# Patient Record
Sex: FEMALE | Race: WHITE | NOT HISPANIC OR LATINO | Employment: STUDENT | URBAN - METROPOLITAN AREA
[De-identification: names, ages, dates, MRNs, and addresses within clinical notes are randomized per-mention and may not be internally consistent; named-entity substitution may affect disease eponyms.]

---

## 2017-03-02 ENCOUNTER — OFFICE VISIT (OUTPATIENT)
Dept: OPHTHALMOLOGY | Facility: CLINIC | Age: 27
End: 2017-03-02

## 2017-03-02 DIAGNOSIS — H52.13 MYOPIA, BILATERAL: Primary | ICD-10-CM

## 2017-03-02 RX ORDER — SPIRONOLACTONE 50 MG/1
TABLET, FILM COATED ORAL
COMMUNITY
Start: 2017-02-26 | End: 2018-12-28

## 2017-03-02 ASSESSMENT — SLIT LAMP EXAM - LIDS
COMMENTS: NORMAL
COMMENTS: NORMAL

## 2017-03-02 ASSESSMENT — REFRACTION_MANIFEST
OD_CYLINDER: SPHERE
OS_SPHERE: -0.25
OD_SPHERE: -0.50
OS_CYLINDER: SPHERE

## 2017-03-02 ASSESSMENT — REFRACTION_WEARINGRX
SPECS_TYPE: SVL
OS_CYLINDER: SPHERE
OD_AXIS: 007
OD_SPHERE: -1.25
OD_CYLINDER: +0.25
OS_SPHERE: -0.75

## 2017-03-02 ASSESSMENT — VISUAL ACUITY
CORRECTION_TYPE: GLASSES
OD_CC: 20/20
OD_CC: J1+
OS_CC: J1+
METHOD: SNELLEN - LINEAR
OS_CC: 20/20

## 2017-03-02 ASSESSMENT — CONF VISUAL FIELD
OD_NORMAL: 1
OS_NORMAL: 1
METHOD: COUNTING FINGERS

## 2017-03-02 ASSESSMENT — REFRACTION
OS_SPHERE: -0.50
OD_SPHERE: -0.75

## 2017-03-02 ASSESSMENT — EXTERNAL EXAM - LEFT EYE: OS_EXAM: NORMAL

## 2017-03-02 ASSESSMENT — TONOMETRY
IOP_METHOD: ICARE
OD_IOP_MMHG: 17
OS_IOP_MMHG: 16

## 2017-03-02 ASSESSMENT — CUP TO DISC RATIO
OS_RATIO: 0.25
OD_RATIO: 0.25

## 2017-03-02 ASSESSMENT — EXTERNAL EXAM - RIGHT EYE: OD_EXAM: NORMAL

## 2017-03-02 NOTE — NURSING NOTE
Chief Complaints and History of Present Illnesses   Patient presents with     Annual Eye Exam     HPI    Affected eye(s):  Both   Symptoms:     No blurred vision      Frequency:  Constant       Do you have eye pain now?:  No      Comments:  Pt states not having any problems with vision. Only wears gls for driving. Pt last eye exam 3 years ago. No pain. No drops.    Jose Denton COT 11:18 AM March 2, 2017

## 2017-03-02 NOTE — MR AVS SNAPSHOT
After Visit Summary   3/2/2017    Debbie Bah    MRN: [id number]           Patient Information     Date Of Birth          1990        Visit Information        Provider Department      3/2/2017 11:00 AM Yvonne Renee OD [facility]        Today's Diagnoses     Myopia, bilateral    -  1       Follow-ups after your visit        Your next 10 appointments already scheduled     Mar 24, 2017 10:45 AM CDT   [facility] with HARJINDER Conn CNP   [facility] ([facility]    [address] [place] [id number] [phone]              Who to contact     Please call your clinic at [phone] to:    Ask questions about your health    Make or cancel appointments    Discuss your medicines    Learn about your test results    Speak to your doctor   If you have compliments or concerns about an experience at your clinic, or if you wish to file a complaint, please contact [facility] Patient Relations at [phone] or email us at [email or web address]         Additional Information About Your Visit        MyChart Information     [employer] gives you secure access to your electronic health record. If you see a primary care provider, you can also send messages to your care team and make appointments. If you have questions, please call your primary care clinic.  If you do not have a primary care provider, please call [phone] and they will assist you.      [employer] is an electronic gateway that provides easy, online access to your medical records. With [employer], you can request a clinic appointment, read your test results, renew a prescription or communicate with your care team.     To access your existing account, please contact your [facility] or call [phone] for assistance.        Care EveryWhere ID     This is your Care EveryWhere ID. This  could be used by other organizations to access your [facility] medical records  [id number]         Blood Pressure from Last 3 Encounters:   08/03/16 122/74   03/08/16 126/78    Weight from Last 3 Encounters:   08/03/16 100.6 kg (221 lb 12.8 oz)   03/08/16 101.2 kg (223 lb)              We Performed the Following     Refraction        Primary Care Provider    None Specified       No primary provider on file.        Thank you!     Thank you for choosing [facility]  for your care. Our goal is always to provide you with excellent care. Hearing back from our patients is one way we can continue to improve our services. Please take a few minutes to complete the written survey that you may receive in the mail after your visit with us. Thank you!             Your Updated Medication List - Protect others around you: Learn how to safely use, store and throw away your medicines at www.disposemymeds.org.          This list is accurate as of: 3/2/17 12:00 PM.  Always use your most recent med list.                   Brand Name Dispense Instructions for use    buPROPion 150 MG 24 hr tablet    WELLBUTRIN XL    180 tablet    Take 2 tablets (300 mg) by mouth every morning Please make an appointment to be seen prior to next script.       LORazepam 0.5 MG tablet    ATIVAN    8 tablet    Take 0.5-1 tablets (0.25-0.5 mg) by mouth every 8 hours as needed for anxiety Take 30 minutes prior to departure.  Do not operate a vehicle after taking this medication       spironolactone 50 MG tablet    ALDACTONE

## 2017-03-23 NOTE — PROGRESS NOTES
SUBJECTIVE:     CC: Debbie Bah is an 26 year old woman who presents for preventive health visit.   Answers for HPI/ROS submitted by the patient on 3/21/2017   Annual Exam:  Getting at least 3 servings of Calcium per day:: Yes  Bi-annual eye exam:: Yes  Dental care twice a year:: Yes  Sleep apnea or symptoms of sleep apnea:: Daytime drowsiness  Diet:: Vegetarian/vegan  Frequency of exercise:: 2-3 days/week  Taking medications regularly:: Yes  Medication side effects:: None  Additional concerns today:: YES  Q1: Little interest or pleasure in doing things: 0=Not at all  Q2: Feeling down, depressed or hopeless: 0=Not at all  PHQ-2 Score: 0  Duration of exercise:: 30-45 minutes      Depression Follow-Up (situational anxiety with flying)    Status since last visit: Improved     Increased Wellbutrin from 150-300 mg for depression symptoms in August - had not noticed difference at 150 mg.  Would like to taper    Feels other approac.  Going to [facility] - therapy, chiropractor, massage.  Exercising - currently 3 days of the week.  Ranges from 3-7 days and does bike for transportation . Improved diet - increased protein, more plant based, less dairy and less gluten.  No sugar. Chemistry of Renea recommendations, aromatherapy.  Seeing health .  Lost weight. Seeing dermtology    Other associated symptoms:None    Complicating factors:     Significant life event: No     Current substance abuse: None    PHQ-9 SCORE 6/30/2016 8/3/2016 3/24/2017   Total Score MyChart 6 (Mild depression) - -   Total Score - 9 3     TRAVIS-7 SCORE 3/8/2016   Total Score 1        PHQ-9  English      PHQ-9   Any Language     GAD7       Today's PHQ-2 Score:   PHQ-2 ( 1999 [employer]) 3/24/2017 3/21/2017   Q1: Little interest or pleasure in doing things 0 -   Q2: Feeling down, depressed or hopeless 1 -   PHQ-2 Score 1 -   Little interest or pleasure in doing things - Not at all   Feeling down, depressed or hopeless - Not at  all   PHQ-2 Score - 0       Abuse: Current or Past(Physical, Sexual or Emotional)- No  Do you feel safe in your environment - Yes    Social History   Substance Use Topics     Smoking status: Never Smoker     Smokeless tobacco: Not on file     Alcohol use 0.0 oz/week     0 Standard drinks or equivalent per week      Comment: 0-1 per week     The patient does not drink >3 drinks per day nor >7 drinks per week.    No results for input(s): CHOL, HDL, LDL, TRIG, CHOLHDLRATIO, NHDL in the last 00165 hours.    Reviewed orders with patient.  Reviewed health maintenance and updated orders accordingly - Yes    Mammo Decision Support:  Mammogram not appropriate for this patient based on age.    Pertinent mammograms are reviewed under the imaging tab.  History of abnormal Pap smear: NO - age 21-29 PAP every 3 years recommended    Reviewed and updated as needed this visit by clinical staff  Tobacco  Allergies  Meds  Med Hx  Surg Hx  Fam Hx  Soc Hx        Reviewed and updated as needed this visit by Provider        Past Medical History:   Diagnosis Date     GERD (gastroesophageal reflux disease)       Past Surgical History:   Procedure Laterality Date     NO HISTORY OF SURGERY       Wt Readings from Last 5 Encounters:   03/24/17 189 lb 4.8 oz (85.9 kg)   08/03/16 221 lb 12.8 oz (100.6 kg)   03/08/16 223 lb (101.2 kg)       ROS:  C: NEGATIVE for fever, chills, change in weight  I: NEGATIVE for worrisome rashes, moles or lesions  E: NEGATIVE for vision changes or irritation  ENT: NEGATIVE for ear, mouth and throat problems  R: NEGATIVE for significant cough or SOB  B: NEGATIVE for masses, tenderness or discharge  CV: NEGATIVE for chest pain, palpitations or peripheral edema  GI: NEGATIVE for nausea, abdominal pain, heartburn, or change in bowel habits.  Heartburn well controlled - laying down is the worst.  Would like to taper.  On for two years  : NEGATIVE for unusual urinary or vaginal symptoms. Periods are regular.  M:  "NEGATIVE for significant arthralgias or myalgia  N: NEGATIVE for weakness, dizziness or paresthesias  E: NEGATIVE for temperature intolerance, skin/hair changes  H: NEGATIVE for bleeding problems  P: NEGATIVE for changes in mood or affect    Problem list, Medication list, Allergies, and Medical/Social/Surgical histories reviewed in [facility] and updated as appropriate.  OBJECTIVE:     /80 (BP Location: Left arm, Patient Position: Chair, Cuff Size: Adult Regular)  Pulse 80  Temp 97.2  F (36.2  C) (Oral)  Ht 5' 4.76\" (1.645 m)  Wt 189 lb 4.8 oz (85.9 kg)  LMP 03/16/2017 (Exact Date)  SpO2 97%  BMI 31.73 kg/m2  EXAM:  GENERAL: healthy, alert and no distress  EYES: Eyes grossly normal to inspection, PERRL and conjunctivae and sclerae normal  HENT: ear canals and TM's normal, nose and mouth without ulcers or lesions  NECK: no adenopathy, no asymmetry, masses, or scars and thyroid normal to palpation  RESP: lungs clear to auscultation - no rales, rhonchi or wheezes  BREAST: normal without masses, tenderness or nipple discharge and no palpable axillary masses or adenopathy  CV: regular rate and rhythm, normal S1 S2, no S3 or S4, no murmur, click or rub, no peripheral edema and peripheral pulses strong  ABDOMEN: soft, nontender, no hepatosplenomegaly, no masses and bowel sounds normal  MS: no gross musculoskeletal defects noted, no edema  SKIN: no suspicious lesions or rashes  NEURO: Normal strength and tone, mentation intact and speech normal  PSYCH: mentation appears normal, affect normal/bright  LYMPH: no cervical, supraclavicular, axillary adenopathy    ASSESSMENT/PLAN:     (Z00.01) Encounter for routine adult medical exam with abnormal findings  (primary encounter diagnosis)  Comment:   Plan: Declining pap smear to lifetime no intimate contact.  Contracted for age 32 if not sexually active before this.    (F33.0) Major depressive disorder, recurrent episode, mild (H)  Comment:   Plan: buPROPion (WELLBUTRIN XL) " "150 MG 24 hr tablet        Extremely diligent effort toward non-pharmacological depression treatment. Very much improved and ready to taper.  Discussed reasonable taper strategy and bolstering non-medication approaches as she tapers.    (F41.8) Situational anxiety  Comment:   Plan:     (K21.9) Gastroesophageal reflux disease without esophagitis  Comment:   Plan: Improved.  Would like to discontinue.  Decrease omeprazole to every other day and can add on zantac if needed    COUNSELING:   Reviewed preventive health counseling, as reflected in patient instructions     reports that she has never smoked. She does not have any smokeless tobacco history on file.    Estimated body mass index is 31.73 kg/(m^2) as calculated from the following:    Height as of this encounter: 5' 4.76\" (1.645 m).    Weight as of this encounter: 189 lb 4.8 oz (85.9 kg).   Weight management plan: Discussed healthy diet and exercise guidelines and patient will follow up in 12 months in clinic to re-evaluate.    Counseling Resources:  ATP IV Guidelines  Pooled Cohorts Equation Calculator  Breast Cancer Risk Calculator  FRAX Risk Assessment  ICSI Preventive Guidelines  Dietary Guidelines for Americans, 2010  USDA's MyPlate  ASA Prophylaxis  Lung CA Screening    HARJINDER Vasquez [facility]  "

## 2017-03-23 NOTE — PATIENT INSTRUCTIONS
Reduce Wellbutrin to 150 mg daily for 2 weeks and then let me know how it's going and we will think about decreasing to 75 mg at that point.    Decrease omeprazole to every other day  If this is successful, then stop altogether after two weeks  If increase in symptoms, start zantac 150 mg daily or twice a day  If we are unable to get you off the omeprazole, I'll have you work with pharmacist to develop plan    Preventive Health Recommendations  Female Ages 18 to 25     Yearly exam:     See your health care provider every year in order to  o Review health changes.   o Discuss preventive care.    o Review your medicines if your doctor has prescribed any.      You should be tested each year for STDs (sexually transmitted diseases).       After age 20, talk to your provider about how often you should have cholesterol testing.      Starting at age 21, get a Pap test every three years. If you have an abnormal result, your doctor may have you test more often.      If you are at risk for diabetes, you should have a diabetes test (fasting glucose).     Shots:     Get a flu shot each year.     Get a tetanus shot every 10 years.     Consider getting the shot (vaccine) that prevents cervical cancer (Gardasil).    Nutrition:     Eat at least 5 servings of fruits and vegetables each day.    Eat whole-grain bread, whole-wheat pasta and brown rice instead of white grains and rice.    Talk to your provider about Calcium and Vitamin D.     Lifestyle    Exercise at least 150 minutes a week each week (30 minutes a day, 5 days a week). This will help you control your weight and prevent disease.    Limit alcohol to one drink per day.    No smoking.     Wear sunscreen to prevent skin cancer.    See your dentist every six months for an exam and cleaning.

## 2017-03-24 ENCOUNTER — OFFICE VISIT (OUTPATIENT)
Dept: FAMILY MEDICINE | Facility: CLINIC | Age: 27
End: 2017-03-24
Payer: COMMERCIAL

## 2017-03-24 VITALS
BODY MASS INDEX: 31.54 KG/M2 | HEART RATE: 80 BPM | DIASTOLIC BLOOD PRESSURE: 80 MMHG | TEMPERATURE: 97.2 F | OXYGEN SATURATION: 97 % | SYSTOLIC BLOOD PRESSURE: 123 MMHG | HEIGHT: 65 IN | WEIGHT: 189.3 LBS

## 2017-03-24 DIAGNOSIS — Z00.01 ENCOUNTER FOR ROUTINE ADULT MEDICAL EXAM WITH ABNORMAL FINDINGS: Primary | ICD-10-CM

## 2017-03-24 DIAGNOSIS — F33.0 MAJOR DEPRESSIVE DISORDER, RECURRENT EPISODE, MILD (H): ICD-10-CM

## 2017-03-24 DIAGNOSIS — K21.9 GASTROESOPHAGEAL REFLUX DISEASE WITHOUT ESOPHAGITIS: ICD-10-CM

## 2017-03-24 DIAGNOSIS — F41.8 SITUATIONAL ANXIETY: ICD-10-CM

## 2017-03-24 PROCEDURE — 99395 PREV VISIT EST AGE 18-39: CPT | Performed by: NURSE PRACTITIONER

## 2017-03-24 PROCEDURE — 99213 OFFICE O/P EST LOW 20 MIN: CPT | Mod: 25 | Performed by: NURSE PRACTITIONER

## 2017-03-24 RX ORDER — BUPROPION HYDROCHLORIDE 150 MG/1
150 TABLET ORAL EVERY MORNING
Qty: 30 TABLET | Refills: 0 | Status: SHIPPED | OUTPATIENT
Start: 2017-03-24 | End: 2017-04-12

## 2017-03-24 NOTE — MR AVS SNAPSHOT
After Visit Summary   3/24/2017    Debbie Bah    MRN: [id number]           Patient Information     Date Of Birth          1990        Visit Information        Provider Department      3/24/2017 10:45 AM Sarahi Santos APRN [facility]        Today's Diagnoses     Encounter for routine adult medical exam with abnormal findings    -  1    Major depressive disorder, recurrent episode, mild (H)        Situational anxiety        Gastroesophageal reflux disease without esophagitis          Care Instructions    Reduce Wellbutrin to 150 mg daily for 2 weeks and then let me know how it's going and we will think about decreasing to 75 mg at that point.    Decrease omeprazole to every other day  If this is successful, then stop altogether after two weeks  If increase in symptoms, start zantac 150 mg daily or twice a day  If we are unable to get you off the omeprazole, I'll have you work with pharmacist to develop plan    Preventive Health Recommendations  Female Ages 18 to 25     Yearly exam:     See your health care provider every year in order to  o Review health changes.   o Discuss preventive care.    o Review your medicines if your doctor has prescribed any.      You should be tested each year for STDs (sexually transmitted diseases).       After age 20, talk to your provider about how often you should have cholesterol testing.      Starting at age 21, get a Pap test every three years. If you have an abnormal result, your doctor may have you test more often.      If you are at risk for diabetes, you should have a diabetes test (fasting glucose).     Shots:     Get a flu shot each year.     Get a tetanus shot every 10 years.     Consider getting the shot (vaccine) that prevents cervical cancer (Gardasil).    Nutrition:     Eat at least 5 servings of fruits and vegetables each day.    Eat whole-grain bread, whole-wheat pasta and brown rice instead of white grains and  "rice.    Talk to your provider about Calcium and Vitamin D.     Lifestyle    Exercise at least 150 minutes a week each week (30 minutes a day, 5 days a week). This will help you control your weight and prevent disease.    Limit alcohol to one drink per day.    No smoking.     Wear sunscreen to prevent skin cancer.    See your dentist every six months for an exam and cleaning.        Follow-ups after your visit        Who to contact     If you have questions or need follow up information about today's clinic visit or your schedule please contact [facility] directly at [phone].  Normal or non-critical lab and imaging results will be communicated to you by [employer], letter or phone within 4 business days after the clinic has received the results. If you do not hear from us within 7 days, please contact the clinic through "[employer]t or phone. If you have a critical or abnormal lab result, we will notify you by phone as soon as possible.  Submit refill requests through [employer] or call your pharmacy and they will forward the refill request to us. Please allow 3 business days for your refill to be completed.          Additional Information About Your Visit        MyChart Information     [employer] gives you secure access to your electronic health record. If you see a primary care provider, you can also send messages to your care team and make appointments. If you have questions, please call your primary care clinic.  If you do not have a primary care provider, please call [phone] and they will assist you.        Care EveryWhere ID     This is your Care EveryWhere ID. This could be used by other organizations to access your [facility] medical records  [id number]        Your Vitals Were     Pulse Temperature Height Last Period Pulse Oximetry BMI (Body Mass Index)    80 97.2  F (36.2  C) (Oral) 5' 4.76\" (1.645 m) 03/16/2017 (Exact Date) 97% 31.73 kg/m2       Blood Pressure from Last 3 Encounters:   03/24/17 " 123/80   08/03/16 122/74   03/08/16 126/78    Weight from Last 3 Encounters:   03/24/17 189 lb 4.8 oz (85.9 kg)   08/03/16 221 lb 12.8 oz (100.6 kg)   03/08/16 223 lb (101.2 kg)              Today, you had the following     No orders found for display         Today's Medication Changes          These changes are accurate as of: 3/24/17 11:48 AM.  If you have any questions, ask your nurse or doctor.               These medicines have changed or have updated prescriptions.        Dose/Directions    buPROPion 150 MG 24 hr tablet   Commonly known as:  WELLBUTRIN XL   This may have changed:    - how much to take  - additional instructions   Used for:  Major depressive disorder, recurrent episode, mild (H)   Changed by:  Sarahi Santos APRN CNP        Dose:  150 mg   Take 1 tablet (150 mg) by mouth every morning   Quantity:  30 tablet   Refills:  0            Where to get your medicines      These medications were sent to [facility] [id number] [place], [place] - [address] [place] [address]  [address] [place] [id number]     Phone:  [phone]     buPROPion 150 MG 24 hr tablet                Primary Care Provider    None Specified       No primary provider on file.        Thank you!     Thank you for choosing [facility]  for your care. Our goal is always to provide you with excellent care. Hearing back from our patients is one way we can continue to improve our services. Please take a few minutes to complete the written survey that you may receive in the mail after your visit with us. Thank you!             Your Updated Medication List - Protect others around you: Learn how to safely use, store and throw away your medicines at www.disposemymeds.org.          This list is accurate as of: 3/24/17 11:48 AM.  Always use your most recent med list.                   Brand Name Dispense Instructions for use    buPROPion 150 MG 24 hr tablet    WELLBUTRIN XL    30 tablet    Take 1 tablet (150 mg) by  mouth every morning       LORazepam 0.5 MG tablet    ATIVAN    8 tablet    Take 0.5-1 tablets (0.25-0.5 mg) by mouth every 8 hours as needed for anxiety Take 30 minutes prior to departure.  Do not operate a vehicle after taking this medication       spironolactone 50 MG tablet    ALDACTONE

## 2017-03-24 NOTE — NURSING NOTE
"Chief Complaint   Patient presents with     Physical       Initial /80 (BP Location: Left arm, Patient Position: Chair, Cuff Size: Adult Regular)  Pulse 80  Temp 97.2  F (36.2  C) (Oral)  Ht 5' 4.76\" (1.645 m)  Wt 189 lb 4.8 oz (85.9 kg)  LMP 03/16/2017 (Exact Date)  SpO2 97%  BMI 31.73 kg/m2 Estimated body mass index is 31.73 kg/(m^2) as calculated from the following:    Height as of this encounter: 5' 4.76\" (1.645 m).    Weight as of this encounter: 189 lb 4.8 oz (85.9 kg).  Medication Reconciliation: complete     Macho Dotson MA      "

## 2017-03-25 ASSESSMENT — PATIENT HEALTH QUESTIONNAIRE - PHQ9: SUM OF ALL RESPONSES TO PHQ QUESTIONS 1-9: 3

## 2017-03-30 ENCOUNTER — MYC MEDICAL ADVICE (OUTPATIENT)
Dept: FAMILY MEDICINE | Facility: CLINIC | Age: 27
End: 2017-03-30

## 2017-03-30 DIAGNOSIS — F33.0 MAJOR DEPRESSIVE DISORDER, RECURRENT EPISODE, MILD (H): ICD-10-CM

## 2017-03-30 DIAGNOSIS — F41.8 SITUATIONAL ANXIETY: Primary | ICD-10-CM

## 2017-03-31 RX ORDER — PROPRANOLOL HYDROCHLORIDE 10 MG/1
10-20 TABLET ORAL 2 TIMES DAILY PRN
Qty: 30 TABLET | Refills: 1 | Status: SHIPPED | OUTPATIENT
Start: 2017-03-31 | End: 2019-02-13

## 2017-03-31 NOTE — TELEPHONE ENCOUNTER
Erika,   Please see [employer] message below.    Verenice Nails RN  [facility]

## 2017-04-01 ENCOUNTER — MYC MEDICAL ADVICE (OUTPATIENT)
Dept: FAMILY MEDICINE | Facility: CLINIC | Age: 27
End: 2017-04-01

## 2017-04-03 NOTE — TELEPHONE ENCOUNTER
Erika,   Please see [employer]GT message below in response to your message to pt. See encounter dated 3/30/17.    Verenice Nails RN  [facility]

## 2017-04-05 NOTE — TELEPHONE ENCOUNTER
Erika,  Please see [employer] message below.    Verenice Nails RN  [facility]

## 2017-04-12 ENCOUNTER — OFFICE VISIT (OUTPATIENT)
Dept: PHARMACY | Facility: CLINIC | Age: 27
End: 2017-04-12
Payer: COMMERCIAL

## 2017-04-12 DIAGNOSIS — E63.9 NUTRITIONAL DEFICIENCY: ICD-10-CM

## 2017-04-12 DIAGNOSIS — R21 RASH AND NONSPECIFIC SKIN ERUPTION: ICD-10-CM

## 2017-04-12 DIAGNOSIS — F33.0 MAJOR DEPRESSIVE DISORDER, RECURRENT EPISODE, MILD (H): Primary | ICD-10-CM

## 2017-04-12 DIAGNOSIS — F41.8 SITUATIONAL ANXIETY: ICD-10-CM

## 2017-04-12 DIAGNOSIS — K21.9 GASTROESOPHAGEAL REFLUX DISEASE WITHOUT ESOPHAGITIS: ICD-10-CM

## 2017-04-12 PROCEDURE — 99605 MTMS BY PHARM NP 15 MIN: CPT | Performed by: PHARMACIST

## 2017-04-12 PROCEDURE — 99607 MTMS BY PHARM ADDL 15 MIN: CPT | Performed by: PHARMACIST

## 2017-04-12 RX ORDER — BUPROPION HYDROCHLORIDE 75 MG/1
TABLET ORAL
Qty: 90 TABLET | Refills: 1 | Status: SHIPPED | OUTPATIENT
Start: 2017-04-12 | End: 2017-05-10

## 2017-04-12 RX ORDER — MULTIPLE VITAMINS W/ MINERALS TAB 9MG-400MCG
1 TAB ORAL DAILY
COMMUNITY
End: 2021-09-20

## 2017-04-12 RX ORDER — ADAPALENE 0.1 G/100G
CREAM TOPICAL AT BEDTIME
COMMUNITY
End: 2020-02-12

## 2017-04-12 RX ORDER — MULTIVIT-MIN/IRON/FOLIC ACID/K 18-600-40
2 CAPSULE ORAL
COMMUNITY
End: 2021-06-18

## 2017-04-12 RX ORDER — IVERMECTIN 10 MG/G
1 CREAM TOPICAL DAILY
COMMUNITY
End: 2018-04-18

## 2017-04-12 RX ORDER — OMEGA-3-ACID ETHYL ESTERS 1 G/1
2 CAPSULE, LIQUID FILLED ORAL 2 TIMES DAILY
COMMUNITY
End: 2021-06-18

## 2017-04-12 NOTE — MR AVS SNAPSHOT
After Visit Summary   4/12/2017    Debbie Bah    MRN: [id number]           Patient Information     Date Of Birth          1990        Visit Information        Provider Department      4/12/2017 11:00 AM Anny Blood [facility] Primary Care MTM        Today's Diagnoses     Major depressive disorder, recurrent episode, mild (H)    -  1      Care Instructions    Recommendations from today's MTM visit:                                                    MTM (medication therapy management) is a service provided by a clinical pharmacist designed to help you get the most of out of your medicines.     1. Stop the bupropion XL 150mg. Start bupropion 150mg in the morning and 75mg at night.     Next MTM visit: I'll MyChart you next week.     To schedule another MTM appointment, please call the clinic directly or you may call the MTM scheduling line at [phone] or toll-free at [phone].     My Clinical Pharmacist's contact information:                                                      It was a pleasure seeing you today!  Please feel free to contact me with any questions or concerns you have.      Anny Blood, PharmD  Medication Therapy Management Pharmacist  Pager: [phone]    You may receive a survey about the MTM services you received.  I would appreciate your feedback to help me serve you better in the future. Please fill it out and return it when you can. Your comments will be anonymous.                  Follow-ups after your visit        Who to contact     If you have questions or need follow up information about today's clinic visit or your schedule please contact [facility] PRIMARY CARE MTM directly at [phone].  Normal or non-critical lab and imaging results will be communicated to you by MyChart, letter or phone within 4 business days after the clinic has received the results. If you do not hear from us within 7 days,  please contact the clinic through [employer] or phone. If you have a critical or abnormal lab result, we will notify you by phone as soon as possible.  Submit refill requests through [employer] or call your pharmacy and they will forward the refill request to us. Please allow 3 business days for your refill to be completed.          Additional Information About Your Visit        [employer] Information     [employer] gives you secure access to your electronic health record. If you see a primary care provider, you can also send messages to your care team and make appointments. If you have questions, please call your primary care clinic.  If you do not have a primary care provider, please call [phone] and they will assist you.        Care EveryWhere ID     This is your Care EveryWhere ID. This could be used by other organizations to access your [place] medical records  [id number]        Your Vitals Were     Last Period                   03/16/2017 (Exact Date)            Blood Pressure from Last 3 Encounters:   03/24/17 123/80   08/03/16 122/74   03/08/16 126/78    Weight from Last 3 Encounters:   03/24/17 189 lb 4.8 oz (85.9 kg)   08/03/16 221 lb 12.8 oz (100.6 kg)   03/08/16 223 lb (101.2 kg)              Today, you had the following     No orders found for display         Today's Medication Changes          These changes are accurate as of: 4/12/17 11:37 AM.  If you have any questions, ask your nurse or doctor.               Start taking these medicines.        Dose/Directions    buPROPion 75 MG tablet   Commonly known as:  WELLBUTRIN   Used for:  Major depressive disorder, recurrent episode, mild (H)   Replaces:  buPROPion 150 MG 24 hr tablet        Take 150mg in the morning and 75mg at night   Quantity:  90 tablet   Refills:  1         Stop taking these medicines if you haven't already. Please contact your care team if you have questions.     buPROPion 150 MG 24 hr tablet   Commonly known as:  WELLBUTRIN XL   Replaced  by:  buPROPion 75 MG tablet                Where to get your medicines      These medications were sent to [facility] [id number] [facility] - [place], [place] - [address]  [address] [facility] [address] [address]     Phone:  [phone]     buPROPion 75 MG tablet                Primary Care Provider    None Specified       No primary provider on file.        Thank you!     Thank you for choosing [facility] PRIMARY CARE [facility]  for your care. Our goal is always to provide you with excellent care. Hearing back from our patients is one way we can continue to improve our services. Please take a few minutes to complete the written survey that you may receive in the mail after your visit with us. Thank you!             Your Updated Medication List - Protect others around you: Learn how to safely use, store and throw away your medicines at www.disposemymeds.org.          This list is accurate as of: 4/12/17 11:37 AM.  Always use your most recent med list.                   Brand Name Dispense Instructions for use    adapalene 0.1 % cream    DIFFERIN     Apply topically At Bedtime       B COMPLEX-FOLIC ACID PO      Take 1 tablet by mouth       buPROPion 75 MG tablet    WELLBUTRIN    90 tablet    Take 150mg in the morning and 75mg at night       Multi-vitamin Tabs tablet      Take 1 tablet by mouth daily       omega-3 acid ethyl esters 1 G capsule    Lovaza     Take 2 g by mouth 2 times daily       PROBIOTIC ACIDOPHILUS Tabs      Take 1 tablet by mouth       propranolol 10 MG tablet    INDERAL    30 tablet    Take 1-2 tablets (10-20 mg) by mouth 2 times daily as needed for situational anxiety       SOOLANTRA 1 % cream   Generic drug:  ivermectin      Apply 1 g topically daily Apply to the affected areas of the face once daily. Use a pea-size amount for each area of the face (forehead, chin, nose, each cheek) that is affected. Spread as a thin layer, avoiding the eyes and lips.       spironolactone 50 MG  tablet    ALDACTONE         vitamin D 2000 UNITS Caps      Take 200 capsules by mouth

## 2017-04-12 NOTE — PATIENT INSTRUCTIONS
Recommendations from today's MTM visit:                                                    MTM (medication therapy management) is a service provided by a clinical pharmacist designed to help you get the most of out of your medicines.     1. Stop the bupropion XL 150mg. Start bupropion 150mg in the morning and 75mg at night.     Next MTM visit: I'll MyChart you next week.     To schedule another MTM appointment, please call the clinic directly or you may call the MTM scheduling line at [phone] or toll-free at [phone].     My Clinical Pharmacist's contact information:                                                      It was a pleasure seeing you today!  Please feel free to contact me with any questions or concerns you have.      Anny Blood, PharmD  Medication Therapy Management Pharmacist  Pager: [phone]    You may receive a survey about the MTM services you received.  I would appreciate your feedback to help me serve you better in the future. Please fill it out and return it when you can. Your comments will be anonymous.

## 2017-04-12 NOTE — PROGRESS NOTES
SUBJECTIVE/OBJECTIVE:                                                    Debbie Bah is a 26 year old female coming in for an initial visit for Medication Therapy Management.  She was referred to me from Erika Santos.     Chief Complaint: Weaning off the Wellbutrin if possible.    Allergies/ADRs: Reviewed in Epic  Tobacco: No tobacco use   Alcohol: 1-3 beverages / week  PMH: Reviewed in Epic    Medication Adherence: no issues reported    Depression/Anxiety:  Current medications include: Bupropion XL 300mg alternating with 150mg once daily. Cut back from 300mg to 150mg once daily and felt very depressed and unmotivated. Then increased to the alternating 300mg and 150mg. Feels that her gas tank has run out when she is about to take the 2 tablets. Feels that her mood is better on the days where she is taking the 2 tablets.  3/25 went to 150XL, did that for 6 days before she realized that she was having issues with both what she thinks was withdrawal from the Wellbutrin and increased depression. Felt little motivation.   Has propranolol on-hand for flights for anxiety (hasn't needed yet).  PHQ-9 SCORE 6/30/2016 8/3/2016 3/24/2017   Total Score MyChart 6 (Mild depression) - -   Total Score - 9 3     GERD: Current medications include: Prilosec (omeprazole) 20mg every third day. Pt c/o no current symptoms.  Patient feels that current regimen is effective. She is self weaning off the Prilosec on her own with no problems.     Skin issues:  Working with derm for acne and rosacea. Has been prescribed Differin cream, Soolantra cream. Also taking spironolactone 50mg daily. She thinks these are working well, though she doesn't believe that she has rosacea. She doesn't have any issues with side effects.     Supplements: Currently taking vitamin D 4,000IU daily, B complex with folic acid daily, omega 3 daily, daily MVI. She would like to keep taking these if possible. Wondering if there's anything else she should be taking.  Asking today about Josiah-E.     Current labs include:  BP Readings from Last 3 Encounters:   03/24/17 123/80   08/03/16 122/74   03/08/16 126/78     TSH   Date Value Ref Range Status   03/08/2016 1.39 0.40 - 4.00 mU/L Final     Most Recent Immunizations   Administered Date(s) Administered     Human Papilloma Virus 08/03/2016     ASSESSMENT:                                                       Current medications were reviewed today.     Medication Adherence: no issues identified    Depression/Anxiety: Needs improvement. Would benefit pt to start on a regimen that is more stable while titrating downward. Should change to Wellbutrin 225mg daily - should separate out so titration is easier.     GERD: Stable. Pt self titrating on her own.     Skin issues:  Stable. Working with derm.    Supplements: Stable. Efficacy of Josiah-E is low - could consider this in the future if being off of Wellbutrin is tolerable however not optimal.     PLAN:                                                      1. Change Wellbutrin to 150mg in the AM and 75mg in the evening daily.    I spent 45 minutes with this patient today.  All changes were made via collaborative practice agreement with No primary care provider on file.. A copy of the visit note was provided to the patient's primary care provider.    Will follow up in 1 week via [employer].    The patient was sent via [employer] a summary of these recommendations as an after visit summary.     Anny Blood, PharmD  Medication Therapy Management Pharmacist  Pager: [phone]

## 2017-04-17 ENCOUNTER — TELEPHONE (OUTPATIENT)
Dept: FAMILY MEDICINE | Facility: CLINIC | Age: 27
End: 2017-04-17

## 2017-04-17 NOTE — TELEPHONE ENCOUNTER
Panel Management Review      Patient has the following on her problem list: None      Composite cancer screening  Chart review shows that this patient is due/due soon for the following Pap Smear  Summary:    Patient is due/failing the following:   PAP    Action needed:   Patient needs office visit for pap smear.    Type of outreach:    Sent [employer] message. and Sent letter.    Questions for provider review:    None                                                                                                                                    Macho Dotson MA     Chart routed to none.

## 2017-04-17 NOTE — LETTER
[facility]  [address] [place] [address]  [facility] [address]  [phone]        April 17, 2017      Debbie Bah  [address] [facility] [address]          Dear Debbie,    In order to ensure we are providing the best quality care, we have reviewed your chart and see that you are due for:    1. Pap smear    Please call the clinic at your earliest convenience to schedule an appointment.  If you have completed these please contact our office via phone or [employer] to update our records.  We would like to know the date (approximately month and year), the result, and ideally where the procedure was performed.    Thank you for trusting us with your health care.      Sincerely,    Care Team for MARCOS Stark

## 2017-06-30 ENCOUNTER — MYC MEDICAL ADVICE (OUTPATIENT)
Dept: FAMILY MEDICINE | Facility: CLINIC | Age: 27
End: 2017-06-30

## 2017-07-05 ENCOUNTER — MYC MEDICAL ADVICE (OUTPATIENT)
Dept: FAMILY MEDICINE | Facility: CLINIC | Age: 27
End: 2017-07-05

## 2017-07-05 DIAGNOSIS — F33.41 RECURRENT MAJOR DEPRESSIVE DISORDER, IN PARTIAL REMISSION (H): ICD-10-CM

## 2017-07-05 RX ORDER — BUPROPION HYDROCHLORIDE 75 MG/1
75 TABLET ORAL DAILY
Qty: 30 TABLET | Refills: 1 | Status: SHIPPED | OUTPATIENT
Start: 2017-07-05 | End: 2017-09-08

## 2017-07-05 NOTE — TELEPHONE ENCOUNTER
Bupropion 75MG       Last Written Prescription Date: 6/7/17  Last Fill Quantity: 30; # refills: 0  Last Office Visit with [facility], [facility] or [facility] prescribing provider:  3/24/17   Next 5 appointments (look out 90 days)     Jul 20, 2017 11:45 AM CDT   Griffin Boucher with HARJINDER Conn CNP   [facility] ([facility]    [address] [place] [id number] [phone]                   Last PHQ-9 score on record=   PHQ-9 SCORE 3/24/2017   Total Score MyChart -   Total Score 3       No results found for: AST  No results found for: ALT

## 2017-07-05 NOTE — TELEPHONE ENCOUNTER
Prescription approved per [facility] Refill Protocol.    Lia Chadwick RN

## 2017-07-19 NOTE — PROGRESS NOTES
SUBJECTIVE:                                                    Debbie Bah is a 26 year old female who presents to clinic today for the following health issues:    Hip Pain    Onset: Around December when restarted running    Description:   Location: right hip  Character: Nerve pain, sharp pain when turning. Only hurt when moving, working out, or in motion.     Intensity: moderate, severe - not able to run, starting to hurt with walking    Progression of Symptoms: same    Accompanying Signs & Symptoms:  Other symptoms: radiation of pain to the back to the thigh    History:   Previous similar pain: no       Precipitating factors:   Trauma or overuse: no     Alleviating factors:  Improved by: rest/inactivity    Therapies Tried and outcome: Seeing chiropractor and didn't seem to help either.        Problem list and histories reviewed & adjusted, as indicated.  Additional history: as documented    Reviewed and updated as needed this visit by clinical staff     Reviewed and updated as needed this visit by Provider         ROS:  C: NEGATIVE for fever, chills, change in weight  E/M: NEGATIVE for ear, mouth and throat problems  R: NEGATIVE for significant cough or SOB  CV: NEGATIVE for chest pain, palpitations or peripheral edema    OBJECTIVE:     /78  Pulse 75  Temp 98.1  F (36.7  C) (Oral)  Wt 180 lb (81.6 kg)  LMP 06/30/2017 (Exact Date)  SpO2 97%  BMI 30.17 kg/m2  Body mass index is 30.17 kg/(m^2).  GENERAL: healthy, alert and no distress  NECK: no adenopathy, no asymmetry, masses, or scars and thyroid normal to palpation  RESP: lungs clear to auscultation - no rales, rhonchi or wheezes  CV: regular rate and rhythm, normal S1 S2, no S3 or S4, no murmur, click or rub, no peripheral edema and peripheral pulses strong  MS: RLE exam shows normal strength and muscle mass, no deformities, no erythema, induration, or nodules, ROM of all joints is normal and no evidence of joint instability    Diagnostic  Test Results:  none     ASSESSMENT/PLAN:       (M25.551) Hip pain, right  (primary encounter diagnosis)  Comment:   Plan: [facility] REFERR[facility]            (M53.3) Sacroiliac joint dysfunction  Comment:   Plan: [facility] REFERRAL            HARJINDER Vasquez [facility]

## 2017-07-20 ENCOUNTER — OFFICE VISIT (OUTPATIENT)
Dept: FAMILY MEDICINE | Facility: CLINIC | Age: 27
End: 2017-07-20
Payer: COMMERCIAL

## 2017-07-20 VITALS
HEART RATE: 75 BPM | SYSTOLIC BLOOD PRESSURE: 120 MMHG | TEMPERATURE: 98.1 F | DIASTOLIC BLOOD PRESSURE: 78 MMHG | OXYGEN SATURATION: 97 % | BODY MASS INDEX: 30.17 KG/M2 | WEIGHT: 180 LBS

## 2017-07-20 DIAGNOSIS — M53.3 SACROILIAC JOINT DYSFUNCTION: ICD-10-CM

## 2017-07-20 DIAGNOSIS — M25.551 HIP PAIN, RIGHT: Primary | ICD-10-CM

## 2017-07-20 PROCEDURE — 99213 OFFICE O/P EST LOW 20 MIN: CPT | Performed by: NURSE PRACTITIONER

## 2017-07-20 NOTE — MR AVS SNAPSHOT
After Visit Summary   7/20/2017    Debbie Bah    MRN: [id number]           Patient Information     Date Of Birth          1990        Visit Information        Provider Department      7/20/2017 11:45 AM Sarahi Santos APRN CNP [facility]        Today's Diagnoses     Hip pain, right    -  1    Sacroiliac joint dysfunction          Care Instructions    I have heard good things about [facility]           Follow-ups after your visit        Additional Services     [facility] REFERRAL       **This order will print in the [facility] Scheduling Office**    Physical Therapy, Hand Therapy and Chiropractic Care are available through:    *[facility]  *[facility]  *[facility]    Call one number to schedule at any of the above locations: [phone].    Your provider has referred you to: Physical Therapy at [facility] or [facility]    Indication/Reason for Referral: Hip Pain  Onset of Illness: Dec 2016  Therapy Orders: Evaluate and Treat  Special Programs: None  Special Request: None    Emre Andrea      Additional Comments for the Therapist or Chiropractor:     Please be aware that coverage of these services is subject to the terms and limitations of your health insurance plan.  Call member services at your health plan with any benefit or coverage questions.      Please bring the following to your appointment:    *Your personal calendar for scheduling future appointments  *Comfortable clothing                  Who to contact     If you have questions or need follow up information about today's clinic visit or your schedule please contact [facility] directly at [phone].  Normal or non-critical lab and imaging results will be communicated to you by MyChart, letter or phone within 4 business days after the clinic has received the results. If you do not hear from us within 7 days,  please contact the clinic through [employer] or phone. If you have a critical or abnormal lab result, we will notify you by phone as soon as possible.  Submit refill requests through [employer] or call your pharmacy and they will forward the refill request to us. Please allow 3 business days for your refill to be completed.          Additional Information About Your Visit        [employer] Information     [employer] gives you secure access to your electronic health record. If you see a primary care provider, you can also send messages to your care team and make appointments. If you have questions, please call your primary care clinic.  If you do not have a primary care provider, please call [phone] and they will assist you.        Care EveryWhere ID     This is your Care EveryWhere ID. This could be used by other organizations to access your [place] medical records  [id number]        Your Vitals Were     Pulse Temperature Last Period Pulse Oximetry BMI (Body Mass Index)       75 98.1  F (36.7  C) (Oral) 06/30/2017 (Exact Date) 97% 30.17 kg/m2        Blood Pressure from Last 3 Encounters:   07/20/17 120/78   03/24/17 123/80   08/03/16 122/74    Weight from Last 3 Encounters:   07/20/17 180 lb (81.6 kg)   03/24/17 189 lb 4.8 oz (85.9 kg)   08/03/16 221 lb 12.8 oz (100.6 kg)              We Performed the Following     [facility] REFERRAL        Primary Care Provider    None Specified       No primary provider on file.        Equal Access to Services     GRISELDA PICKETT : Hademani Christie, wabrittnida coleman, qaybta kaalmadamien ramirez . So [facility] [phone].    ATENCIÓN: Si habla español, tiene a rojas disposición servicios gratuitos de asistencia lingüística. Llame al [phone].    We comply with applicable federal civil rights laws and [place] laws. We do not discriminate on the basis of race, color, national origin, age, disability sex, sexual  orientation or gender identity.            Thank you!     Thank you for choosing [facility]  for your care. Our goal is always to provide you with excellent care. Hearing back from our patients is one way we can continue to improve our services. Please take a few minutes to complete the written survey that you may receive in the mail after your visit with us. Thank you!             Your Updated Medication List - Protect others around you: Learn how to safely use, store and throw away your medicines at www.disposemymeds.org.          This list is accurate as of: 7/20/17 12:27 PM.  Always use your most recent med list.                   Brand Name Dispense Instructions for use Diagnosis    adapalene 0.1 % cream    DIFFERIN     Apply topically At Bedtime        B COMPLEX-FOLIC ACID PO      Take 1 tablet by mouth        buPROPion 75 MG tablet    WELLBUTRIN    30 tablet    Take 1 tablet (75 mg) by mouth daily    Recurrent major depressive disorder, in partial remission (H)       Multi-vitamin Tabs tablet      Take 1 tablet by mouth daily        omega-3 acid ethyl esters 1 G capsule    Lovaza     Take 2 g by mouth 2 times daily        PROBIOTIC ACIDOPHILUS Tabs      Take 1 tablet by mouth        propranolol 10 MG tablet    INDERAL    30 tablet    Take 1-2 tablets (10-20 mg) by mouth 2 times daily as needed for situational anxiety    Situational anxiety       SOOLANTRA 1 % cream   Generic drug:  ivermectin      Apply 1 g topically daily Apply to the affected areas of the face once daily. Use a pea-size amount for each area of the face (forehead, chin, nose, each cheek) that is affected. Spread as a thin layer, avoiding the eyes and lips.        spironolactone 50 MG tablet    ALDACTONE          vitamin D 2000 UNITS Caps      Take 200 capsules by mouth

## 2017-07-31 ENCOUNTER — MYC MEDICAL ADVICE (OUTPATIENT)
Dept: FAMILY MEDICINE | Facility: CLINIC | Age: 27
End: 2017-07-31

## 2017-07-31 DIAGNOSIS — G47.19 EXCESSIVE DAYTIME SLEEPINESS: Primary | ICD-10-CM

## 2017-08-01 ENCOUNTER — THERAPY VISIT (OUTPATIENT)
Dept: PHYSICAL THERAPY | Facility: CLINIC | Age: 27
End: 2017-08-01
Payer: COMMERCIAL

## 2017-08-01 DIAGNOSIS — M25.551 HIP PAIN, RIGHT: Primary | ICD-10-CM

## 2017-08-01 PROCEDURE — 97530 THERAPEUTIC ACTIVITIES: CPT | Mod: GP | Performed by: PHYSICAL THERAPIST

## 2017-08-01 PROCEDURE — 97161 PT EVAL LOW COMPLEX 20 MIN: CPT | Mod: GP | Performed by: PHYSICAL THERAPIST

## 2017-08-01 PROCEDURE — 97110 THERAPEUTIC EXERCISES: CPT | Mod: GP | Performed by: PHYSICAL THERAPIST

## 2017-08-01 ASSESSMENT — ACTIVITIES OF DAILY LIVING (ADL)
LIGHT_TO_MODERATE_WORK: NO DIFFICULTY AT ALL
DEEP_SQUATTING: NO DIFFICULTY AT ALL
SITTING_FOR_15_MINUTES: SLIGHT DIFFICULTY
PUTTING_ON_SOCKS_AND_SHOES: NO DIFFICULTY AT ALL
STEPPING_UP_AND_DOWN_CURBS: NO DIFFICULTY AT ALL
RECREATIONAL_ACTIVITIES: MODERATE DIFFICULTY
ROLLING_OVER_IN_BED: NO DIFFICULTY AT ALL
GOING_UP_1_FLIGHT_OF_STAIRS: NO DIFFICULTY AT ALL
GOING_DOWN_1_FLIGHT_OF_STAIRS: NO DIFFICULTY AT ALL
WALKING_UP_STEEP_HILLS: SLIGHT DIFFICULTY
GETTING_INTO_AND_OUT_OF_AN_AVERAGE_CAR: MODERATE DIFFICULTY
HOS_ADL_ITEM_SCORE_TOTAL: 56
HOW_WOULD_YOU_RATE_YOUR_CURRENT_LEVEL_OF_FUNCTION_DURING_YOUR_USUAL_ACTIVITIES_OF_DAILY_LIVING_FROM_0_TO_100_WITH_100_BEING_YOUR_LEVEL_OF_FUNCTION_PRIOR_TO_YOUR_HIP_PROBLEM_AND_0_BEING_THE_INABILITY_TO_PERFORM_ANY_OF_YOUR_USUAL_DAILY_ACTIVITIES?: 75
HOS_ADL_COUNT: 17
WALKING_15_MINUTES_OR_GREATER: SLIGHT DIFFICULTY
STANDING_FOR_15_MINUTES: NO DIFFICULTY AT ALL
WALKING_APPROXIMATELY_10_MINUTES: NO DIFFICULTY AT ALL
WALKING_DOWN_STEEP_HILLS: SLIGHT DIFFICULTY
TWISTING/PIVOTING_ON_INVOLVED_LEG: MODERATE DIFFICULTY
GETTING_INTO_AND_OUT_OF_A_BATHTUB: SLIGHT DIFFICULTY
HOS_ADL_HIGHEST_POTENTIAL_SCORE: 68
HOS_ADL_SCORE(%): 82.35
WALKING_INITIALLY: NO DIFFICULTY AT ALL
HEAVY_WORK: MODERATE DIFFICULTY

## 2017-08-01 NOTE — MR AVS SNAPSHOT
After Visit Summary   8/1/2017    Debbie Bah    MRN: [id number]           Patient Information     Date Of Birth          1990        Visit Information        Provider Department      8/1/2017 12:40 PM German Biggs, PT [facility]        Today's Diagnoses     Hip pain, right    -  1       Follow-ups after your visit        Your next 10 appointments already scheduled     Aug 09, 2017 11:30 AM CDT   ODELL Extremity with Greta Davis PT   [facility]    [address] [place] [id number] [phone]            Aug 15, 2017 12:40 PM CDT   ODELL Extremity with German Biggs PT   [facility]    [address] [place] [id number] [phone]              Who to contact     If you have questions or need follow up information about today's clinic visit or your schedule please contact [facility] directly at [phone].  Normal or non-critical lab and imaging results will be communicated to you by [facility], letter or phone within 4 business days after the clinic has received the results. If you do not hear from us within 7 days, please contact the clinic through Timeh[employer] or phone. If you have a critical or abnormal lab result, we will notify you by phone as soon as possible.  Submit refill requests through [employer] or call your pharmacy and they will forward the refill request to us. Please allow 3 business days for your refill to be completed.          Additional Information About Your Visit        [facility] Information     [employer] gives you secure access to your electronic health record. If you see a primary care provider, you can also send messages to your care team and make appointments. If you have questions, please call your primary care  clinic.  If you do not have a primary care provider, please call [phone] and they will assist you.        Care EveryWhere ID     This is your Care EveryWhere ID. This could be used by other organizations to access your [place] medical records  [id number]        Your Vitals Were     Last Period                   06/30/2017 (Exact Date)            Blood Pressure from Last 3 Encounters:   07/20/17 120/78   03/24/17 123/80   08/03/16 122/74    Weight from Last 3 Encounters:   07/20/17 81.6 kg (180 lb)   03/24/17 85.9 kg (189 lb 4.8 oz)   08/03/16 100.6 kg (221 lb 12.8 oz)              We Performed the Following     PT Eval, Low Complexity (03050)     Therapeutic Activities     Therapeutic Exercises        Primary Care Provider    None Specified       No primary provider on file.        Equal Access to Services     GRISELDA PICKETT : Leni Christie, lesley cee, karma otto, damien vaughan . So [facility] [phone].    ATENCIÓN: Si habla español, tiene a rojas disposición servicios gratuitos de asistencia lingüística. Krissy al [phone].    We comply with applicable federal civil rights laws and [place] laws. We do not discriminate on the basis of race, color, national origin, age, disability sex, sexual orientation or gender identity.            Thank you!     Thank you for choosing [facility]  for your care. Our goal is always to provide you with excellent care. Hearing back from our patients is one way we can continue to improve our services. Please take a few minutes to complete the written survey that you may receive in the mail after your visit with us. Thank you!             Your Updated Medication List - Protect others around you: Learn how to safely use, store and throw away your medicines at www.disposemymeds.org.          This list is accurate as of: 8/1/17  1:24 PM.  Always use your most recent med list.                    Brand Name Dispense Instructions for use Diagnosis    adapalene 0.1 % cream    DIFFERIN     Apply topically At Bedtime        B COMPLEX-FOLIC ACID PO      Take 1 tablet by mouth        buPROPion 75 MG tablet    WELLBUTRIN    30 tablet    Take 1 tablet (75 mg) by mouth daily    Recurrent major depressive disorder, in partial remission (H)       Multi-vitamin Tabs tablet      Take 1 tablet by mouth daily        omega-3 acid ethyl esters 1 G capsule    Lovaza     Take 2 g by mouth 2 times daily        PROBIOTIC ACIDOPHILUS Tabs      Take 1 tablet by mouth        propranolol 10 MG tablet    INDERAL    30 tablet    Take 1-2 tablets (10-20 mg) by mouth 2 times daily as needed for situational anxiety    Situational anxiety       SOOLANTRA 1 % cream   Generic drug:  ivermectin      Apply 1 g topically daily Apply to the affected areas of the face once daily. Use a pea-size amount for each area of the face (forehead, chin, nose, each cheek) that is affected. Spread as a thin layer, avoiding the eyes and lips.        spironolactone 50 MG tablet    ALDACTONE          vitamin D 2000 UNITS Caps      Take 200 capsules by mouth

## 2017-08-01 NOTE — PROGRESS NOTES
Subjective:    Patient is a 26 year old female presenting with rehab right hip hpi.   Debbie Bah is a 26 year old female with a right hip condition.  Condition occurred with:  Insidious onset.  Condition occurred: at home.  This is a new condition  Pt presents to PT with c/o R hip pain with insidious in Dec 2016 while running.   Pt reports biking is okay.    Pt reports she was running 3 days a week for 10-15 minutes.  The other days she would do walking.      Pt reports she had chiropractic which worked on adjustments of the spine.    Occupation: RN    PMH: R LBP and sciatica    Pt would like to return to running and start kick boxing and martial arts.    .    Site of Pain: lateral.  Radiates to: pain down lateral thigh.  Pain is described as sharp and is intermittent and reported as 3/10.   Pain is the same all the time.  Exacerbated by: Exercise, getting in and out of a car, transfers of patients. and relieved by rest.  Since onset symptoms are unchanged.  Special tests:  X-ray (Lumbar WNL).  Previous treatment includes chiropractic.  There was mild improvement following previous treatment.  General health as reported by patient is excellent.                                              Objective:    System         Lumbar/SI Evaluation  ROM:  AROM Lumbar: not assessed                                                          Hip Evaluation  Hip PROM:  Hip PROM:  Left Hip:    Normal  Right Hip:  Normal (Mild pain with hip flex OP on R)                          Hip Strength:      Extension:  Left: 4-/5  Pain:Right: 3+/5    Pain:    Abduction:  Left: 4-/5     Pain:Right: 3+/5    Pain:  Adduction:  Left: 3+/5    Pain:Right: 3/5   Pain:                Hip Special Testing:      Left hip negative for the following special tests:  Fadir/Labrum or SLR  Right hip negative for the following special tests:  Fadir/Labrum or SLR    Hip Palpation:      Left hip tenderness not present at:  Greater Trachanter or Gluteus  Medius  Right hip tenderness present at:  Greater Trachanter and Gluteus Medius                 General Evaluation:                          Functional Assessment:  Functional assessment wnl: Squat anterior pelvic tilt, SLS WFL, SL Squat valgus B.          Gait:  Gait wnl general: WNL.                                         ROS    Assessment/Plan:      Patient is a 26 year old female with right side hip complaints.    Patient has the following significant findings with corresponding treatment plan.                Diagnosis 1:  R hip pain  Pain -  hot/cold therapy  Decreased ROM/flexibility - manual therapy and therapeutic exercise  Decreased joint mobility - manual therapy and therapeutic exercise  Decreased strength - therapeutic exercise and therapeutic activities  Impaired balance - neuro re-education and therapeutic activities  Decreased proprioception - neuro re-education and therapeutic activities  Impaired gait - gait training  Impaired muscle performance - neuro re-education  Decreased function - therapeutic activities  Impaired posture - neuro re-education    Therapy Evaluation Codes:   1) History comprised of:   Personal factors that impact the plan of care:      None.    Comorbidity factors that impact the plan of care are:      None.     Medications impacting care: None.  2) Examination of Body Systems comprised of:   Body structures and functions that impact the plan of care:      Hip.   Activity limitations that impact the plan of care are:      Running, Squatting/kneeling, Standing, Walking and Working.  3) Clinical presentation characteristics are:   Stable/Uncomplicated.  4) Decision-Making    Low complexity using standardized patient assessment instrument and/or measureable assessment of functional outcome.  Cumulative Therapy Evaluation is: Low complexity.    Previous and current functional limitations:  (See Goal Flow Sheet for this information)    Short term and Long term goals: (See Goal Flow  Sheet for this information)     Communication ability:  Patient appears to be able to clearly communicate and understand verbal and written communication and follow directions correctly.  Treatment Explanation - The following has been discussed with the patient:   RX ordered/plan of care  Anticipated outcomes  Possible risks and side effects  This patient would benefit from PT intervention to resume normal activities.   Rehab potential is excellent.    Frequency:  1 X week, once daily  Duration:  for 6 weeks  Discharge Plan:  Achieve all LTG.  Independent in home treatment program.  Return to work with or without restrictions.  Return to previous functional level by discharge.  Reach maximal therapeutic benefit.    Please refer to the daily flowsheet for treatment today, total treatment time and time spent performing 1:1 timed codes.

## 2017-08-04 NOTE — TELEPHONE ENCOUNTER
Erika -- please see patient reply to your message below.    Lashay Hernandez, LYDIAN, RN  [facility]

## 2017-08-09 ENCOUNTER — THERAPY VISIT (OUTPATIENT)
Dept: PHYSICAL THERAPY | Facility: CLINIC | Age: 27
End: 2017-08-09
Payer: COMMERCIAL

## 2017-08-09 DIAGNOSIS — M25.551 HIP PAIN, RIGHT: Primary | ICD-10-CM

## 2017-08-09 PROCEDURE — 97110 THERAPEUTIC EXERCISES: CPT | Mod: GP | Performed by: PHYSICAL THERAPIST

## 2017-08-09 PROCEDURE — 97112 NEUROMUSCULAR REEDUCATION: CPT | Mod: GP | Performed by: PHYSICAL THERAPIST

## 2017-08-15 ENCOUNTER — THERAPY VISIT (OUTPATIENT)
Dept: PHYSICAL THERAPY | Facility: CLINIC | Age: 27
End: 2017-08-15
Payer: COMMERCIAL

## 2017-08-15 DIAGNOSIS — M25.551 HIP PAIN, RIGHT: ICD-10-CM

## 2017-08-15 PROCEDURE — 97530 THERAPEUTIC ACTIVITIES: CPT | Mod: GP | Performed by: PHYSICAL THERAPIST

## 2017-08-15 PROCEDURE — 97112 NEUROMUSCULAR REEDUCATION: CPT | Mod: GP | Performed by: PHYSICAL THERAPIST

## 2017-08-15 PROCEDURE — 97110 THERAPEUTIC EXERCISES: CPT | Mod: GP | Performed by: PHYSICAL THERAPIST

## 2017-08-17 NOTE — TELEPHONE ENCOUNTER
Erika -- please see pt message below.    Lashay Hernandez, LYDIAN, RN  [facility]

## 2017-08-22 ENCOUNTER — THERAPY VISIT (OUTPATIENT)
Dept: PHYSICAL THERAPY | Facility: CLINIC | Age: 27
End: 2017-08-22
Payer: COMMERCIAL

## 2017-08-22 DIAGNOSIS — M25.551 HIP PAIN, RIGHT: ICD-10-CM

## 2017-08-22 PROCEDURE — 97110 THERAPEUTIC EXERCISES: CPT | Mod: GP | Performed by: PHYSICAL THERAPIST

## 2017-08-22 PROCEDURE — 97140 MANUAL THERAPY 1/> REGIONS: CPT | Mod: GP | Performed by: PHYSICAL THERAPIST

## 2017-08-22 PROCEDURE — 97530 THERAPEUTIC ACTIVITIES: CPT | Mod: GP | Performed by: PHYSICAL THERAPIST

## 2017-08-28 ENCOUNTER — THERAPY VISIT (OUTPATIENT)
Dept: PHYSICAL THERAPY | Facility: CLINIC | Age: 27
End: 2017-08-28
Payer: COMMERCIAL

## 2017-08-28 DIAGNOSIS — M25.551 HIP PAIN, RIGHT: ICD-10-CM

## 2017-08-28 PROCEDURE — 97140 MANUAL THERAPY 1/> REGIONS: CPT | Mod: GP | Performed by: PHYSICAL THERAPIST

## 2017-08-28 PROCEDURE — 97530 THERAPEUTIC ACTIVITIES: CPT | Mod: GP | Performed by: PHYSICAL THERAPIST

## 2017-08-28 PROCEDURE — 97110 THERAPEUTIC EXERCISES: CPT | Mod: GP | Performed by: PHYSICAL THERAPIST

## 2017-09-07 ENCOUNTER — THERAPY VISIT (OUTPATIENT)
Dept: PHYSICAL THERAPY | Facility: CLINIC | Age: 27
End: 2017-09-07
Payer: COMMERCIAL

## 2017-09-07 DIAGNOSIS — M25.551 HIP PAIN, RIGHT: ICD-10-CM

## 2017-09-07 PROCEDURE — 97530 THERAPEUTIC ACTIVITIES: CPT | Mod: GP | Performed by: PHYSICAL THERAPIST

## 2017-09-07 PROCEDURE — 97110 THERAPEUTIC EXERCISES: CPT | Mod: GP | Performed by: PHYSICAL THERAPIST

## 2017-09-07 ASSESSMENT — ACTIVITIES OF DAILY LIVING (ADL)
GETTING_INTO_AND_OUT_OF_AN_AVERAGE_CAR: NO DIFFICULTY AT ALL
GETTING_INTO_AND_OUT_OF_A_BATHTUB: NO DIFFICULTY AT ALL
WALKING_INITIALLY: NO DIFFICULTY AT ALL
HOS_ADL_COUNT: 17
GOING_DOWN_1_FLIGHT_OF_STAIRS: NO DIFFICULTY AT ALL
PUTTING_ON_SOCKS_AND_SHOES: NO DIFFICULTY AT ALL
WALKING_UP_STEEP_HILLS: NO DIFFICULTY AT ALL
HOS_ADL_ITEM_SCORE_TOTAL: 63
WALKING_APPROXIMATELY_10_MINUTES: NO DIFFICULTY AT ALL
WALKING_15_MINUTES_OR_GREATER: SLIGHT DIFFICULTY
STEPPING_UP_AND_DOWN_CURBS: NO DIFFICULTY AT ALL
STANDING_FOR_15_MINUTES: NO DIFFICULTY AT ALL
WALKING_DOWN_STEEP_HILLS: SLIGHT DIFFICULTY
LIGHT_TO_MODERATE_WORK: NO DIFFICULTY AT ALL
ROLLING_OVER_IN_BED: NO DIFFICULTY AT ALL
HOS_ADL_HIGHEST_POTENTIAL_SCORE: 68
RECREATIONAL_ACTIVITIES: SLIGHT DIFFICULTY
HOS_ADL_SCORE(%): 92.65
GOING_UP_1_FLIGHT_OF_STAIRS: NO DIFFICULTY AT ALL
TWISTING/PIVOTING_ON_INVOLVED_LEG: SLIGHT DIFFICULTY
HEAVY_WORK: SLIGHT DIFFICULTY
SITTING_FOR_15_MINUTES: SLIGHT DIFFICULTY
DEEP_SQUATTING: NO DIFFICULTY AT ALL

## 2017-09-08 ENCOUNTER — OFFICE VISIT (OUTPATIENT)
Dept: SLEEP MEDICINE | Facility: CLINIC | Age: 27
End: 2017-09-08
Attending: NURSE PRACTITIONER
Payer: COMMERCIAL

## 2017-09-08 VITALS
HEART RATE: 74 BPM | WEIGHT: 189 LBS | SYSTOLIC BLOOD PRESSURE: 106 MMHG | DIASTOLIC BLOOD PRESSURE: 66 MMHG | BODY MASS INDEX: 31.49 KG/M2 | RESPIRATION RATE: 16 BRPM | OXYGEN SATURATION: 98 % | HEIGHT: 65 IN

## 2017-09-08 DIAGNOSIS — G47.21 CIRCADIAN RHYTHM SLEEP DISORDER, DELAYED SLEEP PHASE TYPE: Primary | ICD-10-CM

## 2017-09-08 DIAGNOSIS — G47.8 UNHEALTHY SLEEP HABIT: ICD-10-CM

## 2017-09-08 PROCEDURE — 99211 OFF/OP EST MAY X REQ PHY/QHP: CPT | Mod: ZF

## 2017-09-08 NOTE — PROGRESS NOTES
"Allergies:    Allergies   Allergen Reactions     Doxycycline      Mushroom Cramps, Diarrhea and GI Disturbance       Medications:    Current Outpatient Prescriptions   Medication Sig Dispense Refill     adapalene (DIFFERIN) 0.1 % cream Apply topically At Bedtime       Cholecalciferol (VITAMIN D) 2000 UNITS CAPS Take 200 capsules by mouth       Folic Acid-Vit B6-Vit B12 (B COMPLEX-FOLIC ACID PO) Take 1 tablet by mouth       omega-3 acid ethyl esters (LOVAZA) 1 G capsule Take 2 g by mouth 2 times daily       multivitamin, therapeutic with minerals (MULTI-VITAMIN) TABS tablet Take 1 tablet by mouth daily       ivermectin (SOOLANTRA) 1 % cream Apply 1 g topically daily Apply to the affected areas of the face once daily. Use a pea-size amount for each area of the face (forehead, chin, nose, each cheek) that is affected. Spread as a thin layer, avoiding the eyes and lips.       Lactobacillus (PROBIOTIC ACIDOPHILUS) TABS Take 1 tablet by mouth       propranolol (INDERAL) 10 MG tablet Take 1-2 tablets (10-20 mg) by mouth 2 times daily as needed for situational anxiety 30 tablet 1     spironolactone (ALDACTONE) 50 MG tablet          Problem List:  Patient Active Problem List    Diagnosis Date Noted     Hip pain, right 08/09/2017     Priority: Medium     Situational anxiety 08/10/2016     Priority: Medium     Gastroesophageal reflux disease without esophagitis 03/08/2016     Priority: Medium     Major depressive disorder, recurrent episode, mild (H) 03/08/2016     Priority: Medium        Past Medical/Surgical History:  Past Medical History:   Diagnosis Date     GERD (gastroesophageal reflux disease)      Past Surgical History:   Procedure Laterality Date     NO HISTORY OF SURGERY             Physical Examination:  Vitals: /66  Pulse 74  Resp 16  Ht 1.638 m (5' 4.5\")  Wt 85.7 kg (189 lb)  SpO2 98%  BMI 31.94 kg/m2  BMI= Body mass index is 31.94 kg/(m^2).    Neck Cir (cm): 35 cm    No flowsheet data " found.    GENERAL APPEARANCE: healthy, alert and no distress

## 2017-09-08 NOTE — PATIENT INSTRUCTIONS
Normal adults 7-9 hrs    Sleep environment: darker, no TV (voices)    Window for alcohol, not within 3-4 hrs of bedtime    Nap: power nap    Consistency is the key    Melatonin: avoid    Use bed for sleep or sex    If you wish to change to an AM person:    Insomnia - Delayed Sleep Phase  Each of us has a built in tendency to find it easier to stay up late at night or get up early in the morning.  Being a  night owl  or  early bird  is a function of our internal body clock.  When you are forced to keep a sleep schedule that goes against your typical habits (because a job or other responsibilities) insomnia can be the result.  Try the following changes to see if you can improve your sleep patterns:    Pick a sleep and wake schedule with about 7-8 hours in bed that is consistent.  That means keep the same bedtime and rise time every day of the week including the weekends, for the next 4-6 weeks    Try to get outside for about 30 minutes after you get up in the morning if the sun is out.  Sunlight is the best way to  set  your internal body clock  . SAD lamp 10,000 lux 1/2 hr     Please keep a sleep log or diary during this time to track your sleep patterns

## 2017-09-08 NOTE — PROGRESS NOTES
LOCATION:  [facility]      CHIEF COMPLAINT:  I have been asked to see Ms. Debbie Bah in consultation by Dr. Nadeen Santos for problems with daytime lethargy.      HISTORY OF PRESENT ILLNESS:  Reports a lifelong problem with tiredness and fatigue.  She does report that during middle school and high school, she was obviously unable to wake up adequately in the morning and slept away most of the hours of her morning classes.  What provoked her to seek consultation was a report that she snored; however, family members do not report loud snoring or witnessed apneas.  She does have a dry throat in the morning and has had problems with heartburn, but that has gotten much better with the weight loss.  Sleeps primarily on her stomach and sides.  No signs of orexin deficiency.  Has injured her hip about 9 months ago, but does not feel that hip pain is playing a problem with her sleep.  On occasion; however, she will have a jerking sensation as she enters sleep, occasional complaints of nightmares about 2 to 3 per month and some talking in her sleep, which she has always done.  On occasion, a nightmare may make re-falling asleep, difficult 1 or 2 times a month.      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  She gets into bed between 11 or 12 throughout the week and will spend time reading, eating, watching TV, working in bed or using a phone or computer.  She, on occasion will leave the TV on or use Spotify with sleep sounds that do not involve the human voice.  She will consider bedtime somewhere around 1:00 or 1:30, on rare occasions may fall asleep on weekends as late as 3:00 in the morning, that might be 1 day out of the month.  Exits bed on workdays, usually around 8:00 a.m.; however, 2 out of 30 days it will be 7:00 a.m.  On weekends, usually 9:00 a.m., it is  rare for her to awaken later than that.  She awakens naturally without an alarm clock.  Sleep latency:  Once she decides it is bedtime 20 to 40 minutes.  Wakes up 0 to1 time per night, it only takes 5 minutes to reinitiate sleep.  Total sleep with recalculations can be as little as 6-3/4 hours to as long as 8-8-1/2 hours.  Feels her best if she gets 9.  Does nap 5 times a week, usually a brief nap prior to work can be 45 minutes or less.  On weekends may nap in the afternoon for about an hour and a half.      SOCIAL HISTORY, PERSONAL, AND FAMILY HISTORY:  She is single, has a roommate, who has a daytime job.  Currently works as an RN.  Sleep environment.  She does not have a blackout shades in the morning and noise can awaken her, especially when her roommate wakes up.      FAMILY HISTORY:  Positive for snoring and restless legs.  She does have alcohol, maybe 1 or 2 times a week, that can occur in the later evening hours and may be within that 3 to 4 hour window.  Melatonin is taken, does drink caffeine, and does exercise.      Her Silex Sleepiness score today is 4 over 24.  No problems with driving, but does have problems with sleepiness affecting her at work.  If she does not get a nap, she is usually tired for the first few hours; however, as her shift goes on, she tends to wake up more.  Two out of 7 days, she will not have a nap prior to going to work.  Thirty out of 30 days, she is tired and fatigued and 5 out of 30 days, some mild depression, but that is pretty good right now.  One of her concerns is that with correcting her depression, she has not felt that the fatigue has really gone away.   14 point comprehensive ROS completed and negative with exception of the following:     CONSTITUTIONAL:  Has had a weight loss of I believe about 40 pounds.        ALLERGIES:  Allergy to doxycycline.      MUSCLES AND BONES:  Has hip pain.   Depression: good control  Skin: acne       Allergies:    Allergies   Allergen  "Reactions     Doxycycline      Mushroom Cramps, Diarrhea and GI Disturbance       Medications:    Current Outpatient Prescriptions   Medication Sig Dispense Refill     adapalene (DIFFERIN) 0.1 % cream Apply topically At Bedtime       Cholecalciferol (VITAMIN D) 2000 UNITS CAPS Take 200 capsules by mouth       Folic Acid-Vit B6-Vit B12 (B COMPLEX-FOLIC ACID PO) Take 1 tablet by mouth       omega-3 acid ethyl esters (LOVAZA) 1 G capsule Take 2 g by mouth 2 times daily       multivitamin, therapeutic with minerals (MULTI-VITAMIN) TABS tablet Take 1 tablet by mouth daily       ivermectin (SOOLANTRA) 1 % cream Apply 1 g topically daily Apply to the affected areas of the face once daily. Use a pea-size amount for each area of the face (forehead, chin, nose, each cheek) that is affected. Spread as a thin layer, avoiding the eyes and lips.       Lactobacillus (PROBIOTIC ACIDOPHILUS) TABS Take 1 tablet by mouth       propranolol (INDERAL) 10 MG tablet Take 1-2 tablets (10-20 mg) by mouth 2 times daily as needed for situational anxiety 30 tablet 1     spironolactone (ALDACTONE) 50 MG tablet          Problem List:  Patient Active Problem List    Diagnosis Date Noted     Hip pain, right 08/09/2017     Priority: Medium     Situational anxiety 08/10/2016     Priority: Medium     Gastroesophageal reflux disease without esophagitis 03/08/2016     Priority: Medium     Major depressive disorder, recurrent episode, mild (H) 03/08/2016     Priority: Medium        Past Medical/Surgical History:  Past Medical History:   Diagnosis Date     GERD (gastroesophageal reflux disease)      Past Surgical History:   Procedure Laterality Date     NO HISTORY OF SURGERY             Physical Examination:  Vitals: /66  Pulse 74  Resp 16  Ht 1.638 m (5' 4.5\")  Wt 85.7 kg (189 lb)  SpO2 98%  BMI 31.94 kg/m2  BMI= Body mass index is 31.94 kg/(m^2).    Neck Cir (cm): 35 cm    No flowsheet data found.    GENERAL APPEARANCE: healthy, alert and no " distress       ASSESSMENT AND PLAN:  It is my impression that Ms. Wyman continues to struggle with delayed sleep phase disorder and the following plan of care has been developed.   1.  Delayed sleep phase disorder.  I have discussed sleep norms for Ms. Wyman and environmental changes that could improve her quality of sleep, as well as some sleep habits.  In particular, making the sleep environment darker would be a significant help to her in getting improved quality of sleep towards the later part of the morning.  I recommended as well, that she protect her morning hours by not making appointments at that period of time and urged her to continue to use at least 30 minutes or less in a brief nap prior to going to work since she finds this exceptionally restorative.  She will look at the work of Brandon Del Real, and I have laid out a plan to phase advance should she wish to change to a daytime position down the road.  She will contact me if she has further concerns.   2.  Sleep habits.  With her problems with nightmares, as well as poor quality of sleep toward the end of her night's rest, I have recommended that she avoid alcohol within the 3 to 4-hour window of bedtime, to not use TV at night for white noise, and to reduce time in bed doing things other than sleep.  For best results enter bed at the time that she is ready to go to sleep.  We also discussed consistency and her wakeup time is the key to improving her sleep at this point and may consider stopping melatonin since her endogenous melatonin is likely adequate and she does tend to keep a regular shift, but on occasion has sleep debt.      Thank you for allowing me to participate in this kind woman's care.      Time spent with patient 60 minutes, of which greater than 50% was spent in counseling, education, and coordination of care.         HARJINDER ORTIZ, CNP             D: 09/08/2017 13:05   T: 09/08/2017 15:34   MT: CC      Name:     ZOFIA  DEREJE   MRN:      [id number]        Account:      [id number]   :      1990           Visit Date:   2017      Document: [id number]

## 2017-09-08 NOTE — MR AVS SNAPSHOT
After Visit Summary   9/8/2017    Debbie Bah    MRN: [id number]           Patient Information     Date Of Birth          1990        Visit Information        Provider Department      9/8/2017 10:00 AM Tracy Portillo APRN CNP [facility], [place], Sleep Study        Today's Diagnoses     Circadian rhythm sleep disorder, delayed sleep phase type    -  1    Unhealthy sleep habit          Care Instructions    Normal adults 7-9 hrs    Sleep environment: darker, no TV (voices)    Window for alcohol, not within 3-4 hrs of bedtime    Nap: power nap    Consistency is the key    Melatonin: avoid    Use bed for sleep or sex    If you wish to change to an AM person:    Insomnia - Delayed Sleep Phase  Each of us has a built in tendency to find it easier to stay up late at night or get up early in the morning.  Being a  night owl  or  early bird  is a function of our internal body clock.  When you are forced to keep a sleep schedule that goes against your typical habits (because a job or other responsibilities) insomnia can be the result.  Try the following changes to see if you can improve your sleep patterns:    Pick a sleep and wake schedule with about 7-8 hours in bed that is consistent.  That means keep the same bedtime and rise time every day of the week including the weekends, for the next 4-6 weeks    Try to get outside for about 30 minutes after you get up in the morning if the sun is out.  Sunlight is the best way to  set  your internal body clock  . SAD lamp 10,000 lux 1/2 hr     Please keep a sleep log or diary during this time to track your sleep patterns                Follow-ups after your visit        Follow-up notes from your care team     Return if symptoms worsen or fail to improve.      Your next 10 appointments already scheduled     Sep 15, 2017 11:30 AM CDT   ODELL Extremity with German Biggs PT   [facility] ([place]  "Gloria  Stephanie    [address] [facility] [address] [phone]              Who to contact     If you have questions or need follow up information about today's clinic visit or your schedule please [facility] directly at [phone].  Normal or non-critical lab and imaging results will be communicated to you by MyChart, letter or phone within 4 business days after the clinic has received the results. If you do not hear from us within 7 days, please contact the clinic through Daixehart or phone. If you have a critical or abnormal lab result, we will notify you by phone as soon as possible.  Submit refill requests through Moerae Matrix or call your pharmacy and they will forward the refill request to us. Please allow 3 business days for your refill to be completed.          Additional Information About Your Visit        MyChart Information     Mo[employer] Matrix gives you secure access to your electronic health record. If you see a primary care provider, you can also send messages to your care team and make appointments. If you have questions, please call your primary care clinic.  If you do not have a primary care provider, please call [phone] and they will assist you.        Care EveryWhere ID     This is your Care EveryWhere ID. This could be used by other organizations to access your [place] medical records  [id number]        Your Vitals Were     Pulse Respirations Height Pulse Oximetry BMI (Body Mass Index)       74 16 1.638 m (5' 4.5\") 98% 31.94 kg/m2        Blood Pressure from Last 3 Encounters:   09/08/17 106/66   07/20/17 120/78   03/24/17 123/80    Weight from Last 3 Encounters:   09/08/17 85.7 kg (189 lb)   07/20/17 81.6 kg (180 lb)   03/24/17 85.9 kg (189 lb 4.8 oz)              We Performed the Following     SLEEP EVALUATION & MANAGEMENT REFERRAL - ADULT        Primary Care Provider    None Specified       No primary provider on file.        Equal Access to Services     GRISELDA PICKETT AH: " Hadii aad ku hadflorentinoo Sobebetoali, waaxda luqadaha, qaybta kaalmeghana otto, damien anatjesus james. So [facility] [phone].    ATENCIÓN: Si pravin wells, tiene a rojas disposición servicios gratuitos de asistencia lingüística. Llame al [phone].    We comply with applicable federal civil rights laws and [place] laws. We do not discriminate on the basis of race, color, national origin, age, disability sex, sexual orientation or gender identity.            Thank you!     Thank you for choosing [facility] [place], SLEEP STUDY  for your care. Our goal is always to provide you with excellent care. Hearing back from our patients is one way we can continue to improve our services. Please take a few minutes to complete the written survey that you may receive in the mail after your visit with us. Thank you!             Your Updated Medication List - Protect others around you: Learn how to safely use, store and throw away your medicines at www.disposemymeds.org.          This list is accurate as of: 9/8/17  1:06 PM.  Always use your most recent med list.                   Brand Name Dispense Instructions for use Diagnosis    adapalene 0.1 % cream    DIFFERIN     Apply topically At Bedtime        B COMPLEX-FOLIC ACID PO      Take 1 tablet by mouth        Multi-vitamin Tabs tablet      Take 1 tablet by mouth daily        omega-3 acid ethyl esters 1 G capsule    Lovaza     Take 2 g by mouth 2 times daily        PROBIOTIC ACIDOPHILUS Tabs      Take 1 tablet by mouth        propranolol 10 MG tablet    INDERAL    30 tablet    Take 1-2 tablets (10-20 mg) by mouth 2 times daily as needed for situational anxiety    Situational anxiety       SOOLANTRA 1 % cream   Generic drug:  ivermectin      Apply 1 g topically daily Apply to the affected areas of the face once daily. Use a pea-size amount for each area of the face (forehead, chin, nose, each cheek) that is affected. Spread as a thin layer, avoiding the eyes and lips.         spironolactone 50 MG tablet    ALDACTONE          vitamin D 2000 UNITS Caps      Take 200 capsules by mouth

## 2017-09-15 ENCOUNTER — THERAPY VISIT (OUTPATIENT)
Dept: PHYSICAL THERAPY | Facility: CLINIC | Age: 27
End: 2017-09-15
Payer: COMMERCIAL

## 2017-09-15 DIAGNOSIS — M25.551 HIP PAIN, RIGHT: ICD-10-CM

## 2017-09-15 PROCEDURE — 97530 THERAPEUTIC ACTIVITIES: CPT | Mod: GP | Performed by: PHYSICAL THERAPIST

## 2017-09-15 PROCEDURE — 97110 THERAPEUTIC EXERCISES: CPT | Mod: GP | Performed by: PHYSICAL THERAPIST

## 2017-09-15 NOTE — PROGRESS NOTES
Subjective:    HPI                    Objective:    System    Physical Exam    General     ROS    Assessment/Plan:      PROGRESS  REPORT    Overall impression:  Pt has demonstrated minimal progress with PT focusing on hip/core strength, motor control, and mobilizations to improve hip IR.  Pt also has c/o of pain in multiple areas of the body including B hips, L shoulder, R wrist, and low back.  I recommend f/u with primary care to rule out systemic causes of pain.  I also recommend a trail of 1 session of dry needling with Sven Jackman to assess response.      SUBJECTIVE   Pt reports her R hip feels slightly better, but the L hip is also really hurting now.    She also feels shooting pain down the outside of both of her hips down into the knees.    She is not sure what is causing the pain.  She does not think it is the exercises.  Pt thinks it could maybe be her lifestyle because she is on her feet most of the day.    Pt also reports she has low back, L shoulder and R wrist pain.          OBJECTIVE  Hip ROM: Lacking a 5 deg R hip IR in 90 degrees flexion  Hip Strength: glute med and max weakness  Functional: Mild valgus with SL squat    ASSESSMENT/PLAN  Updated problem list and treatment plan: Diagnosis 1:  R hip pain  Pain -  hot/cold therapy  Decreased ROM/flexibility - manual therapy and therapeutic exercise  Decreased joint mobility - manual therapy and therapeutic exercise  Decreased strength - therapeutic exercise and therapeutic activities  Impaired balance - neuro re-education and therapeutic activities  Inflammation - cold therapy  Impaired gait - gait training  Impaired muscle performance - neuro re-education  Decreased function - therapeutic activities  Impaired posture - neuro re-education  STG/LTGs have been met or progress has been made towards goals:  Yes (See Goal flow sheet completed today.)  Assessment of Progress: The patient's condition is improving.  Self Management Plans:  Patient has been  instructed in a home treatment program.  I have re-evaluated this patient and find that the nature, scope, duration and intensity of the therapy is appropriate for the medical condition of the patient.  Debbie continues to require the following intervention to meet STG and LTG's:  PT    Recommendations:  This patient would benefit from continued therapy.     Frequency:  1 X week, once daily  Duration:  for 6 weeks          Please refer to the daily flowsheet for treatment today, total treatment time and time spent performing 1:1 timed codes.

## 2017-09-15 NOTE — MR AVS SNAPSHOT
After Visit Summary   9/15/2017    Debbie Bah    MRN: [id number]           Patient Information     Date Of Birth          1990        Visit Information        Provider Department      9/15/2017 11:30 AM German Biggs PT [facility] Physical Therapy        Today's Diagnoses     Hip pain, right           Follow-ups after your visit        Your next 10 appointments already scheduled     Sep 26, 2017  4:10 PM CDT   (Arrive by 3:55 PM)   ODELL Blanco with Sven Jackman PT   [facility] ( [facility]    [address] [place] [address]  [address] [address] [phone]              Who to contact     If you have questions or need follow up information about today's clinic visit or your schedule please contact [place] [facility] directly at [phone].  Normal or non-critical lab and imaging results will be communicated to you by [employer] Corporationhart, letter or phone within 4 business days after the clinic has received the results. If you do not hear from us within 7 days, please contact the clinic through [employer]hart or phone. If you have a critical or abnormal lab result, we will notify you by phone as soon as possible.  Submit refill requests through ClipClock or call your pharmacy and they will forward the refill request to us. Please allow 3 business days for your refill to be completed.          Additional Information About Your Visit        MyChart Information     ClipClock gives you secure access to your electronic health record. If you see a primary care provider, you can also send messages to your care team and make appointments. If you have questions, please call your primary care clinic.  If you do not have a primary care provider, please call [phone] and they will assist you.        Care EveryWhere ID     This is your Care EveryWhere ID. This could be  used by other organizations to access your [place] medical records  [id number]         Blood Pressure from Last 3 Encounters:   09/08/17 106/66   07/20/17 120/78   03/24/17 123/80    Weight from Last 3 Encounters:   09/08/17 85.7 kg (189 lb)   07/20/17 81.6 kg (180 lb)   03/24/17 85.9 kg (189 lb 4.8 oz)              We Performed the Following     Therapeutic Activities     Therapeutic Exercises        Primary Care Provider    None Specified       No primary provider on file.        Equal Access to Services     GRISELDA PICKETT : Hadii aad ku hadasho Soomaali, waaxda luqadaha, qaybta kaalmada adenakulyaoscar, damien vaughan . So [facility] [phone].    ATENCIÓN: Si habla español, tiene a rojas disposición servicios gratuitos de asistencia lingüística. Llame al [phone].    We comply with applicable federal civil rights laws and [place] laws. We do not discriminate on the basis of race, color, national origin, age, disability sex, sexual orientation or gender identity.            Thank you!     Thank you for choosing [facility]  for your care. Our goal is always to provide you with excellent care. Hearing back from our patients is one way we can continue to improve our services. Please take a few minutes to complete the written survey that you may receive in the mail after your visit with us. Thank you!             Your Updated Medication List - Protect others around you: Learn how to safely use, store and throw away your medicines at www.disposemymeds.org.          This list is accurate as of: 9/15/17 11:59 PM.  Always use your most recent med list.                   Brand Name Dispense Instructions for use Diagnosis    adapalene 0.1 % cream    DIFFERIN     Apply topically At Bedtime        B COMPLEX-FOLIC ACID PO      Take 1 tablet by mouth        Multi-vitamin Tabs tablet      Take 1 tablet by mouth daily        omega-3 acid ethyl esters 1 G  capsule    Lovaza     Take 2 g by mouth 2 times daily        PROBIOTIC ACIDOPHILUS Tabs      Take 1 tablet by mouth        propranolol 10 MG tablet    INDERAL    30 tablet    Take 1-2 tablets (10-20 mg) by mouth 2 times daily as needed for situational anxiety    Situational anxiety       SOOLANTRA 1 % cream   Generic drug:  ivermectin      Apply 1 g topically daily Apply to the affected areas of the face once daily. Use a pea-size amount for each area of the face (forehead, chin, nose, each cheek) that is affected. Spread as a thin layer, avoiding the eyes and lips.        spironolactone 50 MG tablet    ALDACTONE          vitamin D 2000 UNITS Caps      Take 200 capsules by mouth

## 2017-09-26 ENCOUNTER — THERAPY VISIT (OUTPATIENT)
Dept: PHYSICAL THERAPY | Facility: CLINIC | Age: 27
End: 2017-09-26
Payer: COMMERCIAL

## 2017-09-26 DIAGNOSIS — M25.551 HIP PAIN, RIGHT: ICD-10-CM

## 2017-09-26 PROCEDURE — 97110 THERAPEUTIC EXERCISES: CPT | Mod: GP | Performed by: PHYSICAL THERAPIST

## 2017-09-26 PROCEDURE — 97140 MANUAL THERAPY 1/> REGIONS: CPT | Mod: GP | Performed by: PHYSICAL THERAPIST

## 2017-10-04 ENCOUNTER — THERAPY VISIT (OUTPATIENT)
Dept: PHYSICAL THERAPY | Facility: CLINIC | Age: 27
End: 2017-10-04
Payer: COMMERCIAL

## 2017-10-04 DIAGNOSIS — M25.551 HIP PAIN, RIGHT: ICD-10-CM

## 2017-10-04 PROCEDURE — 97110 THERAPEUTIC EXERCISES: CPT | Mod: GP | Performed by: PHYSICAL THERAPIST

## 2017-10-04 PROCEDURE — 97140 MANUAL THERAPY 1/> REGIONS: CPT | Mod: GP | Performed by: PHYSICAL THERAPIST

## 2017-10-04 NOTE — MR AVS SNAPSHOT
After Visit Summary   10/4/2017    Debbie Bah    MRN: [id number]           Patient Information     Date Of Birth          1990        Visit Information        Provider Department      10/4/2017 10:50 AM Sven Jackman PT [facility]        Today's Diagnoses     Hip pain, right           Follow-ups after your visit        Your next 10 appointments already scheduled     Oct 10, 2017  2:10 PM CDT   ODELL Extremity with Sven Jackman PT   [facility] ( [facility])    [address] [place] [address] [phone]              Who to contact     If you have questions or need follow up information about today's clinic visit or your schedule please contact [facility] directly at [phone].  Normal or non-critical lab and imaging results will be communicated to you by DocLandinghart, letter or phone within 4 business days after the clinic has received the results. If you do not hear from us within 7 days, please contact the clinic through DocLandinghart or phone. If you have a critical or abnormal lab result, we will notify you by phone as soon as possible.  Submit refill requests through Trending Taste or call your pharmacy and they will forward the refill request to us. Please allow 3 business days for your refill to be completed.          Additional Information About Your Visit        MyChart Information     Trending Taste gives you secure access to your electronic health record. If you see a primary care provider, you can also send messages to your care team and make appointments. If you have questions, please call your primary care clinic.  If you do not have a primary care provider, please call [phone] and they will assist you.        Care EveryWhere ID     This is your Care EveryWhere ID. This could be used by other organizations to access your [facility]  records  [id number]         Blood Pressure from Last 3 Encounters:   09/08/17 106/66   07/20/17 120/78   03/24/17 123/80    Weight from Last 3 Encounters:   09/08/17 85.7 kg (189 lb)   07/20/17 81.6 kg (180 lb)   03/24/17 85.9 kg (189 lb 4.8 oz)              We Performed the Following     Manual Ther Tech, 1+Regions, EA 15 min     Therapeutic Exercises        Primary Care Provider    None Specified       No primary provider on file.        Equal Access to Services     GRISELDA PICKETT : Hadii aad ku hadasho Soomaali, waaxda luqadaha, qaybta kaalmada adenakulyaoscar, damien vaughan . So [facility] [phone].    ATENCIÓN: Si habla español, tiene a rojas disposición servicios gratuitos de asistencia lingüística. Llame al [phone].    We comply with applicable federal civil rights laws and [place] laws. We do not discriminate on the basis of race, color, national origin, age, disability, sex, sexual orientation, or gender identity.            Thank you!     Thank you for choosing [facility] PHYSICAL THERAPY [place]  for your care. Our goal is always to provide you with excellent care. Hearing back from our patients is one way we can continue to improve our services. Please take a few minutes to complete the written survey that you may receive in the mail after your visit with us. Thank you!             Your Updated Medication List - Protect others around you: Learn how to safely use, store and throw away your medicines at www.disposemymeds.org.          This list is accurate as of: 10/4/17 11:49 AM.  Always use your most recent med list.                   Brand Name Dispense Instructions for use Diagnosis    adapalene 0.1 % cream    DIFFERIN     Apply topically At Bedtime        B COMPLEX-FOLIC ACID PO      Take 1 tablet by mouth        Multi-vitamin Tabs tablet      Take 1 tablet by mouth daily        omega-3 acid ethyl esters 1 G capsule    Lovaza     Take 2 g by mouth 2 times daily         PROBIOTIC ACIDOPHILUS Tabs      Take 1 tablet by mouth        propranolol 10 MG tablet    INDERAL    30 tablet    Take 1-2 tablets (10-20 mg) by mouth 2 times daily as needed for situational anxiety    Situational anxiety       SOOLANTRA 1 % cream   Generic drug:  ivermectin      Apply 1 g topically daily Apply to the affected areas of the face once daily. Use a pea-size amount for each area of the face (forehead, chin, nose, each cheek) that is affected. Spread as a thin layer, avoiding the eyes and lips.        spironolactone 50 MG tablet    ALDACTONE          vitamin D 2000 UNITS Caps      Take 200 capsules by mouth

## 2017-10-04 NOTE — PROGRESS NOTES
S:  Pt reports left shoulder>right shoulder pain began after leaning forward reaching in bed six weeks ago-it has subdoed but is a dull ache and annoying.  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  Chiro has been working on it because of a rib out of place.  Pt believes have increased rhomboid tone.  HEP for this region is the bow and arrow stretch otherwise AROM and awareness with movement at work.  O:  Obs: ROHAN round shoulders  TTP: increase tone to left rhomboid/low trap, increase tone left greater than right UT  Shoulder AROM is full and painfree ROHAN  Special tests RTC and labral tear  A:  Pt has sxs consistent with postural strain.  P:  Follow up for hip and shoulder in one week.

## 2017-10-10 ENCOUNTER — THERAPY VISIT (OUTPATIENT)
Dept: PHYSICAL THERAPY | Facility: CLINIC | Age: 27
End: 2017-10-10
Payer: COMMERCIAL

## 2017-10-10 DIAGNOSIS — M25.551 HIP PAIN, RIGHT: ICD-10-CM

## 2017-10-10 PROCEDURE — 97140 MANUAL THERAPY 1/> REGIONS: CPT | Mod: GP | Performed by: PHYSICAL THERAPIST

## 2017-10-10 PROCEDURE — 97110 THERAPEUTIC EXERCISES: CPT | Mod: GP | Performed by: PHYSICAL THERAPIST

## 2017-10-27 ENCOUNTER — THERAPY VISIT (OUTPATIENT)
Dept: PHYSICAL THERAPY | Facility: CLINIC | Age: 27
End: 2017-10-27
Payer: COMMERCIAL

## 2017-10-27 DIAGNOSIS — M25.551 HIP PAIN, RIGHT: ICD-10-CM

## 2017-10-27 PROCEDURE — 97140 MANUAL THERAPY 1/> REGIONS: CPT | Mod: GP | Performed by: PHYSICAL THERAPIST

## 2017-10-27 PROCEDURE — 97110 THERAPEUTIC EXERCISES: CPT | Mod: GP | Performed by: PHYSICAL THERAPIST

## 2017-11-10 ENCOUNTER — THERAPY VISIT (OUTPATIENT)
Dept: PHYSICAL THERAPY | Facility: CLINIC | Age: 27
End: 2017-11-10
Payer: COMMERCIAL

## 2017-11-10 DIAGNOSIS — M25.551 HIP PAIN, RIGHT: ICD-10-CM

## 2017-11-10 PROCEDURE — 97140 MANUAL THERAPY 1/> REGIONS: CPT | Mod: GP | Performed by: PHYSICAL THERAPIST

## 2017-11-10 PROCEDURE — 97112 NEUROMUSCULAR REEDUCATION: CPT | Mod: GP | Performed by: PHYSICAL THERAPIST

## 2017-11-10 PROCEDURE — 97110 THERAPEUTIC EXERCISES: CPT | Mod: GP | Performed by: PHYSICAL THERAPIST

## 2017-11-24 ENCOUNTER — MYC MEDICAL ADVICE (OUTPATIENT)
Dept: FAMILY MEDICINE | Facility: CLINIC | Age: 27
End: 2017-11-24

## 2017-11-24 DIAGNOSIS — E55.9 HYPOVITAMINOSIS D: Primary | ICD-10-CM

## 2017-11-27 RX ORDER — EPINEPHRINE 0.3 MG/.3ML
0.3 INJECTION SUBCUTANEOUS PRN
Qty: 0.6 ML | Refills: 0 | Status: CANCELLED | OUTPATIENT
Start: 2017-11-27

## 2017-11-27 NOTE — TELEPHONE ENCOUNTER
Erika    See pt MashMango message    Med/lab cued if you agree    Advise     Lia Chadwick RN   [facility]

## 2017-12-05 DIAGNOSIS — E55.9 HYPOVITAMINOSIS D: ICD-10-CM

## 2017-12-05 PROCEDURE — 82306 VITAMIN D 25 HYDROXY: CPT | Performed by: NURSE PRACTITIONER

## 2017-12-05 PROCEDURE — 36415 COLL VENOUS BLD VENIPUNCTURE: CPT | Performed by: NURSE PRACTITIONER

## 2017-12-06 ENCOUNTER — E-VISIT (OUTPATIENT)
Dept: FAMILY MEDICINE | Facility: CLINIC | Age: 27
End: 2017-12-06
Payer: COMMERCIAL

## 2017-12-06 DIAGNOSIS — F33.0 MAJOR DEPRESSIVE DISORDER, RECURRENT EPISODE, MILD (H): Primary | ICD-10-CM

## 2017-12-06 DIAGNOSIS — M25.551 HIP PAIN, RIGHT: ICD-10-CM

## 2017-12-06 LAB — DEPRECATED CALCIDIOL+CALCIFEROL SERPL-MC: 54 UG/L (ref 20–75)

## 2017-12-06 PROCEDURE — 99444 ZZC PHYSICIAN ONLINE EVALUATION & MANAGEMENT SERVICE: CPT | Performed by: NURSE PRACTITIONER

## 2017-12-06 ASSESSMENT — PATIENT HEALTH QUESTIONNAIRE - PHQ9
SUM OF ALL RESPONSES TO PHQ QUESTIONS 1-9: 9
10. IF YOU CHECKED OFF ANY PROBLEMS, HOW DIFFICULT HAVE THESE PROBLEMS MADE IT FOR YOU TO DO YOUR WORK, TAKE CARE OF THINGS AT HOME, OR GET ALONG WITH OTHER PEOPLE: SOMEWHAT DIFFICULT
SUM OF ALL RESPONSES TO PHQ QUESTIONS 1-9: 9

## 2017-12-06 NOTE — TELEPHONE ENCOUNTER
Huddle with Erika who advised E-visit is OK. Message to patient.    LYDIA VelardeN, RN  [facility]

## 2017-12-06 NOTE — PROGRESS NOTES
Debbie,    Vitamin D looks great.  Let's continue course.  I don't have a great answer about the epi-pen.  I think without an associated diagnoses you'd have to pay out of pocket.  These things have tightened up a lot in the last couple years.  If you have any questions, please feel free to contact the clinic.    MARCOS Stark

## 2017-12-07 ASSESSMENT — PATIENT HEALTH QUESTIONNAIRE - PHQ9: SUM OF ALL RESPONSES TO PHQ QUESTIONS 1-9: 9

## 2017-12-12 ENCOUNTER — THERAPY VISIT (OUTPATIENT)
Dept: PHYSICAL THERAPY | Facility: CLINIC | Age: 27
End: 2017-12-12
Payer: COMMERCIAL

## 2017-12-12 ENCOUNTER — MYC MEDICAL ADVICE (OUTPATIENT)
Dept: FAMILY MEDICINE | Facility: CLINIC | Age: 27
End: 2017-12-12

## 2017-12-12 DIAGNOSIS — M25.551 HIP PAIN, RIGHT: ICD-10-CM

## 2017-12-12 PROCEDURE — 97530 THERAPEUTIC ACTIVITIES: CPT | Mod: GP | Performed by: PHYSICAL THERAPIST

## 2017-12-12 PROCEDURE — 97140 MANUAL THERAPY 1/> REGIONS: CPT | Mod: GP | Performed by: PHYSICAL THERAPIST

## 2017-12-12 NOTE — PROGRESS NOTES
S:  Pt reports pain is similar as last visit.  Pt reports things have never changed and are frustrated.  Current right hip pain is posterior lateral sharp hip pain.  Pt reports left hip has hurt over the past few months.  Pt reports groin pain with walking more than 2 miles.  Pt reports constantly feeling tight at the low back region.  Pt wonders with all the back, knees, hip shoulder pains is there something systemic occurring?  Pt reports HEP including clamshells, hip abd, squats, lunges, rhomboid PREs.  O:   TTP: none at hip region, SI  Negative SLR  FADIR + right hip  - scour  Good hamstring flexibility ROHAN  Good quad flexibility  HIP AROM  IR at 90: left 50, right 40  ER at 90: left 80, right 80  abd 50 ROHAN  MMT:  Glut med left 4+/5, right 4+/5  Glut max left 4+/5, right 4+/5  A:  Pt has plateaued in PT, continues to have chronic right hip pain.  Pt has gone through a course of conservative care and is being recommended back to MD for additional follow up possible labral/PRASHANT invovlement.  P: hold PT

## 2017-12-12 NOTE — MR AVS SNAPSHOT
After Visit Summary   12/12/2017    Debbie Bah    MRN: [id number]           Patient Information     Date Of Birth          1990        Visit Information        Provider Department      12/12/2017 10:20 AM Sven Jackman PT [facility]        Today's Diagnoses     Hip pain, right           Follow-ups after your visit        Who to contact     If you have questions or need follow up information about today's clinic visit or your schedule please contact [facility] directly at [phone].  Normal or non-critical lab and imaging results will be communicated to you by [employer], letter or phone within 4 business days after the clinic has received the results. If you do not hear from us within 7 days, please contact the clinic through [employer] or phone. If you have a critical or abnormal lab result, we will notify you by phone as soon as possible.  Submit refill requests through [employer] or call your pharmacy and they will forward the refill request to us. Please allow 3 business days for your refill to be completed.          Additional Information About Your Visit        MyChart Information     [employer] gives you secure access to your electronic health record. If you see a primary care provider, you can also send messages to your care team and make appointments. If you have questions, please call your primary care clinic.  If you do not have a primary care provider, please call [phone] and they will assist you.        Care EveryWhere ID     This is your Care EveryWhere ID. This could be used by other organizations to access your [place] medical records  [id number]         Blood Pressure from Last 3 Encounters:   09/08/17 106/66   07/20/17 120/78   03/24/17 123/80    Weight from Last 3 Encounters:   09/08/17 85.7 kg (189 lb)   07/20/17 81.6 kg (180 lb)   03/24/17 85.9 kg (189 lb 4.8 oz)              We Performed the  Following     ODELL Progress Notes Report     Manual Ther Tech, 1+Regions, EA 15 min     Therapeutic Activities        Primary Care Provider Office Phone # Fax #    HARJINDER [facility] [phone]       [facility] [address] [facility] [address]  [facility] [address]        Equal Access to Services     GRISELDA PICKETT : Hadii aad ku hadasho Soomaali, waaxda luqadaha, qaybta kaalmada adeegyada, waxay idiin hayaan adeeg kharash la'aan . So [facility] [phone].    ATENCIÓN: Si habla español, tiene a rojas disposición servicios gratuitos de asistencia lingüística. LlFire[facility] [phone].    We comply with applicable federal civil rights laws and [place] laws. We do not discriminate on the basis of race, color, national origin, age, disability, sex, sexual orientation, or gender identity.            Thank you!     Thank you for choosing [facility] PHYSICAL THER[facility]Y [place]  for your care. Our goal is always to provide you with excellent care. Hearing back from our patients is one way we can continue to improve our services. Please take a few minutes to complete the written survey that you may receive in the mail after your visit with us. Thank you!             Your Updated Medication List - Protect others around you: Learn how to safely use, store and throw away your medicines at www.disposemymeds.org.          This list is accurate as of: 12/12/17 11:10 AM.  Always use your most recent med list.                   Brand Name Dispense Instructions for use Diagnosis    adapalene 0.1 % cream    DIFFERIN     Apply topically At Bedtime        B COMPLEX-FOLIC ACID PO      Take 1 tablet by mouth        Multi-vitamin Tabs tablet      Take 1 tablet by mouth daily        omega-3 acid ethyl esters 1 G capsule    Lovaza     Take 2 g by mouth 2 times daily        PROBIOTIC ACIDOPHILUS Tabs      Take 1 tablet by mouth        propranolol 10 MG tablet    INDERAL    30 tablet    Take 1-2 tablets (10-20 mg) by mouth 2  times daily as needed for situational anxiety    Situational anxiety       SOOLANTRA 1 % cream   Generic drug:  ivermectin      Apply 1 g topically daily Apply to the affected areas of the face once daily. Use a pea-size amount for each area of the face (forehead, chin, nose, each cheek) that is affected. Spread as a thin layer, avoiding the eyes and lips.        spironolactone 50 MG tablet    ALDACTONE          vitamin D 2000 UNITS Caps      Take 200 capsules by mouth

## 2017-12-12 NOTE — TELEPHONE ENCOUNTER
Erika--    Please see patient mychart message below. Patient's last office visit with you was July 2017 for hip pain. Would you like patient to schedule a follow up with you or with ortho at this time?      Thank you,   Corine Fletcher RN  [facility]

## 2017-12-13 ENCOUNTER — MYC MEDICAL ADVICE (OUTPATIENT)
Dept: FAMILY MEDICINE | Facility: CLINIC | Age: 27
End: 2017-12-13

## 2017-12-21 ENCOUNTER — OFFICE VISIT (OUTPATIENT)
Dept: ORTHOPEDICS | Facility: CLINIC | Age: 27
End: 2017-12-21
Payer: COMMERCIAL

## 2017-12-21 ENCOUNTER — RADIANT APPOINTMENT (OUTPATIENT)
Dept: GENERAL RADIOLOGY | Facility: CLINIC | Age: 27
End: 2017-12-21
Attending: FAMILY MEDICINE
Payer: COMMERCIAL

## 2017-12-21 VITALS — WEIGHT: 190 LBS | BODY MASS INDEX: 31.65 KG/M2 | HEIGHT: 65 IN | RESPIRATION RATE: 16 BRPM

## 2017-12-21 DIAGNOSIS — M25.551 RIGHT HIP PAIN: Primary | ICD-10-CM

## 2017-12-21 DIAGNOSIS — M25.551 RIGHT HIP PAIN: ICD-10-CM

## 2017-12-21 NOTE — PROGRESS NOTES
Subjective:   Debbie Bah is a 27 year old female who is here complaining of right hip pain. 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 She restarted running last Winter when she started noticing the pain.  Throughout that, her right hip started becoming more painful. She tried pushing through it over the winter, but backed off in Spring. Since then, minimal exercise (walking, hiking), which can even aggravate her symptoms. Tried short distance running (1/2 mile), felt fine running, but very sore the next day. Localizes pain to anterior hip as well as buttocks on right side. Saw NP twice for this, and PT started in August. PT suggested PRASHANT vs labral pathology, saying muscles are strong and not spastic.     Athletic history: Horseback riding for about 10 years, otherwise rec soccer.    Background:   Date of injury: NA, insidious in onset  Duration of symptoms: 1 year  Mechanism of Injury: Insidious Onset; Activity Related Running  Aggravating factors: Running, hiking  Relieving Factors: rest  Prior Evaluation: Physical Therapy  7/20/17    PAST MEDICAL, SOCIAL, SURGICAL AND FAMILY HISTORY: She  has a past medical history of GERD (gastroesophageal reflux disease).  She  has a past surgical history that includes no history of surgery.  Her family history includes Arthritis in her paternal grandmother; Asthma in her mother; CEREBROVASCULAR DISEASE (age of onset: 75) in her paternal grandmother; CEREBROVASCULAR DISEASE (age of onset: 82) in her paternal grandfather; Coronary Artery Disease (age of onset: 65) in her maternal grandfather; Hypertension in her father and mother. There is no history of DIABETES or Breast Cancer.  She reports that she has never smoked. She has never used smokeless tobacco. She reports that she drinks alcohol. She reports that she does not use illicit drugs.      ALLERGIES: She is allergic to doxycycline and mushroom.    CURRENT MEDICATIONS: She has a current medication list  "which includes the following prescription(s): fluoxetine, vitamin d, omega-3 acid ethyl esters, multivitamin, therapeutic with minerals, ivermectin, probiotic acidophilus, propranolol, spironolactone, adapalene, and folic acid-vit b6-vit b12.     REVIEW OF SYSTEMS: 9 point review of systems is negative except as noted above.     Exam:   Resp 16  Ht 5' 4.5\" (1.638 m)  Wt 190 lb (86.2 kg)  BMI 32.11 kg/m2        CONSTITUTIONAL: healthy, alert and no distress  HEAD: Normocephalic. No masses, lesions, tenderness or abnormalities  SKIN: no suspicious lesions or rashes  GAIT: normal  NEUROLOGIC: Non-focal  PSYCHIATRIC: affect normal/bright and mentation appears normal.    MUSCULOSKELETAL:     - Right hip: Full range of motion. NTTP over hip flexors. TTP over lateral hip.  No discomfort with MAYNOR, FADIR, logroll. Scour test produced lateral hip pain. Strong resisted abduction. No pain with resisted hip flexion.  No pain with SI provoking tests.  - Contralateral hip: FROM. 5/5 strength         Assessment/Plan:   Right Hip Pain  - X rays show mild cam lesion, but exam is not quite consistent with PRASHANT at this time  - Concern for either labral pathology or stress reaction  - Obtaining MRI with arthrogram, follow up after imaging  - Okay for any activity that does not reproduce pain  - Tylenol for pain management    X-RAY INTERPRETATION:   X-Ray of the Hip: 2-view, ap pelvis, bilateral Rodrigues ordered and interpreted in the office today was positive for mild bilateral cam lesions with anteversion of pelvis    Kenneth Dick MD  Family Medicine Resident, PGY-3    Patient seen and staffed with Dr. Alfredo and Dr. Sofía Hernandez    "

## 2017-12-21 NOTE — LETTER
12/21/2017      RE: Debbie Bah  [address]  [facility] [address]        Subjective:   Debbie Bah is a 27 year old female who is here complaining of right hip pain. 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  Throughout that, her right hip started becoming more painful. She tried pushing through it over the winter, but backed off in Spring. Since then, minimal exercise (walking, hiking), which can even aggravate her symptoms. Tried short distance running (1/2 mile), felt fine running, but very sore the next day. Localizes pain to anterior hip as well as buttocks on right side. Saw NP twice for this, and PT started in August. PT suggested PRASHANT vs labral pathology, saying muscles are strong and not spastic.     Athletic history: Horseback riding for about 10 years, otherwise rec soccer.    Background:   Date of injury: NA, insidious in onset  Duration of symptoms: 1 year  Mechanism of Injury: Insidious Onset; Activity Related Running  Aggravating factors: Running, hiking  Relieving Factors: rest  Prior Evaluation: Physical Therapy  7/20/17    PAST MEDICAL, SOCIAL, SURGICAL AND FAMILY HISTORY: She  has a past medical history of GERD (gastroesophageal reflux disease).  She  has a past surgical history that includes no history of surgery.  Her family history includes Arthritis in her paternal grandmother; Asthma in her mother; CEREBROVASCULAR DISEASE (age of onset: 75) in her paternal grandmother; CEREBROVASCULAR DISEASE (age of onset: 82) in her paternal grandfather; Coronary Artery Disease (age of onset: 65) in her maternal grandfather; Hypertension in her father and mother. There is no history of DIABETES or Breast Cancer.  She reports that she has never smoked. She has never used smokeless tobacco. She reports that she drinks alcohol. She reports that she does not use illicit drugs.      ALLERGIES: She is allergic to  "doxycycline and mushroom.    CURRENT MEDICATIONS: She has a current medication list which includes the following prescription(s): fluoxetine, vitamin d, omega-3 acid ethyl esters, multivitamin, therapeutic with minerals, ivermectin, probiotic acidophilus, propranolol, spironolactone, adapalene, and folic acid-vit b6-vit b12.     REVIEW OF SYSTEMS: 9 point review of systems is negative except as noted above.     Exam:   Resp 16  Ht 5' 4.5\" (1.638 m)  Wt 190 lb (86.2 kg)  BMI 32.11 kg/m2        CONSTITUTIONAL: healthy, alert and no distress  HEAD: Normocephalic. No masses, lesions, tenderness or abnormalities  SKIN: no suspicious lesions or rashes  GAIT: normal  NEUROLOGIC: Non-focal  PSYCHIATRIC: affect normal/bright and mentation appears normal.    MUSCULOSKELETAL:     - Right hip: Full range of motion. NTTP over hip flexors. TTP over lateral hip.  No discomfort with MAYNOR, FADIR, logroll. Scour test produced lateral hip pain. Strong resisted abduction. No pain with resisted hip flexion.  No pain with SI provoking tests.  - Contralateral hip: FROM. 5/5 strength         Assessment/Plan:   Right Hip Pain  - X rays show mild cam lesion, but exam is not quite consistent with PRASHANT at this time  - Concern for either labral pathology or stress reaction  - Obtaining MRI with arthrogram, follow up after imaging  - Okay for any activity that does not reproduce pain  - Tylenol for pain management    X-RAY INTERPRETATION:   X-Ray of the Hip: 2-view, ap pelvis, bilateral Rodrigues ordered and interpreted in the office today was positive for mild bilateral cam lesions with anteversion of pelvis    Kenneth Dick MD  Family Medicine Resident, PGY-3    Patient seen and staffed with Dr. Alfredo and Dr. Sofía Hernandez      Attending Note:   I have personally examined this patient and have reviewed the clinical presentation and progress note with the resident. I agree with the treatment plan as outlined. The plan was formulated with " the resident on the day of the patient's visit. I have reviewed all imaging with the resident and agree with the findings in the documentation.     Sofía Hernandez MD, CAQ, CCD  [facility]  Sports Medicine and Bone Health    German Alfredo MD

## 2017-12-21 NOTE — MR AVS SNAPSHOT
After Visit Summary   12/21/2017    Debbie Bah    MRN: [id number]           Patient Information     Date Of Birth          1990        Visit Information        Provider Department      12/21/2017 3:00 PM German Alfredo MD M [facility] Sports Medicine        Today's Diagnoses     Right hip pain    -  1       Follow-ups after your visit        Your next 10 appointments already scheduled     Dec 28, 2017  1:00 PM CST   (Arrive by 12:45 PM)   XR HIP ARTHROGRAM RIGHT with SHXR4, SH MSK RAD   [facility] ([facility])    [address] [facility] [id number] [phone]           Stop drinking 1 hour before the exam.  You may take your medicines as usual, except for blood thinners (Coumadin, Plavix, Ticlid, Persantine, Aggrenox, Pletal, Effient, Brilliant). Talk to your doctor if you take these.  Tell your doctor if:   You have ever had an allergic reaction to X-ray dye (contrast fluid).   There is a chance you may be pregnant.  Please bring a list of your current medicines to your exam. Include vitamins, minerals and over-the-counter medicines.  Please call the Imaging Department at your exam site with any questions.            Dec 28, 2017  1:45 PM CST   (Arrive by 1:30 PM)   MR HIP RIGHT W CONTRAST with SHMRP2   [facility] MRI ([facility])    [address] [facility] [id number] [phone]           Take your medicines as usual, unless your doctor tells you not to. Bring a list of your current medicines to your exam (including vitamins, minerals and over-the-counter drugs).  You will be given intravenous contrast for this exam. To prepare:   The day before your exam, drink extra fluids at least six 8-ounce glasses (unless your doctor tells you to restrict your fluids).   Have a blood test (creatinine test) within 30 days of your exam. Go to your clinic or Diagnostic Imaging Department for this test.  The MRI  machine uses a strong magnet. Please wear clothes without metal (snaps, zippers). A sweatsuit works well, or we may give you a hospital gown.  Please remove any body piercings and hair extensions before you arrive. You will also remove watches, jewelry, hairpins, wallets, dentures, partial dental plates and hearing aids. You may wear contact lenses, and you may be able to wear your rings. We have a safe place to keep your personal items, but it is safer to leave them at home.   **IMPORTANT** THE INSTRUCTIONS BELOW ARE ONLY FOR THOSE PATIENTS WHO HAVE BEEN TOLD THEY WILL RECEIVE SEDATION OR GENERAL ANESTHESIA DURING THEIR MRI PROCEDURE:  IF YOU WILL RECEIVE SEDATION (take medicine to help you relax during your exam):   You must get the medicine from your doctor before you arrive. Bring the medicine to the exam. Do not take it at home.   Arrive one hour early. Bring someone who can take you home after the test. Your medicine will make you sleepy. After the exam, you may not drive, take a bus or take a taxi by yourself.   No eating 8 hours before your exam. You may have clear liquids up until 4 hours before your exam. (Clear liquids include water, clear tea, black coffee and fruit juice without pulp.)  IF YOU WILL RECEIVE ANESTHESIA (be asleep for your exam):   Arrive 1 1/2 hours early. Bring someone who can take you home after the test. You may not drive, take a bus or take a taxi by yourself.   No eating 8 hours before your exam. You may have clear liquids up until 4 hours before your exam. (Clear liquids include water, clear tea, black coffee and fruit juice without pulp.)  Please call the Imaging Department at your exam site with any questions.            Jan 04, 2018  1:00 PM CST   (Arrive by 12:45 PM)   New Patient Visit with German Alfredo MD   [facility] ([facility] [place])    [address] [address] [phone]              Future tests  "that were ordered for you today     Open Future Orders        Priority Expected Expires Ordered    MR Hip Right w Contrast Routine  12/21/2018 12/21/2017    XR Hip Arthrogram Right Routine 12/21/2017 12/21/2018 12/21/2017            Who to contact     Please call your clinic at [phone] to:    Ask questions about your health    Make or cancel appointments    Discuss your medicines    Learn about your test results    Speak to your doctor   If you have compliments or concerns about an experience at your clinic, or if you wish to file a complaint, please contact [facility] Patient Relations at [phone] or email us at [email or web address]         Additional Information About Your Visit        [employer] Information     [employer] gives you secure access to your electronic health record. If you see a primary care provider, you can also send messages to your care team and make appointments. If you have questions, please call your primary care clinic.  If you do not have a primary care provider, please call [phone] and they will assist you.      [employer] is an electronic gateway that provides easy, online access to your medical records. With [employer], you can request a clinic appointment, read your test results, renew a prescription or communicate with your care team.     To access your existing account, please contact your [facility] Clinic or call [phone] for assistance.        Care EveryWhere ID     This is your Care EveryWhere ID. This could be used by other organizations to access your [place] medical records  [id number]        Your Vitals Were     Respirations Height BMI (Body Mass Index)             16 5' 4.5\" (1.638 m) 32.11 kg/m2          Blood Pressure from Last 3 Encounters:   09/08/17 106/66   07/20/17 120/78   03/24/17 123/80    Weight from Last 3 Encounters:   12/21/17 190 lb (86.2 kg)   09/08/17 189 lb (85.7 kg)   07/20/17 180 lb " (81.6 kg)                 Today's Medication Changes          These changes are accurate as of: 12/21/17  4:51 PM.  If you have any questions, ask your nurse or doctor.               These medicines have changed or have updated prescriptions.        Dose/Directions    FLUoxetine 20 MG capsule   Commonly known as:  PROzac   This may have changed:  how much to take   Used for:  Major depressive disorder, recurrent episode, mild (H)        Dose:  20 mg   Take 1 capsule (20 mg) by mouth daily   Quantity:  90 capsule   Refills:  0                Primary Care Provider Office Phone # Fax #    Sarahi NAVA Danielle, HARJINDER [facility] [phone]       [facility] [address] [facility] [address]  [facility] [address]        Equal Access to Services     GRISELDA PICKETT : Leni Christie, waaxda markqadaha, qaybta kaalmada adenakulyada, damien vaughan . So [facility] [phone].    ATENCIÓN: Si habla español, tiene a rojas disposición servicios gratuitos de asistencia lingüística. Llame al [phone].    We comply with applicable federal civil rights laws and [place] laws. We do not discriminate on the basis of race, color, national origin, age, disability, sex, sexual orientation, or gender identity.            Thank you!     Thank you for choosing [facility]  for your care. Our goal is always to provide you with excellent care. Hearing back from our patients is one way we can continue to improve our services. Please take a few minutes to complete the written survey that you may receive in the mail after your visit with us. Thank you!             Your Updated Medication List - Protect others around you: Learn how to safely use, store and throw away your medicines at www.disposemymeds.org.          This list is accurate as of: 12/21/17  4:51 PM.  Always use your most recent med list.                   Brand Name Dispense Instructions for use Diagnosis    adapalene 0.1 % cream    DIFFERIN      Apply topically At Bedtime        B COMPLEX-FOLIC ACID PO      Take 1 tablet by mouth        FLUoxetine 20 MG capsule    PROzac    90 capsule    Take 1 capsule (20 mg) by mouth daily    Major depressive disorder, recurrent episode, mild (H)       Multi-vitamin Tabs tablet      Take 1 tablet by mouth daily        omega-3 acid ethyl esters 1 G capsule    Lovaza     Take 2 g by mouth 2 times daily        PROBIOTIC ACIDOPHILUS Tabs      Take 1 tablet by mouth        propranolol 10 MG tablet    INDERAL    30 tablet    Take 1-2 tablets (10-20 mg) by mouth 2 times daily as needed for situational anxiety    Situational anxiety       SOOLANTRA 1 % cream   Generic drug:  ivermectin      Apply 1 g topically daily Apply to the affected areas of the face once daily. Use a pea-size amount for each area of the face (forehead, chin, nose, each cheek) that is affected. Spread as a thin layer, avoiding the eyes and lips.        spironolactone 50 MG tablet    ALDACTONE          vitamin D 2000 UNITS Caps      Take 2 capsules by mouth

## 2017-12-28 ENCOUNTER — HOSPITAL ENCOUNTER (OUTPATIENT)
Dept: MRI IMAGING | Facility: CLINIC | Age: 27
End: 2017-12-28
Attending: COLON & RECTAL SURGERY
Payer: COMMERCIAL

## 2017-12-28 ENCOUNTER — HOSPITAL ENCOUNTER (OUTPATIENT)
Dept: GENERAL RADIOLOGY | Facility: CLINIC | Age: 27
Discharge: HOME | End: 2017-12-28
Attending: COLON & RECTAL SURGERY | Admitting: COLON & RECTAL SURGERY
Payer: COMMERCIAL

## 2017-12-28 ENCOUNTER — TELEPHONE (OUTPATIENT)
Dept: FAMILY MEDICINE | Facility: CLINIC | Age: 27
End: 2017-12-28

## 2017-12-28 DIAGNOSIS — M25.551 RIGHT HIP PAIN: ICD-10-CM

## 2017-12-28 PROCEDURE — A9585 GADOBUTROL INJECTION: HCPCS | Performed by: PHYSICIAN ASSISTANT

## 2017-12-28 PROCEDURE — 25500064 ZZH RX 255 OP 636: Performed by: PHYSICIAN ASSISTANT

## 2017-12-28 PROCEDURE — 25000125 ZZHC RX 250: Performed by: PHYSICIAN ASSISTANT

## 2017-12-28 PROCEDURE — 27211111 XR HIP ARTHROGRAM RIGHT

## 2017-12-28 PROCEDURE — 25000128 H RX IP 250 OP 636: Performed by: PHYSICIAN ASSISTANT

## 2017-12-28 PROCEDURE — 73722 MRI JOINT OF LWR EXTR W/DYE: CPT | Mod: RT

## 2017-12-28 RX ORDER — GADOBUTROL 604.72 MG/ML
0.1 INJECTION INTRAVENOUS ONCE
Status: COMPLETED | OUTPATIENT
Start: 2017-12-28 | End: 2017-12-28

## 2017-12-28 RX ORDER — EPINEPHRINE 1 MG/ML
1 INJECTION, SOLUTION, CONCENTRATE INTRAVENOUS ONCE
Status: COMPLETED | OUTPATIENT
Start: 2017-12-28 | End: 2017-12-28

## 2017-12-28 RX ORDER — IOPAMIDOL 408 MG/ML
10 INJECTION, SOLUTION INTRATHECAL ONCE
Status: COMPLETED | OUTPATIENT
Start: 2017-12-28 | End: 2017-12-28

## 2017-12-28 RX ADMIN — LIDOCAINE HYDROCHLORIDE 3 ML: 10 INJECTION, SOLUTION EPIDURAL; INFILTRATION; INTRACAUDAL; PERINEURAL at 13:42

## 2017-12-28 RX ADMIN — IOPAMIDOL 5 ML: 408 INJECTION, SOLUTION INTRATHECAL at 13:44

## 2017-12-28 RX ADMIN — EPINEPHRINE 0.1 MG: 1 INJECTION, SOLUTION, CONCENTRATE INTRAVENOUS at 14:07

## 2017-12-28 RX ADMIN — GADOBUTROL 0.1 ML: 604.72 INJECTION INTRAVENOUS at 14:08

## 2017-12-28 NOTE — PROGRESS NOTES
RADIOLOGY PROCEDURE NOTE  Patient name: Debbie Bah  MRN: [id number]  : 1990    Pre-procedure diagnosis:  Right hip pain  Post-procedure diagnosis: Same    Procedure Date/Time: 2017  1:54 PM  Procedure: Right hip gadolinium injection for MR arthrogram  Estimated blood loss: None  Specimen(s) collected with description: none  The patient tolerated the procedure well with no immediate complications.  Significant findings:none    See imaging dictation for procedural details.    Provider name: Neftaly Senior  Assistant(s):None

## 2017-12-28 NOTE — IP AVS SNAPSHOT
[facility] Radiology    [address] [facility] [address]    Phone:  [phone]                                       After Visit Summary   12/28/2017    Debbie Bah    MRN: [id number]           After Visit Summary Signature Page     I have received my discharge instructions, and my questions have been answered. I have discussed any challenges I see with this plan with the nurse or doctor.    ..........................................................................................................................................  Patient/Patient Representative Signature      ..........................................................................................................................................  Patient Representative Print Name and Relationship to Patient    ..................................................               ................................................  Date                                            Time    ..........................................................................................................................................  Reviewed by Signature/Title    ...................................................              ..............................................  Date                                                            Time

## 2017-12-28 NOTE — LETTER
December 28, 2017      Debbie Bah  [address] [facility] [address]        Dear Debbie,    In order to ensure we are providing the best quality care, we have reviewed your chart and see that you are due for:    1. Pap smear    Please call the clinic at your earliest convenience to schedule an appointment.  If you have completed these please contact our office via phone or Closehart to update our records.  We would like to know the date (approximately month and year), the result, and ideally where the procedure was performed.    Thank you for trusting us with your health care.      Sincerely,    Care Team for MARCOS Stark

## 2017-12-28 NOTE — DISCHARGE INSTRUCTIONS
Orthopedic Discharge Instructions:   After Your Injection or Aspiration  ________________________________________    Patient Name:  Debbie Bah  Today's Date:  December 28, 2017  The doctor who performed your  INJECTION PROCEDURE was Neftaly Senior at [facility] in the  RADIOLOGY Department  Care of needle site    If you have new numbness down your leg, this may last up to 6 hours, but it should go away. You may need help with walking until your leg feels normal.     Over the next 24 to 48 hours, pain at the needle site may increase before it gets better.     For the next 48 hours, use ice packs for 15 minutes, three to four times a day for pain.    If you have a bandage, you may remove it the next morning.     No tub baths, hot tubs or swimming for 48 hours. You may shower the next day.   Activity    Do not drive until morning.     Limit your activity based on your pain level. Follow your doctor s orders for activity.     You may eat a normal diet.     If you had sedation,   - You may feel sleepy, forgetful or unsteady.   - Do not drink alcohol for 24 hours.  Medicines    If you take aspirin or platelet inhibitors, you can restart them tomorrow.     Restart all other medicines today at your regular dose, including Coumadin (warfarin).    If you are restarting Coumadin, talk to your doctor about having your INR checked.   If you had a steroid shot     The medicine should help reduce swelling and pain. This may take from 7 to 10 days.     Side effects from the shot will be mild and go away in 2 to 3 days. Common side effects may include:  -  Insomnia (trouble sleeping).  -  Heartburn.  -  Flushed face.  -  Water retention (bloating or fluid build-up).  -  Increased appetite (feeling more hungry than usual).  -  Increased blood sugar.  If you have diabetes, watch your blood sugar closely. If needed, call your doctor to help you control your blood sugar.  Some patients will get lasting relief  from a single shot. Others may require up to three shots to get results. If you have more than one steroid shot, they should be given two weeks apart.  Some patients do not have relief of symptoms.    Follow-up:  No FOLLOW-UP NEEDED     Call your doctor at or go to the Emergency Room if you have severe pain, fever or problems with bowel or bladder control.

## 2017-12-28 NOTE — IP AVS SNAPSHOT
MRN:[id number]                      After Visit Summary   12/28/2017    Debbie Bah    MRN: [id number]           Visit Information        Provider Department      12/28/2017  1:00 PM [facility] RAD; [facility]           Review of your medicines      UNREVIEWED medicines. Ask your doctor about these medicines        Dose / Directions    adapalene 0.1 % cream   Commonly known as:  DIFFERIN        Apply topically At Bedtime   Refills:  0       B COMPLEX-FOLIC ACID PO        Dose:  1 tablet   Take 1 tablet by mouth   Refills:  0       FLUoxetine 20 MG capsule   Commonly known as:  PROzac   Used for:  Major depressive disorder, recurrent episode, mild (H)        Dose:  20 mg   Take 1 capsule (20 mg) by mouth daily   Quantity:  90 capsule   Refills:  0       Multi-vitamin Tabs tablet        Dose:  1 tablet   Take 1 tablet by mouth daily   Refills:  0       omega-3 acid ethyl esters 1 G capsule   Commonly known as:  Lovaza        Dose:  2 g   Take 2 g by mouth 2 times daily   Refills:  0       PROBIOTIC ACIDOPHILUS Tabs        Dose:  1 tablet   Take 1 tablet by mouth   Refills:  0       propranolol 10 MG tablet   Commonly known as:  INDERAL   Used for:  Situational anxiety        Dose:  10-20 mg   Take 1-2 tablets (10-20 mg) by mouth 2 times daily as needed for situational anxiety   Quantity:  30 tablet   Refills:  1       SOOLANTRA 1 % cream   Generic drug:  ivermectin        Dose:  1 g   Apply 1 g topically daily Apply to the affected areas of the face once daily. Use a pea-size amount for each area of the face (forehead, chin, nose, each cheek) that is affected. Spread as a thin layer, avoiding the eyes and lips.   Refills:  0       spironolactone 50 MG tablet   Commonly known as:  ALDACTONE        Refills:  0       vitamin D 2000 UNITS Caps        Dose:  2 capsule   Take 2 capsules by mouth   Refills:  0                Protect others around you: Learn how to safely use,  store and throw away your medicines at www.disposemymeds.org.         Follow-ups after your visit        Your next 10 appointments already scheduled     Jan 04, 2018  1:00 PM CST   (Arrive by 12:45 PM)   New Patient Visit with German Alfredo MD   [facility] ([facility])    [address] [facility]  [address] [facility] [address] [phone]               Care Instructions        Further instructions from your care team         Orthopedic Discharge Instructions:   After Your Injection or Aspiration  ________________________________________    Patient Name:  Debbie Bah  Today's Date:  December 28, 2017  The doctor who performed your  INJECTION PROCEDURE was Neftaly Senior at [facility] in the  RADIOLOGY Department  Care of needle site    If you have new numbness down your leg, this may last up to 6 hours, but it should go away. You may need help with walking until your leg feels normal.     Over the next 24 to 48 hours, pain at the needle site may increase before it gets better.     For the next 48 hours, use ice packs for 15 minutes, three to four times a day for pain.    If you have a bandage, you may remove it the next morning.     No tub baths, hot tubs or swimming for 48 hours. You may shower the next day.   Activity    Do not drive until morning.     Limit your activity based on your pain level. Follow your doctor s orders for activity.     You may eat a normal diet.     If you had sedation,   - You may feel sleepy, forgetful or unsteady.   - Do not drink alcohol for 24 hours.  Medicines    If you take aspirin or platelet inhibitors, you can restart them tomorrow.     Restart all other medicines today at your regular dose, including Coumadin (warfarin).    If you are restarting Coumadin, talk to your doctor about having your INR checked.   If you had a steroid shot     The medicine should help reduce swelling and pain. This may take from 7  to 10 days.     Side effects from the shot will be mild and go away in 2 to 3 days. Common side effects may include:  -  Insomnia (trouble sleeping).  -  Heartburn.  -  Flushed face.  -  Water retention (bloating or fluid build-up).  -  Increased appetite (feeling more hungry than usual).  -  Increased blood sugar.  If you have diabetes, watch your blood sugar closely. If needed, call your doctor to help you control your blood sugar.  Some patients will get lasting relief from a single shot. Others may require up to three shots to get results. If you have more than one steroid shot, they should be given two weeks apart.  Some patients do not have relief of symptoms.    Follow-up:  No FOLLOW-UP NEEDED     Call your doctor at or go to the Emergency Room if you have severe pain, fever or problems with bowel or bladder control.      Additional Information About Your Visit        [employer] Information     [email or web address] gives you secure access to your electronic health record. If you see a primary care provider, you can also send messages to your care team and make appointments. If you have questions, please call your primary care clinic.  If you do not have a primary care provider, please call [phone] and they will assist you.        Care EveryWhere ID     This is your Care EveryWhere ID. This could be used by other organizations to access your [facility] medical records  [id number]         Primary Care Provider Office Phone # Fax #    Sarahi HARJINDER Andrade [facility] [phone]      Equal Access to Services     GRISELDA PICKETT : Hadii sherlyn vanegaso Soliane, waaxda luqadaha, qaybta kaalmada adeegyada, damien vaughan . So [facility] [phone].    ATENCIÓN: Si habla español, tiene a rojas disposición servicios gratuitos de asistencia lingüística. Llhansa al [phone].    We comply with applicable federal civil rights laws and [place] laws. We do not discriminate on the basis of race, color,  national origin, age, disability, sex, sexual orientation, or gender identity.            Thank you!     Thank you for choosing [facility] for your care. Our goal is always to provide you with excellent care. Hearing back from our patients is one way we can continue to improve our services. Please take a few minutes to complete the written survey that you may receive in the mail after you visit with us. Thank you!             Medication List: This is a list of all your medications and when to take them. Check marks below indicate your daily home schedule. Keep this list as a reference.      Medications           Morning Afternoon Evening Bedtime As Needed    adapalene 0.1 % cream   Commonly known as:  DIFFERIN   Apply topically At Bedtime                                B COMPLEX-FOLIC ACID PO   Take 1 tablet by mouth                                FLUoxetine 20 MG capsule   Commonly known as:  PROzac   Take 1 capsule (20 mg) by mouth daily                                Multi-vitamin Tabs tablet   Take 1 tablet by mouth daily                                omega-3 acid ethyl esters 1 G capsule   Commonly known as:  Lovaza   Take 2 g by mouth 2 times daily                                PROBIOTIC ACIDOPHILUS Tabs   Take 1 tablet by mouth                                propranolol 10 MG tablet   Commonly known as:  INDERAL   Take 1-2 tablets (10-20 mg) by mouth 2 times daily as needed for situational anxiety                                SOOLANTRA 1 % cream   Apply 1 g topically daily Apply to the affected areas of the face once daily. Use a pea-size amount for each area of the face (forehead, chin, nose, each cheek) that is affected. Spread as a thin layer, avoiding the eyes and lips.   Generic drug:  ivermectin                                spironolactone 50 MG tablet   Commonly known as:  ALDACTONE                                vitamin D 2000 UNITS Caps   Take 2 capsules by mouth

## 2017-12-31 ENCOUNTER — HEALTH MAINTENANCE LETTER (OUTPATIENT)
Age: 27
End: 2017-12-31

## 2018-01-04 ENCOUNTER — MYC MEDICAL ADVICE (OUTPATIENT)
Dept: FAMILY MEDICINE | Facility: CLINIC | Age: 28
End: 2018-01-04

## 2018-01-04 ENCOUNTER — RADIANT APPOINTMENT (OUTPATIENT)
Dept: GENERAL RADIOLOGY | Facility: CLINIC | Age: 28
End: 2018-01-04
Payer: COMMERCIAL

## 2018-01-04 ENCOUNTER — OFFICE VISIT (OUTPATIENT)
Dept: ORTHOPEDICS | Facility: CLINIC | Age: 28
End: 2018-01-04
Payer: COMMERCIAL

## 2018-01-04 DIAGNOSIS — M54.5 LOW BACK PAIN, UNSPECIFIED BACK PAIN LATERALITY, UNSPECIFIED CHRONICITY, WITH SCIATICA PRESENCE UNSPECIFIED: ICD-10-CM

## 2018-01-04 DIAGNOSIS — M54.5 LOW BACK PAIN, UNSPECIFIED BACK PAIN LATERALITY, UNSPECIFIED CHRONICITY, WITH SCIATICA PRESENCE UNSPECIFIED: Primary | ICD-10-CM

## 2018-01-04 NOTE — MR AVS SNAPSHOT
After Visit Summary   1/4/2018    Debbie Bah    MRN: [id number]           Patient Information     Date Of Birth          1990        Visit Information        Provider Department      1/4/2018 1:00 PM German Alfredo MD [facility] Medicine        Today's Diagnoses     Low back pain, unspecified back pain laterality, unspecified chronicity, with sciatica presence unspecified    -  1       Follow-ups after your visit        Your next 10 appointments already scheduled     Jan 09, 2018  4:45 PM CST   (Arrive by 4:30 PM)   MR THORACIC SPINE W/O CONTRAST with [id number]   [facility] MRI ([facility])    [address]  [facility] [address] [phone]           Take your medicines as usual, unless your doctor tells you not to. Bring a list of your current medicines to your exam (including vitamins, minerals and over-the-counter drugs). Also bring the results of similar scans you may have had.  Please remove any body piercings and hair extensions before you arrive.  Follow your doctor s orders. If you do not, we may have to postpone your exam.  You will not have contrast for this exam. You do not need to do anything special to prepare.  The MRI machine uses a strong magnet. Please wear clothes without metal (snaps, zippers). A sweatsuit works well, or we may give you a hospital gown.   **IMPORTANT** THE INSTRUCTIONS BELOW ARE ONLY FOR THOSE PATIENTS WHO HAVE BEEN TOLD THEY WILL RECEIVE SEDATION OR GENERAL ANESTHESIA DURING THEIR MRI PROCEDURE:  IF YOU WILL RECEIVE SEDATION (take medicine to help you relax during your exam):   You must get the medicine from your doctor before you arrive. Bring the medicine to the exam. Do not take it at home.   Arrive one hour early. Bring someone who can take you home after the test. Your medicine will make you sleepy. After the exam, you may not drive, take a bus or take a taxi by yourself.   No  eating 8 hours before your exam. You may have clear liquids up until 4 hours before your exam. (Clear liquids include water, clear tea, black coffee and fruit juice without pulp.)  IF YOU WILL RECEIVE ANESTHESIA (be asleep for your exam):   Arrive 1 1/2 hours early. Bring someone who can take you home after the test. You may not drive, take a bus or take a taxi by yourself.   No eating 8 hours before your exam. You may have clear liquids up until 4 hours before your exam. (Clear liquids include water, clear tea, black coffee and fruit juice without pulp.)   You will spend four to five hours in the recovery room.  Please call the Imaging Department at your exam site with any questions.            Jan 09, 2018  5:30 PM CST   (Arrive by 5:15 PM)   MR LUMBAR SPINE W/O CONTRAST with [id number]   [facility] MRI ([facility])    [address] [address] [phone]           Take your medicines as usual, unless your doctor tells you not to. Bring a list of your current medicines to your exam (including vitamins, minerals and over-the-counter drugs). Also bring the results of similar scans you may have had.  Please remove any body piercings and hair extensions before you arrive.  Follow your doctor s orders. If you do not, we may have to postpone your exam.  You will not have contrast for this exam. You do not need to do anything special to prepare.  The MRI machine uses a strong magnet. Please wear clothes without metal (snaps, zippers). A sweatsuit works well, or we may give you a hospital gown.   **IMPORTANT** THE INSTRUCTIONS BELOW ARE ONLY FOR THOSE PATIENTS WHO HAVE BEEN TOLD THEY WILL RECEIVE SEDATION OR GENERAL ANESTHESIA DURING THEIR MRI PROCEDURE:  IF YOU WILL RECEIVE SEDATION (take medicine to help you relax during your exam):   You must get the medicine from your doctor before you arrive. Bring the medicine to the exam. Do not take it at  home.   Arrive one hour early. Bring someone who can take you home after the test. Your medicine will make you sleepy. After the exam, you may not drive, take a bus or take a taxi by yourself.   No eating 8 hours before your exam. You may have clear liquids up until 4 hours before your exam. (Clear liquids include water, clear tea, black coffee and fruit juice without pulp.)  IF YOU WILL RECEIVE ANESTHESIA (be asleep for your exam):   Arrive 1 1/2 hours early. Bring someone who can take you home after the test. You may not drive, take a bus or take a taxi by yourself.   No eating 8 hours before your exam. You may have clear liquids up until 4 hours before your exam. (Clear liquids include water, clear tea, black coffee and fruit juice without pulp.)   You will spend four to five hours in the recovery room.  Please call the Imaging Department at your exam site with any questions.            Jan 11, 2018  4:40 PM CST   (Arrive by 4:25 PM)   Return Visit with German Alfredo MD   [facility] ([facility])    [address] [place] [address] [phone]              Future tests that were ordered for you today     Open Future Orders        Priority Expected Expires Ordered    MRI Lumbar spine w/o contrast Routine  1/4/2019 1/4/2018    MR Thoracic Spine w/o Contrast Routine  1/4/2019 1/4/2018            Who to contact     Please call your clinic at [phone] to:    Ask questions about your health    Make or cancel appointments    Discuss your medicines    Learn about your test results    Speak to your doctor   If you have compliments or concerns about an experience at your clinic, or if you wish to file a complaint, please contact [facility] Patient Relations at [phone] or email us at [email or web address]         Additional Information About Your Visit        [employer] Information     [employer] gives you secure  access to your electronic health record. If you see a primary care provider, you can also send messages to your care team and make appointments. If you have questions, please call your primary care clinic.  If you do not have a primary care provider, please call [phone] and they will assist you.      TiVo is an electronic gateway that provides easy, online access to your medical records. With TiVo, you can request a clinic appointment, read your test results, renew a prescription or communicate with your care team.     To access your existing account, please contact your [facility] or call [phone] for assistance.        Care EveryWhere ID     This is your Care EveryWhere ID. This could be used by other organizations to access your [facility] medical records  [id number]         Blood Pressure from Last 3 Encounters:   09/08/17 106/66   07/20/17 120/78   03/24/17 123/80    Weight from Last 3 Encounters:   12/21/17 86.2 kg (190 lb)   09/08/17 85.7 kg (189 lb)   07/20/17 81.6 kg (180 lb)               Primary Care Provider Office Phone # Fax #    Sarahi HARJINDER Andrade [facility] [phone]       [facility] [address]  [facility] [address]        Equal Access to Services     GRISELDA PICKETT AH: Hadii aad ku hadasho Soomaali, waaxda luqadaha, qaybta kaalmada adeegyada, waxay idiin hayaan fariba vaughan . So [facility] [phone].    ATENCIÓN: Si habla español, tiene a rojas disposición servicios gratuitos de asistencia lingüística. Llame al [phone].    We comply with applicable federal civil rights laws and [place] laws. We do not discriminate on the basis of race, color, national origin, age, disability, sex, sexual orientation, or gender identity.            Thank you!     Thank you for choosing [facility]  for your care. Our goal is always to provide you with excellent care. Hearing back from our patients is one way we can continue  to improve our services. Please take a few minutes to complete the written survey that you may receive in the mail after your visit with us. Thank you!             Your Updated Medication List - Protect others around you: Learn how to safely use, store and throw away your medicines at www.disposemymeds.org.          This list is accurate as of: 1/4/18  2:11 PM.  Always use your most recent med list.                   Brand Name Dispense Instructions for use Diagnosis    adapalene 0.1 % cream    DIFFERIN     Apply topically At Bedtime        B COMPLEX-FOLIC ACID PO      Take 1 tablet by mouth        FLUoxetine 20 MG capsule    PROzac    60 capsule    Take 2 capsules (40 mg) by mouth daily    Major depressive disorder, recurrent episode, mild (H)       Multi-vitamin Tabs tablet      Take 1 tablet by mouth daily        omega-3 acid ethyl esters 1 G capsule    Lovaza     Take 2 g by mouth 2 times daily        PROBIOTIC ACIDOPHILUS Tabs      Take 1 tablet by mouth        propranolol 10 MG tablet    INDERAL    30 tablet    Take 1-2 tablets (10-20 mg) by mouth 2 times daily as needed for situational anxiety    Situational anxiety       SOOLANTRA 1 % cream   Generic drug:  ivermectin      Apply 1 g topically daily Apply to the affected areas of the face once daily. Use a pea-size amount for each area of the face (forehead, chin, nose, each cheek) that is affected. Spread as a thin layer, avoiding the eyes and lips.        spironolactone 50 MG tablet    ALDACTONE          vitamin D 2000 UNITS Caps      Take 2 capsules by mouth

## 2018-01-04 NOTE — LETTER
"  1/4/2018      RE: Debbie Bah  [address]  [facility] [address]        Subjective:   Debbie Bah is a 27 year old female presenting for MRI follow up for ongoing R gluteal pain, most notable while running. Reports ongoing gluteal pain. MRI shows the following:     \"MR RIGHT HIP WITH CONTRAST  12/28/2017 2:51 PM     HISTORY:  Right hip pain for one year. No specific injury.        TECHNIQUE:  Intra-articular injection of gadolinium procedure will be  reported separately. Multiplanar T1, T2 and STIR images.     COMPARISON:  None.     FINDINGS: No acute or chronic bony abnormality. Articular cartilages  appear normal. Acetabular labrum is well evaluated and shows no  evidence for degeneration, tear, detachment or paralabral cyst. No  evidence for synovitis or loose bodies. The ligamentum teres appears  normal. Soft tissues around the right hip are unremarkable. Incidental  note of a 1.9 cm right ovarian cyst.         IMPRESSION: Unremarkable MR arthrogram right hip.     ARTURO THOMPSON MD\"    Athletic history: Horseback riding for about 10 years, otherwise rec soccer.    Background:   Date of injury: NA, insidious in onset  Duration of symptoms: 1 year  Mechanism of Injury: Insidious Onset; Activity Related Running  Aggravating factors: Running, hiking  Relieving Factors: rest  Prior Evaluation: Physical Therapy  7/20/17    PAST MEDICAL, SOCIAL, SURGICAL AND FAMILY HISTORY: She  has a past medical history of GERD (gastroesophageal reflux disease).  She  has a past surgical history that includes no history of surgery.  Her family history includes Arthritis in her paternal grandmother; Asthma in her mother; CEREBROVASCULAR DISEASE (age of onset: 75) in her paternal grandmother; CEREBROVASCULAR DISEASE (age of onset: 82) in her paternal grandfather; Coronary Artery Disease (age of onset: 65) in her maternal grandfather; Hypertension in her father and mother. There is no history of DIABETES " or Breast Cancer.  She reports that she has never smoked. She has never used smokeless tobacco. She reports that she drinks alcohol. She reports that she does not use illicit drugs.      ALLERGIES: She is allergic to doxycycline and mushroom.    CURRENT MEDICATIONS: She has a current medication list which includes the following prescription(s): fluoxetine, adapalene, vitamin d, folic acid-vit b6-vit b12, omega-3 acid ethyl esters, multivitamin, therapeutic with minerals, ivermectin, probiotic acidophilus, propranolol, and spironolactone.     REVIEW OF SYSTEMS: 9 point review of systems is negative except as noted above.     Exam:   There were no vitals taken for this visit.        CONSTITUTIONAL: healthy, alert and no distress  HEAD: Normocephalic. No masses, lesions, tenderness or abnormalities  SKIN: no suspicious lesions or rashes  GAIT: normal  NEUROLOGIC: Non-focal  PSYCHIATRIC: affect normal/bright and mentation appears normal.    MUSCULOSKELETAL:      Right hip: Full range of motion. No discomfort with MAYNOR, FADIR, logroll. Scour test produced lateral hip pain. Strong resisted abduction. No pain with resisted hip flexion.  No pain with SI provoking tests.   Left hip: Full ROM. 5/5 strength   Back: TTP along L1-L2 spinous processes. SLR WNL      Assessment/Plan:   Right gluteal and leg pain: Her symptoms initially presented in a similar manner to hip pathology. However, her hip MRI showed no pathology. As prior, we discussed that referred back pain may be possible with gluteal and groin pain, especially in light of spinous process tenderness.     Will obtain MRI T and L spine given the location of her tenderness. For follow up thereafter.     German Alfredo MD  Primary Care Sports Medicine Fellow  January 11, 2018      Attending Note:   I have personally examined this patient and have reviewed the clinical presentation and progress note with the fellow. I agree with the treatment plan as outlined. The  plan was formulated with the fellow on the day of the patient's visit. I have reviewed all imaging with the fellow and agree with the findings in the documentation.     Sofía Hernandez MD, CAQ, CCD  [facility]  Sports Medicine and Bone Health

## 2018-01-04 NOTE — TELEPHONE ENCOUNTER
Erika,     Patient is wondering if she should increase her Prozac dose from 40mg/day to 60 mg/day.     Please see mychart message below.       Corine Fletcher RN  [facility]

## 2018-01-04 NOTE — PROGRESS NOTES
" Subjective:   Debbie Bah is a 27 year old female presenting for MRI follow up for ongoing R gluteal pain, most notable while running. Reports ongoing gluteal pain. MRI shows the following:     \"MR RIGHT HIP WITH CONTRAST  12/28/2017 2:51 PM     HISTORY:  Right hip pain for one year. No specific injury.        TECHNIQUE:  Intra-articular injection of gadolinium procedure will be  reported separately. Multiplanar T1, T2 and STIR images.     COMPARISON:  None.     FINDINGS: No acute or chronic bony abnormality. Articular cartilages  appear normal. Acetabular labrum is well evaluated and shows no  evidence for degeneration, tear, detachment or paralabral cyst. No  evidence for synovitis or loose bodies. The ligamentum teres appears  normal. Soft tissues around the right hip are unremarkable. Incidental  note of a 1.9 cm right ovarian cyst.         IMPRESSION: Unremarkable MR arthrogram right hip.     ARTURO THOMPSON MD\"    Athletic history: Horseback riding for about 10 years, otherwise rec soccer.    Background:   Date of injury: NA, insidious in onset  Duration of symptoms: 1 year  Mechanism of Injury: Insidious Onset; Activity Related Running  Aggravating factors: Running, hiking  Relieving Factors: rest  Prior Evaluation: Physical Therapy  7/20/17    PAST MEDICAL, SOCIAL, SURGICAL AND FAMILY HISTORY: She  has a past medical history of GERD (gastroesophageal reflux disease).  She  has a past surgical history that includes no history of surgery.  Her family history includes Arthritis in her paternal grandmother; Asthma in her mother; CEREBROVASCULAR DISEASE (age of onset: 75) in her paternal grandmother; CEREBROVASCULAR DISEASE (age of onset: 82) in her paternal grandfather; Coronary Artery Disease (age of onset: 65) in her maternal grandfather; Hypertension in her father and mother. There is no history of DIABETES or Breast Cancer.  She reports that she has never smoked. She has never used " smokeless tobacco. She reports that she drinks alcohol. She reports that she does not use illicit drugs.      ALLERGIES: She is allergic to doxycycline and mushroom.    CURRENT MEDICATIONS: She has a current medication list which includes the following prescription(s): fluoxetine, adapalene, vitamin d, folic acid-vit b6-vit b12, omega-3 acid ethyl esters, multivitamin, therapeutic with minerals, ivermectin, probiotic acidophilus, propranolol, and spironolactone.     REVIEW OF SYSTEMS: 9 point review of systems is negative except as noted above.     Exam:   There were no vitals taken for this visit.        CONSTITUTIONAL: healthy, alert and no distress  HEAD: Normocephalic. No masses, lesions, tenderness or abnormalities  SKIN: no suspicious lesions or rashes  GAIT: normal  NEUROLOGIC: Non-focal  PSYCHIATRIC: affect normal/bright and mentation appears normal.    MUSCULOSKELETAL:      Right hip: Full range of motion. No discomfort with MAYNOR, FADIR, logroll. Scour test produced lateral hip pain. Strong resisted abduction. No pain with resisted hip flexion.  No pain with SI provoking tests.   Left hip: Full ROM. 5/5 strength   Back: TTP along L1-L2 spinous processes. SLR WNL      Assessment/Plan:   Right gluteal and leg pain: Her symptoms initially presented in a similar manner to hip pathology. However, her hip MRI showed no pathology. As prior, we discussed that referred back pain may be possible with gluteal and groin pain, especially in light of spinous process tenderness.     Will obtain MRI T and L spine given the location of her tenderness. For follow up thereafter.     German Alfredo MD  Primary Care Sports Medicine Fellow  January 11, 2018

## 2018-01-04 NOTE — PROGRESS NOTES
Attending Note:   I have personally examined this patient and have reviewed the clinical presentation and progress note with the fellow. I agree with the treatment plan as outlined. The plan was formulated with the fellow on the day of the patient's visit. I have reviewed all imaging with the fellow and agree with the findings in the documentation.     Sofía Hernandez MD, CAQ, CCD  [facility]  Sports Medicine and Bone Health

## 2018-01-09 ENCOUNTER — RADIANT APPOINTMENT (OUTPATIENT)
Dept: MRI IMAGING | Facility: CLINIC | Age: 28
End: 2018-01-09
Attending: FAMILY MEDICINE
Payer: COMMERCIAL

## 2018-01-09 DIAGNOSIS — M54.5 LOW BACK PAIN, UNSPECIFIED BACK PAIN LATERALITY, UNSPECIFIED CHRONICITY, WITH SCIATICA PRESENCE UNSPECIFIED: ICD-10-CM

## 2018-01-12 NOTE — PROGRESS NOTES
Attending Note:   I have personally examined this patient and have reviewed the clinical presentation and progress note with the resident. I agree with the treatment plan as outlined. The plan was formulated with the resident on the day of the patient's visit. I have reviewed all imaging with the resident and agree with the findings in the documentation.     Sofía Hernandez MD, CAQ, CCD  [facility]  Sports Medicine and Bone Health

## 2018-01-18 ENCOUNTER — OFFICE VISIT (OUTPATIENT)
Dept: ORTHOPEDICS | Facility: CLINIC | Age: 28
End: 2018-01-18
Payer: COMMERCIAL

## 2018-01-18 DIAGNOSIS — M79.2 NEUROPATHIC PAIN DUE TO RADIATION: Primary | ICD-10-CM

## 2018-01-18 RX ORDER — MELOXICAM 15 MG/1
15 TABLET ORAL DAILY
Qty: 30 TABLET | Refills: 0 | Status: SHIPPED | OUTPATIENT
Start: 2018-01-18 | End: 2018-11-28

## 2018-01-18 NOTE — LETTER
1/18/2018      RE: Debbie Bah  [address]  [facility] [address]        Subjective:   Debbie Bah is a 27 year old female presenting for MRI follow up for ongoing R gluteal pain, most notable while running. Reports that her pain has been mostly improved on its own. MRI T and L spine obtained out concern for radicular pain, but these largely resulted as normal.     Athletic history: Horseback riding for about 10 years, otherwise rec soccer.    Has not tried running to date. States that she has backed off of more impact activities because of this.     Background:   Date of injury: NA, insidious in onset  Duration of symptoms: 1 year  Mechanism of Injury: Insidious Onset; Activity Related Running  Aggravating factors: Running, hiking  Relieving Factors: rest  Prior Evaluation: Physical Therapy  7/20/17    PAST MEDICAL, SOCIAL, SURGICAL AND FAMILY HISTORY: She  has a past medical history of GERD (gastroesophageal reflux disease).  She  has a past surgical history that includes no history of surgery.  Her family history includes Arthritis in her paternal grandmother; Asthma in her mother; CEREBROVASCULAR DISEASE (age of onset: 75) in her paternal grandmother; CEREBROVASCULAR DISEASE (age of onset: 82) in her paternal grandfather; Coronary Artery Disease (age of onset: 65) in her maternal grandfather; Hypertension in her father and mother. There is no history of DIABETES or Breast Cancer.  She reports that she has never smoked. She has never used smokeless tobacco. She reports that she drinks alcohol. She reports that she does not use illicit drugs.      ALLERGIES: She is allergic to doxycycline and mushroom.    CURRENT MEDICATIONS: She has a current medication list which includes the following prescription(s): fluoxetine, adapalene, vitamin d, folic acid-vit b6-vit b12, omega-3 acid ethyl esters, multivitamin, therapeutic with minerals, ivermectin, probiotic acidophilus, propranolol, and  spironolactone.     REVIEW OF SYSTEMS: 9 point review of systems is negative except as noted above.     Exam:   There were no vitals taken for this visit.        CONSTITUTIONAL: healthy, alert and no distress  HEAD: Normocephalic. No masses, lesions, tenderness or abnormalities  SKIN: no suspicious lesions or rashes  GAIT: normal  NEUROLOGIC: Non-focal  PSYCHIATRIC: affect normal/bright and mentation appears normal.    MUSCULOSKELETAL:   Back: TTP over upper lumbar spinous processes, including paraspinal musculature.        Assessment/Plan:   Right gluteal and leg pain: Her symptoms initially presented in a similar manner to hip pathology. Her back was also of concern given her concurrent radiating pain. However, MRI T and L spine (which was obtained due to point tenderness and due to h/o reported lumbar spine injury 2/2 to horseback riding) did not show any significant pathology.     She does have some findings of lumbar disc disease, more at L1-L2 and L2-L3, which may be causing her symptoms. As she is asymptomatic currently will opt for less invasive treatment options. Will refer to PT lumbar spine treatments and will given a course of NSAIDs for 2 weeks.     RTC PRN. Can consider referral for epidural if not improving.      German Alfredo MD  Primary Care Sports Medicine Fellow  January 18, 2018      Attending Note:   I have personally examined this patient and have reviewed the clinical presentation and progress note with the fellow. I agree with the treatment plan as outlined. The plan was formulated with the fellow on the day of the patient's visit. I have reviewed all imaging with the fellow and agree with the findings in the documentation.     Sofía Hernandez MD, CAQ, CCD  [facility]  Sports Medicine and Bone Health

## 2018-01-18 NOTE — PROGRESS NOTES
Subjective:   Debbie Bah is a 27 year old female presenting for MRI follow up for ongoing R gluteal pain, most notable while running. Reports that her pain has been mostly improved on its own. MRI T and L spine obtained out concern for radicular pain, but these largely resulted as normal.     Athletic history: Horseback riding for about 10 years, otherwise rec soccer.    Has not tried running to date. States that she has backed off of more impact activities because of this.     Background:   Date of injury: NA, insidious in onset  Duration of symptoms: 1 year  Mechanism of Injury: Insidious Onset; Activity Related Running  Aggravating factors: Running, hiking  Relieving Factors: rest  Prior Evaluation: Physical Therapy  7/20/17    PAST MEDICAL, SOCIAL, SURGICAL AND FAMILY HISTORY: She  has a past medical history of GERD (gastroesophageal reflux disease).  She  has a past surgical history that includes no history of surgery.  Her family history includes Arthritis in her paternal grandmother; Asthma in her mother; CEREBROVASCULAR DISEASE (age of onset: 75) in her paternal grandmother; CEREBROVASCULAR DISEASE (age of onset: 82) in her paternal grandfather; Coronary Artery Disease (age of onset: 65) in her maternal grandfather; Hypertension in her father and mother. There is no history of DIABETES or Breast Cancer.  She reports that she has never smoked. She has never used smokeless tobacco. She reports that she drinks alcohol. She reports that she does not use illicit drugs.      ALLERGIES: She is allergic to doxycycline and mushroom.    CURRENT MEDICATIONS: She has a current medication list which includes the following prescription(s): fluoxetine, adapalene, vitamin d, folic acid-vit b6-vit b12, omega-3 acid ethyl esters, multivitamin, therapeutic with minerals, ivermectin, probiotic acidophilus, propranolol, and spironolactone.     REVIEW OF SYSTEMS: 9 point review of systems is negative except as  noted above.     Exam:   There were no vitals taken for this visit.        CONSTITUTIONAL: healthy, alert and no distress  HEAD: Normocephalic. No masses, lesions, tenderness or abnormalities  SKIN: no suspicious lesions or rashes  GAIT: normal  NEUROLOGIC: Non-focal  PSYCHIATRIC: affect normal/bright and mentation appears normal.    MUSCULOSKELETAL:   Back: TTP over upper lumbar spinous processes, including paraspinal musculature.        Assessment/Plan:   Right gluteal and leg pain: Her symptoms initially presented in a similar manner to hip pathology. Her back was also of concern given her concurrent radiating pain. However, MRI T and L spine (which was obtained due to point tenderness and due to h/o reported lumbar spine injury 2/2 to horseback riding) did not show any significant pathology.     She does have some findings of lumbar disc disease, more at L1-L2 and L2-L3, which may be causing her symptoms. As she is asymptomatic currently will opt for less invasive treatment options. Will refer to PT lumbar spine treatments and will given a course of NSAIDs for 2 weeks.     RTC PRN. Can consider referral for epidural if not improving.      German Alfredo MD  Primary Care Sports Medicine Fellow  January 18, 2018

## 2018-01-18 NOTE — MR AVS SNAPSHOT
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    -  1       Follow-ups after your visit        Additional Services     PHYSICAL THERAPY REFERRAL (Internal)       Physical Therapy Referral                  Your next 10 appointments already scheduled     Feb 02, 2018  2:00 PM CST   (Arrive by 1:45 PM)   [facility] Running with Kristina Mccann PT   [facility] Physical Therapy [facility] ([facility])    [address] [place] [address] [address] [facility] [address] [phone]              Who to contact     Please call your clinic at [phone] to:    Ask questions about your health    Make or cancel appointments    Discuss your medicines    Learn about your test results    Speak to your doctor   If you have compliments or concerns about an experience at your clinic, or if you wish to file a complaint, please contact [facility] Patient Relations at [phone] or email us at [email or web address]         Additional Information About Your Visit        MyChart Information     MyNextRun gives you secure access to your electronic health record. If you see a primary care provider, you can also send messages to your care team and make appointments. If you have questions, please call your primary care clinic.  If you do not have a primary care provider, please call [phone] and they will assist you.      MyNextRun is an electronic gateway that provides easy, online access to your medical records. With MyNextRun, you can request a clinic appointment, read your test results, renew a prescription or communicate with your care team.     To access your existing account, please contact your  [facility] or call [phone] for assistance.        Care EveryWhere ID     This is your Care EveryWhere ID. This could be used by other organizations to access your [facility] medical records  [id number]         Blood Pressure from Last 3 Encounters:   09/08/17 106/66   07/20/17 120/78   03/24/17 123/80    Weight from Last 3 Encounters:   12/21/17 190 lb (86.2 kg)   09/08/17 189 lb (85.7 kg)   07/20/17 180 lb (81.6 kg)              We Performed the Following     PHYSICAL THERAPY REFERRAL (Internal)          Today's Medication Changes          These changes are accurate as of 1/18/18 11:59 PM.  If you have any questions, ask your nurse or doctor.               Start taking these medicines.        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   Started by:  German Alfredo MD        Dose:  15 mg   Take 1 tablet (15 mg) by mouth daily   Quantity:  30 tablet   Refills:  0            Where to get your medicines      These medications were sent to [place] [id number] [place], [place] - [address] [facility]  [address] [facility] [address]     Phone:  [phone]     meloxicam 15 MG tablet                Primary Care Provider Office Phone # Fax #    Sarahi HARJINDER Andrade [facility] [phone]       [facility] [address]  [facility] [address]        Equal Access to Services     GRISELDA PICKETT AH: Hadii sherlyn vanegaso Soliane, waaxda luqadaha, qaybta kaalmada adeegyada, waxeve steve james. So [facility] [phone].    ATENCIÓN: Si habla español, tiene a rojas disposición servicios gratuitos de asistencia lingüística. Llame al [phone].    We comply with applicable federal civil rights laws and [place] laws. We do not discriminate on the basis of race, color, national origin, age, disability, sex, sexual orientation, or gender identity.            Thank you!      Thank you for choosing [facility]  for your care. Our goal is always to provide you with excellent care. Hearing back from our patients is one way we can continue to improve our services. Please take a few minutes to complete the written survey that you may receive in the mail after your visit with us. Thank you!             Your Updated Medication List - Protect others around you: Learn how to safely use, store and throw away your medicines at www.disposemymeds.org.          This list is accurate as of 1/18/18 11:59 PM.  Always use your most recent med list.                   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 Use a pea-size amount for each area of the face (forehead, chin, nose, each cheek) that is affected. Spread as a thin layer, avoiding the eyes and lips.        spironolactone 50 MG tablet    ALDACTONE          vitamin D 2000 UNITS Caps      Take 2 capsules by mouth

## 2018-01-25 NOTE — PROGRESS NOTES
Attending Note:   I have personally examined this patient and have reviewed the clinical presentation and progress note with the fellow. I agree with the treatment plan as outlined. The plan was formulated with the fellow on the day of the patient's visit. I have reviewed all imaging with the fellow and agree with the findings in the documentation.     Sofía Hernandez MD, CAQ, CCD  [facility]  Sports Medicine and Bone Health

## 2018-01-27 ENCOUNTER — MYC MEDICAL ADVICE (OUTPATIENT)
Dept: FAMILY MEDICINE | Facility: CLINIC | Age: 28
End: 2018-01-27

## 2018-01-27 DIAGNOSIS — F33.0 MAJOR DEPRESSIVE DISORDER, RECURRENT EPISODE, MILD (H): Primary | ICD-10-CM

## 2018-01-27 NOTE — LETTER
[address] [address]  [phone]      2018        To Whom It May Concern:       Debbie Bah (: 1990) is under my medical care for depression and medication management. While she is currently taking medication, her depression has been stable both on and off medication.  She has no symptoms currently that would pose a risk to herself or others.    Sincerely,       MARCOS Stark

## 2018-01-29 NOTE — TELEPHONE ENCOUNTER
Erika--    Please see [employer] message below. A letter is cued for your review/revision, if okay.     Thank you,   Corine Fletcher RN

## 2018-02-02 ENCOUNTER — THERAPY VISIT (OUTPATIENT)
Dept: PHYSICAL THERAPY | Facility: CLINIC | Age: 28
End: 2018-02-02
Payer: COMMERCIAL

## 2018-02-02 DIAGNOSIS — M25.551 HIP PAIN, RIGHT: Primary | ICD-10-CM

## 2018-02-02 PROCEDURE — 97110 THERAPEUTIC EXERCISES: CPT | Mod: GP | Performed by: PHYSICAL THERAPIST

## 2018-02-02 PROCEDURE — 97162 PT EVAL MOD COMPLEX 30 MIN: CPT | Mod: GP | Performed by: PHYSICAL THERAPIST

## 2018-02-02 PROCEDURE — 97530 THERAPEUTIC ACTIVITIES: CPT | Mod: GP | Performed by: PHYSICAL THERAPIST

## 2018-02-02 NOTE — PROGRESS NOTES
KEY PT FINDINGS:  1) Impaired trunk control/abnormal movement patterns with SL stance and SL Squat  2) Decreased core strength - lacking transverse abdominis activation  3) Negative lumbar screen, negative SIJ screen - does present with hip findings but no limitations in motion    Physical Therapy Initial Evaluation: Subjective History     Injury/Condition Details:  Presenting Complaint Right Hip (left hip is becoming more involved)   Onset Timing/Date December 2016   Mechanism December/January 2016/2017 started running again and working out more to lose weight. Would do intervals. After the first month, her hip would be sore for the next couple of days. March/April stopped running and the pain did not change. PT in August to December. Saw Dr. Hernanedz in January.   All MRIs and X-rays have been unremarkable.   The pain has changed over the year - started with right groin pain (pain with lifting leg out of the car). Moved to having nerve pain with lateral hip and down the lateral thigh. The nerve pain can move down into the foot, the nerve pain stays on the right. Cannot recall a pattern to produce the pain.   Reports her pain is like a pulsating pain, general achiness in the hip.     Symptom Behavior Details    Primary Symptoms Sporadic symptoms; Activity/position dependent, pain (Location: Anterior groin (feels tight), deep in the buttock , Quality: Sharp and Aching/Throbbing), stiffness, weakness   Worst Pain 6/10 (with running)   Symptom Provocators Walking >2 miles, sitting for a long time   Best Pain 0/10    Symptom Relievers Unknown - nothing consistently that doesn't cause her pain   Time of day dependent? No   Recent symptom change? no change in symptoms     Prior Testing/Intervention for current condition:  Prior Tests  x-ray and MRI - no findings that would indicate the etiology of her pain - Lumbar MRI clear, Hip MRI showed a cam lesion but no labral pathology   Prior Treatment PT, chiropractic, massage      Lifestyle & General Medical History:  Employment Nurse at [employer]   Usual physical activities  (within past year) Running - goal. Currently not participating in activities due to pain.   Orthopaedic history See Epic Chart   Notable medical history See Epic Chart     Lower Extremity Physical Therapy Examination    Dynamic Movement Screen:  2 leg stance: Mild increased in lumbar lordosis, symmetrical loading  2 leg squat: moves into posterior pelvic tilt with full depth    1 leg stance: Right: Mild proprioceptive deficits, trunk lean over stance leg plus hip hike on non-stance limb ; Left: proprioceptive deficits, normal trunk positioning with level pelvis positioning  1 leg squat:   Right: Lacks trunk control - rotation noted throughout trunk with sidebending, loses femoral ADD/IR at end range  Left: lacks trunk control - rotation noted throughout the trunk with sidebending and excessive trunk flexion    Gait: No remarkable findings, running evaluation performed    Knee Joint ROM: symmetrical            Hip Joint ROM: symmetrical    Lower Extremity Muscle Strength (x/5)   Hip IR Hip ER Knee Ext Hip ABD Hip Ext Knee Flex Hip Flex   Right  5/5 5/5 5/5 5/5 5-/5 5/5 4+/5   Left 5/5 5/5 5/5 5/5 5-/5 5/5 5/5     Basic Muscle Activation:  Transversus Abdominus: Impaired activation in hooklying, decreased awareness of neutral spine positioning, lacks rotational pelvic control in DL bridge testing.       Lower Extremity Flexibility Screen:  Hamstrings (Supine SLR): Right: -; Left: -  Gastroc (Supine Active DF): Right: ++; Left: ++  Quadriceps (Prone KF): Right: -; Left: -  Hip Flexors (Kenneth Test): Right: NEXT; Left: NEXT  ITB (Kristopher Test): Right: NEXT; Left: NEXT    Hip Special Testing:  Test Right Left   FADDIR (+) at extreme motion (-)   MAYNOR (-) (-)   Log Roll (ER) (-) (-)   Resisted SLR (-) (-)     Resisted Movement Testing:  Test Right Left   Adduction Squeeze (-) (-)   Psoas (-) (-)   TFL (-) (-)   ABD  (-) (-)       Hip Palpation:  Tender to palpation at: gluteus medius muscle belly and TFL muscle belly    Lumbar exam: full motion, no hinging at specific level noted with range of motion. Negative SLR for neural tension    SI exam: negative    Assessment/Plan:  Felicia presents to physical therapy after not making progress with hip strengthening to manage her symptoms. She had a clear lumbar and sacroiliac joint evaluation. She does present with mild signs of PRASHANT but do not seem to be the source of her continued pain. In conjunction with clear imaging, she would benefit from a thorough movement analysis and running analysis to allow her to make progress. The most impressive findings today were his lack of core control and activation with weight bearing tasks and her difficulty maintaining neutral spine.     Patient is a 27 year old female with right side hip complaints.    Patient has the following significant findings with corresponding treatment plan.                Diagnosis 1:  Right hip pain - unclear etiology  Pain -  hot/cold therapy, manual therapy, STS, splint/taping/bracing/orthotics, self management and education  Decreased ROM/flexibility - manual therapy, therapeutic exercise and home program  Decreased strength - therapeutic exercise, therapeutic activities and home program  Impaired balance - neuro re-education, therapeutic activities and home program  Decreased proprioception - neuro re-education, therapeutic activities and home program  Impaired muscle performance - neuro re-education and home program  Decreased function - therapeutic activities and home program    Therapy Evaluation Codes:   1) History comprised of:   Personal factors that impact the plan of care:      Time since onset of symptoms and None.    Comorbidity factors that impact the plan of care are:      None.     Medications impacting care: None.  2) Examination of Body Systems comprised of:   Body structures and functions that  impact the plan of care:      Hip and Pelvis.   Activity limitations that impact the plan of care are:      Running, Sitting, Sports and Squatting/kneeling.  3) Clinical presentation characteristics are:   Evolving/Changing.  4) Decision-Making    Moderate complexity using standardized patient assessment instrument and/or measureable assessment of functional outcome.  Cumulative Therapy Evaluation is: Moderate complexity.    Previous and current functional limitations:  (See Goal Flow Sheet for this information)    Short term and Long term goals: (See Goal Flow Sheet for this information)     Communication ability:  Patient appears to be able to clearly communicate and understand verbal and written communication and follow directions correctly.  Treatment Explanation - The following has been discussed with the patient:   RX ordered/plan of care  Anticipated outcomes  Possible risks and side effects  This patient would benefit from PT intervention to resume normal activities.   Rehab potential is good.    Frequency:  1 X week, once daily  Duration:  for 6 weeks  Discharge Plan:  Achieve all LTG.  Independent in home treatment program.  Reach maximal therapeutic benefit.    Please refer to the daily flowsheet for treatment today, total treatment time and time spent performing 1:1 timed codes.

## 2018-02-02 NOTE — MR AVS SNAPSHOT
After Visit Summary   2/2/2018    Debbie Bah    MRN: [id number]           Patient Information     Date Of Birth          1990        Visit Information        Provider Department      2/2/2018 2:00 PM Kristina Mccann, NISH NAVA [facility] Physical Therapy [facility]        Today's Diagnoses     Hip pain, right    -  1       Follow-ups after your visit        Your next 10 appointments already scheduled     Feb 13, 2018  2:50 PM CST   [facility] Running with NISH Prince [facility] Physical Therapy [facility] ([facility])    [address] [place] [id number] [phone]              Who to contact     If you have questions or need follow up information about today's clinic visit or your schedule please contact [facility] PHYSICAL THERAPY [facility] directly at [phone].  Normal or non-critical lab and imaging results will be communicated to you by SIPXhart, letter or phone within 4 business days after the clinic has received the results. If you do not hear from us within 7 days, please contact the clinic through SIPXhart or phone. If you have a critical or abnormal lab result, we will notify you by phone as soon as possible.  Submit refill requests through Bluenog or call your pharmacy and they will forward the refill request to us. Please allow 3 business days for your refill to be completed.          Additional Information About Your Visit        MyChart Information     Bluenog gives you secure access to your electronic health record. If you see a primary care provider, you can also send messages to your care team and make appointments. If you have questions, please call your primary care clinic.  If you do not have a primary care provider, please call [phone] and they will assist you.        Care EveryWhere ID     This is your Care EveryWhere ID. This could be used by other organizations to access your [place] medical records  [id number]         Blood  Pressure from Last 3 Encounters:   09/08/17 106/66   07/20/17 120/78   03/24/17 123/80    Weight from Last 3 Encounters:   12/21/17 86.2 kg (190 lb)   09/08/17 85.7 kg (189 lb)   07/20/17 81.6 kg (180 lb)              We Performed the Following     HC PT EVAL, MODERATE COMPLEXITY     [facility] INITIAL EVAL REPORT     THERAPEUTIC ACTIVITIES     THERAPEUTIC EXERCISES        Primary Care Provider Office Phone # Fax #    Sarahi Santos, APRN [facility] [phone]       [facility] [address]  [facility] [address]        Equal Access to Services     [facility] NIEVES : Hadii aad ku hadasho Sobebetoali, waaxda luqadaha, qaybta kaalmada adeegyada, damien vaughan . So [facility] [phone].    ATENCIÓN: Si habla español, tiene a rojas disposición servicios gratuitos de asistencia lingüística. Llame al [phone].    We comply with applicable federal civil rights laws and [place] laws. We do not discriminate on the basis of race, color, national origin, age, disability, sex, sexual orientation, or gender identity.            Thank you!     Thank you for choosing [facility] PHYSICAL THERAPY [facility]  for your care. Our goal is always to provide you with excellent care. Hearing back from our patients is one way we can continue to improve our services. Please take a few minutes to complete the written survey that you may receive in the mail after your visit with us. Thank you!             Your Updated Medication List - Protect others around you: Learn how to safely use, store and throw away your medicines at www.disposemymeds.org.          This list is accurate as of 2/2/18  6:55 PM.  Always use your most recent med list.                   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 Use a pea-size amount for each area of the face (forehead, chin, nose, each cheek) that is affected. Spread as a thin layer, avoiding the eyes and lips.        spironolactone 50 MG tablet    ALDACTONE          vitamin D 2000 UNITS Caps      Take 2 capsules by mouth

## 2018-02-13 ENCOUNTER — THERAPY VISIT (OUTPATIENT)
Dept: PHYSICAL THERAPY | Facility: CLINIC | Age: 28
End: 2018-02-13
Payer: COMMERCIAL

## 2018-02-13 DIAGNOSIS — M25.551 HIP PAIN, RIGHT: ICD-10-CM

## 2018-02-13 PROCEDURE — 97110 THERAPEUTIC EXERCISES: CPT | Mod: GP | Performed by: PHYSICAL THERAPIST

## 2018-02-13 PROCEDURE — 97112 NEUROMUSCULAR REEDUCATION: CPT | Mod: GP | Performed by: PHYSICAL THERAPIST

## 2018-02-13 PROCEDURE — 97530 THERAPEUTIC ACTIVITIES: CPT | Mod: GP | Performed by: PHYSICAL THERAPIST

## 2018-02-13 NOTE — PROGRESS NOTES
2D Video Running Gait Analysis   Reproduction of  Sports [facility] 2D Video Analysis    Nagi Bishop PT, PhD 2015     SAGITTAL PLANE OBSERVATIONS  Variable Right Left   Foot Strike Pattern Heel strike Heel strike   IC Tibial Inclination Angle (within 5  of vertical) Mild inclination Mild inclination   IC KF Angle (~20 ) Mild decrease Mild decrease   MS KF Angle (~40 ) Decreased Decreased   MS Ankle DF Angle   (knee over toes) Appropriate Appropriate   Push-off Hip Ext Angle (0-5 ) Decreased Decreased   Anterior Pelvic Tilt (5-10 ) Increased Increased   Lumbar Lordosis Increased Increased   COM Excursion (6-8 cm) Appropriate Appropriate     Forward Trunk Lean (5-10  forward) Forward lean from hip/trunk flexion       FRONTAL PLANE OBSERVATIONS  Variable Right Left   Trunk Side Bend (vertical) Appropriate Appropriate   Lateral Pelvic Drop   (males 3-5 , females 4-7 ) Appropriate Appropriate   Knee Center Position (midline) Mild medial Mild medial   Knee Separation   (slight separation) Appropriate Appropriate   Foot-COM Position   (speed dependent) Appropriate Appropriate   Rearfoot Position   (neutral or mild pronation) Appropriate Appropriate   Forefoot Position   (neutral or mild abduction) Appropriate Appropriate     Other Observations  Trunk Rotation Appropriate   Arm Swing Left arm held wide   Heel Height (symmetrical) Appropriate     Kim: 160    Overall findings: patient demonstrates increased lumbar lordosis and anterior pelvic tilting. When asked to demonstrate neutral standing posture, she was unable to move into posterior pelvic tilt/neutral positioning.

## 2018-02-13 NOTE — MR AVS SNAPSHOT
After Visit Summary   2/13/2018    Debbie Bah    MRN: [id number]           Patient Information     Date Of Birth          1990        Visit Information        Provider Department      2/13/2018 2:50 PM Kristina Mccann PT M [facility] Physical Therapy [facility]        Today's Diagnoses     Hip pain, right           Follow-ups after your visit        Your next 10 appointments already scheduled     Feb 27, 2018 10:20 AM CST   [facility] Running with NISH Prince [facility] Physical Therapy [facility] ([facility] [place])    [address] [place] [address] [phone]              Who to contact     If you have questions or need follow up information about today's clinic visit or your schedule please contact [facility] PHYSICAL THERAPY [facility] directly at [phone].  Normal or non-critical lab and imaging results will be communicated to you by WALTOPhart, letter or phone within 4 business days after the clinic has received the results. If you do not hear from us within 7 days, please contact the clinic through WALTOPhart or phone. If you have a critical or abnormal lab result, we will notify you by phone as soon as possible.  Submit refill requests through [email or web address] or call your pharmacy and they will forward the refill request to us. Please allow 3 business days for your refill to be completed.          Additional Information About Your Visit        MyChart Information     [email or web address] gives you secure access to your electronic health record. If you see a primary care provider, you can also send messages to your care team and make appointments. If you have questions, please call your primary care clinic.  If you do not have a primary care provider, please call [phone] and they will assist you.        Care EveryWhere ID     This is your Care EveryWhere ID. This could be used by other organizations to access your [place] medical records  [id number]         Blood  Pressure from Last 3 Encounters:   09/08/17 106/66   07/20/17 120/78   03/24/17 123/80    Weight from Last 3 Encounters:   12/21/17 86.2 kg (190 lb)   09/08/17 85.7 kg (189 lb)   07/20/17 81.6 kg (180 lb)              We Performed the Following     NEUROMUSCULAR RE-EDUCATION     THERAPEUTIC ACTIVITIES     THERAPEUTIC EXERCISES        Primary Care Provider Office Phone # Fax #    Sarahi Santos, APRN [facility] [phone]       [facility] [address] [facility] [address]  [facility] [address]        Equal Access to Services     [facility]: Hadii aad ku hadasho Soomaali, waaxda luqadaha, qaybta kaalmada adeegyada, damien wilson haytito vaughan . So [facility] [phone].    ATENCIÓN: Si habla español, tiene a rojas disposición servicios gratuitos de asistencia lingüística. Llame al [phone].    We comply with applicable federal civil rights laws and [place] laws. We do not discriminate on the basis of race, color, national origin, age, disability, sex, sexual orientation, or gender identity.            Thank you!     Thank you for choosing [facility] PHYSICAL THERAPY [facility]  for your care. Our goal is always to provide you with excellent care. Hearing back from our patients is one way we can continue to improve our services. Please take a few minutes to complete the written survey that you may receive in the mail after your visit with us. Thank you!             Your Updated Medication List - Protect others around you: Learn how to safely use, store and throw away your medicines at www.disposemymeds.org.          This list is accurate as of 2/13/18  4:13 PM.  Always use your most recent med list.                   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 Use a pea-size amount for each area of the face (forehead, chin, nose, each cheek) that is affected. Spread as a thin layer, avoiding the eyes and lips.        spironolactone 50 MG tablet    ALDACTONE          vitamin D 2000 UNITS Caps      Take 2 capsules by mouth

## 2018-02-14 ENCOUNTER — MYC MEDICAL ADVICE (OUTPATIENT)
Dept: FAMILY MEDICINE | Facility: CLINIC | Age: 28
End: 2018-02-14

## 2018-02-14 NOTE — TELEPHONE ENCOUNTER
Erika    See pt mychart message    Letter printed for your signature    Lia Chadwick RN   [facility]

## 2018-02-27 ENCOUNTER — THERAPY VISIT (OUTPATIENT)
Dept: PHYSICAL THERAPY | Facility: CLINIC | Age: 28
End: 2018-02-27
Payer: COMMERCIAL

## 2018-02-27 DIAGNOSIS — M25.551 HIP PAIN, RIGHT: ICD-10-CM

## 2018-02-27 PROCEDURE — 97110 THERAPEUTIC EXERCISES: CPT | Mod: GP | Performed by: PHYSICAL THERAPIST

## 2018-02-27 PROCEDURE — 97112 NEUROMUSCULAR REEDUCATION: CPT | Mod: GP | Performed by: PHYSICAL THERAPIST

## 2018-03-07 ENCOUNTER — MYC MEDICAL ADVICE (OUTPATIENT)
Dept: FAMILY MEDICINE | Facility: CLINIC | Age: 28
End: 2018-03-07

## 2018-03-08 ENCOUNTER — E-VISIT (OUTPATIENT)
Dept: FAMILY MEDICINE | Facility: CLINIC | Age: 28
End: 2018-03-08
Payer: COMMERCIAL

## 2018-03-08 DIAGNOSIS — L70.0 ACNE VULGARIS: Primary | ICD-10-CM

## 2018-03-08 PROCEDURE — 99207 ZZC NO BILLABLE SERVICE THIS VISIT: CPT | Performed by: NURSE PRACTITIONER

## 2018-03-08 NOTE — MR AVS SNAPSHOT
After Visit Summary   3/8/2018    Debbie Bah    MRN: [id number]           Patient Information     Date Of Birth          1990        Visit Information        Provider Department      3/8/2018 12:28 PM Sarahi Santos APRN CNP [facility]        Today's Diagnoses     Acne vulgaris    -  1       Follow-ups after your visit        Your next 10 appointments already scheduled     Mar 27, 2018  4:10 PM CDT   [facility] Running with Kristina Mccann PT   [facility] ([facility])    [address] [place] [id number] [phone]              Who to contact     If you have questions or need follow up information about today's clinic visit or your schedule please contact [facility] directly at [phone].  Normal or non-critical lab and imaging results will be communicated to you by MyChart, letter or phone within 4 business days after the clinic has received the results. If you do not hear from us within 7 days, please contact the clinic through MyChart or phone. If you have a critical or abnormal lab result, we will notify you by phone as soon as possible.  Submit refill requests through [employer] or call your pharmacy and they will forward the refill request to us. Please allow 3 business days for your refill to be completed.          Additional Information About Your Visit        MyChart Information     [employer] gives you secure access to your electronic health record. If you see a primary care provider, you can also send messages to your care team and make appointments. If you have questions, please call your primary care clinic.  If you do not have a primary care provider, please call [phone] and they will assist you.        Care EveryWhere ID     This is your Care EveryWhere ID. This could be used by other organizations to access your [place] medical records  [id number]         Blood  Pressure from Last 3 Encounters:   09/08/17 106/66   07/20/17 120/78   03/24/17 123/80    Weight from Last 3 Encounters:   12/21/17 190 lb (86.2 kg)   09/08/17 189 lb (85.7 kg)   07/20/17 180 lb (81.6 kg)              Today, you had the following     No orders found for display         Today's Medication Changes          These changes are accurate as of 3/8/18 11:59 PM.  If you have any questions, ask your nurse or doctor.               Start taking these medicines.        Dose/Directions    norgestim-eth estrad triphasic 0.18/0.215/0.25 MG-25 MCG per tablet   Commonly known as:  ORTHO TRI-CYCLEN LO   Used for:  Acne vulgaris        Dose:  1 tablet   Take 1 tablet by mouth daily   Quantity:  84 tablet   Refills:  4            Where to get your medicines      These medications were sent to [place] [id number] [place] [facility] [place], [place] - [address]  [address] [address]     Phone:  [phone]     norgestim-eth estrad triphasic 0.18/0.215/0.25 MG-25 MCG per tablet                Primary Care Provider Office Phone # Fax #    HARJINDER [facility] [phone]       [facility] [address]  [facility] [address]        Equal Access to Services     GRISELDA PICKETT AH: Hademani vanegaso Soliane, waaxda luqadaha, qaybta kaalmada adedeysi, damien james. So [facility] [phone].    ATENCIÓN: Si habla español, tiene a rojas disposición servicios gratuitos de asistencia lingüística. Krissy al [phone].    We comply with applicable federal civil rights laws and [place] laws. We do not discriminate on the basis of race, color, national origin, age, disability, sex, sexual orientation, or gender identity.            Thank you!     Thank you for choosing [facility]  for your care. Our goal is always to provide you with excellent care. Hearing back from our patients is one way we can continue to improve our services. Please take a  few minutes to complete the written survey that you may receive in the mail after your visit with us. Thank you!             Your Updated Medication List - Protect others around you: Learn how to safely use, store and throw away your medicines at www.disposemymeds.org.          This list is accurate as of 3/8/18 11:59 PM.  Always use your most recent med list.                   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       Multi-vitamin Tabs tablet      Take 1 tablet by mouth daily        norgestim-eth estrad triphasic 0.18/0.215/0.25 MG-25 MCG per tablet    ORTHO TRI-CYCLEN LO    84 tablet    Take 1 tablet by mouth daily    Acne vulgaris       omega-3 acid ethyl esters 1 G capsule    Lovaza     Take 2 g by mouth 2 times daily        PROBIOTIC ACIDOPHILUS Tabs      Take 1 tablet by mouth        propranolol 10 MG tablet    INDERAL    30 tablet    Take 1-2 tablets (10-20 mg) by mouth 2 times daily as needed for situational anxiety    Situational anxiety       SOOLANTRA 1 % cream   Generic drug:  ivermectin      Apply 1 g topically daily Apply to the affected areas of the face once daily. Use a pea-size amount for each area of the face (forehead, chin, nose, each cheek) that is affected. Spread as a thin layer, avoiding the eyes and lips.        spironolactone 50 MG tablet    ALDACTONE          vitamin D 2000 UNITS Caps      Take 2 capsules by mouth

## 2018-03-09 PROBLEM — L70.0 ACNE VULGARIS: Status: ACTIVE | Noted: 2018-03-09

## 2018-03-09 RX ORDER — NORGESTIMATE AND ETHINYL ESTRADIOL 7DAYSX3 LO
1 KIT ORAL DAILY
Qty: 84 TABLET | Refills: 4 | Status: SHIPPED | OUTPATIENT
Start: 2018-03-09 | End: 2019-04-26

## 2018-03-09 NOTE — TELEPHONE ENCOUNTER
Routing to provider - [facility] - please review and advise as appropriate      Thank you,  Deep Peterson RN

## 2018-03-16 ENCOUNTER — THERAPY VISIT (OUTPATIENT)
Dept: PHYSICAL THERAPY | Facility: CLINIC | Age: 28
End: 2018-03-16
Payer: COMMERCIAL

## 2018-03-16 DIAGNOSIS — M25.551 HIP PAIN, RIGHT: ICD-10-CM

## 2018-03-16 PROCEDURE — 97110 THERAPEUTIC EXERCISES: CPT | Mod: GP | Performed by: PHYSICAL THERAPIST

## 2018-03-16 PROCEDURE — 97112 NEUROMUSCULAR REEDUCATION: CPT | Mod: GP | Performed by: PHYSICAL THERAPIST

## 2018-03-28 ENCOUNTER — MYC REFILL (OUTPATIENT)
Dept: FAMILY MEDICINE | Facility: CLINIC | Age: 28
End: 2018-03-28

## 2018-03-28 DIAGNOSIS — F33.0 MAJOR DEPRESSIVE DISORDER, RECURRENT EPISODE, MILD (H): ICD-10-CM

## 2018-03-29 NOTE — TELEPHONE ENCOUNTER
Medication is being filled for 1 time refill only due to:  Patient needs to be seen because due for clinic follow up.     LYDIA ConnellyN RN  [facility]

## 2018-04-12 ENCOUNTER — THERAPY VISIT (OUTPATIENT)
Dept: PHYSICAL THERAPY | Facility: CLINIC | Age: 28
End: 2018-04-12
Payer: COMMERCIAL

## 2018-04-12 DIAGNOSIS — M25.551 HIP PAIN, RIGHT: ICD-10-CM

## 2018-04-12 PROCEDURE — 97112 NEUROMUSCULAR REEDUCATION: CPT | Mod: GP | Performed by: PHYSICAL THERAPIST

## 2018-04-12 PROCEDURE — 97110 THERAPEUTIC EXERCISES: CPT | Mod: GP | Performed by: PHYSICAL THERAPIST

## 2018-04-17 NOTE — PROGRESS NOTES
SUBJECTIVE:   CC: Debbie Bah is an 27 year old woman who presents for preventive health visit.     Answers for HPI/ROS submitted by the patient on 4/16/2018   Annual Exam:  Getting at least 3 servings of Calcium per day:: Yes  Bi-annual eye exam:: Yes  Dental care twice a year:: Yes  Sleep apnea or symptoms of sleep apnea:: Daytime drowsiness  Diet:: Vegetarian/vegan  Frequency of exercise:: 2-3 days/week  Taking medications regularly:: Yes  PHQ-2 Score: 1  Duration of exercise:: 45-60 minutes    Depression Followup    Status since last visit: Stable . Feels that control could be a little better.    See PHQ-9 for current symptoms.  Other associated symptoms: None    Complicating factors:   Significant life event:  No   Current substance abuse:  None  Anxiety or Panic symptoms:  Yes-  Has some worry. Denies that worry interferes with self care, responsibilities.    PHQ-9 8/3/2016 3/24/2017 12/6/2017   Total Score 9 3 9   Q9: Suicide Ideation Not at all Not at all Not at all     In the past two weeks have you had thoughts of suicide or self-harm?  No.    Do you have concerns about your personal safety or the safety of others?   No  PHQ-9  English  PHQ-9   Any Language  Suicide Assessment Five-step Evaluation and Treatment (SAFE-T)    Today's PHQ-2 Score:   PHQ-2 ( 1999 [employer]) 4/18/2018 4/16/2018   Q1: Little interest or pleasure in doing things 0 1   Q2: Feeling down, depressed or hopeless 0 0   PHQ-2 Score 0 1   Q1: Little interest or pleasure in doing things - Several days   Q2: Feeling down, depressed or hopeless - Not at all   PHQ-2 Score - 1       Abuse: Current or Past(Physical, Sexual or Emotional)- No  Do you feel safe in your environment - Yes    Social History   Substance Use Topics     Smoking status: Never Smoker     Smokeless tobacco: Never Used     Alcohol use 0.0 oz/week     0 Standard drinks or equivalent per week      Comment: 0-1 per week     If you drink alcohol do you typically have >3  drinks per day or >7 drinks per week? No                     Reviewed orders with patient.  Reviewed health maintenance and updated orders accordingly - Yes  Patient Active Problem List   Diagnosis     Gastroesophageal reflux disease without esophagitis     Major depressive disorder, recurrent episode, mild (H)     Situational anxiety     Hip pain, right     Acne vulgaris     Past Surgical History:   Procedure Laterality Date     NO HISTORY OF SURGERY         Social History   Substance Use Topics     Smoking status: Never Smoker     Smokeless tobacco: Never Used     Alcohol use 0.0 oz/week     0 Standard drinks or equivalent per week      Comment: 0-1 per week     Family History   Problem Relation Age of Onset     Asthma Mother      Hypertension Mother      Hypertension Father      Arthritis Paternal Grandmother      CEREBROVASCULAR DISEASE Paternal Grandmother 75     CEREBROVASCULAR DISEASE Paternal Grandfather 82     Coronary Artery Disease Maternal Grandfather 65     DIABETES No family hx of      Breast Cancer No family hx of            Mammogram not appropriate for this patient based on age.    Pertinent mammograms are reviewed under the imaging tab.  History of abnormal Pap smear: NO - age 21-29 PAP every 3 years recommended    Reviewed and updated as needed this visit by clinical staff  Tobacco  Allergies  Meds  Med Hx  Surg Hx  Fam Hx  Soc Hx        Reviewed and updated as needed this visit by Provider        Starting [employer] at [employer] in May.  Will try to keep working 75%.  Trip to [employer] before school starts.  Anxiety related to health concerns seems to have accelerated, but is manageable    ROS:  C: NEGATIVE for fever, chills, change in weight  I: NEGATIVE for worrisome rashes, moles or lesions  E: NEGATIVE for vision changes or irritation  ENT: NEGATIVE for ear, mouth and throat problems  R: NEGATIVE for significant cough or SOB  B: NEGATIVE for masses, tenderness or  "discharge  CV: NEGATIVE for chest pain, palpitations or peripheral edema  GI: NEGATIVE for nausea, abdominal pain, heartburn, or change in bowel habits  : NEGATIVE for unusual urinary or vaginal symptoms. Periods are regular.  M: NEGATIVE for significant arthralgias or myalgia  N: NEGATIVE for weakness, dizziness or paresthesias  E: NEGATIVE for temperature intolerance, skin/hair changes  P: NEGATIVE for changes in mood or affect    OBJECTIVE:   /78  Pulse 66  Temp 98.1  F (36.7  C) (Oral)  Ht 5' 4.5\" (1.638 m)  Wt 189 lb 8 oz (86 kg)  LMP 04/04/2018 (Exact Date)  SpO2 98%  BMI 32.03 kg/m2  EXAM:  GENERAL: healthy, alert and no distress  EYES: Eyes grossly normal to inspection, PERRL and conjunctivae and sclerae normal  HENT: ear canals and TM's normal, nose and mouth without ulcers or lesions  NECK: no adenopathy, no asymmetry, masses, or scars and thyroid normal to palpation  RESP: lungs clear to auscultation - no rales, rhonchi or wheezes  BREAST: normal without masses, tenderness or nipple discharge and no palpable axillary masses or adenopathy  CV: regular rate and rhythm, normal S1 S2, no S3 or S4, no murmur, click or rub, no peripheral edema and peripheral pulses strong  ABDOMEN: soft, nontender, no hepatosplenomegaly, no masses and bowel sounds normal   (female): normal female external genitalia, normal urethral meatus, vaginal mucosa pink, moist, well rugated, and normal cervix/adnexa/uterus without masses or discharge  MS: no gross musculoskeletal defects noted, no edema  SKIN: no suspicious lesions or rashes  NEURO: Normal strength and tone, mentation intact and speech normal  PSYCH: mentation appears normal, affect normal/bright  LYMPH: no cervical, supraclavicular, axillary, or inguinal adenopathy    ASSESSMENT/PLAN:   (Z00.00) Routine general medical examination at a health care facility  (primary encounter diagnosis)  Comment: Well appearing 27 year old presenting for routine health " "maintenance  Plan:     (Z12.4) Screening for cervical cancer  Comment: Has not had a pap prior to today. Is not sexually active, has not engaged in sexual activity.  Plan: PAP imaged thin layer screen reflex to HPV if         ASCUS - recommended age 25 - 29 years            (F33.0) Major depressive disorder, recurrent episode, mild (H)  Comment: Prozac is helping, but feels that there could be additional control of symptoms. Would like to try to go up on dosage.  Plan: FLUoxetine (PROZAC) 40 MG capsule        Advised to monitor for worsening anxiety symptoms. If this occurs will consider backing off Prozac dose, or switching antidepressants to one that does not affect anxiety symptoms    (Z11.8) Special screening examination for chlamydial disease  Comment: No concern for chlamydia, never sexually active.  Plan: Chlamydia trachomatis PCR        Only per MCM guidelines      COUNSELING:   Reviewed preventive health counseling, as reflected in patient instructions       Regular exercise       Healthy diet/nutrition         reports that she has never smoked. She has never used smokeless tobacco.    Estimated body mass index is 32.03 kg/(m^2) as calculated from the following:    Height as of this encounter: 5' 4.5\" (1.638 m).    Weight as of this encounter: 189 lb 8 oz (86 kg).   Weight management plan: Discussed healthy diet and exercise guidelines and patient will follow up in 12 months in clinic to re-evaluate.    Counseling Resources:  ATP IV Guidelines  Pooled Cohorts Equation Calculator  Breast Cancer Risk Calculator  FRAX Risk Assessment  ICSI Preventive Guidelines  Dietary Guidelines for Americans, 2010  USDA's MyPlate  ASA Prophylaxis  Lung CA Screening    Courtney Zhu RN DNP/FNP student    Present and preformed physical exam with student nurse-family practice. The patient was evaluated and managed, by Courtney Zhu RN, SNP in collaboration with Sarahi Santos, APRN, CNP supervising clinical " preceptor.    Documentation written by HARJINDER Bauman CNP    [facility]

## 2018-04-17 NOTE — PATIENT INSTRUCTIONS
Aim for 5-7 servings of vegetables (raw vegetables - 1 serving = 1/2 cup; raw greens - 1 serving = 1 cup)    Preventive Health Recommendations  Female Ages 18 to 25     Yearly exam:     See your health care provider every year in order to  o Review health changes.   o Discuss preventive care.    o Review your medicines if your doctor has prescribed any.      You should be tested each year for STDs (sexually transmitted diseases).       After age 20, talk to your provider about how often you should have cholesterol testing.      Starting at age 21, get a Pap test every three years. If you have an abnormal result, your doctor may have you test more often.      If you are at risk for diabetes, you should have a diabetes test (fasting glucose).     Shots:     Get a flu shot each year.     Get a tetanus shot every 10 years.     Consider getting the shot (vaccine) that prevents cervical cancer (Gardasil).    Nutrition:     Eat at least 5 servings of fruits and vegetables each day.    Eat whole-grain bread, whole-wheat pasta and brown rice instead of white grains and rice.    Talk to your provider about Calcium and Vitamin D.     Lifestyle    Exercise at least 150 minutes a week each week (30 minutes a day, 5 days a week). This will help you control your weight and prevent disease.    Limit alcohol to one drink per day.    No smoking.     Wear sunscreen to prevent skin cancer.    See your dentist every six months for an exam and cleaning.

## 2018-04-18 ENCOUNTER — OFFICE VISIT (OUTPATIENT)
Dept: FAMILY MEDICINE | Facility: CLINIC | Age: 28
End: 2018-04-18
Payer: COMMERCIAL

## 2018-04-18 VITALS
DIASTOLIC BLOOD PRESSURE: 78 MMHG | BODY MASS INDEX: 31.57 KG/M2 | SYSTOLIC BLOOD PRESSURE: 116 MMHG | TEMPERATURE: 98.1 F | HEIGHT: 65 IN | HEART RATE: 66 BPM | WEIGHT: 189.5 LBS | OXYGEN SATURATION: 98 %

## 2018-04-18 DIAGNOSIS — Z00.00 ROUTINE GENERAL MEDICAL EXAMINATION AT A HEALTH CARE FACILITY: Primary | ICD-10-CM

## 2018-04-18 DIAGNOSIS — Z12.4 SCREENING FOR CERVICAL CANCER: ICD-10-CM

## 2018-04-18 DIAGNOSIS — Z11.8 SPECIAL SCREENING EXAMINATION FOR CHLAMYDIAL DISEASE: ICD-10-CM

## 2018-04-18 DIAGNOSIS — F33.0 MAJOR DEPRESSIVE DISORDER, RECURRENT EPISODE, MILD (H): ICD-10-CM

## 2018-04-18 PROCEDURE — G0145 SCR C/V CYTO,THINLAYER,RESCR: HCPCS | Performed by: NURSE PRACTITIONER

## 2018-04-18 PROCEDURE — 99395 PREV VISIT EST AGE 18-39: CPT | Performed by: NURSE PRACTITIONER

## 2018-04-18 PROCEDURE — 87491 CHLMYD TRACH DNA AMP PROBE: CPT | Performed by: NURSE PRACTITIONER

## 2018-04-18 RX ORDER — FLUOXETINE 40 MG/1
80 CAPSULE ORAL DAILY
Qty: 180 CAPSULE | Refills: 1 | Status: SHIPPED | OUTPATIENT
Start: 2018-04-18 | End: 2018-10-18

## 2018-04-18 NOTE — MR AVS SNAPSHOT
After Visit Summary   4/18/2018    Debbie Bah    MRN: [id number]           Patient Information     Date Of Birth          1990        Visit Information        Provider Department      4/18/2018 1:30 PM Sarahi Santos APRN [facility]        Today's Diagnoses     Routine general medical examination at a health care facility    -  1    Screening for cervical cancer        Major depressive disorder, recurrent episode, mild (H)        Special screening examination for chlamydial disease          Care Instructions    Aim for 5-7 servings of vegetables (raw vegetables - 1 serving = 1/2 cup; raw greens - 1 serving = 1 cup)    Preventive Health Recommendations  Female Ages 18 to 25     Yearly exam:     See your health care provider every year in order to  o Review health changes.   o Discuss preventive care.    o Review your medicines if your doctor has prescribed any.      You should be tested each year for STDs (sexually transmitted diseases).       After age 20, talk to your provider about how often you should have cholesterol testing.      Starting at age 21, get a Pap test every three years. If you have an abnormal result, your doctor may have you test more often.      If you are at risk for diabetes, you should have a diabetes test (fasting glucose).     Shots:     Get a flu shot each year.     Get a tetanus shot every 10 years.     Consider getting the shot (vaccine) that prevents cervical cancer (Gardasil).    Nutrition:     Eat at least 5 servings of fruits and vegetables each day.    Eat whole-grain bread, whole-wheat pasta and brown rice instead of white grains and rice.    Talk to your provider about Calcium and Vitamin D.     Lifestyle    Exercise at least 150 minutes a week each week (30 minutes a day, 5 days a week). This will help you control your weight and prevent disease.    Limit alcohol to one drink per day.    No smoking.     Wear sunscreen to prevent skin  "cancer.    See your dentist every six months for an exam and cleaning.          Follow-ups after your visit        Your next 10 appointments already scheduled     May 08, 2018  4:10 PM CDTHERESE BAIN Running with NISH Prince [facility] ([facility])    [address] [place] [address] [phone]              Who to contact     If you have questions or need follow up information about today's clinic visit or your schedule please contact [facility] directly at [phone].  Normal or non-critical lab and imaging results will be communicated to you by [employer], letter or phone within 4 business days after the clinic has received the results. If you do not hear from us within 7 days, please contact the clinic through [employer] or phone. If you have a critical or abnormal lab result, we will notify you by phone as soon as possible.  Submit refill requests through [employer] or call your pharmacy and they will forward the refill request to us. Please allow 3 business days for your refill to be completed.          Additional Information About Your Visit        MyChart Information     [employer] gives you secure access to your electronic health record. If you see a primary care provider, you can also send messages to your care team and make appointments. If you have questions, please call your primary care clinic.  If you do not have a primary care provider, please call [phone] and they will assist you.        Care EveryWhere ID     This is your Care EveryWhere ID. This could be used by other organizations to access your [place] medical records  [id number]        Your Vitals Were     Pulse Temperature Height Last Period Pulse Oximetry BMI (Body Mass Index)    66 98.1  F (36.7  C) (Oral) 5' 4.5\" (1.638 m) 04/04/2018 (Exact Date) 98% 32.03 kg/m2       Blood Pressure from Last 3 Encounters:   04/18/18 116/78   09/08/17 106/66 "   07/20/17 120/78    Weight from Last 3 Encounters:   04/18/18 189 lb 8 oz (86 kg)   12/21/17 190 lb (86.2 kg)   09/08/17 189 lb (85.7 kg)              We Performed the Following     Chlamydia trachomatis PCR     PAP imaged thin layer screen reflex to HPV if ASCUS - recommended age 25 - 29 years          Today's Medication Changes          These changes are accurate as of 4/18/18  2:33 PM.  If you have any questions, ask your nurse or doctor.               These medicines have changed or have updated prescriptions.        Dose/Directions    * FLUoxetine 20 MG capsule   Commonly known as:  PROzac   This may have changed:  Another medication with the same name was added. Make sure you understand how and when to take each.   Used for:  Major depressive disorder, recurrent episode, mild (H)   Changed by:  Sarahi Santos APRN CNP        Dose:  60 mg   Take 3 capsules (60 mg) by mouth daily   Quantity:  90 capsule   Refills:  1       * FLUoxetine 40 MG capsule   Commonly known as:  PROzac   This may have changed:  You were already taking a medication with the same name, and this prescription was added. Make sure you understand how and when to take each.   Used for:  Major depressive disorder, recurrent episode, mild (H)   Changed by:  Sarahi Santos APRN CNP        Dose:  80 mg   Take 2 capsules (80 mg) by mouth daily   Quantity:  180 capsule   Refills:  1       * Notice:  This list has 2 medication(s) that are the same as other medications prescribed for you. Read the directions carefully, and ask your doctor or other care provider to review them with you.         Where to get your medicines      These medications were sent to [place] [id number] [place] [facility] [place], [place] - [address]  [address] [address]     Phone:  [phone]     FLUoxetine 40 MG capsule                Primary Care Provider Office Phone # Fax #    HARJINDER Conn [phone]       [place]  [facility] [address] [facility] [address]  [facility] [address]        Equal Access to Services     PAERLJAQUELIN NIEVES : Hadii sherlyn ramachandran hadflorentinoo Sobebetoali, waaxda luqadaha, qaybta kaalmada clarita, damien wagnerbalacatalina james. So [facility] [phone].    ATENCIÓN: Si habla español, tiene a rojas disposición servicios gratuitos de asistencia lingüística. Llame al [phone].    We comply with applicable federal civil rights laws and [place] laws. We do not discriminate on the basis of race, color, national origin, age, disability, sex, sexual orientation, or gender identity.            Thank you!     Thank you for choosing [facility]  for your care. Our goal is always to provide you with excellent care. Hearing back from our patients is one way we can continue to improve our services. Please take a few minutes to complete the written survey that you may receive in the mail after your visit with us. Thank you!             Your Updated Medication List - Protect others around you: Learn how to safely use, store and throw away your medicines at www.disposemymeds.org.          This list is accurate as of 4/18/18  2:33 PM.  Always use your most recent med list.                   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       Multi-vitamin Tabs tablet      Take 1 tablet by mouth daily        norgestim-eth estrad triphasic 0.18/0.215/0.25 MG-25 MCG per tablet    ORTHO TRI-CYCLEN LO    84 tablet    Take 1  tablet by mouth daily    Acne vulgaris       omega-3 acid ethyl esters 1 g capsule    Lovaza     Take 2 g by mouth 2 times daily        PROBIOTIC ACIDOPHILUS Tabs      Take 1 tablet by mouth        propranolol 10 MG tablet    INDERAL    30 tablet    Take 1-2 tablets (10-20 mg) by mouth 2 times daily as needed for situational anxiety    Situational anxiety       spironolactone 50 MG tablet    ALDACTONE          vitamin D 2000 units Caps      Take 2 capsules by mouth        * Notice:  This list has 2 medication(s) that are the same as other medications prescribed for you. Read the directions carefully, and ask your doctor or other care provider to review them with you.

## 2018-04-19 LAB
C TRACH DNA SPEC QL NAA+PROBE: NEGATIVE
SPECIMEN SOURCE: NORMAL

## 2018-04-19 NOTE — PROGRESS NOTES
Debbie,    Not surprisingly, the chlamydia test was negative.  Good luck with the start of school!  If you have any questions, please feel free to contact the clinic.    MARCOS Stark

## 2018-04-23 LAB
COPATH REPORT: NORMAL
PAP: NORMAL

## 2018-05-08 ENCOUNTER — THERAPY VISIT (OUTPATIENT)
Dept: PHYSICAL THERAPY | Facility: CLINIC | Age: 28
End: 2018-05-08
Payer: COMMERCIAL

## 2018-05-08 DIAGNOSIS — M25.551 HIP PAIN, RIGHT: ICD-10-CM

## 2018-05-08 PROCEDURE — 97110 THERAPEUTIC EXERCISES: CPT | Mod: GP | Performed by: PHYSICAL THERAPIST

## 2018-05-08 PROCEDURE — 97530 THERAPEUTIC ACTIVITIES: CPT | Mod: GP | Performed by: PHYSICAL THERAPIST

## 2018-05-18 ENCOUNTER — HOSPITAL ENCOUNTER (EMERGENCY)
Facility: CLINIC | Age: 28
Discharge: HOME | End: 2018-05-19
Attending: EMERGENCY MEDICINE | Admitting: EMERGENCY MEDICINE
Payer: COMMERCIAL

## 2018-05-18 DIAGNOSIS — J06.9 VIRAL URI: ICD-10-CM

## 2018-05-18 PROCEDURE — 99282 EMERGENCY DEPT VISIT SF MDM: CPT | Performed by: EMERGENCY MEDICINE

## 2018-05-18 PROCEDURE — 99282 EMERGENCY DEPT VISIT SF MDM: CPT | Mod: Z6 | Performed by: EMERGENCY MEDICINE

## 2018-05-18 NOTE — ED AVS SNAPSHOT
[facility], Emergency Department    [address] [facility] [facility] [address]    Phone:  [phone]                                       Debbie Bah   MRN: [id number]    Department:  [facility], Emergency Department   Date of Visit:  5/18/2018           Patient Information     Date Of Birth          1990        Your diagnoses for this visit were:     Viral URI        You were seen by Surendra Arshad MD.        Discharge Instructions       Please make an appointment to follow up with Your Primary Care Provider in 7-10 days unless symptoms completely resolve.        Viral Upper Respiratory Illness (Adult)  You have a viral upper respiratory illness (URI), which is another term for the common cold. This illness is contagious during the first few days. It is spread through the air by coughing and sneezing. It may also be spread by direct contact (touching the sick person and then touching your own eyes, nose, or mouth). Frequent handwashing will decrease risk of spread. Most viral illnesses go away within 7 to 10 days with rest and simple home remedies. Sometimes the illness may last for several weeks. Antibiotics will not kill a virus, and they are generally not prescribed for this condition.    Home care    If symptoms are severe, rest at home for the first 2 to 3 days. When you resume activity, don't let yourself get too tired.    Avoid being exposed to cigarette smoke (yours or others ).    You may use acetaminophen or ibuprofen to control pain and fever, unless another medicine was prescribed. If you have chronic liver or kidney disease, have ever had a stomach ulcer or gastrointestinal bleeding, or are taking blood-thinning medicines, talk with your healthcare provider before using these medicines. Aspirin should never be given to anyone under 18 years of age who is ill with a viral infection or fever. It may cause severe liver or brain damage.    Your appetite may be poor, so a light diet is  fine. Avoid dehydration by drinking 6 to 8 glasses of fluids per day (water, soft drinks, juices, tea, or soup). Extra fluids will help loosen secretions in the nose and lungs.    Over-the-counter cold medicines will not shorten the length of time you re sick, but they may be helpful for the following symptoms: cough, sore throat, and nasal and sinus congestion. (Note: Do not use decongestants if you have high blood pressure.)  Follow-up care  Follow up with your healthcare provider, or as advised.  When to seek medical advice  Call your healthcare provider right away if any of these occur:    Cough with lots of colored sputum (mucus)    Severe headache; face, neck, or ear pain    Difficulty swallowing due to throat pain    Fever of 100.4 F (38 C) or higher, or as directed by your healthcare provider  Call 911  Call 911 if any of these occur:    Chest pain, shortness of breath, wheezing, or difficulty breathing    Coughing up blood    Inability to swallow due to throat pain  Date Last Reviewed: 9/13/2015 2000-2017 [employer]. [address] [place] [address]. All rights reserved. This information is not intended as a substitute for professional medical care. Always follow your healthcare professional's instructions.          24 Hour Appointment Hotline       To make an appointment at any [facility], call [phone] ([phone]). If you don't have a family doctor or clinic, we will help you find one. [place] clinics are conveniently located to serve the needs of you and your family.             Review of your medicines      Our records show that you are taking the medicines listed below. If these are incorrect, please call your family doctor or clinic.        Dose / Directions Last dose taken    adapalene 0.1 % cream   Commonly known as:  DIFFERIN        Apply topically At Bedtime   Refills:  0        FLUoxetine 40 MG capsule   Commonly known as:  PROzac   Dose:  80 mg    Quantity:  180 capsule        Take 2 capsules (80 mg) by mouth daily   Refills:  1        meloxicam 15 MG tablet   Commonly known as:  MOBIC   Dose:  15 mg   Quantity:  30 tablet        Take 1 tablet (15 mg) by mouth daily   Refills:  0        Multi-vitamin Tabs tablet   Dose:  1 tablet        Take 1 tablet by mouth daily   Refills:  0        norgestim-eth estrad triphasic 0.18/0.215/0.25 MG-25 MCG per tablet   Commonly known as:  ORTHO TRI-CYCLEN LO   Dose:  1 tablet   Quantity:  84 tablet        Take 1 tablet by mouth daily   Refills:  4        omega-3 acid ethyl esters 1 g capsule   Commonly known as:  Lovaza   Dose:  2 g        Take 2 g by mouth 2 times daily   Refills:  0        PROBIOTIC ACIDOPHILUS Tabs   Dose:  1 tablet        Take 1 tablet by mouth   Refills:  0        propranolol 10 MG tablet   Commonly known as:  INDERAL   Dose:  10-20 mg   Quantity:  30 tablet        Take 1-2 tablets (10-20 mg) by mouth 2 times daily as needed for situational anxiety   Refills:  1        spironolactone 50 MG tablet   Commonly known as:  ALDACTONE        Refills:  0        vitamin D 2000 units Caps   Dose:  2 capsule        Take 2 capsules by mouth   Refills:  0                Orders Needing Specimen Collection     None      Pending Results     No orders found for last 3 day(s).            Pending Culture Results     No orders found for last 3 day(s).            Pending Results Instructions     If you had any lab results that were not finalized at the time of your Discharge, you can call the ED Lab Result RN at [phone]. You will be contacted by this team for any positive Lab results or changes in treatment. The nurses are available 7 days a week from 10A to 6:30P.  You can leave a message 24 hours per day and they will return your call.        Thank you for choosing [facility]       Thank you for choosing [facility] for your care. Our goal is always to provide you with excellent care. Hearing back from our patients is  one way we can continue to improve our services. Please take a few minutes to complete the written survey that you may receive in the mail after you visit with us. Thank you!        [employer] Information     [employer] gives you secure access to your electronic health record. If you see a primary care provider, you can also send messages to your care team and make appointments. If you have questions, please call your primary care clinic.  If you do not have a primary care provider, please call [phone] and they will assist you.        Care EveryWhere ID     This is your Care EveryWhere ID. This could be used by other organizations to access your [place] medical records  [id number]        Equal Access to Services     GRISELDA PICKETT : Leni Christie, lesley cee, karma otto, damien james. So [facility] [phone].    ATENCIÓN: Si habla español, tiene a rojas disposición servicios gratuitos de asistencia lingüística. Llame al [phone].    We comply with applicable federal civil rights laws and [place] laws. We do not discriminate on the basis of race, color, national origin, age, disability, sex, sexual orientation, or gender identity.            After Visit Summary       This is your record. Keep this with you and show to your community pharmacist(s) and doctor(s) at your next visit.

## 2018-05-18 NOTE — ED AVS SNAPSHOT
[facility], [place], Emergency Department    [address] [place] [address]    Phone:  [phone]                                       Debbie Bah   MRN: [id number]    Department:  [facility], Emergency Department   Date of Visit:  5/18/2018           After Visit Summary Signature Page     I have received my discharge instructions, and my questions have been answered. I have discussed any challenges I see with this plan with the nurse or doctor.    ..........................................................................................................................................  Patient/Patient Representative Signature      ..........................................................................................................................................  Patient Representative Print Name and Relationship to Patient    ..................................................               ................................................  Date                                            Time    ..........................................................................................................................................  Reviewed by Signature/Title    ...................................................              ..............................................  Date                                                            Time

## 2018-05-19 VITALS
DIASTOLIC BLOOD PRESSURE: 93 MMHG | BODY MASS INDEX: 30.82 KG/M2 | WEIGHT: 185 LBS | SYSTOLIC BLOOD PRESSURE: 129 MMHG | RESPIRATION RATE: 16 BRPM | HEIGHT: 65 IN | OXYGEN SATURATION: 98 % | TEMPERATURE: 98.3 F

## 2018-05-19 NOTE — ED PROVIDER NOTES
History     Chief Complaint   Patient presents with     Flu Symptoms     Neck Pain     HPI  Debbie Bah is a 27 year old, otherwise healthy female who presents with multiple complaints.   Patient complains of nonproductive cough, sore throat, myalgias and neck soreness since Wednesday, 3 days ago.   Denies fever (temp 98.7 F at home), shortness of breath, rash, abdominal pain, chest pain or dysuria.     PAST MEDICAL HISTORY:   Past Medical History:   Diagnosis Date     GERD (gastroesophageal reflux disease)        PAST SURGICAL HISTORY:   Past Surgical History:   Procedure Laterality Date     NO HISTORY OF SURGERY         FAMILY HISTORY:   Family History   Problem Relation Age of Onset     Asthma Mother      Hypertension Mother      Hypertension Father      Arthritis Paternal Grandmother      CEREBROVASCULAR DISEASE Paternal Grandmother 75     CEREBROVASCULAR DISEASE Paternal Grandfather 82     Coronary Artery Disease Maternal Grandfather 65     DIABETES No family hx of      Breast Cancer No family hx of        SOCIAL HISTORY:   Social History   Substance Use Topics     Smoking status: Never Smoker     Smokeless tobacco: Never Used     Alcohol use 0.0 oz/week     0 Standard drinks or equivalent per week      Comment: 0-1 per week     No current facility-administered medications for this encounter.      Current Outpatient Prescriptions   Medication     adapalene (DIFFERIN) 0.1 % cream     Cholecalciferol (VITAMIN D) 2000 UNITS CAPS     FLUoxetine (PROZAC) 40 MG capsule     Lactobacillus (PROBIOTIC ACIDOPHILUS) TABS     meloxicam (MOBIC) 15 MG tablet     multivitamin, therapeutic with minerals (MULTI-VITAMIN) TABS tablet     norgestim-eth estrad triphasic (ORTHO TRI-CYCLEN LO) 0.18/0.215/0.25 MG-25 MCG per tablet     omega-3 acid ethyl esters (LOVAZA) 1 G capsule     propranolol (INDERAL) 10 MG tablet     spironolactone (ALDACTONE) 50 MG tablet        Allergies   Allergen Reactions     Doxycycline       "Mushroom Cramps, Diarrhea and GI Disturbance      I have reviewed the Medications, Allergies, Past Medical and Surgical History, and Social History in the Epic system.    Review of Systems   14-point review of symptoms was performed and is negative except as noted above.      Physical Exam   BP: 129/85  Heart Rate: 85  Temp: 98  F (36.7  C)  Resp: 16  Height: 165.1 cm (5' 5\")  Weight: 83.9 kg (185 lb)  SpO2: 98 %      Physical Exam  GEN: Well appearing, non toxic, cooperative and conversant.   HEENT: The head is normocephalic and atraumatic. Pupils are equal round and reactive to light. Extraocular motions are intact. There is no facial swelling. The neck is nontender and supple.  Op clear  CV: Regular rate and rhythm without murmurs rubs or gallops. 2+ radial pulses bilaterally.  PULM: Clear to auscultation bilaterally.  ABD: Soft, nontender, nondistended.   EXT: Full range of motion.  No edema.  NEURO: Cranial nerves II through XII are intact and symmetric. Bilateral upper and lower extremities grossly show full range of motion without any focal deficits.   SKIN: No rashes, ecchymosis, or lacerations  PSYCH: Calm and cooperative, interactive.    ED Course     ED Course     Procedures     12:24 AM  The patient was seen and examined by Dr. Arshad in Room 11.                  Labs Ordered and Resulted from Time of ED Arrival Up to the Time of Departure from the ED - No data to display         Assessments & Plan (with Medical Decision Making)   Viral URI with mild cervical spasm, tension.     She does have FROM of her head and looks very well, non toxic appearing.   Exam otherwise unrevealing. OP clear  Susp for emergent infectious meningitis is very low. As is other emergent process.     - OTC analgesics prn  - pcp f/u  - Strict ED return precautions     - Patient agrees to our plan and is ready and eager for discharge. Care plan, follow up plan, and reasons to return immediately to the ED were dicussed in detail and " summarized as noted in the discharge instructions.          I have reviewed the nursing notes.    I have reviewed the findings, diagnosis, plan and need for follow up with the patient.    Discharge Medication List as of 5/19/2018 12:47 AM          Final diagnoses:   Viral URI     INoé, am serving as a trained medical scribe to document services personally performed by Surendra Arshad MD, based on the provider's statements to me.   Surendra MELTON MD, was physically present and have reviewed and verified the accuracy of this note documented by Noé Jack.     5/18/2018   [facility], [place], EMERGENCY DEPARTMENT     Surendra Arshad MD  05/22/18 0122

## 2018-05-19 NOTE — ED TRIAGE NOTES
Pt. presents to ED with c/o fever, cough (non productive), sore throat, body aches, and neck pain that started Wednesday of this week. Pt. A & O x 4, AVSS on RA, afebrile currently. Denies SOB. Independently ambulatory.

## 2018-05-19 NOTE — DISCHARGE INSTRUCTIONS
Please make an appointment to follow up with Your Primary Care Provider in 7-10 days unless symptoms completely resolve.        Viral Upper Respiratory Illness (Adult)  You have a viral upper respiratory illness (URI), which is another term for the common cold. This illness is contagious during the first few days. It is spread through the air by coughing and sneezing. It may also be spread by direct contact (touching the sick person and then touching your own eyes, nose, or mouth). Frequent handwashing will decrease risk of spread. Most viral illnesses go away within 7 to 10 days with rest and simple home remedies. Sometimes the illness may last for several weeks. Antibiotics will not kill a virus, and they are generally not prescribed for this condition.    Home care    If symptoms are severe, rest at home for the first 2 to 3 days. When you resume activity, don't let yourself get too tired.    Avoid being exposed to cigarette smoke (yours or others ).    You may use acetaminophen or ibuprofen to control pain and fever, unless another medicine was prescribed. If you have chronic liver or kidney disease, have ever had a stomach ulcer or gastrointestinal bleeding, or are taking blood-thinning medicines, talk with your healthcare provider before using these medicines. Aspirin should never be given to anyone under 18 years of age who is ill with a viral infection or fever. It may cause severe liver or brain damage.    Your appetite may be poor, so a light diet is fine. Avoid dehydration by drinking 6 to 8 glasses of fluids per day (water, soft drinks, juices, tea, or soup). Extra fluids will help loosen secretions in the nose and lungs.    Over-the-counter cold medicines will not shorten the length of time you re sick, but they may be helpful for the following symptoms: cough, sore throat, and nasal and sinus congestion. (Note: Do not use decongestants if you have high blood pressure.)  Follow-up care  Follow up with  your healthcare provider, or as advised.  When to seek medical advice  Call your healthcare provider right away if any of these occur:    Cough with lots of colored sputum (mucus)    Severe headache; face, neck, or ear pain    Difficulty swallowing due to throat pain    Fever of 100.4 F (38 C) or higher, or as directed by your healthcare provider  Call 911  Call 911 if any of these occur:    Chest pain, shortness of breath, wheezing, or difficulty breathing    Coughing up blood    Inability to swallow due to throat pain  Date Last Reviewed: 9/13/2015 2000-2017 [employer]. [address] [place] [address]. All rights reserved. This information is not intended as a substitute for professional medical care. Always follow your healthcare professional's instructions.

## 2018-05-30 ENCOUNTER — MYC REFILL (OUTPATIENT)
Dept: FAMILY MEDICINE | Facility: CLINIC | Age: 28
End: 2018-05-30

## 2018-05-30 DIAGNOSIS — L70.0 ACNE VULGARIS: ICD-10-CM

## 2018-05-30 RX ORDER — NORGESTIMATE AND ETHINYL ESTRADIOL 7DAYSX3 LO
1 KIT ORAL DAILY
Qty: 84 TABLET | Refills: 4 | Status: CANCELLED | OUTPATIENT
Start: 2018-05-30

## 2018-05-30 NOTE — TELEPHONE ENCOUNTER
Benhauer message sent to patient. Should have refills available. Last prescription written on 03/09/18 for #84, 4 refills.    Grace Cantrell RN  [facility]

## 2018-05-30 NOTE — TELEPHONE ENCOUNTER
Message from MyChart:  Original authorizing provider: HARJINDER Vasquez CNP would like a refill of the following medications:  norgestim-eth estrad triphasic (ORTHO TRI-CYCLEN LO) 0.18/0.215/0.25 MG-25 MCG per tablet [HARJINDER Vasquez CNP]    Preferred pharmacy: [facility] [id number] [place], [place] - [facility]    Comment:

## 2018-09-04 ENCOUNTER — NURSE TRIAGE (OUTPATIENT)
Dept: NURSING | Facility: CLINIC | Age: 28
End: 2018-09-04
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
## 2018-10-18 DIAGNOSIS — F33.0 MAJOR DEPRESSIVE DISORDER, RECURRENT EPISODE, MILD (H): ICD-10-CM

## 2018-10-18 NOTE — TELEPHONE ENCOUNTER
"Requested Prescriptions   Pending Prescriptions Disp Refills     FLUoxetine (PROZAC) 40 MG capsule    Last Written Prescription Date:  4/18/18  Last Fill Quantity: 180,  # refills: 1  Last office visit: 4/18/2018 with prescribing provider:  4/18/18   Future Office Visit:     180 capsule 1     Sig: Take 2 capsules (80 mg) by mouth daily    SSRIs Protocol Failed    10/18/2018 11:17 AM       Failed - PHQ-9 score less than 5 in past 6 months    Please review last PHQ-9 score.          Failed - Recent (6 mo) or future (30 days) visit within the authorizing provider's specialty    Patient had office visit in the last 6 months or has a visit in the next 30 days with authorizing provider or within the authorizing provider's specialty.  See \"Patient Info\" tab in inbasket, or \"Choose Columns\" in Meds & Orders section of the refill encounter.           Passed - Patient is age 18 or older       Passed - No active pregnancy on record       Passed - No positive pregnancy test in last 12 months          "

## 2018-10-18 NOTE — TELEPHONE ENCOUNTER
Erika,     Please review/sign or advise for refill of fluoxetine 40 mg capsule.     Routing because PHQ-9 score today was 10. Called patient and completed over the phone.     Grace Cantrell RN  [facility]

## 2018-10-19 RX ORDER — FLUOXETINE 40 MG/1
80 CAPSULE ORAL DAILY
Qty: 180 CAPSULE | Refills: 1 | Status: SHIPPED | OUTPATIENT
Start: 2018-10-19 | End: 2018-12-20

## 2018-10-19 ASSESSMENT — PATIENT HEALTH QUESTIONNAIRE - PHQ9: SUM OF ALL RESPONSES TO PHQ QUESTIONS 1-9: 10

## 2018-10-19 NOTE — TELEPHONE ENCOUNTER
Spoke with pt, she is not in need of a visit at this time she feels she is doing pretty good  She will do a visit if needed    Lia Chadwick RN   [facility]

## 2018-10-19 NOTE — TELEPHONE ENCOUNTER
PHQ-9 SCORE 3/24/2017 12/6/2017 10/18/2018   Total Score MyChart - 9 (Mild depression) -   Total Score 3 9 10     Offer visit, but ok if doesn't schedule.  Very self-aware and proactive with mental health.  Refilled prozac.  Will be due in April 2019 at the latest.  MARCOS Stark

## 2018-11-20 ENCOUNTER — TELEPHONE (OUTPATIENT)
Dept: FAMILY MEDICINE | Facility: CLINIC | Age: 28
End: 2018-11-20

## 2018-11-20 ENCOUNTER — E-VISIT (OUTPATIENT)
Dept: FAMILY MEDICINE | Facility: CLINIC | Age: 28
End: 2018-11-20
Payer: COMMERCIAL

## 2018-11-20 DIAGNOSIS — F33.0 MAJOR DEPRESSIVE DISORDER, RECURRENT EPISODE, MILD (H): Primary | ICD-10-CM

## 2018-11-20 PROCEDURE — 99444 ZZC PHYSICIAN ONLINE EVALUATION & MANAGEMENT SERVICE: CPT | Performed by: NURSE PRACTITIONER

## 2018-11-20 ASSESSMENT — PATIENT HEALTH QUESTIONNAIRE - PHQ9
SUM OF ALL RESPONSES TO PHQ QUESTIONS 1-9: 16
10. IF YOU CHECKED OFF ANY PROBLEMS, HOW DIFFICULT HAVE THESE PROBLEMS MADE IT FOR YOU TO DO YOUR WORK, TAKE CARE OF THINGS AT HOME, OR GET ALONG WITH OTHER PEOPLE: SOMEWHAT DIFFICULT
SUM OF ALL RESPONSES TO PHQ QUESTIONS 1-9: 16

## 2018-11-20 NOTE — TELEPHONE ENCOUNTER
Please call.patient  If she is actively is-suicidal she needs to go to [facility] otherwise will have her talk to ( not just nicolette) Erika tomorrow

## 2018-11-20 NOTE — TELEPHONE ENCOUNTER
"Erika/Provider Pool,    [facility]  E-Visit was routed to the RN triage pool. Writer noticed a positive answer to quest 9 regarding suicidal ideation.     Called patient. Patient states that she has no plan in place and does not have active thoughts on hurting herself or committing suicide.     She states \"its a very passive suicidal ideation\". Patient states it has been going on for the last couple of weeks.     Patient states she figured she should follow up with her doctor and she is fine with coming in for a face to face visit    Patient was very light hearted and laughing throughout conversations, so writer did not feel patient was in immediate danger    Writer did inform patient that if she starts to feel like she is going to act on these thoughts or develops a plan to go to the emergency room, specifically at the [facility] ED. Patient verbalized understanding and is in agreement with plan    Haley Mcpherson, RN  Triage Nurse      "

## 2018-11-20 NOTE — MR AVS SNAPSHOT
After Visit Summary   11/20/2018    Debbie Bah    MRN: [id number]           Patient Information     Date Of Birth          1990        Visit Information        Provider Department      11/20/2018 4:50 PM Sarahi Santos APRN CNP [facility]        Today's Diagnoses     Major depressive disorder, recurrent episode, mild (H)    -  1       Follow-ups after your visit        Your next 10 appointments already scheduled     Nov 28, 2018 10:00 AM SANA Moya Short with HARJINDER Conn CNP   [facility] ([facility])    [address] [place] [id number] [phone]              Who to contact     If you have questions or need follow up information about today's clinic visit or your schedule please contact [facility] directly at [phone].  Normal or non-critical lab and imaging results will be communicated to you by MyChart, letter or phone within 4 business days after the clinic has received the results. If you do not hear from us within 7 days, please contact the clinic through LOOKKhart or phone. If you have a critical or abnormal lab result, we will notify you by phone as soon as possible.  Submit refill requests through Vadio or call your pharmacy and they will forward the refill request to us. Please allow 3 business days for your refill to be completed.          Additional Information About Your Visit        MyChart Information     Vadio gives you secure access to your electronic health record. If you see a primary care provider, you can also send messages to your care team and make appointments. If you have questions, please call your primary care clinic.  If you do not have a primary care provider, please call [phone] and they will assist you.        Care EveryWhere ID     This is your Care EveryWhere ID. This could be used by other organizations to access your [facility]  records  [id number]         Blood Pressure from Last 3 Encounters:   05/19/18 (!) 129/93   04/18/18 116/78   09/08/17 106/66    Weight from Last 3 Encounters:   05/18/18 185 lb (83.9 kg)   04/18/18 189 lb 8 oz (86 kg)   12/21/17 190 lb (86.2 kg)              Today, you had the following     No orders found for display         Today's Medication Changes          These changes are accurate as of 11/20/18 11:59 PM.  If you have any questions, ask your nurse or doctor.               These medicines have changed or have updated prescriptions.        Dose/Directions    * FLUoxetine 40 MG capsule   Commonly known as:  PROzac   This may have changed:  Another medication with the same name was added. Make sure you understand how and when to take each.   Used for:  Major depressive disorder, recurrent episode, mild (H)   Changed by:  Sarahi Santos APRN CNP        Dose:  80 mg   Take 2 capsules (80 mg) by mouth daily   Quantity:  180 capsule   Refills:  1       * FLUoxetine 20 MG capsule   Commonly known as:  PROzac   This may have changed:  You were already taking a medication with the same name, and this prescription was added. Make sure you understand how and when to take each.   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 Read the directions carefully, and ask your doctor or other care provider to review them with you.         Where to get your medicines      These [id number] [place], [place] - [address] [facility]  [address] [facility] [address]     Phone:  [phone]     FLUoxetine 20 MG capsule                Primary Care Provider Office Phone # Fax #    HARJINDER Conn [facility] [phone]       [address]  [facility] [address]        Equal Access to Services     GRISELDA PICKETT : Hadii aad ku hadasho Soomaali, waaxda luqadaha, qaybta kaalmada adeegyada, waxay idiin hayaan adeeg kharash la'tito . So [facility] [phone].    ATENCIÓN: Si habla español, tiene a rojas disposición servicios gratuitos de asistencia lingüística. Llame al [phone].    We comply with applicable federal civil rights laws and [place] laws. We do not discriminate on the basis of race, color, national origin, age, disability, sex, sexual orientation, or gender identity.            Thank you!     Thank you for choosing [facility]  for your care. Our goal is always to provide you with excellent care. Hearing back from our patients is one way we can continue to improve our services. Please take a few minutes to complete the written survey that you may receive in the mail after your visit with us. Thank you!             Your Updated Medication List - Protect others around you: Learn how to safely use, store and throw away your medicines at www.disposemymeds.org.          This list is accurate as of 11/20/18 11:59 PM.  Always use your most recent med list.                   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       Multi-vitamin Tabs tablet      Take 1 tablet by mouth daily        norgestim-eth estrad triphasic 0.18/0.215/0.25 MG-25 MCG per tablet    ORTHO TRI-CYCLEN LO    84 tablet    Take 1 tablet by mouth daily    Acne vulgaris       omega-3 acid ethyl esters 1 g capsule    Lovaza     Take 2 g by mouth 2 times daily        PROBIOTIC ACIDOPHILUS Tabs      Take 1 tablet by mouth        propranolol 10 MG tablet    INDERAL    30 tablet    Take 1-2 tablets (10-20 mg) by mouth 2 times daily as needed for situational anxiety    Situational anxiety       spironolactone 50 MG tablet    ALDACTONE          vitamin D 2000 units Caps      Take 2 capsules by mouth        * Notice:  This list has 2 medication(s) that are the same as other medications prescribed for you. Read the directions carefully, and ask your doctor or other care provider to review them with you.

## 2018-11-20 NOTE — TELEPHONE ENCOUNTER
Writer already spoke with patient. See TE dated 11/20/2018 for more information    Haley Mcpherson, RN  Triage Nurse

## 2018-11-21 ASSESSMENT — PATIENT HEALTH QUESTIONNAIRE - PHQ9: SUM OF ALL RESPONSES TO PHQ QUESTIONS 1-9: 16

## 2018-11-23 NOTE — TELEPHONE ENCOUNTER
"Dr. Turk response below: \"  To go from 80 mg to 40 is a big decrease, i'd rather she did 60 mg for a time but I enrique still leave this to Erika (Routing comment)            Will await Erika to advise--Writer priscilla'd up 20 mg caps in order for patient to take 60 mg--please advise  Thanks! Shari Loo RN    "

## 2018-11-23 NOTE — TELEPHONE ENCOUNTER
Appointment made for Thursday 11/29/at 245pm.     Patient has    Disp Refills Start End KANCHAN     FLUoxetine (PROZAC) 40 MG capsule           Patient asking if ok to decrease to 40 mg instead of 60 since has 40 mg caps.     Please advise.   Thanks! Shari Loo RN

## 2018-11-26 DIAGNOSIS — F33.0 MAJOR DEPRESSIVE DISORDER, RECURRENT EPISODE, MILD (H): ICD-10-CM

## 2018-11-26 NOTE — TELEPHONE ENCOUNTER
"Requested Prescriptions   Pending Prescriptions Disp Refills     FLUoxetine (PROZAC) 20 MG capsule 30 capsule 1    Last Written Prescription Date:  11/26/18  Last Fill Quantity: 30,  # refills: 1   Last office visit: 4/18/2018 with prescribing provider:  4/18/18   Future Office Visit:   Next 5 appointments (look out 90 days)     Nov 28, 2018 10:00 AM CST   Long Office Call with HARJINDER Conn CNP   [facility] ([facility])    [address] [place] [id number] [phone]                  Sig: Take 3 capsules (60 mg) by mouth daily    SSRIs Protocol Failed    11/26/2018  1:23 PM       Failed - PHQ-9 score less than 5 in past 6 months    Please review last PHQ-9 score.          Passed - Patient is age 18 or older       Passed - No active pregnancy on record       Passed - No positive pregnancy test in last 12 months       Passed - Recent (6 mo) or future (30 days) visit within the authorizing provider's specialty    Patient had office visit in the last 6 months or has a visit in the next 30 days with authorizing provider or within the authorizing provider's specialty.  See \"Patient Info\" tab in inbasket, or \"Choose Columns\" in Meds & Orders section of the refill encounter.              "

## 2018-11-26 NOTE — TELEPHONE ENCOUNTER
[facility]  Did you mean to write for 90, a 30 day supply? If so, please send a new script.

## 2018-11-27 NOTE — TELEPHONE ENCOUNTER
Per chart review, prescription sent by provider for 30 caps with 1 refill.    Grace Cantrell RN  [facility]

## 2018-11-27 NOTE — PROGRESS NOTES
"  SUBJECTIVE:   Debbie Bah is a 28 year old female who presents to clinic today for the following health issues:    Depression Followup    Status since last visit: Worsened - anxiety with schoolwork     Went down to prozac 40 mg about a week ago and not noticing any difference     See PHQ-9 for current symptoms.  Other associated symptoms: new onset of suicidal thoughts     Complicating factors:   Significant life event:  Yes-  Started grad school    Current substance abuse:  None  Anxiety or Panic symptoms: Anxiety     Started graduate school in May and \"shit hit the fan.\" Had not realized how much routine was important to her mental health.  Increased work hours, started school, and teaching clinicals.  Started seeing someone at [facility] for binge eating and doing therapy and intensive day program and is on leave of absence thru Dec 31st.  When she returns she will be at 0.5 or .6 FTE.  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  Started having thoughts like \"want to quit life\"  Taking vitamin D 4000 international unit(s) for 1-2 years, MVI, omega fatty acids,     Previous medication trials:    PHQ 12/6/2017 10/18/2018 11/20/2018   PHQ-9 Total Score 9 10 16   Q9: Suicide Ideation Not at all Not at all Several days   F/U: Thoughts of suicide or self-harm - - Yes   F/U: Safety concerns - - No     In the past two weeks have you had thoughts of suicide or self-harm?  Yes  In the past two weeks have you thought of a plan or intent to harm yourself? No.  Do you have concerns about your personal safety or the safety of others?   No  PHQ-9  English  PHQ-9   Any Language  Suicide Assessment Five-step Evaluation and Treatment (SAFE-T)    Amount of exercise or physical activity: None    Problems taking medications regularly: No    Medication side effects: none    Diet: regular (no restrictions)      Problem list and histories reviewed & adjusted, as " indicated.  Additional history: as documented    Reviewed and updated as needed this visit by clinical staff       Reviewed and updated as needed this visit by Provider         ROS:  Constitutional, HEENT, cardiovascular, pulmonary, gi and gu systems are negative, except as otherwise noted.    OBJECTIVE:     /82  Pulse 84  Temp 98  F (36.7  C) (Oral)  Wt 225 lb (102.1 kg)  LMP 11/14/2018 (Approximate)  SpO2 98%  BMI 37.44 kg/m2  Body mass index is 37.44 kg/(m^2).  GENERAL: healthy, alert and no distress  PSYCH: mentation appears normal, affect normal/bright, tearful at times  MENTAL STATUS EXAM  Appearance: appropriate  Attitude: cooperative  Behavior: normal  Eye Contact: normal  Speech: normal  Orientation: oreinted to person , place, time and situation  Mood:  admits sadness and anxiety most of time  Affect: Mood Congruent  Thought Process: clear  Suicidal Ideation: reports thoughts, no intention  Hallucination: none    Diagnostic Test Results:  none     ASSESSMENT/PLAN:     Depression; recurrent episode-- Moderate   Associated with the following complications:    none   Plan:  Medications:   Bupropion- add      (F33.0) Major depressive disorder, recurrent episode, mild (H)  (primary encounter diagnosis)  Comment:   Plan: buPROPion (WELLBUTRIN XL) 150 MG 24 hr tablet        Lowering dose of prozac has not helped significantly. Will start Wellbutrin and then wean prozac.    (E55.9) Hypovitaminosis D  Comment:   Plan: Vitamin D Deficiency              Patient Instructions   Start wellbutrin xl 150 mg  Continue prozac 40 mg for now  Follow-up in three weeks    Plan for depression and anxiety :    It is important to take care of yourself so you can heal and feel better.    - eat a balanced diet with lots of fruits, vegetables, protein, and whole grains   (organic best).  Try decreasing or avoiding sugars, trans fats, and white flour  -consider these supplements daily:    multiple vitamins or B complex  with at least 50 mg of B6,    fish oil 1000 mg twice daily or 2 TBS ground flax seed meal    Vit D3 1080-6487 IU     Probiotics (healthy bacteria) especially if stomach or bowel symptoms   - exercise regularly with at least 30 minutes of movement daily or 5 hours per week  - sleep at least 8 hours per night    If having difficulty getting to sleep, try Magnesium glycinate 400-600 mg or    melatonin 1-3 mg in the evening    If having difficulty staying asleep, try L-theanine 100-200 mg at bedtime or   Passionflower tincture, tea or capsule  -practice meditation or relaxation daily   Yoga, Berto Chi, Qi Gong, sitting or walking meditation or   whatever you do that is meditative--that which empties the mind of worry   Sewing, reading, cooking, gardening, fishing, praying, etc  -consider starting or continuing in counseling or therapy   Contact your health insurance for resources  -seek support from friends and family and [place] communities   -other alternative therapies may also be of help such as acupuncture, homeopathy,  traditional chinese medicine, massage, etc      In addition to the above important treatments, antidepressant meds may be prescribed.   Drugs of the SSRI class can have side effects such as weight gain, sexual dysfunction, insomnia, headache, nausea, and initially increases in anxiety.  Let me know if significant side effects do occur.    Follow up in clinic in 3 weeks for recheck; sooner if symptoms should worsen.        Resources/Books:  Unstuck by Valeriy Browning     The Chemistry of Renea  by Moreno De La Garza     The Chemistry of Calm  by Moreno De La Garza     Total Catastrophe Living by Francisco Javier Tran    Mindfulness-Based Stress Reduction Classes:    [facility]  [email or web address]  [phone]    [facility]  [email or web address]    [facility]  [email or web address]    [facility] [employer] [facility]  [employer]  [email or web address]                HARJINDER Vasquez [facility]

## 2018-11-28 ENCOUNTER — OFFICE VISIT (OUTPATIENT)
Dept: FAMILY MEDICINE | Facility: CLINIC | Age: 28
End: 2018-11-28
Payer: COMMERCIAL

## 2018-11-28 VITALS
OXYGEN SATURATION: 98 % | BODY MASS INDEX: 37.44 KG/M2 | TEMPERATURE: 98 F | HEART RATE: 84 BPM | WEIGHT: 225 LBS | SYSTOLIC BLOOD PRESSURE: 136 MMHG | DIASTOLIC BLOOD PRESSURE: 82 MMHG

## 2018-11-28 DIAGNOSIS — E55.9 HYPOVITAMINOSIS D: ICD-10-CM

## 2018-11-28 DIAGNOSIS — F33.0 MAJOR DEPRESSIVE DISORDER, RECURRENT EPISODE, MILD (H): Primary | ICD-10-CM

## 2018-11-28 PROBLEM — E66.01 MORBID OBESITY (H): Status: ACTIVE | Noted: 2018-11-28

## 2018-11-28 PROCEDURE — 99214 OFFICE O/P EST MOD 30 MIN: CPT | Performed by: NURSE PRACTITIONER

## 2018-11-28 PROCEDURE — 82306 VITAMIN D 25 HYDROXY: CPT | Performed by: NURSE PRACTITIONER

## 2018-11-28 PROCEDURE — 36415 COLL VENOUS BLD VENIPUNCTURE: CPT | Performed by: NURSE PRACTITIONER

## 2018-11-28 RX ORDER — BUPROPION HYDROCHLORIDE 150 MG/1
150 TABLET ORAL EVERY MORNING
Qty: 90 TABLET | Refills: 1 | Status: SHIPPED | OUTPATIENT
Start: 2018-11-28 | End: 2019-05-22

## 2018-11-28 NOTE — MR AVS SNAPSHOT
After Visit Summary   11/28/2018    Debbie Bah    MRN: [id number]           Patient Information     Date Of Birth          1990        Visit Information        Provider Department      11/28/2018 10:00 AM Sarahi Santos APRN [facility]        Today's Diagnoses     Major depressive disorder, recurrent episode, mild (H)    -  1    Hypovitaminosis D          Care Instructions    Start wellbutrin xl 150 mg  Continue prozac 40 mg for now  Follow-up in three weeks    Plan for depression and anxiety :    It is important to take care of yourself so you can heal and feel better.    - eat a balanced diet with lots of fruits, vegetables, protein, and whole grains   (organic best).  Try decreasing or avoiding sugars, trans fats, and white flour  -consider these supplements daily:    multiple vitamins or B complex with at least 50 mg of B6,    fish oil 1000 mg twice daily or 2 TBS ground flax seed meal    Vit D3 1849-2538 IU     Probiotics (healthy bacteria) especially if stomach or bowel symptoms   - exercise regularly with at least 30 minutes of movement daily or 5 hours per week  - sleep at least 8 hours per night    If having difficulty getting to sleep, try Magnesium glycinate 400-600 mg or    melatonin 1-3 mg in the evening    If having difficulty staying asleep, try L-theanine 100-200 mg at bedtime or   Passionflower tincture, tea or capsule  -practice meditation or relaxation daily   Yoga, Berto Chi, Qi Gong, sitting or walking meditation or   whatever you do that is meditative--that which empties the mind of worry   Sewing, reading, cooking, gardening, fishing, praying, etc  -consider starting or continuing in counseling or therapy   Contact your health insurance for resources  -seek support from friends and family and eloina communities   -other alternative therapies may also be of help such as acupuncture, homeopathy,  traditional chinese medicine, massage, etc      In  addition to the above important treatments, antidepressant meds may be prescribed.   Drugs of the SSRI class can have side effects such as weight gain, sexual dysfunction, insomnia, headache, nausea, and initially increases in anxiety.  Let me know if significant side effects do occur.    Follow up in clinic in 3 weeks for recheck; sooner if symptoms should worsen.        Resources/Books:  Unstuck by Valeriy Browning     The Chemistry of Renea  by Moreno De La Garza     The Chemistry of Calm  by Moreno De La Garza     Total Catastrophe Living by Francisco Javier Tran    Mindfulness-Based Stress Reduction Classes:    [facility]ionate [facility]  [email or web address]  [phone]    [facility] Healing  [email or web address]    [facility]  www.[email or web address] [email or web address]    [facility] [place]  [email or web address]                    Follow-ups after your visit        Follow-up notes from your care team     Return in about 3 weeks (around 12/19/2018) for mental health check.      Who to contact     If you have questions or need follow up information about today's clinic visit or your schedule please contact [facility] directly at [phone].  Normal or non-critical lab and imaging results will be communicated to you by ThromboVisionhart, letter or phone within 4 business days after the clinic has received the results. If you do not hear from us within 7 days, please contact the clinic through ThromboVisionhart or phone. If you have a critical or abnormal lab result, we will notify you by phone as soon as possible.  Submit refill requests through MatchMine or call your pharmacy and they will forward the refill request to us. Please allow 3 business days for your refill to be completed.          Additional Information About Your Visit        ThromboVisionhart Information     MatchMine gives you secure access to your electronic health record. If you see a primary care provider, you can also send messages to your  care team and make appointments. If you have questions, please call your primary care clinic.  If you do not have a primary care provider, please call [phone] and they will assist you.        Care EveryWhere ID     This is your Care EveryWhere ID. This could be used by other organizations to access your [place] medical records  [id number]        Your Vitals Were     Pulse Temperature Last Period Pulse Oximetry BMI (Body Mass Index)       84 98  F (36.7  C) (Oral) 11/14/2018 (Approximate) 98% 37.44 kg/m2        Blood Pressure from Last 3 Encounters:   11/28/18 136/82   05/19/18 (!) 129/93   04/18/18 116/78    Weight from Last 3 Encounters:   11/28/18 225 lb (102.1 kg)   05/18/18 185 lb (83.9 kg)   04/18/18 189 lb 8 oz (86 kg)              We Performed the Following     Vitamin D Deficiency          Today's Medication Changes          These changes are accurate as of 11/28/18 11:00 AM.  If you have any questions, ask your nurse or doctor.               Start taking these medicines.        Dose/Directions    buPROPion 150 MG 24 hr tablet   Commonly known as:  WELLBUTRIN XL   Used for:  Major depressive disorder, recurrent episode, mild (H)   Started by:  Sarahi Santos APRN CNP        Dose:  150 mg   Take 1 tablet (150 mg) by mouth every morning   Quantity:  90 tablet   Refills:  1            Where to get your medicines      These medications were sent to [place] [id number] [place] [facility] [place], [place] - [address]  [address] [address]     Phone:  [phone]     buPROPion 150 MG 24 hr tablet                Primary Care Provider Office Phone # Fax #    HARJINDER Conn [phone]       [address] [facility] [address]  [facility] [address]        Equal Access to Services     GRISELDA PICKETT : Leni Christie, wabrittnida luqadaha, qaybta kaalmada clarita, damien james. So [facility] [phone].    ATENCIÓN: Si ivon henriquez rojas  disposición servicios gratuitos de asistencia lingüística. Krissy mendoza [phone].    We comply with applicable federal civil rights laws and [place] laws. We do not discriminate on the basis of race, color, national origin, age, disability, sex, sexual orientation, or gender identity.            Thank you!     Thank you for choosing [facility]  for your care. Our goal is always to provide you with excellent care. Hearing back from our patients is one way we can continue to improve our services. Please take a few minutes to complete the written survey that you may receive in the mail after your visit with us. Thank you!             Your Updated Medication List - Protect others around you: Learn how to safely use, store and throw away your medicines at www.disposemymeds.org.          This list is accurate as of 11/28/18 11:00 AM.  Always use your most recent med list.                   Brand Name Dispense Instructions for use Diagnosis    adapalene 0.1 % external cream    DIFFERIN     Apply topically At Bedtime        buPROPion 150 MG 24 hr tablet    WELLBUTRIN XL    90 tablet    Take 1 tablet (150 mg) by mouth every morning    Major depressive disorder, recurrent episode, mild (H)       * FLUoxetine 40 MG capsule    PROzac    180 capsule    Take 2 capsules (80 mg) by mouth daily    Major depressive disorder, recurrent episode, mild (H)       * FLUoxetine 20 MG capsule    PROzac    30 capsule    Take 3 capsules (60 mg) by mouth daily    Major depressive disorder, recurrent episode, mild (H)       Multi-vitamin tablet      Take 1 tablet by mouth daily        norgestim-eth estrad triphasic 0.18/0.215/0.25 MG-25 MCG per tablet    ORTHO TRI-CYCLEN LO    84 tablet    Take 1 tablet by mouth daily    Acne vulgaris       omega-3 acid ethyl esters 1 g capsule    Lovaza     Take 2 g by mouth 2 times daily        PROBIOTIC ACIDOPHILUS Tabs      Take 1 tablet by mouth        propranolol 10 MG tablet    INDERAL     30 tablet    Take 1-2 tablets (10-20 mg) by mouth 2 times daily as needed for situational anxiety    Situational anxiety       spironolactone 50 MG tablet    ALDACTONE          vitamin D 2000 units Caps      Take 2 capsules by mouth        * Notice:  This list has 2 medication(s) that are the same as other medications prescribed for you. Read the directions carefully, and ask your doctor or other care provider to review them with you.

## 2018-11-28 NOTE — PATIENT INSTRUCTIONS
Start wellbutrin xl 150 mg  Continue prozac 40 mg for now  Follow-up in three weeks    Plan for depression and anxiety :    It is important to take care of yourself so you can heal and feel better.    - eat a balanced diet with lots of fruits, vegetables, protein, and whole grains   (organic best).  Try decreasing or avoiding sugars, trans fats, and white flour  -consider these supplements daily:    multiple vitamins or B complex with at least 50 mg of B6,    fish oil 1000 mg twice daily or 2 TBS ground flax seed meal    Vit D3 3636-8917 IU     Probiotics (healthy bacteria) especially if stomach or bowel symptoms   - exercise regularly with at least 30 minutes of movement daily or 5 hours per week  - sleep at least 8 hours per night    If having difficulty getting to sleep, try Magnesium glycinate 400-600 mg or    melatonin 1-3 mg in the evening    If having difficulty staying asleep, try L-theanine 100-200 mg at bedtime or   Passionflower tincture, tea or capsule  -practice meditation or relaxation daily   Yoga, Berto Chi, Qi Gong, sitting or walking meditation or   whatever you do that is meditative--that which empties the mind of worry   Sewing, reading, cooking, gardening, fishing, praying, etc  -consider starting or continuing in counseling or therapy   Contact your health insurance for resources  -seek support from friends and family and eloina communities   -other alternative therapies may also be of help such as acupuncture, homeopathy,  traditional chinese medicine, massage, etc      In addition to the above important treatments, antidepressant meds may be prescribed.   Drugs of the SSRI class can have side effects such as weight gain, sexual dysfunction, insomnia, headache, nausea, and initially increases in anxiety.  Let me know if significant side effects do occur.    Follow up in clinic in 3 weeks for recheck; sooner if symptoms should worsen.        Resources/Books:  Unstuck by Valeriy Browning     The  Chemistry of Renea  by Moreno De La Garza     The Chemistry of Calm  by Moreno De La Garza     Total Catastrophe Living by Francisco Javier Tran    Mindfulness-Based Stress Reduction Classes:    [facility]ionate [facility]  [email or web address]  [phone]    [facility]  [email or web address]    [facility]  [email or web address]    [facility] [place]  www.[facility]

## 2018-11-29 LAB — DEPRECATED CALCIDIOL+CALCIFEROL SERPL-MC: 53 UG/L (ref 20–75)

## 2018-11-29 NOTE — PROGRESS NOTES
Debbie,    Your vitamin D is perfect at 53. If you have any questions, please feel free to contact the clinic.    MARCOS Stark

## 2018-12-19 NOTE — PROGRESS NOTES
"  SUBJECTIVE:   Debbie Bah is a 28 year old female who presents to clinic today for the following health issues:    Depression Followup    Status since last visit: Improved     Added wellbutrin at the last visit.  Unsure how much improvement is wellbutrin vs end of the semester    Continues [employer], trying to do more \"putting herself out there\" - for example went on a blind date.  Also notes doing art therapy at [facility].    Taking vitamin D 5000 international unit(s) and prozac 40 mg (reduced at last visit from 60 mg)    SI has resolved    See PHQ-9 for current symptoms.  Other associated symptoms: None    Complicating factors:   Significant life event:  No - semester has ended   Current substance abuse:  None  Anxiety or Panic symptoms:  No    PHQ 12/6/2017 10/18/2018 11/20/2018   PHQ-9 Total Score 9 10 16   Q9: Suicide Ideation Not at all Not at all Several days   F/U: Thoughts of suicide or self-harm - - Yes   F/U: Safety concerns - - No     In the past two weeks have you had thoughts of suicide or self-harm?  No.    Do you have concerns about your personal safety or the safety of others?   No  PHQ-9  English  PHQ-9   Any Language  Suicide Assessment Five-step Evaluation and Treatment (SAFE-T)    Amount of exercise or physical activity: 2-3 days/week for an average of 15-30 minutes    Problems taking medications regularly: No    Medication side effects: none    Diet: regular (no restrictions)      Problem list and histories reviewed & adjusted, as indicated.  Additional history: as documented      Reviewed and updated as needed this visit by clinical staff  Tobacco  Allergies  Meds  Med Hx  Surg Hx  Fam Hx  Soc Hx      Reviewed and updated as needed this visit by Provider         ROS:  Constitutional, HEENT, cardiovascular, pulmonary, gi and gu systems are negative, except as otherwise noted.    OBJECTIVE:     /72   Pulse 75   Temp 97.7  F (36.5  C) (Oral)   Wt 101.6 kg (223 lb " 14.4 oz)   SpO2 95%   BMI 37.26 kg/m    Body mass index is 37.26 kg/m .  GENERAL: healthy, alert and no distress  MENTAL STATUS EXAM  Appearance: appropriate  Attitude: cooperative  Behavior: normal  Eye Contact: normal  Speech: normal  Orientation: oriented to person , place, time and situation  Mood:  Happy, less stressed  Affect: Mood Congruent  Thought Process: clear  Suicidal Ideation: reports no thoughts, no intention  Hallucination: no      Diagnostic Test Results:  none     ASSESSMENT/PLAN:     Depression; recurrent episode-- Severe but improved   Associated with the following complications:    None   Plan:  No changes in the patient's current treatment plan          (F33.0) Major depressive disorder, recurrent episode, mild (H)  (primary encounter diagnosis)  Comment:   Plan: FLUoxetine (PROZAC) 40 MG capsule        Significant improvement with the addition of wellbutrin.  At this point, continue the prozac at 40 mg unless anxiety or irritability appear.  Follow-up a couple weeks into the next semester    (F41.8) Situational anxiety  Comment:   Plan: no change    Patient Instructions         Resources/Books:  Unstuck by Valeriy Browning     The Chemistry of Renea  by Moreno De La Garza     The Chemistry of Calm  by Moreno De La Garza     Total Catastrophe Living by Francisco Javier Tran    Mindfulness-Based Stress Reduction Classes:    Compassionate [facility]  [email or web address]  [phone]    [facility]  [email or web address]    [facility]  [email or web address]    [facility] [place]  [email or web address]              HARJINDER Vasquez [facility]

## 2018-12-20 ENCOUNTER — OFFICE VISIT (OUTPATIENT)
Dept: FAMILY MEDICINE | Facility: CLINIC | Age: 28
End: 2018-12-20
Payer: COMMERCIAL

## 2018-12-20 VITALS
TEMPERATURE: 97.7 F | BODY MASS INDEX: 37.26 KG/M2 | DIASTOLIC BLOOD PRESSURE: 72 MMHG | WEIGHT: 223.9 LBS | HEART RATE: 75 BPM | OXYGEN SATURATION: 95 % | SYSTOLIC BLOOD PRESSURE: 105 MMHG

## 2018-12-20 DIAGNOSIS — F33.0 MAJOR DEPRESSIVE DISORDER, RECURRENT EPISODE, MILD (H): Primary | ICD-10-CM

## 2018-12-20 DIAGNOSIS — F41.8 SITUATIONAL ANXIETY: ICD-10-CM

## 2018-12-20 DIAGNOSIS — L83 ACANTHOSIS NIGRICANS: Primary | ICD-10-CM

## 2018-12-20 LAB
ALBUMIN SERPL-MCNC: 3.6 G/DL (ref 3.4–5)
ALP SERPL-CCNC: 83 U/L (ref 40–150)
ALT SERPL W P-5'-P-CCNC: 23 U/L (ref 0–50)
ANION GAP SERPL CALCULATED.3IONS-SCNC: 10 MMOL/L (ref 3–14)
AST SERPL W P-5'-P-CCNC: 12 U/L (ref 0–45)
BASOPHILS # BLD AUTO: 0 10E9/L (ref 0–0.2)
BASOPHILS NFR BLD AUTO: 0.4 %
BILIRUB DIRECT SERPL-MCNC: <0.1 MG/DL (ref 0–0.2)
BILIRUB SERPL-MCNC: 0.2 MG/DL (ref 0.2–1.3)
BUN SERPL-MCNC: 9 MG/DL (ref 7–30)
CALCIUM SERPL-MCNC: 8.7 MG/DL (ref 8.5–10.1)
CHLORIDE SERPL-SCNC: 106 MMOL/L (ref 94–109)
CO2 SERPL-SCNC: 23 MMOL/L (ref 20–32)
CREAT SERPL-MCNC: 0.67 MG/DL (ref 0.52–1.04)
DIFFERENTIAL METHOD BLD: NORMAL
EOSINOPHIL # BLD AUTO: 0.1 10E9/L (ref 0–0.7)
EOSINOPHIL NFR BLD AUTO: 1.8 %
ERYTHROCYTE [DISTWIDTH] IN BLOOD BY AUTOMATED COUNT: 13.2 % (ref 10–15)
GFR SERPL CREATININE-BSD FRML MDRD: >90 ML/MIN/{1.73_M2}
GLUCOSE SERPL-MCNC: 94 MG/DL (ref 70–99)
HCT VFR BLD AUTO: 39.9 % (ref 35–47)
HGB BLD-MCNC: 13.3 G/DL (ref 11.7–15.7)
LYMPHOCYTES # BLD AUTO: 1.2 10E9/L (ref 0.8–5.3)
LYMPHOCYTES NFR BLD AUTO: 17.9 %
MCH RBC QN AUTO: 30.4 PG (ref 26.5–33)
MCHC RBC AUTO-ENTMCNC: 33.3 G/DL (ref 31.5–36.5)
MCV RBC AUTO: 91 FL (ref 78–100)
MONOCYTES # BLD AUTO: 0.5 10E9/L (ref 0–1.3)
MONOCYTES NFR BLD AUTO: 8 %
NEUTROPHILS # BLD AUTO: 4.9 10E9/L (ref 1.6–8.3)
NEUTROPHILS NFR BLD AUTO: 71.9 %
PLATELET # BLD AUTO: 352 10E9/L (ref 150–450)
POTASSIUM SERPL-SCNC: 3.8 MMOL/L (ref 3.4–5.3)
PROT SERPL-MCNC: 7.6 G/DL (ref 6.8–8.8)
RBC # BLD AUTO: 4.38 10E12/L (ref 3.8–5.2)
SODIUM SERPL-SCNC: 139 MMOL/L (ref 133–144)
TSH SERPL DL<=0.005 MIU/L-ACNC: 1.05 MU/L (ref 0.4–4)
WBC # BLD AUTO: 6.8 10E9/L (ref 4–11)

## 2018-12-20 PROCEDURE — 85025 COMPLETE CBC W/AUTO DIFF WBC: CPT | Performed by: NURSE PRACTITIONER

## 2018-12-20 PROCEDURE — 80076 HEPATIC FUNCTION PANEL: CPT | Performed by: NURSE PRACTITIONER

## 2018-12-20 PROCEDURE — 84443 ASSAY THYROID STIM HORMONE: CPT | Performed by: NURSE PRACTITIONER

## 2018-12-20 PROCEDURE — 80048 BASIC METABOLIC PNL TOTAL CA: CPT | Performed by: NURSE PRACTITIONER

## 2018-12-20 PROCEDURE — 99213 OFFICE O/P EST LOW 20 MIN: CPT | Performed by: NURSE PRACTITIONER

## 2018-12-20 PROCEDURE — 36415 COLL VENOUS BLD VENIPUNCTURE: CPT | Performed by: NURSE PRACTITIONER

## 2018-12-20 RX ORDER — FLUOXETINE 40 MG/1
40 CAPSULE ORAL DAILY
Qty: 90 CAPSULE | Refills: 1 | Status: SHIPPED | OUTPATIENT
Start: 2018-12-20 | End: 2019-04-30

## 2018-12-20 NOTE — PATIENT INSTRUCTIONS
Resources/Books:  Unstuck by Valeriy Browning     The Chemistry of Renea  by Moreno De La Garza     The Chemistry of Calm  by Moreno De La Garza     Total Catastrophe Living by Francisco Javier Tran    Mindfulness-Based Stress Reduction Classes:    Compassionate [facility]  [email or web address]  [phone]    [facility] Healing  [email or web address]    [facility]  www.[email or web address].[email or web address]    [facility] [place]  www.[facility] Center[facility]

## 2018-12-28 ENCOUNTER — OFFICE VISIT (OUTPATIENT)
Dept: OPHTHALMOLOGY | Facility: CLINIC | Age: 28
End: 2018-12-28
Payer: COMMERCIAL

## 2018-12-28 DIAGNOSIS — H52.13 MYOPIA, BILATERAL: Primary | ICD-10-CM

## 2018-12-28 ASSESSMENT — REFRACTION_MANIFEST
OS_SPHERE: -1.00
OS_CYLINDER: SPHERE
OD_SPHERE: -1.00
OD_CYLINDER: +0.25
OD_AXIS: 105

## 2018-12-28 ASSESSMENT — CUP TO DISC RATIO
OS_RATIO: 0.25
OD_RATIO: 0.25

## 2018-12-28 ASSESSMENT — TONOMETRY
IOP_METHOD: ICARE
OS_IOP_MMHG: 19
OD_IOP_MMHG: 21

## 2018-12-28 ASSESSMENT — EXTERNAL EXAM - LEFT EYE: OS_EXAM: NORMAL

## 2018-12-28 ASSESSMENT — EXTERNAL EXAM - RIGHT EYE: OD_EXAM: NORMAL

## 2018-12-28 ASSESSMENT — REFRACTION_WEARINGRX
SPECS_TYPE: SVL
OD_SPHERE: -1.25
OS_SPHERE: -0.75
OD_AXIS: 007
OD_CYLINDER: +0.25
OS_CYLINDER: SPHERE

## 2018-12-28 ASSESSMENT — VISUAL ACUITY
OS_CC: 20/20
OD_CC: J1+
METHOD: SNELLEN - LINEAR
OS_CC: J1+
OD_CC: 20/20

## 2018-12-28 ASSESSMENT — SLIT LAMP EXAM - LIDS
COMMENTS: NORMAL
COMMENTS: NORMAL

## 2018-12-28 ASSESSMENT — CONF VISUAL FIELD
OS_NORMAL: 1
OD_NORMAL: 1
METHOD: COUNTING FINGERS

## 2018-12-28 NOTE — PROGRESS NOTES
Assessment/Plan  (H52.13) Myopia, bilateral  (primary encounter diagnosis)  Comment: Patient deferred DFE today as she has had difficulty in the past. 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  Plan: REFRACTION [36296]        SRx updated and released. Plan on monitoring regularly for changes. Patient encouraged to RTC with vision changes. Emphasized importance of regular DFE to examine peripheral retina.       Complete documentation of historical and exam elements from today's encounter can  be found in the full encounter summary report (not reduplicated in this progress  note). I personally obtained the chief complaint(s) and history of present illness. I  confirmed and edited as necessary the review of systems, past medical/surgical  history, family history, social history, and examination findings as documented by  others; and I examined the patient myself. I personally reviewed the relevant tests,  images, and reports as documented above. I formulated and edited as necessary the  assessment and plan and discussed the findings and management plan with the  patient and family.    Bret Hernandez, OD

## 2019-02-12 NOTE — PROGRESS NOTES
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  Clinicals do not start until next spring semester    Marathon training starting in April - [place] Half and [place] Full    Read Full Ctastrophe Living and looked into MBSR, but class was too expensive    Vitamin D last check was normal while patient taking 5000IU daily    See PHQ-9 for current symptoms.  Other associated symptoms: None    Complicating factors:   Significant life event:  No   Current substance abuse:  None  Anxiety or Panic symptoms:  Yes-  Very mild anxiety     PHQ 10/18/2018 11/20/2018 2/13/2019   PHQ-9 Total Score 10 16 7   Q9: Suicide Ideation Not at all Several days Not at all   F/U: Thoughts of suicide or self-harm - Yes -   F/U: Safety concerns - No -     In the past two weeks have you had thoughts of suicide or self-harm?  No.    Do you have concerns about your personal safety or the safety of others?   No  PHQ-9  English  PHQ-9   Any Language  Suicide Assessment Five-step Evaluation and Treatment (SAFE-T)    Amount of exercise or physical activity: 2-3 days/week for an average of 30-45 minutes    Problems taking medications regularly: No    Medication side effects: none    Diet: regular (no restrictions)      Problem list and histories reviewed & adjusted, as indicated.  Additional history: as documented    Reviewed and updated as needed this visit by clinical staff  Tobacco  Allergies  Meds  Med Hx  Surg Hx  Fam Hx  Soc Hx      Reviewed and updated as needed this visit by Provider         ROS:  Constitutional, HEENT, cardiovascular, pulmonary, gi and gu systems are negative, except as otherwise noted.    OBJECTIVE:     /78   Pulse 88   Temp 98.1  F (36.7  C) (Oral)   Ht 1.67 m (5' 5.75\")   Wt 105.3 kg (232 lb 1.6 oz)   LMP 02/05/2019 (Exact Date)   SpO2 96%  "  BMI 37.75 kg/m    Body mass index is 37.75 kg/m .  GENERAL: healthy, alert and no distress  PSYCH: mentation appears normal, affect normal/bright  MENTAL STATUS EXAM  Appearance: appropriate  Attitude: cooperative  Behavior: normal  Eye Contact: normal  Speech: normal  Orientation: oriented to person , place, time and situation  Mood:  Good, happy, little anxiety  Affect: Mood Congruent  Thought Process: clear  Suicidal Ideation: reports no thoughts, no intention  Hallucination: no    Diagnostic Test Results:  none     ASSESSMENT/PLAN:     Depression; recurrent episode-- Full remission   Associated with the following complications:    anxiety   Plan:  No changes in the patient's current treatment plan    (F33.0) Major depressive disorder, recurrent episode, mild (H)  (primary encounter diagnosis)  Comment:   Plan: Stable - good.  No changes.  Increasing exercise with marathon training.      (F41.8) Situational anxiety  Comment:   Plan: Hasn't flown since getting rx for propranolol so has not tried this yet    (Z23) Need for tetanus, diphtheria, and acellular pertussis (Tdap) vaccine  Comment:   Plan: TDAP VACCINE (ADACEL)              See Patient Instructions    HARJINDER Vasquez [facility]

## 2019-02-13 ENCOUNTER — OFFICE VISIT (OUTPATIENT)
Dept: FAMILY MEDICINE | Facility: CLINIC | Age: 29
End: 2019-02-13
Payer: COMMERCIAL

## 2019-02-13 VITALS
TEMPERATURE: 98.1 F | OXYGEN SATURATION: 96 % | HEIGHT: 66 IN | SYSTOLIC BLOOD PRESSURE: 128 MMHG | WEIGHT: 232.1 LBS | HEART RATE: 88 BPM | DIASTOLIC BLOOD PRESSURE: 78 MMHG | BODY MASS INDEX: 37.3 KG/M2

## 2019-02-13 DIAGNOSIS — Z23 NEED FOR TETANUS, DIPHTHERIA, AND ACELLULAR PERTUSSIS (TDAP) VACCINE: ICD-10-CM

## 2019-02-13 DIAGNOSIS — F33.0 MAJOR DEPRESSIVE DISORDER, RECURRENT EPISODE, MILD (H): Primary | ICD-10-CM

## 2019-02-13 DIAGNOSIS — F41.8 SITUATIONAL ANXIETY: ICD-10-CM

## 2019-02-13 PROCEDURE — 90471 IMMUNIZATION ADMIN: CPT | Performed by: NURSE PRACTITIONER

## 2019-02-13 PROCEDURE — 99213 OFFICE O/P EST LOW 20 MIN: CPT | Mod: 25 | Performed by: NURSE PRACTITIONER

## 2019-02-13 PROCEDURE — 90715 TDAP VACCINE 7 YRS/> IM: CPT | Performed by: NURSE PRACTITIONER

## 2019-02-13 ASSESSMENT — PATIENT HEALTH QUESTIONNAIRE - PHQ9: SUM OF ALL RESPONSES TO PHQ QUESTIONS 1-9: 7

## 2019-02-13 ASSESSMENT — MIFFLIN-ST. JEOR: SCORE: 1805.55

## 2019-02-13 NOTE — PATIENT INSTRUCTIONS
HERBERT Sherman at [employer] [facility] Daja RIDLEY'Brien  Or any of the other classes there    Fluevog    Refill ok when birth control comes due

## 2019-04-01 ENCOUNTER — MYC MEDICAL ADVICE (OUTPATIENT)
Dept: FAMILY MEDICINE | Facility: CLINIC | Age: 29
End: 2019-04-01

## 2019-04-10 DIAGNOSIS — L83 ACANTHOSIS NIGRICANS: ICD-10-CM

## 2019-04-10 LAB — HBA1C MFR BLD: 5.3 % (ref 0–5.6)

## 2019-04-10 PROCEDURE — 36415 COLL VENOUS BLD VENIPUNCTURE: CPT | Performed by: NURSE PRACTITIONER

## 2019-04-10 PROCEDURE — 83036 HEMOGLOBIN GLYCOSYLATED A1C: CPT | Performed by: NURSE PRACTITIONER

## 2019-04-26 DIAGNOSIS — L70.0 ACNE VULGARIS: ICD-10-CM

## 2019-04-26 RX ORDER — NORGESTIMATE AND ETHINYL ESTRADIOL 7DAYSX3 LO
1 KIT ORAL DAILY
Qty: 84 TABLET | Refills: 4 | Status: SHIPPED | OUTPATIENT
Start: 2019-04-26 | End: 2020-02-12

## 2019-04-26 NOTE — TELEPHONE ENCOUNTER
Prescription approved per [facility] Refill Protocol.Betsy Camargo RN April 26, 2019 9:54 AM

## 2019-04-26 NOTE — TELEPHONE ENCOUNTER
"Requested Prescriptions   Pending Prescriptions Disp Refills     norgestim-eth estrad triphasic (ORTHO TRI-CYCLEN LO) 0.18/0.215/0.25 MG-25 MCG tablet 84 tablet 4     Sig: Take 1 tablet by mouth daily  Last Written Prescription Date:  03/09/2018  Last Fill Quantity: 84,  # refills: 4   Last office visit: 2/13/2019 with prescribing provider:  02/13/2019   Future Office Visit:         Contraceptives Protocol Passed - 4/26/2019  8:39 AM        Passed - Patient is not a current smoker if age is 35 or older        Passed - Recent (12 mo) or future (30 days) visit within the authorizing provider's specialty     Patient had office visit in the last 12 months or has a visit in the next 30 days with authorizing provider or within the authorizing provider's specialty.  See \"Patient Info\" tab in inbasket, or \"Choose Columns\" in Meds & Orders section of the refill encounter.              Passed - Medication is active on med list        Passed - No active pregnancy on record        Passed - No positive pregnancy test in past 12 months        "

## 2019-04-30 ENCOUNTER — MYC REFILL (OUTPATIENT)
Dept: FAMILY MEDICINE | Facility: CLINIC | Age: 29
End: 2019-04-30

## 2019-04-30 DIAGNOSIS — F33.0 MAJOR DEPRESSIVE DISORDER, RECURRENT EPISODE, MILD (H): ICD-10-CM

## 2019-04-30 NOTE — TELEPHONE ENCOUNTER
"Requested Prescriptions   Pending Prescriptions Disp Refills     FLUoxetine (PROZAC) 40 MG capsule 90 capsule 1     Sig: Take 1 capsule (40 mg) by mouth daily       SSRIs Protocol Failed - 4/30/2019 12:49 PM        Failed - PHQ-9 score less than 5 in past 6 months     Please review last PHQ-9 score.           Passed - Medication is active on med list        Passed - Patient is age 18 or older        Passed - No active pregnancy on record        Passed - No positive pregnancy test in last 12 months        Passed - Recent (6 mo) or future (30 days) visit within the authorizing provider's specialty     Patient had office visit in the last 6 months or has a visit in the next 30 days with authorizing provider or within the authorizing provider's specialty.  See \"Patient Info\" tab in inbasket, or \"Choose Columns\" in Meds & Orders section of the refill encounter.            "

## 2019-05-01 RX ORDER — FLUOXETINE 40 MG/1
40 CAPSULE ORAL DAILY
Qty: 90 CAPSULE | Refills: 1 | Status: SHIPPED | OUTPATIENT
Start: 2019-05-01 | End: 2020-02-12

## 2019-05-20 DIAGNOSIS — F33.0 MAJOR DEPRESSIVE DISORDER, RECURRENT EPISODE, MILD (H): ICD-10-CM

## 2019-05-20 NOTE — TELEPHONE ENCOUNTER
"Requested Prescriptions   Pending Prescriptions Disp Refills     buPROPion (WELLBUTRIN XL) 150 MG 24 hr tablet 90 tablet 1     Sig: Take 1 tablet (150 mg) by mouth every morning  Last Written Prescription Date:  11/28/2018  Last Fill Quantity: 90,  # refills: 1   Last office visit: 2/13/2019 with prescribing provider:  02/13/2019   Future Office Visit:         SSRIs Protocol Failed - 5/20/2019 10:08 AM        Failed - PHQ-9 score less than 5 in past 6 months     Please review last PHQ-9 score.           Passed - Medication is Bupropion     If the medication is Bupropion (Wellbutrin), and the patient is taking for smoking cessation; OK to refill.          Passed - Medication is active on med list        Passed - Patient is age 18 or older        Passed - No active pregnancy on record        Passed - No positive pregnancy test in last 12 months        Passed - Recent (6 mo) or future (30 days) visit within the authorizing provider's specialty     Patient had office visit in the last 6 months or has a visit in the next 30 days with authorizing provider or within the authorizing provider's specialty.  See \"Patient Info\" tab in inbasket, or \"Choose Columns\" in Meds & Orders section of the refill encounter.            "

## 2019-05-22 RX ORDER — BUPROPION HYDROCHLORIDE 150 MG/1
150 TABLET ORAL EVERY MORNING
Qty: 90 TABLET | Refills: 1 | Status: SHIPPED | OUTPATIENT
Start: 2019-05-22 | End: 2020-05-20

## 2019-05-22 NOTE — TELEPHONE ENCOUNTER
Pt was seen by provider 02/13/19 and instructed to return in August.    Prescription approved per [facility] Refill Protocol.    Grace Cantrell RN  [facility] [place]

## 2019-06-11 ENCOUNTER — TELEPHONE (OUTPATIENT)
Dept: FAMILY MEDICINE | Facility: CLINIC | Age: 29
End: 2019-06-11

## 2019-06-11 NOTE — TELEPHONE ENCOUNTER
Panel Management Review      Patient has the following on her problem list:     Depression / Dysthymia review    Measure:  Needs PHQ-9 score of 4 or less during index window.  Administer PHQ-9 and if score is 5 or more, send encounter to provider for next steps.    5 - 7 month window range: Due    PHQ-9 SCORE 10/18/2018 11/20/2018 2/13/2019   PHQ-9 Total Score MyChart - 16 (Moderately severe depression) -   PHQ-9 Total Score 10 16 7       If PHQ-9 recheck is 5 or more, route to provider for next steps.    Patient is due for:  PHQ9      Composite cancer screening  Chart review shows that this patient is due/due soon for the following None  Summary:    Patient is due/failing the following:   PHQ9    Action needed:   Patient needs to do PHQ9.    Type of outreach:    Sent [employer] message.    Questions for provider review:    None                                                                                                                                    Kay Jorge MA

## 2019-12-02 ENCOUNTER — MYC MEDICAL ADVICE (OUTPATIENT)
Dept: FAMILY MEDICINE | Facility: CLINIC | Age: 29
End: 2019-12-02

## 2019-12-02 ENCOUNTER — TELEPHONE (OUTPATIENT)
Dept: FAMILY MEDICINE | Facility: CLINIC | Age: 29
End: 2019-12-02

## 2019-12-02 NOTE — TELEPHONE ENCOUNTER
Patient Quality Outreach      Patient has the following on her problem list:     Depression / Dysthymia review       PHQ-9 SCORE 10/18/2018 11/20/2018 2/13/2019   PHQ-9 Total Score MyChart - 16 (Moderately severe depression) -   PHQ-9 Total Score 10 16 7       If PHQ-9 recheck is 5 or more, route to provider for next steps.    Immunizations     Flu Vaccine(1)        Composite cancer screening  Chart review shows that this patient is due/due soon for the following None  Patient declined cancer screening. No  Summary:    Patient is due/failing the following:   Depression - PHQ-9 Needed and Immunizations    Type of outreach:    Sent [employer] message.    Questions for provider review:    None                                                                                                                                    Rosa Baker    Care Management Coordinator - [facility]  [facility]

## 2019-12-03 ASSESSMENT — PATIENT HEALTH QUESTIONNAIRE - PHQ9
10. IF YOU CHECKED OFF ANY PROBLEMS, HOW DIFFICULT HAVE THESE PROBLEMS MADE IT FOR YOU TO DO YOUR WORK, TAKE CARE OF THINGS AT HOME, OR GET ALONG WITH OTHER PEOPLE: SOMEWHAT DIFFICULT
SUM OF ALL RESPONSES TO PHQ QUESTIONS 1-9: 8
SUM OF ALL RESPONSES TO PHQ QUESTIONS 1-9: 8

## 2019-12-04 ASSESSMENT — PATIENT HEALTH QUESTIONNAIRE - PHQ9: SUM OF ALL RESPONSES TO PHQ QUESTIONS 1-9: 8

## 2019-12-16 ENCOUNTER — OFFICE VISIT (OUTPATIENT)
Dept: SLEEP MEDICINE | Facility: CLINIC | Age: 29
End: 2019-12-16
Payer: COMMERCIAL

## 2019-12-16 VITALS
BODY MASS INDEX: 42.51 KG/M2 | DIASTOLIC BLOOD PRESSURE: 79 MMHG | HEART RATE: 79 BPM | RESPIRATION RATE: 16 BRPM | HEIGHT: 64 IN | WEIGHT: 249 LBS | OXYGEN SATURATION: 97 % | SYSTOLIC BLOOD PRESSURE: 114 MMHG

## 2019-12-16 DIAGNOSIS — G25.81 RESTLESS LEGS SYNDROME (RLS): ICD-10-CM

## 2019-12-16 DIAGNOSIS — G47.21 CIRCADIAN RHYTHM SLEEP DISORDER, DELAYED SLEEP PHASE TYPE: ICD-10-CM

## 2019-12-16 DIAGNOSIS — G47.26 CIRCADIAN RHYTHM SLEEP DISORDER, SHIFT WORK TYPE: ICD-10-CM

## 2019-12-16 DIAGNOSIS — R06.83 SNORING: Primary | ICD-10-CM

## 2019-12-16 PROCEDURE — 99215 OFFICE O/P EST HI 40 MIN: CPT | Performed by: NURSE PRACTITIONER

## 2019-12-16 ASSESSMENT — MIFFLIN-ST. JEOR: SCORE: 1857.21

## 2019-12-16 NOTE — PATIENT INSTRUCTIONS
Snore lab: samantha  Overnight ox before return  RLS  The following is recommended:  Other treatments that can work well is developing a routine of stretching legs, warm bath, massage of legs with lotion, all before bedtime.  Avoiding excessive caffeine, alcohol can help as well.    Fasting ferritin and TIBC.   Hold off:  Ferrous sulfate 325mg and 500mg of vitamin C _____ daily. Most common side effects is constipation, which can be treated with increasing water and fiber or adding miralax if needed.   An iron infusion is also a possibility, but there are some side effects of this treatment with rare but possible anaphylaxis, so most of my patients elect ferrous sulfate and add gabapentin till the ferritin level rises.    Gabapentin 100mg pill, 1.5 hrs before bedtime. 1 pill for 3-5 days, if needed increase to 2 pills, then after 3-5 days can increase to 3 pills if needed.    No driving after taking gabapentin.   Let me know what pharmacy you would like this to go to. [facility] [address]      Your BMI is Body mass index is 42.1 kg/m .  Weight management is a personal decision.  If you are interested in exploring weight loss strategies, the following discussion covers the approaches that may be successful. Body mass index (BMI) is one way to tell whether you are at a healthy weight, overweight, or obese. It measures your weight in relation to your height.  A BMI of 18.5 to 24.9 is in the healthy range. A person with a BMI of 25 to 29.9 is considered overweight, and someone with a BMI of 30 or greater is considered obese. More than two-thirds of American adults are considered overweight or obese.  Being overweight or obese increases the risk for further weight gain. Excess weight may lead to heart disease and diabetes.  Creating and following plans for healthy eating and physical activity may help you improve your health.  Weight control is part of healthy lifestyle and includes exercise, emotional health, and  healthy eating habits. Careful eating habits lifelong are the mainstay of weight control. Though there are significant health benefits from weight loss, long-term weight loss with diet alone may be very difficult to achieve- studies show long-term success with dietary management in less than 10% of people. Attaining a healthy weight may be especially difficult to achieve in those with severe obesity. In some cases, medications, devices and surgical management might be considered.  What can you do?  If you are overweight or obese and are interested in methods for weight loss, you should discuss this with your provider.     Consider reducing daily calorie intake by 500 calories.     Keep a food journal.     Avoiding skipping meals, consider cutting portions instead.    Diet combined with exercise helps maintain muscle while optimizing fat loss. Strength training is particularly important for building and maintaining muscle mass. Exercise helps reduce stress, increase energy, and improves fitness. Increasing exercise without diet control, however, may not burn enough calories to loose weight.       Start walking three days a week 10-20 minutes at a time    Work towards walking thirty minutes five days a week     Eventually, increase the speed of your walking for 1-2 minutes at time    In addition, we recommend that you review healthy lifestyles and methods for weight loss available through the National Institutes of Health patient information sites:  http://win.niddk.nih.gov/publications/index.htm    And look into health and wellness programs that may be available through your health insurance provider, employer, local community center, or gila club.    Weight management plan: Patient was referred to their PCP to discuss a diet and exercise plan.    RLS:

## 2019-12-16 NOTE — Clinical Note
Please see recent return to sleep clinic.HARJINDER Sainz, CNP-[facility]

## 2019-12-16 NOTE — PROGRESS NOTES
[facility]   Outpatient Sleep Medicine Consultation  December 16, 2019      Name: Debbie Bah MRN# [id number]   Age: 29 year old YOB: 1990     Date of Consultation: December 16, 2019  Consultation is requested by: No referring provider defined for this encounter. No ref. provider found  Primary care provider: Sarahi Santos  Home clinic: {[facility]:[id number]}       Reason for Sleep Consult:     Debbie Bah is a 29 year old female for complaints of  *** for  *** months/years         Assessment and Plan:     Summary Sleep Diagnoses:    ***  ***      Comorbid Diagnoses:    ***  ***    Summary Recommendations:    ***  No orders of the defined types were placed in this encounter.      Summary Counseling:  See instructions    Counseling included a comprehensive review of diagnostic and therapeutic strategies as well as risks of inadequate therapy.  Educational materials provided in instructions.             History of Present Illness:     Debbie Bah is a 29 year old female ***  <dictation: nature, severity, frequency, duration of symptom/compliant, assoc impairments, exacerbating factors, observations by others, alleviating factors>    <Supporting PSG payer requirements for HST: failed HST or autoCPAP, cognitive impairment, moderate to severe pulmonary disease, stroke, neuromuscular disease, obesity hypoventilation [PaCO2/BMI/or nocturnal hypoxemia], heart failure [EF/congestion], daily opioids, central sleep apnea, parasomnias/seizures, severe insomnia [ex with ROBIN greater than 21, sleep diary] >    PREVIOUS IN- LAB or HOME SLEEP STUDIES:   Date:   TYPE:    AHI:   Intervention:   Sleep Architecture:   MSLT/MWT:       SLEEP-WAKE SCHEDULE:     Debbie Bah     -Describes themself as {MORNING/NIGHT:273820} person;  prefer to go to sleep at {TIMES OF DAY:193638} {AM/PM:794799} and wake up at {TIMES OF DAY:788790} {AM/PM:384811}.     -Naps  "{NUMBER RANGE:718203} times per {DAYS:599046} for {NAP RANGE:095995} minutes, feels {REFRESHED/UNREFRESHED:490973} after naps; takes {NAPS:751959} inadvertant naps.     Debbie Bah    Work shifts 7-3:30 or 3-11:30  School: on line  Days: wake up 5:55A , enter at 9:30, fall at 10:30  Lori schedule: 7-8A, nap before work 12-1:30 may feel groggy  Enter on lori 12, fall about 1A  Off 7-8A  Mid afternoon 1-2 pm  For 1 hr, 1.5    Enter 12   wakeups noc 5 mins- 20 mins avg,  sometime 1-2 hrs,     Wakeup alarm on day shift  wakeups  30 mins or longer:1 or 2x/night    Unwind out side 8 or 9P, bed 10-11 and no check, or day shift 7:30 or 8PM, even 10P : enter if rain sounds or show, scroll       -ON WEEKDAYS, goes to sleep at {TIMES OF DAY:752266} {AM/PM:517827} during the week; awakens  {TIMES OF DAY:233265} {AM/PM:199980} {WITH/WITHOUT:418832::\"with\"} an alarm; falls asleep in {SLEEP TIMES:247943} minutes; {HAS/DENIES/NA:570324} difficulty falling asleep.     -ON WEEKENDS, goes to sleep at {TIMES OF DAY:918797} {AM/PM:540029}.  He wakes up at {TIMES OF DAY:333240} {AM/PM:158383} {WITH/WITHOUT:170698::\"with\"} an alarm; falls asleep in {SLEEP TIMES:256415} minutes.       -Awakens {WAKES UP:946724} times a night for {SLEEP TIMES:395344} minutes before falling back to sleep; awakens to {WAKING ACTIVITIES:501884} with social jet lag of *** hours.    -Describes optimal sleeping time as {MORNING/NIGHT:091044} person.  She would prefer to go to sleep at {TIMES OF DAY:681443} {AM/PM:266868} and wake up at {TIMES OF DAY:789614} {AM/PM:511153}.       BEDTIME SCHEDULE S [RED] AND SHIFT WORK W [GREEN]:      Shift Schedule                            MONDAY        or M-F 8 9 10 11 12am 1 2 3 4 5 6 7 8 9 10 11 12pm 1 2 3 4 5 6 7 HRS       S S S S S S S S  W W W W W W W W W    0   TUESDAY 8 9 10 11 12am 1 2 3 4 5 6 7 8 9 10 11 12pm 1 2 3 4 5 6 7 HRS                            0   WEDNESDAY 8 9 10 11 12am 1 2 3 4 5 6 7 8 9 10 11 12pm 1 " 2 3 4 5 6 7 HRS                            0   THURSDAY 8 9 10 11 12am 1 2 3 4 5 6 7 8 9 10 11 12pm 1 2 3 4 5 6 7 HRS                            0   FRIDAY 8 9 10 11 12am 1 2 3 4 5 6 7 8 9 10 11 12pm 1 2 3 4 5 6 7 HRS                            0   SATURDAY 8 9 10 11 12am 1 2 3 4 5 6 7 8 9 10 11 12pm 1 2 3 4 5 6 7 HRS                            0   SUNDAY 8 9 10 11 12am 1 2 3 4 5 6 7 8 9 10 11 12pm 1 2 3 4 5 6 7 HRS                            0        SCALES       SLEEP APNEA: [place]ang score         INSOMNIA:  Insomnia severity score:        SLEEPINESS: [place] sleepiness scale: *** [normal < 11]   Drowsy driving/near accidents ***  Consequences: ***       PHQ9:     SLEEP COMPLAINTS:  Cardio-respiratory    Snoring- on occasion /week  Dyspnea- denies  Morning headaches or confusion-denies  Coexisting Lung disease: denies    Coexisting Heart disease: denies    Does patient have a bed partner: occasional denies  Has bed partner been sleeping separately because of snoring:  No denies            RLS Screen: When you try to relax in the evening or sleep at  night, do you ever have unpleasant, restless feelings in your  legs that can be relieved by walking or movement?     Periodic limb movement: denies    + hypnic jerk    Narcolepsy:  denies sudden urges of sleep attacks  Cataplexy:  ***denies   Sleep paralysis:  ***denies    Hallucinations:   ***denies       Sleep Behaviors:  Leg symptoms/movements: reportsdenies  Motor restlessness:reports denies  Night terrors: denies   Bruxism: denies  Automatic behaviors: none  Wakeups: nocturnal   conf arousal: crying or scream: 1x/mo    Other subjective complaints:  Anxiety or rumination: to do list,    Pain and discomfort at  night: denies  Waking up with heart pounding or racing:   GERD or aspiration:denies         Parasomnia:   NREM - denies recurrent persistent confusional arousal, night eating, sleep walking or sleep terrors , intentional eat to fall  REM  - denies dream  enactment; injuries   Safety:             Medications:     Current Outpatient Medications   Medication Sig     Cholecalciferol (VITAMIN D) 2000 UNITS CAPS Take 2 capsules by mouth      multivitamin, therapeutic with minerals (MULTI-VITAMIN) TABS tablet Take 1 tablet by mouth daily     norgestim-eth estrad triphasic (ORTHO TRI-CYCLEN LO) 0.18/0.215/0.25 MG-25 MCG tablet Take 1 tablet by mouth daily     adapalene (DIFFERIN) 0.1 % cream Apply topically At Bedtime     buPROPion (WELLBUTRIN XL) 150 MG 24 hr tablet Take 1 tablet (150 mg) by mouth every morning (Patient not taking: Reported on 12/16/2019)     FLUoxetine (PROZAC) 40 MG capsule Take 1 capsule (40 mg) by mouth daily (Patient not taking: Reported on 12/16/2019)     Lactobacillus (PROBIOTIC ACIDOPHILUS) TABS Take 1 tablet by mouth     omega-3 acid ethyl esters (LOVAZA) 1 G capsule Take 2 g by mouth 2 times daily     No current facility-administered medications for this visit.       Vit d and bcp  Allergies   Allergen Reactions     Doxycycline      Mushroom Cramps, Diarrhea and GI Disturbance            Past Medical History:     Does*** not need 02 supplement at night   Past Medical History:   Diagnosis Date     GERD (gastroesophageal reflux disease)              Past Surgical History:    Previous upper airway surgery ***   Past Surgical History:   Procedure Laterality Date     NO HISTORY OF SURGERY              Social History:     Social History     Tobacco Use     Smoking status: Never Smoker     Smokeless tobacco: Never Used   Substance Use Topics     Alcohol use: Yes     Alcohol/week: 0.0 standard drinks     Comment: 0-1 per week         Chemical History:     Tobacco: ***      Uses {CAFFEINE INTAKE:682511}. Last caffeine intake is usually before ***    Supplements for wakefulness: ***    EtOH: *** {CAGE SCREEN FOR ETOH ABUSE:040514}  Recreational Drugs: ***     Psych Hx:   ***  PHQ9 ***  Current dangers to self or others:none           Family History:  "    Family History   Problem Relation Age of Onset     Asthma Mother      Hypertension Mother      Hypertension Father      Arthritis Paternal Grandmother      Cerebrovascular Disease Paternal Grandmother 75     Cerebrovascular Disease Paternal Grandfather 82     Coronary Artery Disease Maternal Grandfather 65     Diabetes No family hx of      Breast Cancer No family hx of         Sleep Family Hx:        RLS- ***   JHONY - ***  Insomnia - ***  Parasomnia - ***         Review of Systems:     A complete 10 point review of systems was negative other than HPI or as commented below:   Debbie Bah has gained {NUMBERS 0-5,5-10,10-15:456584} pounds {TIMEFRAME:798959}.      {SLEEP ROS QUESTIONS:746050::\"CONSTITUTIONAL: NEGATIVE for weight gain/loss, fever, chills, sweats or night sweats, drug allergies.\",\"EYES: NEGATIVE for changes in vision, blind spots, double vision.\",\"ENT: NEGATIVE for ear pain, sore throat, sinus pain, post-nasal drip, runny nose, bloody nose\",\"CARDIAC: NEGATIVE for fast heartbeats or fluttering in chest, chest pain or pressure, breathlessness when lying flat, swollen legs or swollen feet.\",\"NEUROLOGIC: NEGATIVE headaches, weakness or numbness in the arms or legs.\",\"DERMATOLOGIC: NEGATIVE for rashes, new moles or change in mole(s)\",\"PULMONARY: NEGATIVE SOB at rest, SOB with activity, dry cough, productive cough, coughing up blood, wheezing or whistling when breathing.  \",\"GASTROINTESTINAL: NEGATIVE for nausea or vomitting, loose or watery stools, fat or grease in stools, constipation, abdominal pain, bowel movements black in color or blood noted.\",\"GENITOURINARY: NEGATIVE for pain during urination, blood in urine, urinating more frequently than usual, irregular menstrual periods.\",\"MUSCULOSKELETAL: NEGATIVE for muscle pain, bone or joint pain, swollen joints.\",\"ENDOCRINE: NEGATIVE for increased thirst or urination, diabetes.\",\"LYMPHATIC: NEGATIVE for swollen lymph nodes, lumps or bumps in the " "breasts or nipple discharge.\"}         Physical Examination:     [place] Total Score 12/16/2019   Total score - [place] 7     Neck Circumference: *** inches/cm   Constitutional: . ***Awake, alert, cooperative, dressed casually, good eye contact, comfortably sitting in a chair, in no apparent distress  Mood: ***euthymic; affect congruent with full range and intensity.  Attention/Concentration:  Normal   Eyes: No icterus.  ENT: Mallampati Class: {YASMINE NUMERAL I-IV:686110}.   Tonsillar Stage: {NUMBERS 1-4:013747}  *** Retrognathia, micrognathia, small and crowded oropharynx,  low-lying soft palate macroglossia, tonsillar hypertrophy, elongated uvula, turbinate hypertrophy, deviated nasal septum, poor dentition***  Cardiovascular: ***Regular S1 and S2, no gallops or murmurs. No carotid bruits  Neck: ***Supple, no thyroid enlargement.   Pulmonary:  ***Chest symmetric, lungs clear bilaterally and no crackles, wheezes or rales  Extremities:  ***No pedal edema.  Muscle/joint: ***Strength and tone normal   Skin:  ***No rash or significant lesions.   Gait ***Normal.  Neurologic: ***Alert, oriented x3, no focal neurological deficit, cranial nerves grossly normal  Allergies:    Allergies   Allergen Reactions     Doxycycline      Mushroom Cramps, Diarrhea and GI Disturbance       Medications:    Current Outpatient Medications   Medication Sig Dispense Refill     Cholecalciferol (VITAMIN D) 2000 UNITS CAPS Take 2 capsules by mouth        multivitamin, therapeutic with minerals (MULTI-VITAMIN) TABS tablet Take 1 tablet by mouth daily       norgestim-eth estrad triphasic (ORTHO TRI-CYCLEN LO) 0.18/0.215/0.25 MG-25 MCG tablet Take 1 tablet by mouth daily 84 tablet 4     adapalene (DIFFERIN) 0.1 % cream Apply topically At Bedtime       buPROPion (WELLBUTRIN XL) 150 MG 24 hr tablet Take 1 tablet (150 mg) by mouth every morning (Patient not taking: Reported on 12/16/2019) 90 tablet 1     FLUoxetine (PROZAC) 40 MG capsule Take 1 " "capsule (40 mg) by mouth daily (Patient not taking: Reported on 12/16/2019) 90 capsule 1     Lactobacillus (PROBIOTIC ACIDOPHILUS) TABS Take 1 tablet by mouth       omega-3 acid ethyl esters (LOVAZA) 1 G capsule Take 2 g by mouth 2 times daily         Problem List:  Patient Active Problem List    Diagnosis Date Noted     Obesity (BMI 35.0-39.9) with comorbidity (H) 11/28/2018     Priority: Medium     Acne vulgaris 03/09/2018     Priority: Medium     Situational anxiety 08/10/2016     Priority: Medium     Gastroesophageal reflux disease without esophagitis 03/08/2016     Priority: Medium     Major depressive disorder, recurrent episode, mild (H) 03/08/2016     Priority: Medium        Past Medical/Surgical History:  Past Medical History:   Diagnosis Date     GERD (gastroesophageal reflux disease)      Past Surgical History:   Procedure Laterality Date     NO HISTORY OF SURGERY             Physical Examination:  Vitals: /79   Pulse 79   Resp 16   Ht 1.638 m (5' 4.49\")   Wt 112.9 kg (249 lb)   SpO2 97%   BMI 42.10 kg/m    BMI= Body mass index is 42.1 kg/m .         [place] Total Score 12/16/2019   Total score - [place] 7       {SHORT EXAM:033160}  Musculoskeletal:  Mallampati Class: {YASMINE NUMERAL I-IV:399868}.  Tonsillar Stage: {NUMBERS 1-4:600117}.  Dental:         CC: No ref. provider found                  Data: All pertinent previous laboratory data reviewed     No results found for: PH, PHARTERIAL, PO2, [id number], SAT, PCO2, HCO3, BASEEXCESS, JOSE, BEB  Lab Results   Component Value Date    TSH 1.05 12/20/2018    TSH 1.39 03/08/2016     Lab Results   Component Value Date    GLC 94 12/20/2018     Lab Results   Component Value Date    HGB 13.3 12/20/2018    HGB 13.4 03/08/2016     Lab Results   Component Value Date    BUN 9 12/20/2018    CR 0.67 12/20/2018     Lab Results   Component Value Date    AST 12 12/20/2018    ALT 23 12/20/2018    ALKPHOS 83 12/20/2018    BILITOTAL 0.2 12/20/2018     No results " found for: UAMP, UBARB, BENZODIAZEUR, UCANN, UCOC, OPIT, UPCP    Echocardiology: ***     Chest x-ray: ***    PFT: ***    Copy to: Sarahi Santos, APRN CNP 12/16/2019     Total time spent with patient: *** min >50% counseling      .

## 2019-12-21 NOTE — PROGRESS NOTES
[facility]   Outpatient Sleep Medicine Consultation  December 16, 2019      Name: Debbie Bah MRN# [id number]   Age: 29 year old YOB: 1990     Date of Consultation: December 16, 2019  Consultation is requested by: No referring provider defined for this encounter. No ref. provider found  Primary care provider: Sarahi Santos  [place] [facility]:        Reason for Sleep Consult:     Debbie Bah is a 29 year old female for complaints of worsening tiredness and fatigue as she has gone back to school for her masters program and is currently working 2 different shifts having taken on day shifts in addition to her preferred 10:57 PM shift.  She has had a delayed sleep phase disorder since middle school.  She did improve with behavioral management after last being seen however at this point in time with increased fatigue is uncertain as to what might be the cause.  With this review it appears that she has developed RLS, (new to SSRI medication) she has gained approximately 60 pounds in weight since I have seen her last and is uncertain as to whether snoring has worsened, and she has added shiftwork to her regimen in the setting of delayed sleep phase disorder, and returned to school to get her masters.         Assessment and Plan:     Summary Sleep Diagnoses:      Snoring: Will download snore lab samantha, and we will do an overnight oximetry prior to returning to clinic    RLS: wating on fasting labs, will add gabapetin and discussed behavioral tx.    Circadian rhythm: shift work.  Has taken on working AM shifts with her previous and well tolerated PM shift.  Discussed the need to select a shift which meets her own biology if she can (delayed sleep phase disorder) she will update me on possibilities or if she needs a letter for management.    Comorbid Diagnoses:      Obesity BMI 42    Depression anxiety: Currently treated with SSRI (Prozac) and Wellbutrin    Summary  Counseling:  See instructions    Counseling included a comprehensive review of diagnostic and therapeutic strategies as well as risks of inadequate therapy.  Educational materials provided in instructions.             History of Present Illness:     Debbie Bah is a 29 year old female who was previously seen by me in the past for difficulties with snoring and tiredness and fatigue.  She also reported a prolonged difficulty with a.m. awakening since middle school and sleeping through most of her morning classes, occasional hypnic jerks and some sleep talking, with a rare nightmare and difficulty re-falling back asleep.  The plan of care included improving her delayed sleep phase disorder in the setting of shiftwork (worked an excellent shaft for her of 3-11 PM) with an eye towards protecting her total sleep time by not making early morning appointments, and discussed enhancing some sleep habits and environmental changes to improve quality of sleep such as avoiding bright light towards the end of the night, alcohol within 3 to 4 hours of her bedtime, and white noise to eliminate sounds from the neighborhood.    She returns here in a more difficult predicament having added an occasional daytime shift to her previous shifts of working 3-11 and has also taken on getting her masters at the same time.  She reports a period of time where she did try Vyvanse without significant improvement to her daytime function.  She denies other changes with regard to her health.  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  New medications for her include SSRI and Wellbutrin, was previously on propranolol for anxiety.      SLEEP-WAKE SCHEDULE: And work schedule:    Work shifts 7-3:30 or 3-11:30  School: on line    Days: wake up 5:55A , enter bed at 9:30, fall at 10:30  Lori schedule: Wake up at bed 7-8A, nap before work 12-1:30 may feel groggy  Enter on lori 12 P, fall about 1A  Off wake up 7-8A nap mid  afternoon 1-2 pm  For 1 hr, 1.5    Enter bed at 12 with brief wakeups for Nocturia taking 5 mins- 20 mins avg, to return to sleep, sometime 1-2 hrs,     Wakeup alarm on day shift  wakeups  30 mins or longer:1 or 2x/night    Unwind out side 8 or 9P, bed 10-11 and no check phone for next day shift 7:30 or 8PM, even 10P : enter if rain sounds or shower, scroll     SCALES       SLEEP APNEA: Stopbang score 3/8 with current observations       INSOMNIA:  Insomnia severity score: 16/28       SLEEPINESS: Mccammon sleepiness scale: 7/24 [normal < 11]   Drowsy driving/near accidents not assessed   consequences: Worsening daytime fatigue with changing to day and p.m. shifts with occasional wake ups and difficulty returning to sleep, nonrestorative sleep.       PHQ9: Did not check    SLEEP COMPLAINTS:  Cardio-respiratory    Snoring- on occasion unaware of number of times/week  Dyspnea- denies  Morning headaches or confusion-denies  Coexisting Lung disease: denies    Coexisting Heart disease: denies    Does patient have a bed partner: occasional   Has bed partner been sleeping separately: No because of snoring:  No denies            RLS Screen: When you try to relax in the evening or sleep at  night, do you ever have unpleasant, restless feelings in your  legs that can be relieved by walking or movement?  Yes, with movement, may come on after falling asleep with her regular sleep schedule    Periodic limb movement: denies    + hypnic jerk    Narcolepsy:  denies sudden urges of sleep attacks    Cataplexy:  denies     Sleep paralysis:  denies      Hallucinations:   denies       Sleep Behaviors:    Leg symptoms/movements: reports yes    Motor restlessness:reports: Indicates yes    Night terrors: denies     Bruxism: denies    Automatic behaviors: none    Wakeups:     conf arousal: crying or scream: 1x/mo    Other subjective complaints:    Anxiety or rumination: to do list,      Pain and discomfort at  night: denies    Waking up with  heart pounding or racing:     GERD or aspiration:denies         Parasomnia:   NREM - denies recurrent persistent confusional arousal, night eating, sleep walking or sleep terrors , reports intentional eat to fall  REM  - denies dream enactment; injuries   Safety: No injuries             Medications:     Current Outpatient Medications   Medication Sig     Cholecalciferol (VITAMIN D) 2000 UNITS CAPS Take 2 capsules by mouth      multivitamin, therapeutic with minerals (MULTI-VITAMIN) TABS tablet Take 1 tablet by mouth daily     norgestim-eth estrad triphasic (ORTHO TRI-CYCLEN LO) 0.18/0.215/0.25 MG-25 MCG tablet Take 1 tablet by mouth daily     adapalene (DIFFERIN) 0.1 % cream Apply topically At Bedtime     buPROPion (WELLBUTRIN XL) 150 MG 24 hr tablet Take 1 tablet (150 mg) by mouth every morning (Patient not taking: Reported on 12/16/2019)     FLUoxetine (PROZAC) 40 MG capsule Take 1 capsule (40 mg) by mouth daily (Patient not taking: Reported on 12/16/2019)     Lactobacillus (PROBIOTIC ACIDOPHILUS) TABS Take 1 tablet by mouth     omega-3 acid ethyl esters (LOVAZA) 1 G capsule Take 2 g by mouth 2 times daily     No current facility-administered medications for this visit.       Vit d and bcp  Allergies   Allergen Reactions     Doxycycline      Mushroom Cramps, Diarrhea and GI Disturbance            Past Medical History:     Does not need 02 supplement at night   Past Medical History:   Diagnosis Date     GERD (gastroesophageal reflux disease)              Past Surgical History:    Previous upper airway surgery    Past Surgical History:   Procedure Laterality Date     NO HISTORY OF SURGERY              Social History:     Social History     Tobacco Use     Smoking status: Never Smoker     Smokeless tobacco: Never Used   Substance Use Topics     Alcohol use: Yes     Alcohol/week: 0.0 standard drinks     Comment: 0-1 per week         Chemical History: Need further information will get on a sleep diary         Psych Hx:    Anxiety and depression           Family History:   Positive for snoring and restless legs  Family History   Problem Relation Age of Onset     Asthma Mother      Hypertension Mother      Hypertension Father      Arthritis Paternal Grandmother      Cerebrovascular Disease Paternal Grandmother 75     Cerebrovascular Disease Paternal Grandfather 82     Coronary Artery Disease Maternal Grandfather 65     Diabetes No family hx of      Breast Cancer No family hx of              Review of Systems:            Physical Examination:     [place] Total Score 12/16/2019   Total score - [place] 7     Neck Circumference:  inches/cm   Constitutional: . Awake, alert, cooperative, dressed casually, good eye contact, comfortably sitting in a chair, in no apparent distress  Mood: euthymic; affect congruent with full range and intensity.  Attention/Concentration:  Normal   Allergies:    Allergies   Allergen Reactions     Doxycycline      Mushroom Cramps, Diarrhea and GI Disturbance       Medications:    Current Outpatient Medications   Medication Sig Dispense Refill     Cholecalciferol (VITAMIN D) 2000 UNITS CAPS Take 2 capsules by mouth        multivitamin, therapeutic with minerals (MULTI-VITAMIN) TABS tablet Take 1 tablet by mouth daily       norgestim-eth estrad triphasic (ORTHO TRI-CYCLEN LO) 0.18/0.215/0.25 MG-25 MCG tablet Take 1 tablet by mouth daily 84 tablet 4     adapalene (DIFFERIN) 0.1 % cream Apply topically At Bedtime       buPROPion (WELLBUTRIN XL) 150 MG 24 hr tablet Take 1 tablet (150 mg) by mouth every morning (Patient not taking: Reported on 12/16/2019) 90 tablet 1     FLUoxetine (PROZAC) 40 MG capsule Take 1 capsule (40 mg) by mouth daily (Patient not taking: Reported on 12/16/2019) 90 capsule 1     Lactobacillus (PROBIOTIC ACIDOPHILUS) TABS Take 1 tablet by mouth       omega-3 acid ethyl esters (LOVAZA) 1 G capsule Take 2 g by mouth 2 times daily         Problem List:  Patient Active Problem List    Diagnosis Date  "Noted     Obesity (BMI 35.0-39.9) with comorbidity (H) 11/28/2018     Priority: Medium     Acne vulgaris 03/09/2018     Priority: Medium     Situational anxiety 08/10/2016     Priority: Medium     Gastroesophageal reflux disease without esophagitis 03/08/2016     Priority: Medium     Major depressive disorder, recurrent episode, mild (H) 03/08/2016     Priority: Medium        Past Medical/Surgical History:  Past Medical History:   Diagnosis Date     GERD (gastroesophageal reflux disease)      Past Surgical History:   Procedure Laterality Date     NO HISTORY OF SURGERY             Physical Examination:  Vitals: /79   Pulse 79   Resp 16   Ht 1.638 m (5' 4.49\")   Wt 112.9 kg (249 lb)   SpO2 97%   BMI 42.10 kg/m    BMI= Body mass index is 42.1 kg/m .                 Data: All pertinent previous laboratory data reviewed     No results found for: PH, PHARTERIAL, PO2, OT4CXLQHMCM, SAT, PCO2, HCO3, BASEEXCESS, JOSE, BEB  Lab Results   Component Value Date    TSH 1.05 12/20/2018    TSH 1.39 03/08/2016     Lab Results   Component Value Date    GLC 94 12/20/2018     Lab Results   Component Value Date    HGB 13.3 12/20/2018    HGB 13.4 03/08/2016     Lab Results   Component Value Date    BUN 9 12/20/2018    CR 0.67 12/20/2018     Lab Results   Component Value Date    AST 12 12/20/2018    ALT 23 12/20/2018    ALKPHOS 83 12/20/2018    BILITOTAL 0.2 12/20/2018     No results found for: UAMP, UBARB, BENZODIAZEUR, UCANN, UCOC, OPIT, UPCP    Copy to: Sarahi Santos, APRN CNP 12/16/2019     The above note was dictated using voice recognition software. Although reviewed after completion, some word and grammatical error may remain . Please contact the author for any clarifications.    Total time spent with patient: 60 min >50% counseling    "

## 2020-01-16 ENCOUNTER — MYC MEDICAL ADVICE (OUTPATIENT)
Dept: SLEEP MEDICINE | Facility: CLINIC | Age: 30
End: 2020-01-16

## 2020-01-21 RX ORDER — GABAPENTIN 100 MG/1
CAPSULE ORAL
Qty: 90 CAPSULE | Refills: 1 | Status: SHIPPED | OUTPATIENT
Start: 2020-01-21 | End: 2020-03-05

## 2020-01-22 ENCOUNTER — OFFICE VISIT (OUTPATIENT)
Dept: SLEEP MEDICINE | Facility: CLINIC | Age: 30
End: 2020-01-22
Payer: COMMERCIAL

## 2020-01-22 DIAGNOSIS — R06.83 SNORING: ICD-10-CM

## 2020-01-22 DIAGNOSIS — G25.81 RESTLESS LEGS SYNDROME (RLS): ICD-10-CM

## 2020-01-22 LAB
FERRITIN SERPL-MCNC: 36 NG/ML (ref 12–150)
IRON SATN MFR SERPL: 15 % (ref 15–46)
IRON SERPL-MCNC: 49 UG/DL (ref 35–180)
TIBC SERPL-MCNC: 331 UG/DL (ref 240–430)

## 2020-01-22 PROCEDURE — 83540 ASSAY OF IRON: CPT | Performed by: NURSE PRACTITIONER

## 2020-01-22 PROCEDURE — 82728 ASSAY OF FERRITIN: CPT | Performed by: NURSE PRACTITIONER

## 2020-01-22 PROCEDURE — 83550 IRON BINDING TEST: CPT | Performed by: NURSE PRACTITIONER

## 2020-01-22 PROCEDURE — 36415 COLL VENOUS BLD VENIPUNCTURE: CPT | Performed by: NURSE PRACTITIONER

## 2020-01-22 NOTE — PROCEDURES
Patient presented to clinic for  and demonstration of the PAUL. Patient was set up and instructed use. Patient verbalized understanding and demonstrated set up back to me. Patient will be returning device by 1/23/2020.    Patient was given sleep logs and written instructions for use.

## 2020-01-23 ENCOUNTER — DOCUMENTATION ONLY (OUTPATIENT)
Dept: SLEEP MEDICINE | Facility: CLINIC | Age: 30
End: 2020-01-23
Payer: COMMERCIAL

## 2020-01-23 ENCOUNTER — APPOINTMENT (OUTPATIENT)
Dept: SLEEP MEDICINE | Facility: CLINIC | Age: 30
End: 2020-01-23
Payer: COMMERCIAL

## 2020-01-23 NOTE — PROGRESS NOTES
Overnight oximetry returned to clinic today 1/23/2020 and results have been received. The encounter was routed to the provider for review. Copy of these results have also been scanned into chart.    Recording date: 1/22/2020 at 23:48:42    Duration: 07:50:08    Time (min) Spent Below 88%: 0    Completed withtout oxygen    Deshawn Jackman  Sleep Clinic Specialist - [place]

## 2020-01-23 NOTE — PROGRESS NOTES
[facility]   Outpatient Sleep Medicine Consultation  December 16, 2019      Name: Debbie Bah MRN# [id number]   Age: 29 year old YOB: 1990     Date of Consultation: December 16, 2019  Consultation is requested by: No referring provider defined for this encounter. No ref. provider found  Primary care provider: Sarahi Santos  [facility]:        Reason for Sleep Consult:     Debbie Bah is a 29 year old female for complaints of worsening tiredness and fatigue as she has gone back to school for her masters program and is currently working 2 different shifts having taken on day shifts in addition to her preferred 10:57 PM shift.  She has had a delayed sleep phase disorder since middle school.  She did improve with behavioral management after last being seen however at this point in time with increased fatigue is uncertain as to what might be the cause.  With this review it appears that she has developed RLS, (new to SSRI medication) she has gained approximately 60 pounds in weight since I have seen her last and is uncertain as to whether snoring has worsened, and she has added shiftwork to her regimen in the setting of delayed sleep phase disorder, and returned to school to get her masters.         Assessment and Plan:     Summary Sleep Diagnoses:      Snoring: Will download snore lab samantha, and we will do an overnight oximetry prior to returning to clinic    RLS: wating on fasting labs, will add gabapetin and discussed behavioral tx.    Circadian rhythm: shift work.  Has taken on working AM shifts with her previous and well tolerated PM shift.  Discussed the need to select a shift which meets her own biology if she can (delayed sleep phase disorder) she will update me on possibilities or if she needs a letter for management.    Comorbid Diagnoses:      Obesity BMI 42    Depression anxiety: Currently treated with SSRI (Prozac) and Wellbutrin    Summary  Counseling:  See instructions    Counseling included a comprehensive review of diagnostic and therapeutic strategies as well as risks of inadequate therapy.  Educational materials provided in instructions.             History of Present Illness:     Debbie Bah is a 29 year old female who was previously seen by me in the past for difficulties with snoring and tiredness and fatigue.  She also reported a prolonged difficulty with a.m. awakening since middle school and sleeping through most of her morning classes, occasional hypnic jerks and some sleep talking, with a rare nightmare and difficulty re-falling back asleep.  The plan of care included improving her delayed sleep phase disorder in the setting of shiftwork (worked an excellent shaft for her of 3-11 PM) with an eye towards protecting her total sleep time by not making early morning appointments, and discussed enhancing some sleep habits and environmental changes to improve quality of sleep such as avoiding bright light towards the end of the night, alcohol within 3 to 4 hours of her bedtime, and white noise to eliminate sounds from the neighborhood.    She returns here in a more difficult predicament having added an occasional daytime shift to her previous shifts of working 3-11 and has also taken on getting her masters at the same time.  She reports a period of time where she did try Vyvanse without significant improvement to her daytime function.  She denies other changes with regard to her health.  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  New medications for her include SSRI and Wellbutrin, was previously on propranolol for anxiety.      SLEEP-WAKE SCHEDULE: And work schedule:    Work shifts 7-3:30 or 3-11:30  School: on line    Days: wake up 5:55A , enter bed at 9:30, fall at 10:30  Lori schedule: Wake up at bed 7-8A, nap before work 12-1:30 may feel groggy  Enter on lori 12 P, fall about 1A  Off wake up 7-8A nap mid  afternoon 1-2 pm  For 1 hr, 1.5    Enter bed at 12 with brief wakeups for Nocturia taking 5 mins- 20 mins avg, to return to sleep, sometime 1-2 hrs,     Wakeup alarm on day shift  wakeups  30 mins or longer:1 or 2x/night    Unwind out side 8 or 9P, bed 10-11 and no check phone for next day shift 7:30 or 8PM, even 10P : enter if rain sounds or shower, scroll     SCALES       SLEEP APNEA: Stopbang score 3/8 with current observations       INSOMNIA:  Insomnia severity score: 16/28       SLEEPINESS: [place] sleepiness scale: 7/24 [normal < 11]   Drowsy driving/near accidents not assessed   consequences: Worsening daytime fatigue with changing to day and p.m. shifts with occasional wake ups and difficulty returning to sleep, nonrestorative sleep.       PHQ9: Did not check    SLEEP COMPLAINTS:  Cardio-respiratory    Snoring- on occasion unaware of number of times/week  Dyspnea- denies  Morning headaches or confusion-denies  Coexisting Lung disease: denies    Coexisting Heart disease: denies    Does patient have a bed partner: occasional   Has bed partner been sleeping separately: No because of snoring:  No denies            RLS Screen: When you try to relax in the evening or sleep at  night, do you ever have unpleasant, restless feelings in your  legs that can be relieved by walking or movement?  Yes, with movement, may come on after falling asleep with her regular sleep schedule    Periodic limb movement: denies    + hypnic jerk    Narcolepsy:  denies sudden urges of sleep attacks    Cataplexy:  denies     Sleep paralysis:  denies      Hallucinations:   denies       Sleep Behaviors:    Leg symptoms/movements: reports yes    Motor restlessness:reports: Indicates yes    Night terrors: denies     Bruxism: denies    Automatic behaviors: none    Wakeups:     conf arousal: crying or scream: 1x/mo    Other subjective complaints:    Anxiety or rumination: to do list,      Pain and discomfort at  night: denies    Waking up with  heart pounding or racing:     GERD or aspiration:denies         Parasomnia:   NREM - denies recurrent persistent confusional arousal, night eating, sleep walking or sleep terrors , reports intentional eat to fall  REM  - denies dream enactment; injuries   Safety: No injuries             Medications:     Current Outpatient Medications   Medication Sig     adapalene (DIFFERIN) 0.1 % cream Apply topically At Bedtime     buPROPion (WELLBUTRIN XL) 150 MG 24 hr tablet Take 1 tablet (150 mg) by mouth every morning (Patient not taking: Reported on 12/16/2019)     Cholecalciferol (VITAMIN D) 2000 UNITS CAPS Take 2 capsules by mouth      FLUoxetine (PROZAC) 40 MG capsule Take 1 capsule (40 mg) by mouth daily (Patient not taking: Reported on 12/16/2019)     gabapentin (NEURONTIN) 100 MG capsule Take 1 pill 1.5 hrs prior to bedtime, may increase by 1 pill every 3-5 days to total of 3 if needed     Lactobacillus (PROBIOTIC ACIDOPHILUS) TABS Take 1 tablet by mouth     multivitamin, therapeutic with minerals (MULTI-VITAMIN) TABS tablet Take 1 tablet by mouth daily     norgestim-eth estrad triphasic (ORTHO TRI-CYCLEN LO) 0.18/0.215/0.25 MG-25 MCG tablet Take 1 tablet by mouth daily     omega-3 acid ethyl esters (LOVAZA) 1 G capsule Take 2 g by mouth 2 times daily     No current facility-administered medications for this visit.       Vit d and bcp  Allergies   Allergen Reactions     Doxycycline      Mushroom Cramps, Diarrhea and GI Disturbance            Past Medical History:     Does not need 02 supplement at night   Past Medical History:   Diagnosis Date     GERD (gastroesophageal reflux disease)              Past Surgical History:    Previous upper airway surgery    Past Surgical History:   Procedure Laterality Date     NO HISTORY OF SURGERY              Social History:     Social History     Tobacco Use     Smoking status: Never Smoker     Smokeless tobacco: Never Used   Substance Use Topics     Alcohol use: Yes      Alcohol/week: 0.0 standard drinks     Comment: 0-1 per week         Chemical History: Need further information will get on a sleep diary         Psych Hx:   Anxiety and depression           Family History:   Positive for snoring and restless legs  Family History   Problem Relation Age of Onset     Asthma Mother      Hypertension Mother      Hypertension Father      Arthritis Paternal Grandmother      Cerebrovascular Disease Paternal Grandmother 75     Cerebrovascular Disease Paternal Grandfather 82     Coronary Artery Disease Maternal Grandfather 65     Diabetes No family hx of      Breast Cancer No family hx of              Review of Systems:            Physical Examination:     Leighton Total Score 12/16/2019   Total score - Leighton 7     Neck Circumference:  inches/cm   Constitutional: . Awake, alert, cooperative, dressed casually, good eye contact, comfortably sitting in a chair, in no apparent distress  Mood: euthymic; affect congruent with full range and intensity.  Attention/Concentration:  Normal   Allergies:    Allergies   Allergen Reactions     Doxycycline      Mushroom Cramps, Diarrhea and GI Disturbance       Medications:    Current Outpatient Medications   Medication Sig Dispense Refill     adapalene (DIFFERIN) 0.1 % cream Apply topically At Bedtime       buPROPion (WELLBUTRIN XL) 150 MG 24 hr tablet Take 1 tablet (150 mg) by mouth every morning (Patient not taking: Reported on 12/16/2019) 90 tablet 1     Cholecalciferol (VITAMIN D) 2000 UNITS CAPS Take 2 capsules by mouth        FLUoxetine (PROZAC) 40 MG capsule Take 1 capsule (40 mg) by mouth daily (Patient not taking: Reported on 12/16/2019) 90 capsule 1     gabapentin (NEURONTIN) 100 MG capsule Take 1 pill 1.5 hrs prior to bedtime, may increase by 1 pill every 3-5 days to total of 3 if needed 90 capsule 1     Lactobacillus (PROBIOTIC ACIDOPHILUS) TABS Take 1 tablet by mouth       multivitamin, therapeutic with minerals (MULTI-VITAMIN) TABS tablet  Take 1 tablet by mouth daily       norgestim-eth estrad triphasic (ORTHO TRI-CYCLEN LO) 0.18/0.215/0.25 MG-25 MCG tablet Take 1 tablet by mouth daily 84 tablet 4     omega-3 acid ethyl esters (LOVAZA) 1 G capsule Take 2 g by mouth 2 times daily         Problem List:  Patient Active Problem List    Diagnosis Date Noted     Obesity (BMI 35.0-39.9) with comorbidity (H) 11/28/2018     Priority: Medium     Acne vulgaris 03/09/2018     Priority: Medium     Situational anxiety 08/10/2016     Priority: Medium     Gastroesophageal reflux disease without esophagitis 03/08/2016     Priority: Medium     Major depressive disorder, recurrent episode, mild (H) 03/08/2016     Priority: Medium        Past Medical/Surgical History:  Past Medical History:   Diagnosis Date     GERD (gastroesophageal reflux disease)      Past Surgical History:   Procedure Laterality Date     NO HISTORY OF SURGERY             Physical Examination:  Vitals: There were no vitals taken for this visit.  BMI= There is no height or weight on file to calculate BMI.                 Data: All pertinent previous laboratory data reviewed     No results found for: PH, PHARTERIAL, PO2, [id number], SAT, PCO2, HCO3, BASEEXCESS, JOSE, BEB  Lab Results   Component Value Date    TSH 1.05 12/20/2018    TSH 1.39 03/08/2016     Lab Results   Component Value Date    GLC 94 12/20/2018     Lab Results   Component Value Date    HGB 13.3 12/20/2018    HGB 13.4 03/08/2016     Lab Results   Component Value Date    BUN 9 12/20/2018    CR 0.67 12/20/2018     Lab Results   Component Value Date    AST 12 12/20/2018    ALT 23 12/20/2018    ALKPHOS 83 12/20/2018    BILITOTAL 0.2 12/20/2018     No results found for: UAMP, UBARB, BENZODIAZEUR, UCANN, UCOC, OPIT, UPCP    Copy to: Sarahi Santos, APRN CNP 12/16/2019     The above note was dictated using voice recognition software. Although reviewed after completion, some word and grammatical error may remain .  Please contact the author for any clarifications.    Total time spent with patient: 60 min >50% counseling  Patient presented to clinic for  and demonstration of the PAUL. Patient was set up and instructed use. Patient verbalized understanding and demonstrated set up back to me. Patient will be returning device by 1/23/2020.    Patient was given sleep logs and written instructions for use.    HPI: Referred by ***  Baseline symptoms whitney Bah is a 29 year old female who was previously seen by me in the past for difficulties with snoring and tiredness and fatigue.  She also reported a prolonged difficulty with a.m. awakening since middle school and sleeping through most of her morning classes, occasional hypnic jerks and some sleep talking, with a rare nightmare and difficulty re-falling back asleep.  The plan of care included improving her delayed sleep phase disorder in the setting of shiftwork (worked an excellent shaft for her of 3-11 PM) with an eye towards protecting her total sleep time by not making early morning appointments, and discussed enhancing some sleep habits and environmental changes to improve quality of sleep such as avoiding bright light towards the end of the night, alcohol within 3 to 4 hours of her bedtime, and white noise to eliminate sounds from the neighborhood.    She returns here in a more difficult predicament having added an occasional daytime shift to her previous shifts of working 3-11 and has also taken on getting her masters at the same time.  She reports a period of time where she did try Vyvanse without significant improvement to her daytime function.  She denies other changes with regard to her health.  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  New medications for her include SSRI and Wellbutrin, was previously on propranolol for anxiety.    SLEEP APNEA: Stopbang score 3/8 with current observations       INSOMNIA:  Insomnia severity score:  16/28       SLEEPINESS: [place] sleepiness scale: 7/24 [normal < 11]   Drowsy driving/near accidents not assessed        Sleep comorbities: probable RLS, hypnic jerks, confusional arousals  Cormorbid conditions: weightgain    ***PAUL of *** Revealed***    Treatment options discussed ***    Barriers to treatment***    Preferences ***    VS***    ASSESSMENT/PLAN:    1. Snoring ***    2. Obesity:***    3. Htn:...    4. Smoking ...    5. Circadian rhythm ***  .

## 2020-01-23 NOTE — Clinical Note
PAUL completed and placed in providers folder for interpretation.[facility] - [place]

## 2020-02-01 NOTE — PROGRESS NOTES
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  History of snoring, uncertain if sleep apnea is present  SLEEP APNEA: Stopbang score 3/8 with current observations       INSOMNIA:  Insomnia severity score: 16/28       SLEEPINESS: [place] sleepiness scale: 7/24 [normal < 11]   Drowsy driving/near accidents not assessed    Sleep comorbities: probable RLS, hypnic jerks, confusional arousals    Cormorbid conditions: weightgain, depression/anxiety    1/22/2020 PAUL Revealed 0 minutes with O2 sats less than 88%, 4% adjusted index of 10-second desaturations is 0.5 (threshold 15)    SLEEP SCHEDULE  BEDTIME DAY shift:  Bedtime: Enters bed approximately 845                                 sleep Latency: Approximately 1 hour 15 minutes  Wakeups: 2 times, brief in middle of the night, more prolonged after 5 AM  Return to sleep: Few minutes-up to 30 minutes  Final wakeup time: 6 AM  TOTAL SLEEP: Approximately 7-1/4 hours  (With day of following these 2 days shifts stayed up till 12:30 in the morning and slept until 8:30 AM with a few brief wake ups)    EVENING SHIFT:  Bedtime:      Enters bed at 12:30 AM                            sleep Latency: 1.5 hours  Wakeups: 1-2, appears to be 15- 30 minutes  Final wakeup time: 10 AM 1 day sleep extension 4 2 hours after being awake for roughly 1 hour and 15 minutes with a final wake up time for the day of 1:45 PM  Naps: Between noon- 1:45 PM    DAYS OFF SLEEP SCHEDULE:  Bedtime: Midnight-1 AM                                 sleep Latency: Approximately 45 minutes - 1 hour  Wakeups: Approximately 20-30 minutes (outlier 1 hour- during.  Of long afternoon naps)  Final wakeup time: 730- 8 AM outlier 9 AM    Naps: Between 2-3 PM  to 4- 6 PM  Total sleep time: 7-8 hours during the night and some days with 2 to 3-hour naps in the afternoon    Treatment options discussed ***    Barriers to treatment***    Preferences ***    VS***    ASSESSMENT/PLAN:    1. Snoring ***    2. Obesity:***    3. Htn:...    4. Smoking ...    5. Circadian rhythm ***  .

## 2020-02-02 NOTE — PROCEDURES
PHYSICIAN INTERPRETATION   HOME OXIMETRY   Patient: Debbie Bah  MRN: [id number]  YOB: 1990  Study Date: 1/22/20   Referring Physician: Sarahi Santos  Ordering Physician: HARJINDER Monte CNP, MD     Conditions:  ,Room air  Quality: artifact noted 6.6% artifact     Measure [threshold]                 Time less than or equal to SpO2 88% [5 min]:  0min  Duration monitoring [> 2 hours artifact free]:  7:50 hours                    4% O2 desat index [ > 15/ hour]:    0.5/ hour                    Pattern:       normal                                  Assessment:   Normal study  Recommendations:  The following changes in O2/PAP settings were ordered:  None, normal test    Diagnosis Code(s): None, normal study     HARJINDER Monte CNP, February 2, 2020

## 2020-02-02 NOTE — PROGRESS NOTES
CC: non restorative sleep, shift work, binge eating, infrequent RLS    HPI:Reviewed results of delmi, and sleep diaires with virtual call.    HS about midnight to 1:30 a.m. most nights, (outlier bedtime 10:45 PM)  Wake ups: Less than 15- 20 minutes most nights, 1 time per night (outlier: 1 hour wake up x1)  Final wake up: 7:30 AM- 8:30 AM (outliers 10- 10:30 AM-noon)    Naps: X1 some days length varying from half hour- 1 hour (x3- 3 hours x 6 days with rise time from naps as short as 5 night hours prior to bedtime.    Total sleep time most nights 7-1/2-9 hours, plus naps, some naps lasting as long as 3 hours.     naps as indicated before some days for an hour- 3 hours with some close to bedtime    Assessment/plan  1.  Snoring no evidence for possible obstructive sleep apnea from overnight oximetry  2.  RLS does not wish to start gabapentin as is using Topamax under the eyes of her psychiatrist for binge eating disorder  3..  Irregular sleep-wake pattern.  It appears that all 3 shifts were done working the p.m. shift during this 2-1/2 weeks of sleep diaries.  Under that consideration there is a fairly irregular pattern to the rise time which varies from 7:15 in the morning to as late as noon, with a somewhat, and rise time of somewhere between 9-10 AM.  Naps are excessive in length possibly impacting the quality of sleep at night, some nights with impact of alcohol being seen with shorter nights rest, or possible more fragmentation of sleep.    Recommendations: Keep rise time approximately the same, keeping rise time to less than 3 hours difference or more similar the better for quality sleep (may need to set an alarm on days off.  enter bed when sleepy, not to unwind; avoid bright light for the last hour of the night prior to expected bedtime by not using devices (cell phone or computer or iPad in that period of time) and get some bright light in the morning either by going for a brief walk (being careful with icy  roads) or sitting by a window with morning light.  Naps should be brief and early in the afternoon to not affect the nighttime's rest.  Brief is likely 40 or less minutes.    Time spent with patient is 15 minutes with a unscheduled phone visit, of which >50% was spent in counseling, education and coordination of care.

## 2020-02-09 ASSESSMENT — ENCOUNTER SYMPTOMS
FREQUENCY: 0
ABDOMINAL PAIN: 0
HEMATOCHEZIA: 0
ARTHRALGIAS: 0
NAUSEA: 0
HEARTBURN: 0
NERVOUS/ANXIOUS: 0
JOINT SWELLING: 0
HEADACHES: 0
PALPITATIONS: 0
DIZZINESS: 0
CONSTIPATION: 0
FEVER: 0
DYSURIA: 0
EYE PAIN: 0
COUGH: 0
BREAST MASS: 0
DIARRHEA: 0
SORE THROAT: 0
PARESTHESIAS: 0
SHORTNESS OF BREATH: 0
WEAKNESS: 0
CHILLS: 0
MYALGIAS: 0
HEMATURIA: 0

## 2020-02-12 ENCOUNTER — OFFICE VISIT (OUTPATIENT)
Dept: FAMILY MEDICINE | Facility: CLINIC | Age: 30
End: 2020-02-12
Payer: COMMERCIAL

## 2020-02-12 VITALS
OXYGEN SATURATION: 97 % | HEART RATE: 86 BPM | HEIGHT: 65 IN | RESPIRATION RATE: 18 BRPM | DIASTOLIC BLOOD PRESSURE: 74 MMHG | WEIGHT: 258.4 LBS | TEMPERATURE: 98.4 F | SYSTOLIC BLOOD PRESSURE: 116 MMHG | BODY MASS INDEX: 43.05 KG/M2

## 2020-02-12 DIAGNOSIS — L70.0 ACNE VULGARIS: ICD-10-CM

## 2020-02-12 DIAGNOSIS — F33.0 MAJOR DEPRESSIVE DISORDER, RECURRENT EPISODE, MILD (H): ICD-10-CM

## 2020-02-12 DIAGNOSIS — Z00.00 ROUTINE GENERAL MEDICAL EXAMINATION AT A HEALTH CARE FACILITY: Primary | ICD-10-CM

## 2020-02-12 DIAGNOSIS — G25.81 RESTLESS LEGS SYNDROME: ICD-10-CM

## 2020-02-12 DIAGNOSIS — E66.01 MORBID OBESITY (H): ICD-10-CM

## 2020-02-12 DIAGNOSIS — F41.8 SITUATIONAL ANXIETY: ICD-10-CM

## 2020-02-12 PROCEDURE — 99214 OFFICE O/P EST MOD 30 MIN: CPT | Mod: 25 | Performed by: NURSE PRACTITIONER

## 2020-02-12 PROCEDURE — 99395 PREV VISIT EST AGE 18-39: CPT | Performed by: NURSE PRACTITIONER

## 2020-02-12 RX ORDER — TRIAMCINOLONE ACETONIDE 1 MG/G
0.1 CREAM TOPICAL 2 TIMES DAILY
Refills: 3 | COMMUNITY
Start: 2019-08-19 | End: 2020-11-19

## 2020-02-12 RX ORDER — IBUPROFEN 800 MG/1
200 TABLET, FILM COATED ORAL DAILY PRN
Refills: 0 | COMMUNITY
Start: 2019-11-20 | End: 2021-06-18

## 2020-02-12 RX ORDER — GABAPENTIN 100 MG/1
CAPSULE ORAL
Qty: 90 CAPSULE | Refills: 1 | Status: CANCELLED | OUTPATIENT
Start: 2020-02-12

## 2020-02-12 RX ORDER — NORGESTIMATE AND ETHINYL ESTRADIOL 7DAYSX3 LO
1 KIT ORAL DAILY
Qty: 84 TABLET | Refills: 4 | Status: SHIPPED | OUTPATIENT
Start: 2020-02-12 | End: 2020-05-20

## 2020-02-12 ASSESSMENT — PATIENT HEALTH QUESTIONNAIRE - PHQ9: SUM OF ALL RESPONSES TO PHQ QUESTIONS 1-9: 9

## 2020-02-12 ASSESSMENT — MIFFLIN-ST. JEOR: SCORE: 1902.96

## 2020-02-12 NOTE — PATIENT INSTRUCTIONS
Try listen to medical podcasts when you exercise  Curbsiders  This Podcast Will Kill You    Double iron supplement    Weight management  Merna Mendez MD    Intermittent fasting 16:8 split  Coffee is generally considered non-caloric, pickle or sauerkraut juice, electrolyte mix  No caloric restriction necessarily in the eating window, but pick a window when it is easiest to choose healthy food  Aim for 5-7 servings of vegetables (raw vegetables - 1 serving = 1/2 cup; raw greens - 1 serving = 1 cup)      Preventive Health Recommendations  Female Ages 26 - 39  Yearly exam:   See your health care provider every year in order to    Review health changes.     Discuss preventive care.      Review your medicines if you your doctor has prescribed any.    Until age 30: Get a Pap test every three years (more often if you have had an abnormal result).    After age 30: Talk to your doctor about whether you should have a Pap test every 3 years or have a Pap test with HPV screening every 5 years.   You do not need a Pap test if your uterus was removed (hysterectomy) and you have not had cancer.  You should be tested each year for STDs (sexually transmitted diseases), if you're at risk.   Talk to your provider about how often to have your cholesterol checked.  If you are at risk for diabetes, you should have a diabetes test (fasting glucose).  Shots: Get a flu shot each year. Get a tetanus shot every 10 years.   Nutrition:     Eat at least 5 servings of fruits and vegetables each day.    Eat whole-grain bread, whole-wheat pasta and brown rice instead of white grains and rice.    Get adequate Calcium and Vitamin D.     Lifestyle    Exercise at least 150 minutes a week (30 minutes a day, 5 days of the week). This will help you control your weight and prevent disease.    Limit alcohol to one drink per day.    No smoking.     Wear sunscreen to prevent skin cancer.    See your dentist every six months for an exam and  cleaning.

## 2020-02-12 NOTE — PROGRESS NOTES
SUBJECTIVE:   CC: Debbie Bah is an 29 year old woman who presents for preventive health visit.     Healthy Habits:      Answers for HPI/ROS submitted by the patient on 2/9/2020   Annual Exam:  Frequency of exercise:: 1 day/week  Getting at least 3 servings of Calcium per day:: Yes  Diet:: Regular (no restrictions)  Taking medications regularly:: Yes  Medication side effects:: None  Bi-annual eye exam:: Yes  Dental care twice a year:: Yes  Sleep apnea or symptoms of sleep apnea:: Daytime drowsiness  abdominal pain: No  Blood in stool: No  Blood in urine: No  chest pain: No  chills: No  congestion: No  constipation: No  cough: No  diarrhea: No  dizziness: No  ear pain: No  eye pain: No  nervous/anxious: No  fever: No  frequency: No  genital sores: No  headaches: No  hearing loss: No  heartburn: No  arthralgias: No  joint swelling: No  peripheral edema: No  mood changes: Yes  myalgias: No  nausea: No  dysuria: No  palpitations: No  Skin sensation changes: No  sore throat: No  urgency: No  rash: No  shortness of breath: No  visual disturbance: No  weakness: No  pelvic pain: No  vaginal bleeding: No  vaginal discharge: No  tenderness: No  breast mass: No  breast discharge: No  Additional concerns today:: Yes  Duration of exercise:: 30-45 minutes    Flu shot at work Sept 2019    Weight management - exercising once or twice a week when able to , with going to school it makes it hard to focus on the weight management.  School has been too busy to do a lot of her normal activities and provoked binge eating.  Had been seeing [facility].  She doesn't have time for her typical activity like hiking and running.  Unsure how to rearrange life to allow for more activity.  Wants to be more physically able like she was previously.  Psychiatry had prescribed Vyvanse (vision side effect) and Topiramate (got very bad numbness and tingling).    Depression Followup    How are you doing with your depression since your last  visit? Improved     Stopped all medications in December under idea that entire symptoms were from sleep problem, saw sleep specialist again and only dx was RLS; restarted 150 mg, will be going up to 300 mg and plan is to try 450 mg.  Hasn't tried gabapentin yet for RLS from sleep.  Did start iron supplement (MegaFood Blood Builder) once a day and has felt better energy since starting.    Are you having other symptoms that might be associated with depression? No    Have you had a significant life event?  No     Are you feeling anxious or having panic attacks?   No    Do you have any concerns with your use of alcohol or other drugs? No    Wt Readings from Last 5 Encounters:   02/12/20 117.2 kg (258 lb 6.4 oz)   12/16/19 112.9 kg (249 lb)   02/13/19 105.3 kg (232 lb 1.6 oz)   12/20/18 101.6 kg (223 lb 14.4 oz)   11/28/18 102.1 kg (225 lb)     Social History     Tobacco Use     Smoking status: Never Smoker     Smokeless tobacco: Never Used   Substance Use Topics     Alcohol use: Yes     Alcohol/week: 0.0 standard drinks     Comment: 0-1 per week     Drug use: No     PHQ 2/13/2019 12/3/2019 2/12/2020   PHQ-9 Total Score 7 8 9   Q9: Thoughts of better off dead/self-harm past 2 weeks Not at all Not at all Not at all   F/U: Thoughts of suicide or self-harm - - -   F/U: Safety concerns - - -     TRAVIS-7 SCORE 3/8/2016   Total Score 1       Suicide Assessment Five-step Evaluation and Treatment (SAFE-T)      Today's PHQ-2 Score:   PHQ-2 ( 1999 [employer]) 2/9/2020 2/13/2019   Q1: Little interest or pleasure in doing things 1 1   Q2: Feeling down, depressed or hopeless 1 1   PHQ-2 Score 2 2   Q1: Little interest or pleasure in doing things Several days -   Q2: Feeling down, depressed or hopeless Several days -   PHQ-2 Score 2 -       Abuse: Current or Past(Physical, Sexual or Emotional)- No  Do you feel safe in your environment? Yes        Social History     Tobacco Use     Smoking status: Never Smoker     Smokeless tobacco: Never  "Used   Substance Use Topics     Alcohol use: Yes     Alcohol/week: 0.0 standard drinks     Comment: 0-1 per week     If you drink alcohol do you typically have >3 drinks per day or >7 drinks per week? No                     Reviewed orders with patient.  Reviewed health maintenance and updated orders accordingly - Yes  Lab work is in process  Labs reviewed in [facility]    Mammogram not appropriate for this patient based on age.    Pertinent mammograms are reviewed under the imaging tab.  History of abnormal Pap smear: NO - age 30- 65 PAP every 3 years recommended  PAP / HPV 4/18/2018   PAP NIL     Reviewed and updated as needed this visit by clinical staff  Tobacco  Allergies  Meds         Reviewed and updated as needed this visit by Provider          OBJECTIVE:   /74   Pulse 86   Temp 98.4  F (36.9  C) (Oral)   Resp 18   Ht 1.643 m (5' 4.69\")   Wt 117.2 kg (258 lb 6.4 oz)   LMP 02/04/2020 (Approximate)   SpO2 97%   BMI 43.42 kg/m    EXAM:  GENERAL: healthy, alert and no distress  EYES: Eyes grossly normal to inspection, PERRL and conjunctivae and sclerae normal  HENT: ear canals and TM's normal, nose and mouth without ulcers or lesions  NECK: no adenopathy, no asymmetry, masses, or scars and thyroid normal to palpation  RESP: lungs clear to auscultation - no rales, rhonchi or wheezes  CV: regular rate and rhythm, normal S1 S2, no S3 or S4, no murmur, click or rub, no peripheral edema and peripheral pulses strong  ABDOMEN: soft, nontender, no hepatosplenomegaly, no masses and bowel sounds normal  MS: no gross musculoskeletal defects noted, no edema  SKIN: no suspicious lesions or rashes  NEURO: Normal strength and tone, mentation intact and speech normal  PSYCH: mentation appears normal, affect normal/bright, tearful discussing weight and school stress    Diagnostic Test Results:  Labs reviewed in Epic  none     ASSESSMENT/PLAN:   (Z00.00) Routine general medical examination at a health care facility  " "(primary encounter diagnosis)  Comment:   Plan: Healthy young woman experiencing stress of grad school.  Weight gain secondary to time constraints and stress    (F33.0) Major depressive disorder, recurrent episode, mild (H)  Comment:   Plan: Stable despite stress.  No medication changes    (E66.01) Morbid obesity (H)  Comment:   Plan: COMPREHENSIVE WEIGHT MANAGEMENT        Not likely to be open to topiramate-phentermine due to side effects experienced with topiramate. Potentially open to naltrexone added to wellbutrin that she is already taking.  Unsure if she is eligible for victoza.  Patient would like to see weight management - may opt for visits for \"accountability\" rather than medication.    (F41.8) Situational anxiety  Comment:   Plan:  Noted - no needs today    (L70.0) Acne vulgaris  Comment:   Plan: norgestim-eth estrad triphasic (ORTHO         TRI-CYCLEN LO) 0.18/0.215/0.25 MG-25 MCG tablet    No sexually active    (G25.81) Restless legs syndrome  Plan: double iron supplement    COUNSELING:   Reviewed preventive health counseling, as reflected in patient instructions    Estimated body mass index is 43.42 kg/m  as calculated from the following:    Height as of this encounter: 1.643 m (5' 4.69\").    Weight as of this encounter: 117.2 kg (258 lb 6.4 oz).    Weight management plan: Discussed healthy diet and exercise guidelines     reports that she has never smoked. She has never used smokeless tobacco.      Counseling Resources:  ATP IV Guidelines  Pooled Cohorts Equation Calculator  Breast Cancer Risk Calculator  FRAX Risk Assessment  ICSI Preventive Guidelines  Dietary Guidelines for Americans, 2010  USDA's MyPlate  ASA Prophylaxis  Lung CA Screening    Sarahi Santos, HARJINDER MCCORMICK  [facility] [place]    "

## 2020-02-24 NOTE — PROGRESS NOTES
"New Weight Management Nutrition Consultation    Debbie Bah is a 29 year old female presents today for new weight management nutrition consultation.  Patient referred by Dr. Bauamn on February 25, 2020.    Patient with Co-morbidities of obesity including:  Type II DM no  Renal Failure no  Sleep apnea no  Hypertension no   Dyslipidemia no  Joint pain no  Back pain no  GERD no     Per MD note, pt with h/o depression.     Anthropometrics:  Estimated body mass index is 43.57 kg/m  as calculated from the following:    Height as of an earlier encounter on 2/25/20: 1.638 m (5' 4.5\").    Weight as of an earlier encounter on 2/25/20: 116.9 kg (257 lb 12.8 oz).    Medications for Weight Loss:  Naltrexone started today with Dr. Bauman     Per PCP note, has tried topiramate in the past, had intense tingling and numbness    NUTRITION HISTORY  See MD note for details.    Per primary care note (2/12/20): Weight management - exercising once or twice a week when able to , with going to school it makes it hard to focus on the weight management.  School has been too busy to do a lot of her normal activities and provoked binge eating.  Had been seeing [facility]. PCP suggested intermittent fasting (16:8).    Per discussion with patient, during her time at the [facility], she found treatment focused on lifting food rules to be the most helpful. Prior, she would restrict intake of \"preferred foods\" (pasta, ice cream), resulting in a subsequent binge. She feels she now has a better relationship with food, and would like to focus on weight loss in a healthy way.      Pt has a mushroom allergy, and suspects a mild lactose intolerance. Pt enjoys cooking, but does not get to do it often d/t busy work and school schedule.     Pt states she often uses food to relive stress after work. She gets home around midnight, has little energy and has gotten in the habit of using food to decompress before bed. Will  [employer] on her way " home from work.     Pt states she is always thinking about food which leads to frequent snacking. She feels if she can manage thoughts about food she would be more successful at making positive changes to diet.     Typically eats 3-4 meals + 1-2 snacks per day.       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    Recent food recall:  Breakfast: 2 eggs + mixed green salad + hashbrowns/bfast potatoes (1 cup)  - AM snack: Occ latte (espresso + half and half + sugar); tea with sugar  Lunch: Hello fresh meal + kabocha  - PM snack: bowl of ramen; cereal   Dinner: Hello fresh meal (couscous + roasted g beans, tomatoes and tilapia); dining out    -  snacks: Popcorn, ice cream, popsicle, yogurt, ramen   Beverages: Water, tea, coffee, and kombocha    Alcohol: 1-2 drinks 1-2 times per week   Dining out: 2-3 times per week - [employer].     Physical Activity:  Enjoys hiking, running, biking. Does not have a lot of time to devote to increased physical activity.      MALNUTRITION  % Intake: No decreased intake noted  % Weight Loss: None noted  Subcutaneous Fat Loss: None observed  Muscle Loss: None observed  Fluid Accumulation/Edema: None noted  Malnutrition Diagnosis: Patient does not meet two of the established criteria necessary for diagnosing malnutrition    Nutrition Prescription  Recommended energy/nutrient modification.  Volumetrics/Plate Method    Nutrition Diagnosis  Food and nutrition related knowledge deficit r/t lack of prior exposure to Volumetrics diet and nutrition education aeb pt unable to verbalize understanding of Volumetrics diet for weight loss.    Nutrition Intervention  Materials/education provided on Volumetric eating to help satiety level on fewer calories; portion control and healthy food choices (Plate Method and Volumetrics handouts), 100 calorie snack choices, meal and snack planning, and mindful eating. Pt states she frequently picks up fast-food after work despite not being  "hungry to help unwind from work day. Discussed using meditation after work to unwind instead. Pt to focus on 1-2 goals at a time to avoid becoming increasingly overwhelmed. Discussed eliminating half and half from coffee and planning meals/snacks in advanced. Discussed using the plate method to structure meals, and provided pt with list of snacks to use. Discussed measuring out snacks ahead of time. Pt may use frozen meal or protein shake as back up meal options. Pt to work on meal/snack consistency, moving away from grazing pattern. Discussed having 3 meals + 1-2 snacks. One snack should be 2 hours before dinner to help control dinner portions.      Pt inquired about doing intermittent fasting. Given pt is overwhelmed with school and work, and has a h/o eating disorder, would not recommend intermittent fasting at this time. Pt agrees that setting strict rules about diet may not be helpful at this time. Would like to avoid feeling of guilt when fasting timeframe not met.     Provided pt with list of goals and RD contact info.       Patient Understanding: good  Expected Compliance: good  Follow-Up Plans: Meal planning, stress management, exercise     Nutrition Goals  1) Eat 3 meals per day using the 9\" Plate method (1/2 plate non-starchy vegetables/fruit, 1/4 plate lean protein, 1/4 plate whole grain starch - no more than 1/2 cup carb/meal)   - Eat slowly (20-30 minutes per meal), chewing foods well (25 chews per bite/applesauce consistency)   - May use a protein shake or frozen meal meal alternative   2) 1-2 snacks per day. One 2 hours before dinner, and occ one after work   - Plan snacks ahead of time. Healthy choice and portioned out - Seaweed crisps, popcorn, fruit, vegetable.  3) Eliminate half and half from coffee. Try almond or soy milk instead.  4) Try to use meditation after work for stress relief       *Protein Shake Criteria: no more than 210 Calories, at least 20 grams of protein, and less than 10 grams " of sugar     Meal Replacement Shake Options:   [facility] smoothie (160 Calories, 20 g protein)   Premier Protein (160 Calories, 30 g protein)  Slim Fast Advanced Nutrition (180 Calories, 20 g protein)  Muscle Milk, lactose-free, 17 oz bottle (210 Calories, 30 g protein)  Integrated Supplements, no artificial sugars (110 Calories, 20 g protein)  Genepro, unflavored protein powder (60 Calories, 30 g protein)    Frozen Meal Replacements  Healthy Choice  Lean Cuisine  Atkins Meals  Smart Ones    Follow-Up:  PRN    Time spent with patient: 30 minutes.  Rosa Crews, SERGEI, LD

## 2020-02-25 ENCOUNTER — ALLIED HEALTH/NURSE VISIT (OUTPATIENT)
Dept: SURGERY | Facility: CLINIC | Age: 30
End: 2020-02-25
Payer: COMMERCIAL

## 2020-02-25 ENCOUNTER — OFFICE VISIT (OUTPATIENT)
Dept: ENDOCRINOLOGY | Facility: CLINIC | Age: 30
End: 2020-02-25
Payer: COMMERCIAL

## 2020-02-25 VITALS
TEMPERATURE: 98.2 F | HEIGHT: 65 IN | SYSTOLIC BLOOD PRESSURE: 133 MMHG | DIASTOLIC BLOOD PRESSURE: 85 MMHG | WEIGHT: 257.8 LBS | BODY MASS INDEX: 42.95 KG/M2 | HEART RATE: 74 BPM | OXYGEN SATURATION: 98 %

## 2020-02-25 DIAGNOSIS — E66.01 MORBID OBESITY (H): Primary | ICD-10-CM

## 2020-02-25 RX ORDER — NALTREXONE HYDROCHLORIDE 50 MG/1
TABLET, FILM COATED ORAL
Qty: 60 TABLET | Refills: 1 | Status: SHIPPED | OUTPATIENT
Start: 2020-02-25 | End: 2020-04-16

## 2020-02-25 ASSESSMENT — MIFFLIN-ST. JEOR: SCORE: 1897.31

## 2020-02-25 ASSESSMENT — PAIN SCALES - GENERAL: PAINLEVEL: NO PAIN (0)

## 2020-02-25 NOTE — PROGRESS NOTES
"    New Medical Weight Management Consult    PATIENT:  Debbie Bah  MRN:         [id number]  :         1990  LARRY:         2020    Dear Sarahi Santos,    I had the pleasure of seeing your patient, Debbie Bah. Full intake/assessment was done to determine barriers to weight loss success and develop a treatment plan. Debbie Bah is a 29 year old female interested in treatment of medical problems associated with excess weight. She has a height of 5' 4.5\", a weight of 257 lbs 12.8 oz, and the calculated Body mass index is 43.57 kg/m .    ASSESSMENT/PLAN:  Debbie is a patient with mature onset obesity with significant element of familial/genetic influence and with current health consequences. She does not need aggressive weight loss plan.  Debbie Bah endorses binging, eats a high carb diet, eats a high fat diet, eats fast food once or more per week, uses food as a reward, uses food as mood management, mostly eats during the evening and has a disorganized meal pattern.    Her problem is complicated by strong craving/reward pathways, binges eating and poor lifestyle choices    Her ability to lose weight is impacted by current work life and lack of confidence.    PLAN:    Decrease portion sizes  Decrease eating out  Purge house of food triggers  Dietician visit of education  Calorie/low fat diet  Meal planning  Increase activity     Continue to follow up with psychiatrist and therapist for stress coping    Craving/Reward   Ancillary testing:  N/A.  Food Plan:  High protein/low carbohydrate.   Activity Plan:  Exercise after meals.  Supplementary:  Continue to follow up with psychiatrist and therapist for stress coping.   Medication:  The patient will begin medication in pursuit of improved medical status as influenced by body weight. She will start naltrexone 25 mg up to 50 mg daily along with prescribed bupropion  mg daily. There is a mutual understanding of the goals and risks " of this therapy. The patient is in agreement. She is educated on dosage regimen and possible side effects.      She has the following co-morbidities:  Depression, Binges eating       2/23/2020   I have the following health issues associated with obesity: None of the above   I have the following symptoms associated with obesity: Ajit Lewis has been struggled with weight since her adult life but mostly in the past 2 years when she has started grad school and continued to work part time. 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 She gained from 180 lbs up to 250 lbs in the past 2 years. She reports increased stress eating, emotional eating, larger portion side and increased fast food consumption. She reports frequent late night eating just to relax before going to sleep. She feels that she has food thought all the time. She sleeps about 7-8 hours per day but still feels very fatigue.     She was [facility] at [facility] for 4 months for binges eating from the end of 2018 to early 2019.     She works with psychiatrist at [facility]. She was prescribed topiramate but it caused extreme paresthesia. She also was on Vyvanse but it caused her to feel not well. She also see therapist to work on stress coping mechanism. She is currently taking bupropion.     She does not exercise regularly. She feels that she has no time to exercise. She probably walks about 1-2 times per week.  She works from 3 pm to 11 pm. She usually eats before going to work and during works hours. Diet is mostly riches with carbohydrate.     Patient Goals 2/23/2020   I am interested in having a healthier weight to diminish current health problems: Yes   I am interested in having a healthier weight in order to prevent future health problems: Yes   I am interested in having a healthier weight in order to have a future surgery: No       Referring Provider 2/23/2020   Please name the provider who referred you to  "Medical Weight Management.  If you do not know, please answer: \"I Don't Know\". Erika Santos       Weight History 2/23/2020   How concerned are you about your weight? Very Concerned   Would you describe your weight gain as gradual? Yes   I became overweight: As a Teenager   The following factors have contributed to my weight gain:  Eating Too Much, Lack of Exercise, Stress   I have tried the following methods to lose weight: Watching Portions or Calories, Yaritza Cortez, Medications, Other   Please list the medications you have tried.  topiramate, Vyvanse   Please list the other methods.  Binge Eating Disorder Program   My lowest weight since age 20 was: 154   My highest weight since age 20 was: 258   The most weight I have ever lost was: (lbs) 258   I have the following family history of obesity/being overweight:  One or more of my siblings are overweight   Has anyone in your family had weight loss surgery? No   How has your weight changed over the last year?  Gained   How many pounds? 30       Diet Recall Review with Patient 2/23/2020   Do you typically eat breakfast? Yes   If you do eat breakfast, what types of food do you eat? eggs, potatoes, salad   Do you typically eat lunch? Yes   If you do eat lunch, what types of food do you typically eat?  roasted veggies, protein, pasta   Do you typically eat supper? Yes   If you do eat supper, what types of food do you typically eat? roasted vegetable, protein, pasta   Do you typically eat snacks? Yes   If you do snack, what types of food do you typically eat? Ensure, ramen, ice cream   Do you like vegetables?  Yes   Do you drink water? Yes   How many glasses of juice do you drink in a typical day? 0   How many of glasses of milk do you drink in a typical day? 0   If you do drink milk, what type? Whole   How many 8oz glasses of sugar containing drinks such as Niraj-Aid/sweet tea do you drink in a day? 0   How many cans/bottles of sugar pop/soda/tea/sports drinks do you drink " in a day? 0   How many cans/bottles of diet pop/soda/tea or sports drink do you drink in a day? 0   How often do you have a drink of alcohol? 2-4 Times a Month   If you do drink, how many drinks might you have in a day? 1 or 2       Eating Habits 2/23/2020   Generally, my meals include foods like these: bread, pasta, rice, potatoes, corn, crackers, sweet dessert, pop, or juice. Everyday   Generally, my meals include foods like these: fried meats, brats, burgers, french fries, pizza, cheese, chips, or ice cream. A Few Times a Week   Eat fast food (like [employer], [employer], [employer]). A Few Times a Week   Eat at a buffet or sit-down restaurant. Once a Week   Eat most of my meals in front of the TV or computer. Everyday   Often skip meals, eat at random times, have no regular eating times. A Few Times a Week   Rarely sit down for a meal but snack or graze throughout.  Less Than Weekly   Eat extra snacks between meals. Everyday   Eat most of my food at the end of the day. Almost Everyday   Eat in the middle of the night or wake up at night to eat. Less Than Weekly   Eat extra snacks to prevent or correct low blood sugar. Never   Eat to prevent acid reflux or stomach pain. Never   Worry about not having enough food to eat. Never   Have you been to the food shelf at least a few times this year? No   I eat when I am depressed. Almost Everyday   I eat when I am stressed. Everyday   I eat when I am bored. Everyday   I eat when I am anxious. Everyday   I eat when I am happy or as a reward. Almost Everyday   I feel hungry all the time even if I just have eaten. Almost Everyday   Feeling full is important to me. Almost Everyday   I finish all the food on my plate even if I am already full. Almost Everyday   I can't resist eating delicious food or walk past the good food/smell. A Few Times a Week   I eat/snack without noticing that I am eating. Less Than Weekly   I eat when I am preparing the meal. Almost Everyday   I eat  more than usual when I see others eating. Never   I have trouble not eating sweets, ice cream, cookies, or chips if they are around the house. A Few Times a Week   I think about food all day. Everyday   What foods, if any, do you crave? Cheese   Please list any other foods you crave? ramen, fast food, ice cream, cereal       Amount of Food 2/23/2020   I make myself vomit what I have eaten or use laxatives to get rid of food. Never   I eat a large amount of food, like a loaf of bread, a box of cookies, a pint/quart of ice cream, all at once. Weekly   I eat a large amount of food even when I am not hungry. Weekly   I eat rapidly. Everyday   I eat alone because I feel embarrassed and do not want others to see how much I have eaten. Weekly   I eat until I am uncomfortably full. Almost Everyday   I feel bad, disgusted, or guilty after I overeat. Almost Everyday   I make myself vomit what I have eaten or use laxatives to get rid of food. Never       Activity/Exercise History 2/23/2020   How much of a typical 12 hour day do you spend sitting? Most of the Day   How much of a typical 12 hour day do you spend lying down? Less Than Half the Day   How much of a typical day do you spend walking/standing? Less Than Half the Day   How many hours (not including work) do you spend on the TV/Video Games/Computer/Tablet/Phone? 6 Hours or More   How many times a week are you active for the purpose of exercise? 2-3 Times a Week   What keeps you from being more active? Shortness of Breath, Lack of Time, Too tired   How many total minutes do you spend doing some activity for the purpose of exercising when you exercise? 15-30 Minutes       PAST MEDICAL HISTORY:  Past Medical History:   Diagnosis Date     GERD (gastroesophageal reflux disease)        Work/Social History Reviewed With Patient 2/23/2020   My employment status is: Full-Time   How much of your job is spent on the computer or phone? 50%   How many hours do you spend commuting to  work daily?  25   What is your marital status? Single   If in a relationship, is your significant other overweight? N/A   Do you have children? No   If you have children, are they overweight? N/A   Who do you live with?  Roommate   Are they supportive of your health goals? Yes   Who does the food shopping?  Myself       Mental Health History Reviewed With Patient 2/23/2020   Have you ever been physically or sexually abused? No   If yes, do you feel that the abuse is affecting your weight? N/A   If yes, would you like to talk to a counselor about the abuse? N/A   How often in the past 2 weeks have you felt little interest or pleasure in doing things? For Several Days   Over the past 2 weeks how often have you felt down, depressed, or hopeless? For Several Days       Sleep History Reviewed With Patient 2/23/2020   How many hours do you sleep at night? 7   Do you think that you snore loudly or has anybody ever heard you snore loudly (louder than talking or so loud it can be heard behind a shut door)? No   Has anyone seen or heard you stop breathing during your sleep? No   Do you often feel tired, fatigued, or sleepy during the day? Yes   Do you have a TV/Computer in your bedroom? Yes       MEDICATIONS:   Current Outpatient Medications   Medication Sig Dispense Refill     buPROPion (WELLBUTRIN XL) 150 MG 24 hr tablet Take 1 tablet (150 mg) by mouth every morning 90 tablet 1     Cholecalciferol (VITAMIN D) 2000 UNITS CAPS Take 2 capsules by mouth        gabapentin (NEURONTIN) 100 MG capsule Take 1 pill 1.5 hrs prior to bedtime, may increase by 1 pill every 3-5 days to total of 3 if needed 90 capsule 1     ibuprofen (ADVIL/MOTRIN) 800 MG tablet Take 200 mg by mouth daily as needed  0     Lactobacillus (PROBIOTIC ACIDOPHILUS) TABS Take 1 tablet by mouth       multivitamin, therapeutic with minerals (MULTI-VITAMIN) TABS tablet Take 1 tablet by mouth daily       norgestim-eth estrad triphasic (ORTHO TRI-CYCLEN LO)  "0.18/0.215/0.25 MG-25 MCG tablet Take 1 tablet by mouth daily 84 tablet 4     omega-3 acid ethyl esters (LOVAZA) 1 G capsule Take 2 g by mouth 2 times daily       triamcinolone (KENALOG) 0.1 % external cream Apply 0.1 Tubes topically 2 times daily  3       ALLERGIES:   Allergies   Allergen Reactions     Doxycycline      Mushroom Cramps, Diarrhea and GI Disturbance       PHYSICAL EXAM:  /85 (BP Location: Left arm, Patient Position: Sitting, Cuff Size: Adult Large)   Pulse 74   Temp 98.2  F (36.8  C) (Oral)   Ht 1.638 m (5' 4.5\")   Wt 116.9 kg (257 lb 12.8 oz)   LMP 02/04/2020 (Approximate)   SpO2 98%   BMI 43.57 kg/m      Waist circumference: 112 cm    Wt Readings from Last 4 Encounters:   02/25/20 116.9 kg (257 lb 12.8 oz)   02/12/20 117.2 kg (258 lb 6.4 oz)   12/16/19 112.9 kg (249 lb)   02/13/19 105.3 kg (232 lb 1.6 oz)     A & O x 3  HEENT: NCAT, mucous membranes moist  Respirations unlabored  Location of obesity: Mixed Obesity    FOLLOW-UP:   4 weeks.    TIME: 45 min spent on evaluation, management, counseling, education, & motivational interviewing with greater than 50 % of the total time was spent on counseling and coordinating care    Sincerely,    Merna Mendez MD      "

## 2020-02-25 NOTE — LETTER
"2020       RE: Debbie Bah  [address]  [address]  [facility] [address]     Dear Colleague,    Thank you for referring your patient, Debbie Bah, to the [facility] MEDICAL WEIGHT MANAGEMENT at [facility]. Please see a copy of my visit note below.        New Medical Weight Management Consult    PATIENT:  Debbie Bah  MRN:         [id number]  :         1990  LARRY:         2020    Dear Sarahi Santos,    I had the pleasure of seeing your patient, Debbie Bah. Full intake/assessment was done to determine barriers to weight loss success and develop a treatment plan. Debbie Bah is a 29 year old female interested in treatment of medical problems associated with excess weight. She has a height of 5' 4.5\", a weight of 257 lbs 12.8 oz, and the calculated Body mass index is 43.57 kg/m .    ASSESSMENT/PLAN:  Debbie is a patient with mature onset obesity with significant element of familial/genetic influence and with current health consequences. She does not need aggressive weight loss plan.  Debbie Bah endorses binging, eats a high carb diet, eats a high fat diet, eats fast food once or more per week, uses food as a reward, uses food as mood management, mostly eats during the evening and has a disorganized meal pattern.    Her problem is complicated by strong craving/reward pathways, binges eating and poor lifestyle choices    Her ability to lose weight is impacted by current work life and lack of confidence.    PLAN:    Decrease portion sizes  Decrease eating out  Purge house of food triggers  Dietician visit of education  Calorie/low fat diet  Meal planning  Increase activity     Continue to follow up with psychiatrist and therapist for stress coping    Craving/Reward   Ancillary testing:  N/A.  Food Plan:  High protein/low carbohydrate.   Activity Plan:  Exercise after meals.  Supplementary:  Continue to follow up with " psychiatrist and therapist for stress coping.   Medication:  The patient will begin medication in pursuit of improved medical status as influenced by body weight. She will start naltrexone 25 mg up to 50 mg daily along with prescribed bupropion  mg daily. There is a mutual understanding of the goals and risks of this therapy. The patient is in agreement. She is educated on dosage regimen and possible side effects.      She has the following co-morbidities:  Depression, Binges eating       2/23/2020   I have the following health issues associated with obesity: None of the above   I have the following symptoms associated with obesity: Ajit Lewis has been struggled with weight since her adult life but mostly in the past 2 years when she has started grad school and continued to work part time. 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 She gained from 180 lbs up to 250 lbs in the past 2 years. She reports increased stress eating, emotional eating, larger portion side and increased fast food consumption. She reports frequent late night eating just to relax before going to sleep. She feels that she has food thought all the time. She sleeps about 7-8 hours per day but still feels very fatigue.     She was admitted at [facility] for 4 months for binges eating from the end of 2018 to early 2019.     She works with psychiatrist at [facility]. She was prescribed topiramate but it caused extreme paresthesia. She also was on Vyvanse but it caused her to feel not well. She also see therapist to work on stress coping mechanism. She is currently taking bupropion.     She does not exercise regularly. She feels that she has no time to exercise. She probably walks about 1-2 times per week.  She works from 3 pm to 11 pm. She usually eats before going to work and during works hours. Diet is mostly riches with carbohydrate.     Patient Goals 2/23/2020   I am interested in having a  "healthier weight to diminish current health problems: Yes   I am interested in having a healthier weight in order to prevent future health problems: Yes   I am interested in having a healthier weight in order to have a future surgery: No       Referring Provider 2/23/2020   Please name the provider who referred you to Medical Weight Management.  If you do not know, please answer: \"I Don't Know\". Erika Santos       Weight History 2/23/2020   How concerned are you about your weight? Very Concerned   Would you describe your weight gain as gradual? Yes   I became overweight: As a Teenager   The following factors have contributed to my weight gain:  Eating Too Much, Lack of Exercise, Stress   I have tried the following methods to lose weight: Watching Portions or Calories, Yaritza Cortez, Medications, Other   Please list the medications you have tried.  topiramate, Vyvanse   Please list the other methods.  Binge Eating Disorder Program   My lowest weight since age 20 was: 154   My highest weight since age 20 was: 258   The most weight I have ever lost was: (lbs) 258   I have the following family history of obesity/being overweight:  One or more of my siblings are overweight   Has anyone in your family had weight loss surgery? No   How has your weight changed over the last year?  Gained   How many pounds? 30       Diet Recall Review with Patient 2/23/2020   Do you typically eat breakfast? Yes   If you do eat breakfast, what types of food do you eat? eggs, potatoes, salad   Do you typically eat lunch? Yes   If you do eat lunch, what types of food do you typically eat?  roasted veggies, protein, pasta   Do you typically eat supper? Yes   If you do eat supper, what types of food do you typically eat? roasted vegetable, protein, pasta   Do you typically eat snacks? Yes   If you do snack, what types of food do you typically eat? Ensure, ramen, ice cream   Do you like vegetables?  Yes   Do you drink water? Yes   How many glasses " of juice do you drink in a typical day? 0   How many of glasses of milk do you drink in a typical day? 0   If you do drink milk, what type? Whole   How many 8oz glasses of sugar containing drinks such as Niraj-Aid/sweet tea do you drink in a day? 0   How many cans/bottles of sugar pop/soda/tea/sports drinks do you drink in a day? 0   How many cans/bottles of diet pop/soda/tea or sports drink do you drink in a day? 0   How often do you have a drink of alcohol? 2-4 Times a Month   If you do drink, how many drinks might you have in a day? 1 or 2       Eating Habits 2/23/2020   Generally, my meals include foods like these: bread, pasta, rice, potatoes, corn, crackers, sweet dessert, pop, or juice. Everyday   Generally, my meals include foods like these: fried meats, brats, burgers, french fries, pizza, cheese, chips, or ice cream. A Few Times a Week   Eat fast food (like [employer], [employer], [employer]aco [employer]). A Few Times a Week   Eat at a buffet or sit-down restaurant. Once a Week   Eat most of my meals in front of the TV or computer. Everyday   Often skip meals, eat at random times, have no regular eating times. A Few Times a Week   Rarely sit down for a meal but snack or graze throughout.  Less Than Weekly   Eat extra snacks between meals. Everyday   Eat most of my food at the end of the day. Almost Everyday   Eat in the middle of the night or wake up at night to eat. Less Than Weekly   Eat extra snacks to prevent or correct low blood sugar. Never   Eat to prevent acid reflux or stomach pain. Never   Worry about not having enough food to eat. Never   Have you been to the food shelf at least a few times this year? No   I eat when I am depressed. Almost Everyday   I eat when I am stressed. Everyday   I eat when I am bored. Everyday   I eat when I am anxious. Everyday   I eat when I am happy or as a reward. Almost Everyday   I feel hungry all the time even if I just have eaten. Almost Everyday   Feeling full is important to  me. Almost Everyday   I finish all the food on my plate even if I am already full. Almost Everyday   I can't resist eating delicious food or walk past the good food/smell. A Few Times a Week   I eat/snack without noticing that I am eating. Less Than Weekly   I eat when I am preparing the meal. Almost Everyday   I eat more than usual when I see others eating. Never   I have trouble not eating sweets, ice cream, cookies, or chips if they are around the house. A Few Times a Week   I think about food all day. Everyday   What foods, if any, do you crave? Cheese   Please list any other foods you crave? ramen, fast food, ice cream, cereal       Amount of Food 2/23/2020   I make myself vomit what I have eaten or use laxatives to get rid of food. Never   I eat a large amount of food, like a loaf of bread, a box of cookies, a pint/quart of ice cream, all at once. Weekly   I eat a large amount of food even when I am not hungry. Weekly   I eat rapidly. Everyday   I eat alone because I feel embarrassed and do not want others to see how much I have eaten. Weekly   I eat until I am uncomfortably full. Almost Everyday   I feel bad, disgusted, or guilty after I overeat. Almost Everyday   I make myself vomit what I have eaten or use laxatives to get rid of food. Never       Activity/Exercise History 2/23/2020   How much of a typical 12 hour day do you spend sitting? Most of the Day   How much of a typical 12 hour day do you spend lying down? Less Than Half the Day   How much of a typical day do you spend walking/standing? Less Than Half the Day   How many hours (not including work) do you spend on the TV/Video Games/Computer/Tablet/Phone? 6 Hours or More   How many times a week are you active for the purpose of exercise? 2-3 Times a Week   What keeps you from being more active? Shortness of Breath, Lack of Time, Too tired   How many total minutes do you spend doing some activity for the purpose of exercising when you exercise? 15-30  Minutes       PAST MEDICAL HISTORY:  Past Medical History:   Diagnosis Date     GERD (gastroesophageal reflux disease)        Work/Social History Reviewed With Patient 2/23/2020   My employment status is: Full-Time   How much of your job is spent on the computer or phone? 50%   How many hours do you spend commuting to work daily?  25   What is your marital status? Single   If in a relationship, is your significant other overweight? N/A   Do you have children? No   If you have children, are they overweight? N/A   Who do you live with?  Roommate   Are they supportive of your health goals? Yes   Who does the food shopping?  Myself       Mental Health History Reviewed With Patient 2/23/2020   Have you ever been physically or sexually abused? No   If yes, do you feel that the abuse is affecting your weight? N/A   If yes, would you like to talk to a counselor about the abuse? N/A   How often in the past 2 weeks have you felt little interest or pleasure in doing things? For Several Days   Over the past 2 weeks how often have you felt down, depressed, or hopeless? For Several Days       Sleep History Reviewed With Patient 2/23/2020   How many hours do you sleep at night? 7   Do you think that you snore loudly or has anybody ever heard you snore loudly (louder than talking or so loud it can be heard behind a shut door)? No   Has anyone seen or heard you stop breathing during your sleep? No   Do you often feel tired, fatigued, or sleepy during the day? Yes   Do you have a TV/Computer in your bedroom? Yes       MEDICATIONS:   Current Outpatient Medications   Medication Sig Dispense Refill     buPROPion (WELLBUTRIN XL) 150 MG 24 hr tablet Take 1 tablet (150 mg) by mouth every morning 90 tablet 1     Cholecalciferol (VITAMIN D) 2000 UNITS CAPS Take 2 capsules by mouth        gabapentin (NEURONTIN) 100 MG capsule Take 1 pill 1.5 hrs prior to bedtime, may increase by 1 pill every 3-5 days to total of 3 if needed 90 capsule 1      "ibuprofen (ADVIL/MOTRIN) 800 MG tablet Take 200 mg by mouth daily as needed  0     Lactobacillus (PROBIOTIC ACIDOPHILUS) TABS Take 1 tablet by mouth       multivitamin, therapeutic with minerals (MULTI-VITAMIN) TABS tablet Take 1 tablet by mouth daily       norgestim-eth estrad triphasic (ORTHO TRI-CYCLEN LO) 0.18/0.215/0.25 MG-25 MCG tablet Take 1 tablet by mouth daily 84 tablet 4     omega-3 acid ethyl esters (LOVAZA) 1 G capsule Take 2 g by mouth 2 times daily       triamcinolone (KENALOG) 0.1 % external cream Apply 0.1 Tubes topically 2 times daily  3       ALLERGIES:   Allergies   Allergen Reactions     Doxycycline      Mushroom Cramps, Diarrhea and GI Disturbance       PHYSICAL EXAM:  /85 (BP Location: Left arm, Patient Position: Sitting, Cuff Size: Adult Large)   Pulse 74   Temp 98.2  F (36.8  C) (Oral)   Ht 1.638 m (5' 4.5\")   Wt 116.9 kg (257 lb 12.8 oz)   LMP 02/04/2020 (Approximate)   SpO2 98%   BMI 43.57 kg/m       Waist circumference: 112 cm    Wt Readings from Last 4 Encounters:   02/25/20 116.9 kg (257 lb 12.8 oz)   02/12/20 117.2 kg (258 lb 6.4 oz)   12/16/19 112.9 kg (249 lb)   02/13/19 105.3 kg (232 lb 1.6 oz)     A & O x 3  HEENT: NCAT, mucous membranes moist  Respirations unlabored  Location of obesity: Mixed Obesity    FOLLOW-UP:   4 weeks.    TIME: 45 min spent on evaluation, management, counseling, education, & motivational interviewing with greater than 50 % of the total time was spent on counseling and coordinating care    Sincerely,    Merna Mendez MD            "

## 2020-02-25 NOTE — NURSING NOTE
"(   Chief Complaint   Patient presents with     Weight Problem     New weight management visit.    )    ( Weight: 116.9 kg (257 lb 12.8 oz) )  ( Height: 163.8 cm (5' 4.5\") )  ( BMI (Calculated): 43.57 )  (   )  ( Cumulative weight loss (lbs): 0 )  (   )  (   )  ( Waist Circumference (cm): 112 cm )  (   )    ( BP: 133/85 )  (   )  ( Temp: 98.2  F (36.8  C) )  ( Temp src: Oral )  ( Pulse: 74 )  (   )  ( SpO2: 98 % )    (   Patient Active Problem List   Diagnosis     Gastroesophageal reflux disease without esophagitis     Major depressive disorder, recurrent episode, mild (H)     Situational anxiety     Acne vulgaris     Obesity (BMI 35.0-39.9) with comorbidity (H)    )  (   Current Outpatient Medications   Medication Sig Dispense Refill     buPROPion (WELLBUTRIN XL) 150 MG 24 hr tablet Take 1 tablet (150 mg) by mouth every morning (Patient taking differently: Take 300 mg by mouth every morning ) 90 tablet 1     Cholecalciferol (VITAMIN D) 2000 UNITS CAPS Take 2 capsules by mouth        gabapentin (NEURONTIN) 100 MG capsule Take 1 pill 1.5 hrs prior to bedtime, may increase by 1 pill every 3-5 days to total of 3 if needed 90 capsule 1     ibuprofen (ADVIL/MOTRIN) 800 MG tablet Take 200 mg by mouth daily as needed  0     Lactobacillus (PROBIOTIC ACIDOPHILUS) TABS Take 1 tablet by mouth       multivitamin, therapeutic with minerals (MULTI-VITAMIN) TABS tablet Take 1 tablet by mouth daily       norgestim-eth estrad triphasic (ORTHO TRI-CYCLEN LO) 0.18/0.215/0.25 MG-25 MCG tablet Take 1 tablet by mouth daily 84 tablet 4     omega-3 acid ethyl esters (LOVAZA) 1 G capsule Take 2 g by mouth 2 times daily       triamcinolone (KENALOG) 0.1 % external cream Apply 0.1 Tubes topically 2 times daily  3    )  ( Diabetes Eval:    )    ( Pain Eval:  No Pain (0) )    ( Wound Eval:       )    (   History   Smoking Status     Never Smoker   Smokeless Tobacco     Never Used    )    ( Signed By:  Maci Byrnes [facility]; February 25, " 2020; 9:36 AM )

## 2020-02-25 NOTE — PATIENT INSTRUCTIONS
"Welcome to our weight management program!   We are excited to join you on your weight loss journey    Thank you for allowing us the privilege of caring for you. We hope we provided you with the excellent service you deserve.   Please let us know if there is anything else we can do for you so that we can be sure you are leaving completely satisfied with your care experience.    To ensure the quality of our services you may be receiving a patient satisfaction survey from an independent patient satisfaction monitoring company.    The greatest compliment you can give is a \"Likely to Recommend\"    You saw Dr. Merna GALLARDO today.    Instructions per today's visit:   Medications started today: Naltrexone 50mg      Follow up appointments:  Dr Bauman and dietitian in 1 month.    Interested in working with a health ?  Health coaches work with you to improve your overall health and wellbeing.  They look at the whole person, and may involve discussion of different areas of life, including, but not limited to the four pillars of health (sleep, exercise, nutrition, and stress management). Discuss with your care team if you would like to start working a health .  Health Coaching-3 Pack:    $99 for three health coaching visits    Visits may be done in person or via phone    Coaching is a partnership between the  and the client; Coaches do not prescribe or diagnose    Coaching helps inspire the client to reach his/her personal goals     For any questions/concerns contact Sophia Li LPN at [phone]     To schedule appointments with our team, please call [phone]     Please call during clinic hours Monday through Friday 8:00a - 4:00p if you have questions or you can contact us via [employer] at anytime. ?    Lab results will be communicated through My Chart or letter (if My Chart not used). Please call the clinic if you have not received communication after 1 week or if you have any questions.?      Fax: " "[phone]    Thank you,  Medical Weight Management Team    MEDICATION STARTED AT THIS APPOINTMENT  We are starting Naltrexone. Start with 1/2 tab 1-2 hours prior to the time you have the most trouble with cravings or extra hunger. If you are doing well you may switch to a whole tablet taken at the same time period.     WARNING: This medication blocks the action of opioid type pain medications. If you routinely take any medication like Codeine, Oxycontin,Percocet,Morphine,Dilaudid or Methodone, do not take this until you have talked with weight management staff. If you are planning surgery you should stop Naltrexone 4 days prior to the surgery. If you have an injury that requires pain medication, make sure the health care staff knows you take Naltrexone.     Call the nurse at [phone] if you have any questions or concerns. (Do not stop taking it if you don't think it's working. For some people it works without them knowing it.)    Naltrexone is a medication that is used most often to help people who are troubled by dependence on prescription pain killers or alcohol. It has also been found to help with weight loss. Although it's not currently FDA approved for weight loss, it has been used safely for a number of years to help people who are carrying extra weight.     Just how Naltrexone helps with weight loss has not been exactly determined.  It seems to work by quieting down brain signals related to strong food cravings. Many of our patients use the word \"addiction\" to describe their feelings and constant thoughts about food. It makes sense then to treat the feeling of dependence on food, outside of real hunger, with a medication designed to help with other sorts of dependence.     Our patients on Naltrexone find that they:    >feel less interest in food   >think less about food and eating and have more time to think of other things   >find it easier to push the plate away   >have an easier time eating " less    For some of our patients, these feelings are very immediate. Other patients, don't feel much of a change but find they've lost weight. Like all weight loss medications, Naltrexone works best when you help it work. This means:  1. Having less tempting high calorie (fattening) food around the house or office. (For people with strong cravings this is very important.)   2. Staying away from situations or people that may trigger your cravings .   3. Eating out only one time or less each week.  4. Eating your meals at a table with the TV or computer off.    Side-effects. Naltrexone is generally well tolerated. The main side-effect we see is  nausea or a woozy feeling. A small number of people feel quite ill. Most people have a mild reaction and some people have no reaction at all.  The good news is that this feeling does go away.     In order to avoid nausea, please start the medication with half a pill for the first few days. Go on to a full pill if you are feeling well.      If you  are nauseated on 1/2 a pill it is okay to cut back to 1/4 pill ( a very small amount). Take this for a couple of days and work your way back up to a 1/2 pill and then a whole pill. Taking the medication at night or with food  to start also may help prevent the feeling of nausea.         Please refer to the pharmacy insert for more information on side-effects. Since many pharmacists are not familiar with the use of naltrexone in weight loss, calling the nurse at [phone] will get you the most accurate information.  In order to get refills of this or any medication we prescribe you must be seen in the medical weight mgmt clinic every 2-3 months. Please have your pharmacy fax a refill request to [phone].

## 2020-03-04 NOTE — TELEPHONE ENCOUNTER
Pending Prescriptions:                       Disp   Refills    gabapentin (NEURONTIN) 100 MG capsule     90 cap*1            Sig: Take 1 pill 1.5 hrs prior to bedtime, may           increase by 1 pill every 3-5 days to total of 3           if needed    Last Written Prescription Date:  1/21/20  Last Fill Quantity: 90,   # refills: 1  Last Office Visit with G, P or [facility] prescribing provider: 1/22/20  Future Office visit:   No follow up scheduled at this time.    DEAN Jacobs

## 2020-03-05 RX ORDER — GABAPENTIN 100 MG/1
CAPSULE ORAL
Qty: 90 CAPSULE | Refills: 1 | Status: SHIPPED | OUTPATIENT
Start: 2020-03-05 | End: 2020-08-24

## 2020-04-16 ENCOUNTER — E-VISIT (OUTPATIENT)
Dept: FAMILY MEDICINE | Facility: CLINIC | Age: 30
End: 2020-04-16
Payer: COMMERCIAL

## 2020-04-16 DIAGNOSIS — L73.9 FOLLICULITIS: Primary | ICD-10-CM

## 2020-04-16 PROCEDURE — 99423 OL DIG E/M SVC 21+ MIN: CPT | Performed by: NURSE PRACTITIONER

## 2020-04-17 ENCOUNTER — MYC MEDICAL ADVICE (OUTPATIENT)
Dept: SLEEP MEDICINE | Facility: CLINIC | Age: 30
End: 2020-04-17

## 2020-04-17 RX ORDER — MUPIROCIN 20 MG/G
OINTMENT TOPICAL 3 TIMES DAILY
Qty: 30 G | Refills: 1 | Status: SHIPPED | OUTPATIENT
Start: 2020-04-17 | End: 2020-11-19

## 2020-04-22 DIAGNOSIS — G25.81 RESTLESS LEGS SYNDROME (RLS): Primary | ICD-10-CM

## 2020-04-30 DIAGNOSIS — G25.81 RESTLESS LEGS SYNDROME (RLS): ICD-10-CM

## 2020-04-30 LAB
FERRITIN SERPL-MCNC: 66 NG/ML (ref 12–150)
IRON SATN MFR SERPL: 17 % (ref 15–46)
IRON SERPL-MCNC: 56 UG/DL (ref 35–180)
TIBC SERPL-MCNC: 320 UG/DL (ref 240–430)

## 2020-05-06 ENCOUNTER — NURSE TRIAGE (OUTPATIENT)
Dept: NURSING | Facility: CLINIC | Age: 30
End: 2020-05-06

## 2020-05-06 NOTE — TELEPHONE ENCOUNTER
"Patient calling stating she was transferred from [facility] to receive antibody lab results.    Patient reporting she was advised to contact the \"lab on [facility].\" Phone number provided to patient for [facility]  per page .   Patient will contact lab directly. Phone disconnected prior to attempted transfer.    Caller verbalized understanding. Denies further questions.    Gabbi Rinaldi RN  [facility]      Reason for Disposition    [1] Caller requesting NON-URGENT health information AND [2] PCP's office is the best resource    Additional Information    Negative: [1] Caller is not with the adult (patient) AND [2] reporting urgent symptoms    Negative: Lab result questions    Negative: Medication questions    Negative: Caller can't be reached by phone    Negative: Caller has already spoken to PCP or another triager    Negative: RN needs further essential information from caller in order to complete triage    Negative: Requesting regular office appointment    Protocols used: INFORMATION ONLY CALL-A-AH      "

## 2020-05-10 ENCOUNTER — MYC MEDICAL ADVICE (OUTPATIENT)
Dept: OPHTHALMOLOGY | Facility: CLINIC | Age: 30
End: 2020-05-10

## 2020-05-13 NOTE — TELEPHONE ENCOUNTER
Spoke to pt  Scheduled contact lens fit/exam June 1st with Dr. Renee at [facility] location when schedules open up more  Pt aware of date/time/location  Robinson Parra RN 5:47 PM 05/13/20

## 2020-05-14 NOTE — TELEPHONE ENCOUNTER
Review of hx for my chart.    Hx of dspd, shift work and master program    Debbie Bah is a 29 year old female who was previously seen by me in the past for difficulties with snoring and tiredness and fatigue.  She also reported a prolonged difficulty with a.m. awakening since middle school and sleeping through most of her morning classes, occasional hypnic jerks and some sleep talking, with a rare nightmare and difficulty re-falling back asleep.  The plan of care included improving her delayed sleep phase disorder in the setting of shiftwork (worked an excellent shaft for her of 3-11 PM) with an eye towards protecting her total sleep time by not making early morning appointments, and discussed enhancing some sleep habits and environmental changes to improve quality of sleep such as avoiding bright light towards the end of the night, alcohol within 3 to 4 hours of her bedtime, and white noise to eliminate sounds from the neighborhood.     She returns here in a more difficult predicament having added an occasional daytime shift to her previous shifts of working 3-11 and has also taken on getting her masters at the same time.  She reports a period of time where she did try Vyvanse without significant improvement to her daytime function.  She denies other changes with regard to her health.  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  New medications for her include SSRI and Wellbutrin, was previously on propranolol for anxiety.    [place]: negative  rls-initially did not want to start it  Sleep diaries: irregular sleep schedule (rise time variance 3 hr or >) long naps  Entering bed 1-2 hrs earlier    Decision: 3-11    3/5/20 sleep rise time regular, sleep better

## 2020-05-17 ENCOUNTER — MYC MEDICAL ADVICE (OUTPATIENT)
Dept: FAMILY MEDICINE | Facility: CLINIC | Age: 30
End: 2020-05-17

## 2020-05-18 NOTE — TELEPHONE ENCOUNTER
Erika    See pt MyChart message    Do you want her to schedule a virtual visit?    Lia Chadwick RN   [facility]

## 2020-05-20 DIAGNOSIS — L70.0 ACNE VULGARIS: ICD-10-CM

## 2020-05-20 DIAGNOSIS — F33.0 MAJOR DEPRESSIVE DISORDER, RECURRENT EPISODE, MILD (H): ICD-10-CM

## 2020-05-20 RX ORDER — NORGESTIMATE AND ETHINYL ESTRADIOL 7DAYSX3 LO
1 KIT ORAL DAILY
Qty: 84 TABLET | Refills: 4 | Status: SHIPPED | OUTPATIENT
Start: 2020-05-20 | End: 2021-06-18

## 2020-05-20 NOTE — TELEPHONE ENCOUNTER
"Requested Prescriptions   Pending Prescriptions Disp Refills     buPROPion (WELLBUTRIN XL) 150 MG 24 hr tablet 90 tablet 1     Sig: Take 1 tablet (150 mg) by mouth every morning       SSRIs Protocol Failed - 5/20/2020 12:32 PM        Failed - PHQ-9 score less than 5 in past 6 months     Please review last PHQ-9 score.           Passed - Medication is Bupropion     If the medication is Bupropion (Wellbutrin), and the patient is taking for smoking cessation; OK to refill.          Passed - Medication is active on med list        Passed - Patient is age 18 or older        Passed - No active pregnancy on record        Passed - No positive pregnancy test in last 12 months        Passed - Recent (6 mo) or future (30 days) visit within the authorizing provider's specialty     Patient had office visit in the last 6 months or has a visit in the next 30 days with authorizing provider or within the authorizing provider's specialty.  See \"Patient Info\" tab in inbasket, or \"Choose Columns\" in Meds & Orders section of the refill encounter.            Last Written Prescription Date:  5/22/19  Last Fill Quantity: 90,   # refills: 1  Last Office Visit: 2/12/20  Future Office visit:       Routing refill request to provider for review/approval because:  PHQ-9 does not meet RN refill protocol, patient had medication filled last on 5/22/19 for 90# with one refill and has not had filled since.    PHQ-9 score:    PHQ 2/12/2020   PHQ-9 Total Score 9   Q9: Thoughts of better off dead/self-harm past 2 weeks Not at all   F/U: Thoughts of suicide or self-harm -   F/U: Safety concerns -        norgestim-eth estrad triphasic (ORTHO TRI-CYCLEN LO) 0.18/0.215/0.25 MG-25 MCG tablet 84 tablet 4     Sig: Take 1 tablet by mouth daily       Contraceptives Protocol Passed - 5/20/2020 12:32 PM        Passed - Patient is not a current smoker if age is 35 or older        Passed - Recent (12 mo) or future (30 days) visit within the authorizing provider's " "specialty     Patient has had an office visit with the authorizing provider or a provider within the authorizing providers department within the previous 12 mos or has a future within next 30 days. See \"Patient Info\" tab in inbasket, or \"Choose Columns\" in Meds & Orders section of the refill encounter.              Passed - Medication is active on med list        Passed - No active pregnancy on record        Passed - No positive pregnancy test in past 12 months         Anaid Alex RN   [facility]   "

## 2020-05-21 ENCOUNTER — MYC MEDICAL ADVICE (OUTPATIENT)
Dept: FAMILY MEDICINE | Facility: CLINIC | Age: 30
End: 2020-05-21

## 2020-05-22 RX ORDER — BUPROPION HYDROCHLORIDE 150 MG/1
150 TABLET ORAL EVERY MORNING
Qty: 90 TABLET | Refills: 1 | Status: SHIPPED | OUTPATIENT
Start: 2020-05-22 | End: 2020-09-11

## 2020-05-27 ENCOUNTER — VIRTUAL VISIT (OUTPATIENT)
Dept: FAMILY MEDICINE | Facility: CLINIC | Age: 30
End: 2020-05-27
Payer: COMMERCIAL

## 2020-05-27 DIAGNOSIS — R53.83 FATIGUE, UNSPECIFIED TYPE: Primary | ICD-10-CM

## 2020-05-27 PROCEDURE — 99214 OFFICE O/P EST MOD 30 MIN: CPT | Mod: GT | Performed by: NURSE PRACTITIONER

## 2020-05-27 NOTE — PROGRESS NOTES
"Debbie Bah is a 29 year old female who is being evaluated via a billable video visit.      The patient has been notified of following:     \"This video visit will be conducted via a call between you and your physician/provider. We have found that certain health care needs can be provided without the need for an in-person physical exam.  This service lets us provide the care you need with a video conversation.  If a prescription is necessary we can send it directly to your pharmacy.  If lab work is needed we can place an order for that and you can then stop by our lab to have the test done at a later time.    Video visits are billed at different rates depending on your insurance coverage.  Please reach out to your insurance provider with any questions.    If during the course of the call the physician/provider feels a video visit is not appropriate, you will not be charged for this service.\"    Patient has given verbal consent for Video visit? Yes    How would you like to obtain your AVS? Griffin    Patient would like the video invitation sent by: Send to e-mail at: [email or web address]    Will anyone else be joining your video visit? No    Subjective     Debbie Bah is a 29 year old female who presents today via video visit for the following health issues:    HPI    Only taking one class this summer.  Happy to have a break.  Life hasn't changed much with pandemic.  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    Psychiatrist suggested getting diagnosis of chronic fatigue.  Patient did not like the idea of that, but does want to have more energy.  Frustrated and annoyed at continued lack of energy.  Even as far back as teenage years slept through classes.  Definite times when mood is good and she still feels exhausted.   Has noticed that when she exercises and eats well that overall exhaustion doesn't change.  When she was [age] years and was thinner, running 5Ks and eating healthy " and was also still exhausted.    Pyschiatry increased wellbutrin to 300 and 450 mg and neither helped.  Vyvanse also didn't help.  Also tried prozac and wellbutrin combo, but had increase in SI.   In therapy every couple of weeks.    Over time, she hasn't really noticed physical symptoms that she relates to the exhaustion.  Only sleep disruption sounded like it might fit.  Doesn't have clear food intolerances and has had no change in elimination diets.  No muscle or joint pains.    Got updated iron from sleep medicine and is starting on   Sleep medicine - overnight oximeter was normal, sleep diaries and dx of RLS.  Gabapentin didn't seem to help.  Changed to straight evening off of rotating shifts.    Component      Latest Ref Rng & Units 11/28/2018 12/20/2018 4/10/2019   WBC      4.0 - 11.0 10e9/L  6.8    RBC Count      3.8 - 5.2 10e12/L  4.38    Hemoglobin      11.7 - 15.7 g/dL  13.3    Hematocrit      35.0 - 47.0 %  39.9    MCV      78 - 100 fl  91    MCH      26.5 - 33.0 pg  30.4    MCHC      31.5 - 36.5 g/dL  33.3    RDW      10.0 - 15.0 %  13.2    Platelet Count      150 - 450 10e9/L  352    Diff Method        Automated Method    % Neutrophils      %  71.9    % Lymphocytes      %  17.9    % Monocytes      %  8.0    % Eosinophils      %  1.8    % Basophils      %  0.4    Absolute Neutrophil      1.6 - 8.3 10e9/L  4.9    Absolute Lymphocytes      0.8 - 5.3 10e9/L  1.2    Absolute Monocytes      0.0 - 1.3 10e9/L  0.5    Absolute Eosinophils      0.0 - 0.7 10e9/L  0.1    Absolute Basophils      0.0 - 0.2 10e9/L  0.0    Sodium      133 - 144 mmol/L  139    Potassium      3.4 - 5.3 mmol/L  3.8    Chloride      94 - 109 mmol/L  106    Carbon Dioxide      20 - 32 mmol/L  23    Anion Gap      3 - 14 mmol/L  10    Glucose      70 - 99 mg/dL  94    Urea Nitrogen      7 - 30 mg/dL  9    Creatinine      0.52 - 1.04 mg/dL  0.67    GFR Estimate      >60 mL/min/1.73:m2  >90    GFR Estimate If Black      >60 mL/min/1.73:m2   >90    Calcium      8.5 - 10.1 mg/dL  8.7    Bilirubin Direct      0.0 - 0.2 mg/dL  <0.1    Bilirubin Total      0.2 - 1.3 mg/dL  0.2    Albumin      3.4 - 5.0 g/dL  3.6    Protein Total      6.8 - 8.8 g/dL  7.6    Alkaline Phosphatase      40 - 150 U/L  83    ALT      0 - 50 U/L  23    AST      0 - 45 U/L  12    Iron      35 - 180 ug/dL      Iron Binding Cap      240 - 430 ug/dL      Iron Saturation Index      15 - 46 %      Vitamin D Deficiency screening      20 - 75 ug/L 53     TSH      0.40 - 4.00 mU/L  1.05    Hemoglobin A1C      0 - 5.6 %   5.3   Ferritin      12 - 150 ng/mL        Component      Latest Ref Rng & Units 1/22/2020 4/30/2020   WBC      4.0 - 11.0 10e9/L     RBC Count      3.8 - 5.2 10e12/L     Hemoglobin      11.7 - 15.7 g/dL     Hematocrit      35.0 - 47.0 %     MCV      78 - 100 fl     MCH      26.5 - 33.0 pg     MCHC      31.5 - 36.5 g/dL     RDW      10.0 - 15.0 %     Platelet Count      150 - 450 10e9/L     Diff Method           % Neutrophils      %     % Lymphocytes      %     % Monocytes      %     % Eosinophils      %     % Basophils      %     Absolute Neutrophil      1.6 - 8.3 10e9/L     Absolute Lymphocytes      0.8 - 5.3 10e9/L     Absolute Monocytes      0.0 - 1.3 10e9/L     Absolute Eosinophils      0.0 - 0.7 10e9/L     Absolute Basophils      0.0 - 0.2 10e9/L     Sodium      133 - 144 mmol/L     Potassium      3.4 - 5.3 mmol/L     Chloride      94 - 109 mmol/L     Carbon Dioxide      20 - 32 mmol/L     Anion Gap      3 - 14 mmol/L     Glucose      70 - 99 mg/dL     Urea Nitrogen      7 - 30 mg/dL     Creatinine      0.52 - 1.04 mg/dL     GFR Estimate      >60 mL/min/1.73:m2     GFR Estimate If Black      >60 mL/min/1.73:m2     Calcium      8.5 - 10.1 mg/dL     Bilirubin Direct      0.0 - 0.2 mg/dL     Bilirubin Total      0.2 - 1.3 mg/dL     Albumin      3.4 - 5.0 g/dL     Protein Total      6.8 - 8.8 g/dL     Alkaline Phosphatase      40 - 150 U/L     ALT      0 - 50 U/L     AST    "   0 - 45 U/L     Iron      35 - 180 ug/dL 49 56   Iron Binding Cap      240 - 430 ug/dL 331 320   Iron Saturation Index      15 - 46 % 15 17   Vitamin D Deficiency screening      20 - 75 ug/L     TSH      0.40 - 4.00 mU/L     Hemoglobin A1C      0 - 5.6 %     Ferritin      12 - 150 ng/mL 36 66       Video Start Time: 4:55 PM    I have reviewed and updated the patient's Past Medical History, Social History, Family History and Medication List      Reviewed and updated as needed this visit by Provider         Review of Systems   Constitutional, HEENT, cardiovascular, pulmonary, gi and gu systems are negative, except as otherwise noted.      Objective    There were no vitals taken for this visit.  Estimated body mass index is 43.57 kg/m  as calculated from the following:    Height as of 2/25/20: 1.638 m (5' 4.5\").    Weight as of 2/25/20: 116.9 kg (257 lb 12.8 oz).  Physical Exam     GENERAL: Healthy, alert and no distress  EYES: Eyes grossly normal to inspection.  No discharge or erythema, or obvious scleral/conjunctival abnormalities.  RESP: No audible wheeze, cough, or visible cyanosis.  No visible retractions or increased work of breathing.    SKIN: Visible skin clear. No significant rash, abnormal pigmentation or lesions.  NEURO: Cranial nerves grossly intact.  Mentation and speech appropriate for age.  PSYCH: Mentation appears normal, affect normal/bright, judgement and insight intact, normal speech and appearance well-groomed.      Diagnostic Test Results:  Labs reviewed in Epic  none         Assessment & Plan     (R53.83) Fatigue, unspecified type  (primary encounter diagnosis)  Comment:   Plan: MENTAL HEALTH REFERRAL  - Adult; Assessments         and Testing; Sleep Psychology; [facility]: [place] ([facility] [phone] [phone]; We will         contact you to schedule the appointment or         please call with any question...                 Patient " Instructions   Talk to sleep medicine again about if sleep study is warranted  Consider seeing sleep psychology  Consider scheduling with Luly for a functional medicine eval and approach    [email or web address]      Return in about 6 months (around 11/27/2020) for mental health check, energy.    HARJINDER Vasquez CNP  [facility]      Video-Visit Details    Type of service:  Video Visit    Video End Time:5:51 PM    Originating Location (pt. Location): Home    Distant Location (provider location):  [facility]     Platform used for Video Visit: AmWell    Return in about 6 months (around 11/27/2020) for mental health check, energy.       HARJINDER Vasquez CNP

## 2020-05-27 NOTE — Clinical Note
Tiffanie Shanell Debbie will probably schedule with you.  She has fatigue, but it doesn't reach the criteria (nor does she want a dx of) chronic fatigue.  She has had the fatigue since teenage years and it is there even when mood, diet, exercise etc are optimized.  She has seen sleep medicine and they said she doesn't need a sleep study.  We've done traditional lab work-up.  She is interested if there are any other approaches that might help.  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  Open to that there is no answer.  Should be fun to work with (I hope!).  Erika

## 2020-05-27 NOTE — PATIENT INSTRUCTIONS
Talk to sleep medicine again about if sleep study is warranted  Consider seeing sleep psychology  Consider scheduling with Luly for a functional medicine eval and approach    [email or web address]

## 2020-06-01 ENCOUNTER — OFFICE VISIT (OUTPATIENT)
Dept: OPTOMETRY | Facility: CLINIC | Age: 30
End: 2020-06-01
Payer: COMMERCIAL

## 2020-06-01 DIAGNOSIS — H52.13 MYOPIA OF BOTH EYES: Primary | ICD-10-CM

## 2020-06-01 ASSESSMENT — REFRACTION_CURRENTRX
OS_BASECURVE: 8.4
OD_BRAND: ACUVUE OASYS
OS_BRAND: ACUVUE OASYS
OD_SPHERE: -1.00
OD_BASECURVE: 8.4
OS_SPHERE: -1.00
OD_DIAMETER: 14.0
OS_DIAMETER: 14.0

## 2020-06-01 ASSESSMENT — VISUAL ACUITY
METHOD: SNELLEN - LINEAR
OD_CC: 20/20
OS_CC: 20/20
CORRECTION_TYPE: GLASSES

## 2020-06-01 ASSESSMENT — REFRACTION_MANIFEST
OS_SPHERE: -1.00
OS_CYLINDER: SPHERE
OD_CYLINDER: SPHERE
OD_SPHERE: -1.00

## 2020-06-01 ASSESSMENT — EXTERNAL EXAM - LEFT EYE: OS_EXAM: NORMAL

## 2020-06-01 ASSESSMENT — REFRACTION_WEARINGRX
OS_CYLINDER: SPHERE
OD_AXIS: 105
OS_SPHERE: -1.00
OD_SPHERE: -1.00
OD_CYLINDER: +0.25

## 2020-06-01 ASSESSMENT — TONOMETRY
OS_IOP_MMHG: 20
OD_IOP_MMHG: 21
IOP_METHOD: ICARE

## 2020-06-01 ASSESSMENT — CUP TO DISC RATIO
OS_RATIO: 0.25
OD_RATIO: 0.25

## 2020-06-01 ASSESSMENT — EXTERNAL EXAM - RIGHT EYE: OD_EXAM: NORMAL

## 2020-06-01 ASSESSMENT — SLIT LAMP EXAM - LIDS
COMMENTS: NORMAL
COMMENTS: NORMAL

## 2020-06-01 ASSESSMENT — CONF VISUAL FIELD
OD_NORMAL: 1
OS_NORMAL: 1

## 2020-06-01 NOTE — PROGRESS NOTES
A/P  1.) Myopia each eye  -Stable Rx, doing well  -New CL fit today (new wearer), good vision/comfort/fit in AV [employer]  -Successful I&R, reviewed CL care and hygiene with pt ([facility] or [facility])    CLRx updated - okay to order if working well. Monitor 1 year routine eye exam

## 2020-06-11 ENCOUNTER — TELEPHONE (OUTPATIENT)
Dept: SLEEP MEDICINE | Facility: CLINIC | Age: 30
End: 2020-06-11

## 2020-06-11 NOTE — TELEPHONE ENCOUNTER
Left a voice mail for patient to schedule a video visit/ consultation with Dr. Issa Garcia. Please schedule.    Yuliana Whitley

## 2020-06-30 ENCOUNTER — VIRTUAL VISIT (OUTPATIENT)
Dept: FAMILY MEDICINE | Facility: CLINIC | Age: 30
End: 2020-06-30
Payer: COMMERCIAL

## 2020-06-30 DIAGNOSIS — R53.83 FATIGUE, UNSPECIFIED TYPE: Primary | ICD-10-CM

## 2020-06-30 PROCEDURE — 99214 OFFICE O/P EST MOD 30 MIN: CPT | Mod: GT | Performed by: PHYSICIAN ASSISTANT

## 2020-06-30 NOTE — PROGRESS NOTES
"Debbie Bah is a 29 year old female who is being evaluated via a billable video visit.      The patient has been notified of following:     \"This video visit will be conducted via a call between you and your physician/provider. We have found that certain health care needs can be provided without the need for an in-person physical exam.  This service lets us provide the care you need with a video conversation.  If a prescription is necessary we can send it directly to your pharmacy.  If lab work is needed we can place an order for that and you can then stop by our lab to have the test done at a later time.    Video visits are billed at different rates depending on your insurance coverage.  Please reach out to your insurance provider with any questions.    If during the course of the call the physician/provider feels a video visit is not appropriate, you will not be charged for this service.\"    Patient has given verbal consent for Video visit? Yes  How would you like to obtain your AVS? Griffin  Patient would like the video invitation sent by: Text to cell phone: [phone]   Will anyone else be joining your video visit? No    Subjective     Debbie Bah is a 29 year old female who presents today via video visit for the following health issues:    RUCHI Lewis is here to discuss fatigue and mental health with an alternative approach  She reports having a normal childhood  Felt like depression and fatigue started around puberty  No know trauma  Menarche at 13, periods are normal without siginficant mood changes.     No other associated symptoms    Normally she would describe her fatigue as feeling like she could take a nap all day long  Sleep specialist said unlikely related to sleep    She's currently in grad school which triggers her eating patterns when she's stressed  She always feels mentally drained.     Has done the Whole 30 diet a few times and never felt energized.            Video Start " Time: 2:20        Patient Active Problem List   Diagnosis     Gastroesophageal reflux disease without esophagitis     Major depressive disorder, recurrent episode, mild (H)     Situational anxiety     Acne vulgaris     Obesity (BMI 35.0-39.9) with comorbidity (H)     Past Surgical History:   Procedure Laterality Date     NO HISTORY OF SURGERY         Social History     Tobacco Use     Smoking status: Never Smoker     Smokeless tobacco: Never Used   Substance Use Topics     Alcohol use: Yes     Alcohol/week: 0.0 standard drinks     Comment: 0-1 per week     Family History   Problem Relation Age of Onset     Asthma Mother      Hypertension Mother      Hypertension Father      Arthritis Paternal Grandmother      Cerebrovascular Disease Paternal Grandmother 75     Cerebrovascular Disease Paternal Grandfather 82     Coronary Artery Disease Maternal Grandfather 65     Parkinsonism Other         maternal grandmother     Diabetes No family hx of      Breast Cancer No family hx of          Current Outpatient Medications   Medication Sig Dispense Refill     buPROPion (WELLBUTRIN XL) 150 MG 24 hr tablet Take 1 tablet (150 mg) by mouth every morning 90 tablet 1     Cholecalciferol (VITAMIN D) 2000 UNITS CAPS Take 2 capsules by mouth        ibuprofen (ADVIL/MOTRIN) 800 MG tablet Take 200 mg by mouth daily as needed  0     Lactobacillus (PROBIOTIC ACIDOPHILUS) TABS Take 1 tablet by mouth       multivitamin, therapeutic with minerals (MULTI-VITAMIN) TABS tablet Take 1 tablet by mouth daily       mupirocin (BACTROBAN) 2 % external ointment Apply topically 3 times daily 30 g 1     norgestim-eth estrad triphasic (ORTHO TRI-CYCLEN LO) 0.18/0.215/0.25 MG-25 MCG tablet Take 1 tablet by mouth daily 84 tablet 4     omega-3 acid ethyl esters (LOVAZA) 1 G capsule Take 2 g by mouth 2 times daily       triamcinolone (KENALOG) 0.1 % external cream Apply 0.1 Tubes topically 2 times daily  3     gabapentin (NEURONTIN) 100 MG capsule Take 1 pill  1.5 hrs prior to bedtime, may increase by 1 pill every 3-5 days to total of 3 if needed (Patient not taking: Reported on 5/27/2020) 90 capsule 1       Reviewed and updated as needed this visit by Provider         Review of Systems   CONSTITUTIONAL: NEGATIVE for fever, chills, change in weight  INTEGUMENTARY/SKIN: NEGATIVE for worrisome rashes, moles or lesions  EYES: NEGATIVE for vision changes or irritation  ENT/MOUTH: NEGATIVE for ear, mouth and throat problems  RESP: NEGATIVE for significant cough or SOB  BREAST: NEGATIVE for masses, tenderness or discharge  CV: NEGATIVE for chest pain, palpitations or peripheral edema  GI: NEGATIVE for nausea, abdominal pain, heartburn, or change in bowel habits  : NEGATIVE for frequency, dysuria, or hematuria  MUSCULOSKELETAL: NEGATIVE for significant arthralgias or myalgia  NEURO: NEGATIVE for weakness, dizziness or paresthesias  ENDOCRINE: NEGATIVE for temperature intolerance, skin/hair changes  HEME: NEGATIVE for bleeding problems  PSYCHIATRIC: NEGATIVE for changes in mood or affect      Objective             Physical Exam     GENERAL: Healthy, alert and no distress  EYES: Eyes grossly normal to inspection.  No discharge or erythema, or obvious scleral/conjunctival abnormalities.  RESP: No audible wheeze, cough, or visible cyanosis.  No visible retractions or increased work of breathing.    SKIN: Visible skin clear. No significant rash, abnormal pigmentation or lesions.  NEURO: Cranial nerves grossly intact.  Mentation and speech appropriate for age.  PSYCH: Mentation appears normal, affect normal/bright, judgement and insight intact, normal speech and appearance well-groomed.                ICD-10-CM    1. Fatigue, unspecified type  R53.83      Stress is likely playing a role. Add an adaptogen and B-complex vitamin to help with this. Add meditation and we'll recheck in 3 weeks.   Patient Instructions   Rhodiola 200 mg daily for 2 weeks on and 1 week off  Pure  Encapsulations B-complex vitamin 2 caps daily  Calm samantha 3 times a week.   Recheck 3 rahat weeks  Return to clinic for any new or worsening symptoms or go to ER Urgent care in off hours             Video-Visit Details    Type of service:  Video Visit    Video End Time:2:45    Originating Location (pt. Location): Home    Distant Location (provider location):  [facility]     Platform used for Video Visit: AmWell    Return in about 3 weeks (around 7/21/2020) for Video Visit.       Carmen Mixon PA-C

## 2020-06-30 NOTE — PATIENT INSTRUCTIONS
Rhodiola 200 mg daily for 2 weeks on and 1 week off  Pure Encapsulations B-complex vitamin 2 caps daily  Calm samantha 3 times a week.   Recheck 3 rahat weeks  Return to clinic for any new or worsening symptoms or go to ER Urgent care in off hours

## 2020-07-21 NOTE — PROGRESS NOTES
"Debbie Bah is a 29 year old female who is being evaluated via a billable video visit.      The patient has been notified of following:     \"This video visit will be conducted via a call between you and your physician/provider. We have found that certain health care needs can be provided without the need for an in-person physical exam.  This service lets us provide the care you need with a video conversation.  If a prescription is necessary we can send it directly to your pharmacy.  If lab work is needed we can place an order for that and you can then stop by our lab to have the test done at a later time.    Video visits are billed at different rates depending on your insurance coverage.  Please reach out to your insurance provider with any questions.    If during the course of the call the physician/provider feels a video visit is not appropriate, you will not be charged for this service.\"    Patient has given verbal consent for Video visit? Yes  How would you like to obtain your AVS? MyChart  If you are dropped from the video visit, the video invite should be resent to: Send to e-mail at: [email or web address]  Will anyone else be joining your video visit? No    Subjective     Debbie Bah is a 29 year old female who presents today via video visit for the following health issues:    HPI    Fatigue       Duration: Ongoing     Description (location/character/radiation):  Every Day     Intensity:  moderate    Accompanying signs and symptoms: none      History (similar episodes/previous evaluation): Yes     Precipitating or alleviating factors: None    Therapies tried and outcome: None      No change in symptoms with rhodiola or b vitamins    Every once in a while she wakes up rested    She is a nurse    Admits to be perfectionistic.   Sees psychiatry    Physically she reports some chronic \"random\" pains  She broke her back when she was 13, they think it was after she fell off a horse  Some achiness in the " low back occasionally  Has plantar fasciitis in her left foot  A few years back her right hip started to hurt when she had been running more   Currently no pain    She rode horses from 12 years old to 24  She did jumping and drassage           Video Start Time: 9:01        Patient Active Problem List   Diagnosis     Gastroesophageal reflux disease without esophagitis     Major depressive disorder, recurrent episode, mild (H)     Situational anxiety     Acne vulgaris     Obesity (BMI 35.0-39.9) with comorbidity (H)     Past Surgical History:   Procedure Laterality Date     NO HISTORY OF SURGERY         Social History     Tobacco Use     Smoking status: Never Smoker     Smokeless tobacco: Never Used   Substance Use Topics     Alcohol use: Yes     Alcohol/week: 0.0 standard drinks     Comment: 0-1 per week     Family History   Problem Relation Age of Onset     Asthma Mother      Hypertension Mother      Hypertension Father      Arthritis Paternal Grandmother      Cerebrovascular Disease Paternal Grandmother 75     Cerebrovascular Disease Paternal Grandfather 82     Coronary Artery Disease Maternal Grandfather 65     Parkinsonism Other         maternal grandmother     Diabetes No family hx of      Breast Cancer No family hx of          Current Outpatient Medications   Medication Sig Dispense Refill     buPROPion (WELLBUTRIN XL) 150 MG 24 hr tablet Take 1 tablet (150 mg) by mouth every morning 90 tablet 1     Cholecalciferol (VITAMIN D) 2000 UNITS CAPS Take 2 capsules by mouth        ibuprofen (ADVIL/MOTRIN) 800 MG tablet Take 200 mg by mouth daily as needed  0     Lactobacillus (PROBIOTIC ACIDOPHILUS) TABS Take 1 tablet by mouth       multivitamin, therapeutic with minerals (MULTI-VITAMIN) TABS tablet Take 1 tablet by mouth daily       mupirocin (BACTROBAN) 2 % external ointment Apply topically 3 times daily 30 g 1     norgestim-eth estrad triphasic (ORTHO TRI-CYCLEN LO) 0.18/0.215/0.25 MG-25 MCG tablet Take 1 tablet by  mouth daily 84 tablet 4     omega-3 acid ethyl esters (LOVAZA) 1 G capsule Take 2 g by mouth 2 times daily       triamcinolone (KENALOG) 0.1 % external cream Apply 0.1 Tubes topically 2 times daily  3     gabapentin (NEURONTIN) 100 MG capsule Take 1 pill 1.5 hrs prior to bedtime, may increase by 1 pill every 3-5 days to total of 3 if needed (Patient not taking: Reported on 5/27/2020) 90 capsule 1     Allergies   Allergen Reactions     Doxycycline      esophagitis     Mushroom Cramps, Diarrhea and GI Disturbance       Reviewed and updated as needed this visit by Provider         Review of Systems   CONSTITUTIONAL: NEGATIVE for fever, chills, change in weight  INTEGUMENTARY/SKIN: NEGATIVE for worrisome rashes, moles or lesions  EYES: NEGATIVE for vision changes or irritation  ENT/MOUTH: NEGATIVE for ear, mouth and throat problems  RESP: NEGATIVE for significant cough or SOB  BREAST: NEGATIVE for masses, tenderness or discharge  CV: NEGATIVE for chest pain, palpitations or peripheral edema  GI: NEGATIVE for nausea, abdominal pain, heartburn, or change in bowel habits  : NEGATIVE for frequency, dysuria, or hematuria  NEURO: NEGATIVE for weakness, dizziness or paresthesias  ENDOCRINE: NEGATIVE for temperature intolerance, skin/hair changes  HEME: NEGATIVE for bleeding problems  PSYCHIATRIC: NEGATIVE for changes in mood or affect      Objective             Physical Exam     GENERAL: Healthy, alert and no distress  EYES: Eyes grossly normal to inspection.  No discharge or erythema, or obvious scleral/conjunctival abnormalities.  RESP: No audible wheeze, cough, or visible cyanosis.  No visible retractions or increased work of breathing.    SKIN: Visible skin clear. No significant rash, abnormal pigmentation or lesions.  NEURO: Cranial nerves grossly intact.  Mentation and speech appropriate for age.  PSYCH: Mentation appears normal, affect normal/bright, judgement and insight intact, normal speech and appearance  well-groomed.      Diagnostic Test Results:  Labs reviewed in Epic          ICD-10-CM    1. Excessive sleepiness  G47.10 NEUROLOGY ADULT REFERRAL     May be from previous traumatic brain injury due to history of several falls when she used to horseback ride. She's had a very thorough work up done with regard to endocrinology, psychiatry, sleep, lifestyle, nutrition, and stress reduction. Her symptoms are not actual muscle fatigue, but instead excessive sleepiness. I think it's reasonable to see neurology for further investigation, and possible TBI rehab if there are new strategies in reducing TBI effects. Otherwise we may need to treat with amphetamines as I see no other obvious cause for her symptoms.       Video-Visit Details    Type of service:  Video Visit    Video End Time:9:21    Originating Location (pt. Location): Home    [place]  [facility] [place]     Platform used for Video Visit: Estelle    No follow-ups on file.       Carmen Mixon PA-C

## 2020-08-03 ENCOUNTER — VIRTUAL VISIT (OUTPATIENT)
Dept: FAMILY MEDICINE | Facility: CLINIC | Age: 30
End: 2020-08-03
Payer: COMMERCIAL

## 2020-08-03 DIAGNOSIS — G47.10 EXCESSIVE SLEEPINESS: Primary | ICD-10-CM

## 2020-08-03 PROCEDURE — 99214 OFFICE O/P EST MOD 30 MIN: CPT | Mod: GT | Performed by: PHYSICIAN ASSISTANT

## 2020-08-03 NOTE — PATIENT INSTRUCTIONS
Follow up with neurology to assess for possible ramifications of previous TBI as a cause for your chronic excessive sleepiness.   Return to clinic for any new or worsening symptoms or go to ER Urgent care in off hours

## 2020-08-13 ENCOUNTER — PRE VISIT (OUTPATIENT)
Dept: NEUROLOGY | Facility: CLINIC | Age: 30
End: 2020-08-13

## 2020-08-13 NOTE — TELEPHONE ENCOUNTER
FUTURE VISIT INFORMATION      FUTURE VISIT INFORMATION:    Date: 8/18/2020    Time: 2pm    [place] [facility] [place] INFORMATION:    Referring provider:  Carmen Mixon PA-C     Referring providers clinic:  [facility]     Reason for visit/diagnosis  Excessive sleepiness     RECORDS REQUESTED FROM:       Clinic name Comments Records Status Imaging Status   Internal MUAREEN Mixon-8/3/2020, 6/30/2020    MAUREEN Santos-5/27/2020 [facility] N/A

## 2020-08-17 ENCOUNTER — TRANSFERRED RECORDS (OUTPATIENT)
Dept: HEALTH INFORMATION MANAGEMENT | Facility: CLINIC | Age: 30
End: 2020-08-17

## 2020-08-18 ENCOUNTER — VIRTUAL VISIT (OUTPATIENT)
Dept: NEUROLOGY | Facility: CLINIC | Age: 30
End: 2020-08-18
Attending: PHYSICIAN ASSISTANT
Payer: COMMERCIAL

## 2020-08-18 DIAGNOSIS — R40.0 SLEEPINESS: Primary | ICD-10-CM

## 2020-08-18 RX ORDER — BUPROPION HYDROCHLORIDE 300 MG/1
300 TABLET ORAL DAILY
COMMUNITY
Start: 2020-03-17 | End: 2023-02-03

## 2020-08-18 NOTE — LETTER
"8/18/2020       RE: Debbie Bah  [address]  [facility] [address]     Dear Colleague,    Thank you for referring your patient, Debbie Bah, to the [facility]UR[facility] at [facility]. Please see a copy of my visit note below.    Debbie Bah is a 29 year old female who is being evaluated via a billable video visit.      The patient has been notified of following:     \"This video visit will be conducted via a call between you and your physician/provider. We have found that certain health care needs can be provided without the need for an in-person physical exam.  This service lets us provide the care you need with a video conversation.  If a prescription is necessary we can send it directly to your pharmacy.  If lab work is needed we can place an order for that and you can then stop by our lab to have the test done at a later time.    Video visits are billed at different rates depending on your insurance coverage.  Please reach out to your insurance provider with any questions.    If during the course of the call the physician/provider feels a video visit is not appropriate, you will not be charged for this service.\"    Patient has given verbal consent for Video visit? Yes  How would you like to obtain your AVS? MyChart  If you are dropped from the video visit, the video invite should be resent to: Send to e-mail at: [email or web address]  Will anyone else be joining your video visit? No        Video-Visit Details    Type of service:  Video Visit x 45 min    Vfiol      Again, thank you for allowing me to participate in the care of your patient.      Sincerely,    Star Helm MD      "

## 2020-08-18 NOTE — PROGRESS NOTES
"Debbie Bah is a 29 year old female who is being evaluated via a billable video visit.      The patient has been notified of following:     \"This video visit will be conducted via a call between you and your physician/provider. We have found that certain health care needs can be provided without the need for an in-person physical exam.  This service lets us provide the care you need with a video conversation.  If a prescription is necessary we can send it directly to your pharmacy.  If lab work is needed we can place an order for that and you can then stop by our lab to have the test done at a later time.    Video visits are billed at different rates depending on your insurance coverage.  Please reach out to your insurance provider with any questions.    If during the course of the call the physician/provider feels a video visit is not appropriate, you will not be charged for this service.\"    Patient has given verbal consent for Video visit? Yes  How would you like to obtain your AVS? MyChart  If you are dropped from the video visit, the video invite should be resent to: Send to e-mail at: [email or web address]  Will anyone else be joining your video visit? No        Video-Visit Details    Type of service:  Video Visit x 45 min    Vfiol    "

## 2020-08-18 NOTE — LETTER
"2020      RE: Debbie Bah  [address]  [place] [address]       Debbie Bah is a 29 year old female who is being evaluated via a billable video visit.      The patient has been notified of following:     \"This video visit will be conducted via a call between you and your physician/provider. We have found that certain health care needs can be provided without the need for an in-person physical exam.  This service lets us provide the care you need with a video conversation.  If a prescription is necessary we can send it directly to your pharmacy.  If lab work is needed we can place an order for that and you can then stop by our lab to have the test done at a later time.    Video visits are billed at different rates depending on your insurance coverage.  Please reach out to your insurance provider with any questions.    If during the course of the call the physician/provider feels a video visit is not appropriate, you will not be charged for this service.\"    Patient has given verbal consent for Video visit? Yes  How would you like to obtain your AVS? MyChart  If you are dropped from the video visit, the video invite should be resent to: Send to e-mail at: [email or web address]  Will anyone else be joining your video visit? No        Video-Visit Details    Type of service:  Video Visit x 45 min    Vfiol      Service Date: 2020      HARJINDER Morales, CNP   [place] [id number]      HARJINDER Selby, CNP   [facility]    [address] [place], [place] [id number]      RE: Debbie Bah    MRN: [id number]   : 1990      Dear Ms. Santos and Ms. 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  She has been through the Sleep Center and has had an evaluation there.  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  We are communicating with Ms. Portillo and the results of this neurological evaluation.  As far as neurological symptoms, she is reporting that during the night, she only wakes up once or twice.  There has been some snoring considered minimal.  She does not have any narcolepsy symptoms.      There is no history of seizures or a significant head injury at all.      FAMILY HISTORY:  The mother and a brother had insomnia.  There are no neurological problems in the family.      SOCIAL HISTORY:  She is seeking a nursing doctorate degree.  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  She has passed out, and this sounds like vasovagal syncope.  She never had an EEG, and an imaging of her brain has not been done.  She lives in [facility].  There is no history of significant head injury.  She did have some minor injuries riding horses, but that has not been associated with any loss of consciousness.  This excessive daytime sleeping has been seen since the age of 17 as a freshman.  It does not seem to affect her functioning that well.  Her workup has not included a polysomnogram.       CURRENT MEDICATIONS:  Gabapentin for possible restless legs syndrome, which would be 100 mg prior to sleep, and this episode did not affect her.  She is on birth control pills, and she is also on antidepressant of Wellbutrin 300 mg per day.      ALLERGIES:  Doxycycline and mushrooms.      PAST MEDICAL HISTORY:  I do not think includes any illness that I have not missed here.      PHYSICAL EXAMINATION:  Her exam is very good.  She is an excellent patient in the sense of it.  She follows commands very readily and understands everything very clearly, and there is no confusion on her side.  Blood pressure is 133/85.  Her mental status exam is  excellent.  Her cranial nerves all look very good on the video.  She can do tandem walking very well.  On the Romberg, she sways minimally.  She is actually left handed.  Coordination tests are all very normal, and her strength looks very good.  She can walk on heels and toes, etc.      In summary, I do not see any neurological reason for the patient's excessive daytime sleepiness reported.  I do not see anything that would imply a central nervous or concussive syndrome or anything like that.  Curiously, her brother and mother had insomnia.  The patient's restless legs syndrome is not identified as possible.  She has had serum ferritin levels drawn, which have been normal.  The other thing that we could do  metabolic causes of EDS.  We will do a vitamin B12 level and also check her for Lyme.  I am at a loss to explain her symptoms.  I will return her to the Sleep Center to their expertise.      Sincerely,      Star Helm MD           D: 2020   T: 2020   MT: ganesh      Name:     DEREJE ARMIJO   MRN:      [id number]        Account:      [id number]   :      1990           Service Date: 2020      Document: [id number]

## 2020-08-19 NOTE — PROGRESS NOTES
Service Date: 2020      HARJINDER Mroales, CNP   [facility]   [address] [place] [id number]      HARJINDER Selby, CNP   [facility]    [address] [place] [id number]      RE: Debbie Bah    MRN: [id number]   : 1990      Dear Ms. Adams and Ms. 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  She has been through the Sleep Center and has had an evaluation there.  The diagnosis initially was work shift syndrome, and she was treated and has not improved, so she has requested a second opinion.  We are communicating with Ms. Portillo and the results of this neurological evaluation.  As far as neurological symptoms, she is reporting that during the night, she only wakes up once or twice.  There has been some snoring considered minimal.  She does not have any narcolepsy symptoms.      There is no history of seizures or a significant head injury at all.      FAMILY HISTORY:  The mother and a brother had insomnia.  There are no neurological problems in the family.      SOCIAL HISTORY:  She is seeking a nursing doctorate degree.  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  She has passed out, and this sounds like vasovagal syncope.  She never had an EEG, and an imaging of her brain has not been done.  She lives in [facility].  There is no history of significant head injury.  She did have some minor injuries riding horses, but that has not been associated with any loss of consciousness.  This excessive daytime sleeping has been seen since the age of 17 as a freshman.  It does not seem to affect her functioning that well.  Her workup has not included a polysomnogram.       CURRENT MEDICATIONS:  Gabapentin for possible restless legs  syndrome, which would be 100 mg prior to sleep, and this episode did not affect her.  She is on birth control pills, and she is also on antidepressant of Wellbutrin 300 mg per day.      ALLERGIES:  Doxycycline and mushrooms.      PAST MEDICAL HISTORY:  I do not think includes any illness that I have not missed here.      PHYSICAL EXAMINATION:  Her exam is very good.  She is an excellent patient in the sense of it.  She follows commands very readily and understands everything very clearly, and there is no confusion on her side.  Blood pressure is 133/85.  Her mental status exam is excellent.  Her cranial nerves all look very good on the video.  She can do tandem walking very well.  On the Romberg, she sways minimally.  She is actually left handed.  Coordination tests are all very normal, and her strength looks very good.  She can walk on heels and toes, etc.      In summary, I do not see any neurological reason for the patient's excessive daytime sleepiness reported.  I do not see anything that would imply a central nervous or concussive syndrome or anything like that.  Curiously, her brother and mother had insomnia.  The patient's restless legs syndrome is not identified as possible.  She has had serum ferritin levels drawn, which have been normal.  The other thing that we could do  metabolic causes of EDS.  We will do a vitamin B12 level and also check her for Lyme.  I am at a loss to explain her symptoms.  I will return her to the Sleep Center to their expertise.      Sincerely,      MD KARIME Banuelos MD             D: 2020   T: 2020   MT: ganesh      Name:     DEREJE ARMIJO   MRN:      [id number]        Account:      [id number]   :      1990           Service Date: 2020      Document: [id number]

## 2020-08-20 ENCOUNTER — DOCUMENTATION ONLY (OUTPATIENT)
Dept: LAB | Facility: CLINIC | Age: 30
End: 2020-08-20

## 2020-08-20 NOTE — PROGRESS NOTES
Patient had virtual visit with Dr. Helm and was suggested to have Vitamin B12 and Lyme checked.  No orders were placed.  Orders have been pended for approval.  Please sign orders or advise patient.    Thanks,  RD Lab

## 2020-08-24 DIAGNOSIS — R40.0 SLEEPINESS: Primary | ICD-10-CM

## 2020-08-28 ENCOUNTER — E-VISIT (OUTPATIENT)
Dept: FAMILY MEDICINE | Facility: CLINIC | Age: 30
End: 2020-08-28
Payer: COMMERCIAL

## 2020-08-28 DIAGNOSIS — M72.2 PLANTAR FASCIITIS: Primary | ICD-10-CM

## 2020-08-28 PROCEDURE — 99421 OL DIG E/M SVC 5-10 MIN: CPT | Performed by: NURSE PRACTITIONER

## 2020-09-11 ENCOUNTER — VIRTUAL VISIT (OUTPATIENT)
Dept: SLEEP MEDICINE | Facility: CLINIC | Age: 30
End: 2020-09-11
Payer: COMMERCIAL

## 2020-09-11 VITALS — WEIGHT: 225 LBS | HEIGHT: 64 IN | BODY MASS INDEX: 38.41 KG/M2

## 2020-09-11 DIAGNOSIS — R06.83 SNORING: ICD-10-CM

## 2020-09-11 DIAGNOSIS — E66.01 MORBID OBESITY (H): ICD-10-CM

## 2020-09-11 DIAGNOSIS — R40.0 SLEEPINESS: ICD-10-CM

## 2020-09-11 DIAGNOSIS — G47.10 HYPERSOMNIA: Primary | ICD-10-CM

## 2020-09-11 LAB — VIT B12 SERPL-MCNC: 484 PG/ML (ref 193–986)

## 2020-09-11 PROCEDURE — 36415 COLL VENOUS BLD VENIPUNCTURE: CPT | Performed by: PSYCHIATRY & NEUROLOGY

## 2020-09-11 PROCEDURE — 99214 OFFICE O/P EST MOD 30 MIN: CPT | Mod: 95 | Performed by: INTERNAL MEDICINE

## 2020-09-11 PROCEDURE — 86618 LYME DISEASE ANTIBODY: CPT | Performed by: PSYCHIATRY & NEUROLOGY

## 2020-09-11 PROCEDURE — 82607 VITAMIN B-12: CPT | Performed by: PSYCHIATRY & NEUROLOGY

## 2020-09-11 SDOH — HEALTH STABILITY: MENTAL HEALTH: HOW MANY STANDARD DRINKS CONTAINING ALCOHOL DO YOU HAVE ON A TYPICAL DAY?: 1 OR 2

## 2020-09-11 SDOH — HEALTH STABILITY: MENTAL HEALTH: HOW OFTEN DO YOU HAVE A DRINK CONTAINING ALCOHOL?: 2-4 TIMES A MONTH

## 2020-09-11 ASSESSMENT — MIFFLIN-ST. JEOR: SCORE: 1735.59

## 2020-09-11 NOTE — PROGRESS NOTES
"Debbie Bah is a 30 year old female who is being evaluated via a billable video visit.      The patient has been notified of following:     \"This video visit will be conducted via a call between you and your physician/provider. We have found that certain health care needs can be provided without the need for an in-person physical exam.  This service lets us provide the care you need with a video conversation.  If a prescription is necessary we can send it directly to your pharmacy.  If lab work is needed we can place an order for that and you can then stop by our lab to have the test done at a later time.    Video visits are billed at different rates depending on your insurance coverage.  Please reach out to your insurance provider with any questions.    If during the course of the call the physician/provider feels a video visit is not appropriate, you will not be charged for this service.\"    Patient has given verbal consent for Video visit? Yes  How would you like to obtain your AVS? MyChart  If you are dropped from the video visit, the video invite should be resent to: Text to cell phone: [phone]  Will anyone else be joining your video visit? No        Video-Visit Details    Type of service:  Video Visit    Video Start Time: 1:35 PM  Video End Time: 2:08 PM    Originating Location (pt. Location): Home    Distant Location (provider location):  [facility]     Platform used for Video Visit: Estelle Berry MD    Chief complaint: Patient was referred back to sleep clinic for evaluation of daytime sleepiness    History of Present Illness: 30-year-old female with history of daytime sleepiness and always feeling tired since high school.  She was seen in sleep medicine clinic and treated for possible shiftwork disorder and possible restless legs.  She had made changes to her schedule and currently gets at least 7 hours of sleep nightly.  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  She does not experience symptoms of restlessness but did have a trial of gabapentin which was unhelpful.  She continues to worry about her symptoms of sleepiness.  She is frustrated by lack of refreshing sleep.  She continues to need to schedule naps.  She takes 1 to 3-hour naps 3-4 times a week.  Snoring is light as far as she knows.  She has been told about sleep talking in the past.  Her bedroom is cool dark and quiet.  Sometimes in the morning her roommate can wake her up with her activities.  Patient has a history of frequent falls while horseback riding but no clear loss of consciousness.  She is being evaluated for Lyme disease and B12 deficiency.  She drinks 0-1 caffeinated beverages a day.  She feels that at night her sleep is not particularly disrupted- she gets up once a night to go to the bathroom.  She was prescribed a trial of stimulant for binge eating disorder in the past and did not notice significant improvement in her daytime symptoms of sleepiness at that time.  She is no longer on the medication.  She is on bupropion which is been helpful for mood but has not significantly impacted her fatigue and sleepiness.  She denies excessive sleepiness behind the wheel.    STOP-BANG  Loud Snore 0  Excessively Tired/Sleepy 1  Observed apnea 0  Hypertension 0  BMI> 35 kg/m2 1  Age >50 0  Neck >16 in/40cm ?  Male Gender 0  Total = 2  (0-2 low, 3-4 intermediate, 5-8 high risk of JHONY)      Total score - [place]: 7 (9/8/2020  9:54 AM) (Less than 10 normal)    ROBIN 14 (normal 0-7, mild 8-14, moderate 15-21, severe 22-28)    Past Medical History:   Diagnosis Date     Depression 2015     GERD (gastroesophageal reflux disease)     situational (resolved)       Allergies   Allergen Reactions     Doxycycline      esophagitis     Mushroom Cramps, Diarrhea and GI Disturbance       Current Outpatient Medications   Medication     buPROPion  (WELLBUTRIN XL) 300 MG 24 hr tablet     Cholecalciferol (VITAMIN D) 2000 UNITS CAPS     ibuprofen (ADVIL/MOTRIN) 800 MG tablet     Lactobacillus (PROBIOTIC ACIDOPHILUS) TABS     multivitamin, therapeutic with minerals (MULTI-VITAMIN) TABS tablet     mupirocin (BACTROBAN) 2 % external ointment     norgestim-eth estrad triphasic (ORTHO TRI-CYCLEN LO) 0.18/0.215/0.25 MG-25 MCG tablet     omega-3 acid ethyl esters (LOVAZA) 1 G capsule     triamcinolone (KENALOG) 0.1 % external cream     No current facility-administered medications for this visit.        Social History     Socioeconomic History     Marital status: Single     Spouse name: Not on file     Number of children: Not on file     Years of education: Not on file     Highest education level: Not on file   Occupational History     Not on file   Social Needs     Financial resource strain: Not on file     Food insecurity     Worry: Not on file     Inability: Not on file     Transportation needs     Medical: Not on file     Non-medical: Not on file   Tobacco Use     Smoking status: Never Smoker     Smokeless tobacco: Never Used   Substance and Sexual Activity     Alcohol use: Yes     Alcohol/week: 0.0 standard drinks     Frequency: 2-4 times a month     Drinks per session: 1 or 2     Comment: 0-1 per week     Drug use: No     Sexual activity: Never   Lifestyle     Physical activity     Days per week: Not on file     Minutes per session: Not on file     Stress: Not on file   Relationships     Social connections     Talks on phone: Not on file     Gets together: Not on file     Attends [employer] service: Not on file     Active member of club or organization: Not on file     Attends meetings of clubs or organizations: Not on file     Relationship status: Not on file     Intimate partner violence     Fear of current or ex partner: Not on file     Emotionally abused: Not on file     Physically abused: Not on file     Forced sexual activity: Not on file   Other Topics  "Concern     Parent/sibling w/ CABG, MI or angioplasty before 65F 55M? 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    Went to private school in [place]    Hobbies: reading, painting,walking, plays guitar, hiking, camping       Family History   Problem Relation Age of Onset     Asthma Mother      Hypertension Mother      Insomnia Mother      Hypertension Father      Arthritis Paternal Grandmother      Cerebrovascular Disease Paternal Grandmother 75     Cerebrovascular Disease Paternal Grandfather 82     Coronary Artery Disease Maternal Grandfather 65     Parkinsonism Other         maternal grandmother     Depression Half-Sister      Anxiety Disorder Half-Sister      Insomnia Brother      Diabetes No family hx of      Breast Cancer No family hx of          EXAM:  Ht 1.626 m (5' 4\")   Wt 102.1 kg (225 lb)   BMI 38.62 kg/m    GENERAL: Healthy, alert and no distress  EYES: Eyes grossly normal to inspection.  No discharge or erythema, or obvious scleral/conjunctival abnormalities.  RESP: No audible wheeze, cough, or visible cyanosis.  No visible retractions or increased work of breathing.    SKIN: Visible skin clear. No significant rash, abnormal pigmentation or lesions.  NEURO: Cranial nerves grossly intact.  Mentation and speech appropriate for age.  PSYCH: Mentation appears normal, affect normal/bright, judgement and insight intact, normal speech and appearance well-groomed.    Overnight oximetry on room air 1/22/2020  Analysis time 7 hours and 20 minutes  Oxygen desaturation index 0.5 (4% drop definition)  Lowest oxygen saturation 92%    ASSESSMENT:  30-year-old female with excessive daytime sleepiness since teen years.  There is some mild circadian rhythm delay but this is not impacting her sleep significantly at this time.  Shiftwork is well managed and she is getting at least 7 hours sleep most nights (and additional sleep in naps.)  [place] Sleepiness Scale self rating I believe under " describes the degree of sleepiness given that she is taking naps 3 to 4 days a week for 1 to 3 hours.  She does not have classic type I narcolepsy symptoms and naps are not refreshing.  Differential diagnosis includes sleep disordered breathing (JHONY/upper airways resistance syndrome)  idiopathic hypersomnia.    PLAN:  Extensive discussion today regarding the differential diagnosis.  We discussed the evaluation and decided to proceed with home sleep apnea testing to evaluate for sleep disordered breathing.  Because of her shift work I would like to perform watch pat 1 device and score 3% desaturations to evaluate for upper airways resistance syndrome.  If she should have significant elevations in the apnea hypotony index consider a trial of CPAP therapy as this is the fastest and most effective and would help identify whether sleep apnea is indeed the cause of the symptoms.  If there is no significant sleep apnea would then need to consider further evaluation with actigraphy, in lab polysomnography and mean sleep latency testing this would be more challenging to schedule given the pandemic and her shift work as a nurse.  She does have a Watch Pat One testing.  She is agreeable with this plan.        Faye Berry M.D.  Pulmonary/Critical Care/Sleep Medicine    [facility]   [address], [address]   [address] [place] [address]   Appointments: [phone]    The above note was dictated using voice recognition software and may include typographical errors. Please contact the author for any clarifications.

## 2020-09-11 NOTE — PATIENT INSTRUCTIONS
"MY TREATMENT INFORMATION FOR SLEEP APNEA-  Debbie Bah    DOCTOR : Faye Berry MD      Am I having a home sleep study? (Orderd \"WatchPatOne\")  Watch this video:  /drop off device-   [email or web address].com/watch?v=yGGFBdELGhk  Disposable device sent out require phone/computer application-   https://[email or web address].com/watch?v=BCce_vbiwxE  Please verify your insurance coverage with your insurance carrier    Frequently asked questions:  1. What is Obstructive Sleep Apnea (JHONY)? JHONY is the most common type of sleep apnea. Apnea means, \"without breath.\"  Apnea is most often caused by narrowing or collapse of the upper airway as muscles relax during sleep.   Almost everyone has occasional apneas. Most people with sleep apnea have had brief interruptions at night frequently for many years.  The severity of sleep apnea is related to how frequent and severe the events are.   2. What are the consequences of JHONY? Symptoms include: feeling sleepy during the day, snoring loudly, gasping or stopping of breathing, trouble sleeping, and occasionally morning headaches or heartburn at night.  Sleepiness can be serious and even increase the risk of falling asleep while driving. Other health consequences may include development of high blood pressure and other cardiovascular disease in persons who are susceptible. Untreated JHONY  can contribute to heart disease, stroke and diabetes.   3. What are the treatment options? In most situations, sleep apnea is a lifelong disease that must be managed with daily therapy. Medications are not effective for sleep apnea and surgery is generally not considered until other therapies have been tried. Your treatment is your choice . Continuous Positive Airway (CPAP) works right away and is the therapy that is effective in nearly everyone. An oral device to hold your jaw forward is usually the next most reliable option. Other options include postioning devices (to keep you off your " back), weight loss, and surgery including a tongue pacing device. There is more detail about some of these options below.    Important tips for using CPAP and similar devices   Know your equipment:  CPAP is continuous positive airway pressure that prevents obstructive sleep apnea by keeping the throat from collapsing while you are sleeping. In most cases, the device is  smart  and can slowly self-adjusts if your throat collapses and keeps a record every day of how well you are treated-this information is available to you and your care team.  BPAP is bilevel positive airway pressure that keeps your throat open and also assists each breath with a pressure boost to maintain adequate breathing.  Special kinds of BPAP are used in patients who have inadequate breathing from lung or heart disease. In most cases, the device is  smart  and can slowly self-adjusts to assist breathing. Like CPAP, the device keeps a record of how well you are treated.  Your mask is your connection to the device. You get to choose what feels most comfortable and the staff will help to make sure if fits. Here: are some examples of the different masks that are available:       Key points to remember on your journey with sleep apnea:  1. Sleep study.  PAP devices often need to be adjusted during a sleep study to show that they are effective and adjusted right.  2. Good tips to remember: Try wearing just the mask during a quiet time during the day so your body adapts to wearing it. A humidifier is recommended for comfort in most cases to prevent drying of your nose and throat. Allergy medication from your provider may help you if you are having nasal congestion.  3. Getting settled-in. It takes more than one night for most of us to get used to wearing a mask. Try wearing just the mask during a quiet time during the day so your body adapts to wearing it. A humidifier is recommended for comfort in most cases. Our team will work with you carefully on  the first day and will be in contact within 4 days and again at 2 and 4 weeks for advice and remote device adjustments. Your therapy is evaluated by the device each day.   4. Use it every night. The more you are able to sleep naturally for 7-8 hours, the more likely you will have good sleep and to prevent health risks or symptoms from sleep apnea. Even if you use it 4 hours it helps. Occasionally all of us are unable to use a medical therapy, in sleep apnea, it is not dangerous to miss one night.   5. Communicate. Call our skilled team on the number provided on the first day if your visit for problems that make it difficult to wear the device. Over 2 out of 3 patients can learn to wear the device long-term with help from our team. Remember to call our team or your sleep providers if you are unable to wear the device as we may have other solutions for those who cannot adapt to mask CPAP therapy. It is recommended that you sleep your sleep provider within the first 3 months and yearly after that if you are not having problems.   6. Use it for your health. We encourage use of CPAP masks during daytime quiet periods to allow your face and brain to adapt to the sensation of CPAP so that it will be a more natural sensation to awaken to at night or during naps. This can be very useful during the first few weeks or months of adapting to CPAP though it does not help medically to wear CPAP during wakefulness and  should not be used as a strategy just to meet guidelines.  7. Take care of your equipment. Make sure you clean your mask and tubing using directions every day and that your filter and mask are replaced as recommended or if they are not working.     BESIDES CPAP, WHAT OTHER THERAPIES ARE THERE?    Positioning Device  Positioning devices are generally used when sleep apnea is mild and only occurs on your back.This example shows a pillow that straps around the waist. It may be appropriate for those whose sleep study  shows milder sleep apnea that occurs primarily when lying flat on one's back. Preliminary studies have shown benefit but effectiveness at home may need to be verified by a home sleep test. These devices are generally not covered by medical insurance.  Examples of devices that maintain sleeping on the back to prevent snoring and mild sleep apnea.    Belt type body positioner  Http://UFOstart AG.Ataxion/    Electronic reminder  Http://nightshifttherapy.com/  Http://www.Boxbee.Ataxion.au/      Oral Appliance  What is oral appliance therapy?  An oral appliance device fits on your teeth at night like a retainer used after having braces. The device is made by a specialized dentist and requires several visits over 1-2 months before a manufactured device is made to fit your teeth and is adjusted to prevent your sleep apnea. Once an oral device is working properly, snoring should be improved. A home sleep test may be recommended at that time if to determine whether the sleep apnea is adequately treated.       Some things to remember:  -Oral devices are often, but not always, covered by your medical insurance. Be sure to check with your insurance provider.   -If you are referred for oral therapy, you will be given a list of specialized dentists to consider or you may choose to visit the Web site of the American Academy of Dental Sleep Medicine  -Oral devices are less likely to work if you have severe sleep apnea or are extremely overweight.     More detailed information  An oral appliance is a small acrylic device that fits over the upper and lower teeth  (similar to a retainer or a mouth guard). This device slightly moves jaw forward, which moves the base of the tongue forward, opens the airway, improves breathing for effective treat snoring and obstructive sleep apnea in perhaps 7 out of 10 people .  The best working devices are custom-made by a dental device  after a mold is made of the teeth 1, 2, 3.  When is an oral  appliance indicated?  Oral appliance therapy is recommended as a first-line treatment for patients with primary snoring, mild sleep apnea, and for patients with moderate sleep apnea who prefer appliance therapy to use of CPAP4, 5. Severity of sleep apnea is determined by sleep testing and is based on the number of respiratory events per hour of sleep.   How successful is oral appliance therapy?  The success rate of oral appliance therapy in patients with mild sleep apnea is 75-80% while in patients with moderate sleep apnea it is 50-70%. The chance of success in patients with severe sleep apnea is 40-50%. The research also shows that oral appliances have a beneficial effect on the cardiovascular health of JHONY patients at the same magnitude as CPAP therapy7.  Oral appliances should be a second-line treatment in cases of severe sleep apnea, but if not completely successful then a combination therapy utilizing CPAP plus oral appliance therapy may be effective. Oral appliances tend to be effective in a broad range of patients although studies show that the patients who have the highest success are females, younger patients, those with milder disease, and less severe obesity. 3, 6.   Finding a dentist that practices dental sleep medicine  Specific training is available through the American Academy of Dental Sleep Medicine for dentists interested in working in the field of sleep. To find a dentist who is educated in the field of sleep and the use of oral appliances, near you, visit the Web site of the American Academy of Dental Sleep Medicine.    References  1. Keely et al. Objectively measured vs self-reported compliance during oral appliance therapy for sleep-disordered breathing. Chest 2013; 144(5): 9466-0477.  2. Joaquín et al. Objective measurement of compliance during oral appliance therapy for sleep-disordered breathing. Thorax 2013; 68(1): 91-96.  3. Anna et al. Mandibular advancement devices in  620 men and women with JHONY and snoring: tolerability and predictors of treatment success. Chest 2004; 125: 6591-2193.  4. Shaid et al. Oral appliances for snoring and JHONY: a review. Sleep 2006; 29: 244-262.  5. Miguel et al. Oral appliance treatment for JHONY: an update. J Clin Sleep Med 2014; 10(2): 215-227.  6. Teddy et al. Predictors of OSAH treatment outcome. J Dent Res 2007; 86: 2377-5493.      Weight Loss:    Weight loss is a long-term strategy that may improve sleep apnea in some patients.    Weight management is a personal decision and the decision should be based on your interest and the potential benefits.  If you are interested in exploring weight loss strategies, the following discussion covers the impact on weight loss on sleep apnea and the approaches that may be successful.    Being overweight does not necessarily mean you will have health consequences.  Those who have BMI over 35 or over 27 with existing medical conditions carries greater risk.   Weight loss decreases severity of sleep apnea in most people with obesity. For those with mild obesity who have developed snoring with weight gain, even 15-30 pound weight loss can improve and occasionally eliminate sleep apnea.  Structured and life-long dietary and health habits are necessary to lose weight and keep healthier weight levels.     Though there may be significant health benefits from weight loss, long-term weight loss is very difficult to achieve- studies show success with dietary management in less than 10% of people. In addition, substantial weight loss may require years of dietary control and may be difficult if patients have severe obesity. In these cases, surgical management may be considered.  Finally, older individuals who have tolerated obesity without health complications may be less likely to benefit from weight loss strategies.        Your BMI is Body mass index is 38.62 kg/m .  Weight management is a personal decision.  If  you are interested in exploring weight loss strategies, the following discussion covers the approaches that may be successful. Body mass index (BMI) is one way to tell whether you are at a healthy weight, overweight, or obese. It measures your weight in relation to your height.  A BMI of 18.5 to 24.9 is in the healthy range. A person with a BMI of 25 to 29.9 is considered overweight, and someone with a BMI of 30 or greater is considered obese. More than two-thirds of American adults are considered overweight or obese.  Being overweight or obese increases the risk for further weight gain. Excess weight may lead to heart disease and diabetes.  Creating and following plans for healthy eating and physical activity may help you improve your health.  Weight control is part of healthy lifestyle and includes exercise, emotional health, and healthy eating habits. Careful eating habits lifelong are the mainstay of weight control. Though there are significant health benefits from weight loss, long-term weight loss with diet alone may be very difficult to achieve- studies show long-term success with dietary management in less than 10% of people. Attaining a healthy weight may be especially difficult to achieve in those with severe obesity. In some cases, medications, devices and surgical management might be considered.  What can you do?  If you are overweight or obese and are interested in methods for weight loss, you should discuss this with your provider.     Consider reducing daily calorie intake by 500 calories.     Keep a food journal.     Avoiding skipping meals, consider cutting portions instead.    Diet combined with exercise helps maintain muscle while optimizing fat loss. Strength training is particularly important for building and maintaining muscle mass. Exercise helps reduce stress, increase energy, and improves fitness. Increasing exercise without diet control, however, may not burn enough calories to loose  weight.       Start walking three days a week 10-20 minutes at a time    Work towards walking thirty minutes five days a week     Eventually, increase the speed of your walking for 1-2 minutes at time    In addition, we recommend that you review healthy lifestyles and methods for weight loss available through the National Institutes of Health patient information sites:  http://win.niddk.nih.gov/publications/index.htm    And look into health and wellness programs that may be available through your health insurance provider, employer, local community center, or gila club.    Weight management plan: Patient was referred to their PCP to discuss a diet and exercise plan.      Surgery:    Surgery for obstructive sleep apnea is considered generally only when other therapies fail to work. Surgery may be discussed with you if you are having a difficult time tolerating CPAP and or when there is an abnormal structure that requires surgical correction.  Nose and throat surgeries often enlarge the airway to prevent collapse.  Most of these surgeries create pain for 1-2 weeks and up to half of the most common surgeries are not effective throughout life.  You should carefully discuss the benefits and drawbacks to surgery with your sleep provider and surgeon to determine if it is the best solution for you.   More information  Surgery for JHONY is directed at areas that are responsible for narrowing or complete obstruction of the airway during sleep.  There are a wide range of procedures available to enlarge and/or stabilize the airway to prevent blockage of breathing in the three major areas where it can occur: the palate, tongue, and nasal regions.  Successful surgical treatment depends on the accurate identification of the factors responsible for obstructive sleep apnea in each person.  A personalized approach is required because there is no single treatment that works well for everyone.  Because of anatomic variation,  consultation with an examination by a sleep surgeon is a critical first step in determining what surgical options are best for each patient.  In some cases, examination during sedation may be recommended in order to guide the selection of procedures.  Patients will be counseled about risks and benefits as well as the typical recovery course after surgery. Surgery is typically not a cure for a person s JHONY.  However, surgery will often significantly improve one s JHONY severity (termed  success rate ).  Even in the absence of a cure, surgery will decrease the cardiovascular risk associated with OSA7; improve overall quality of life8 (sleepiness, functionality, sleep quality, etc).      Palate Procedures:  Patients with JHONY often have narrowing of their airway in the region of their tonsils and uvula.  The goals of palate procedures are to widen the airway in this region as well as to help the tissues resist collapse.  Modern palate procedure techniques focus on tissue conservation and soft tissue rearrangement, rather than tissue removal.  Often the uvula is preserved in this procedure. Residual sleep apnea is common in patient after pharyngoplasty with an average reduction in sleep apnea events of 33%2.      Tongue Procedures:  ExamWhile patients are awake, the muscles that surround the throat are active and keep this region open for breathing. These muscles relax during sleep, allowing the tongue and other structures to collapse and block breathing.  There are several different tongue procedures available.  Selection of a tongue base procedure depends on characteristics seen on physical exam.  Generally, procedures are aimed at removing bulky tissues in this area or preventing the back of the tongue from falling back during sleep.  Success rates for tongue surgery range from 50-62%3.    Hypoglossal Nerve Stimulation:  Hypoglossal nerve stimulation has recently received approval from the [place] Food and Drug  Administration for the treatment of obstructive sleep apnea.  This is based on research showing that the system was safe and effective in treating sleep apnea6.  Results showed that the median AHI score decreased 68%, from 29.3 to 9.0. This therapy uses an implant system that senses breathing patterns and delivers mild stimulation to airway muscles, which keeps the airway open during sleep.  The system consists of three fully implanted components: a small generator (similar in size to a pacemaker), a breathing sensor, and a stimulation lead.  Using a small handheld remote, a patient turns the therapy on before bed and off upon awakening.    Candidates for this device must be greater than 22 years of age, have moderate to severe JHONY (AHI between 20-65), BMI less than 32, have tried CPAP/oral appliance without success, and have appropriate upper airway anatomy (determined by a sleep endoscopy performed by Dr. Marin).    Hypoglossal Nerve Stimulation Pathway:    The sleep surgeon s office will work with the patient through the insurance prior-authorization process (including communications and appeals).    Nasal Procedures:  Nasal obstruction can interfere with nasal breathing during the day and night.  Studies have shown that relief of nasal obstruction can improve the ability of some patients to tolerate positive airway pressure therapy for obstructive sleep apnea1.  Treatment options include medications such as nasal saline, topical corticosteroid and antihistamine sprays, and oral medications such as antihistamines or decongestants. Non-surgical treatments can include external nasal dilators for selected patients. If these are not successful by themselves, surgery can improve the nasal airway either alone or in combination with these other options.      Combination Procedures:  Combination of surgical procedures and other treatments may be recommended, particularly if patients have more than one area of narrowing or  persistent positional disease.  The success rate of combination surgery ranges from 66-80%2,3.    References  1. Onelia OTERO. The Role of the Nose in Snoring and Obstructive Sleep Apnoea: An Update.  Eur Arch Otorhinolaryngol. 2011; 268: 1365-73.  2.  Modesto SM; Elma JA; Vikram JR; Pallanch JF; James MB; Silva SG; Joe GALEANO. Surgical modifications of the upper airway for obstructive sleep apnea in adults: a systematic review and meta-analysis. SLEEP 2010;33(10):3598-0645. Nika MARTINES. Hypopharyngeal surgery in obstructive sleep apnea: an evidence-based medicine review.  Arch Otolaryngol Head Neck Surg. 2006 Feb;132(2):206-13.  3. Tima YH1, Nicolette Y, Adebayo LAURA. The efficacy of anatomically based multilevel surgery for obstructive sleep apnea. Otolaryngol Head Neck Surg. 2003 Oct;129(4):327-35.  4. Nika MARTINES, Goldberg A. Hypopharyngeal Surgery in Obstructive Sleep Apnea: An Evidence-Based Medicine Review. Arch Otolaryngol Head Neck Surg. 2006 Feb;132(2):206-13.  5. Yarielo PJ et al. Upper-Airway Stimulation for Obstructive Sleep Apnea.  N Engl J Med. 2014 Jan 9;370(2):139-49.  6. Mustapha Y et al. Increased Incidence of Cardiovascular Disease in Middle-aged Men with Obstructive Sleep Apnea. Am J Respir Crit Care Med; 2002 166: 159-165  7. Tawny EM et al. Studying Life Effects and Effectiveness of Palatopharyngoplasty (SLEEP) study: Subjective Outcomes of Isolated Uvulopalatopharyngoplasty. Otolaryngol Head Neck Surg. 2011; 144: 623-631.

## 2020-09-14 ENCOUNTER — TELEPHONE (OUTPATIENT)
Dept: NEUROLOGY | Facility: CLINIC | Age: 30
End: 2020-09-14

## 2020-09-14 DIAGNOSIS — R40.0 SLEEPINESS: Primary | ICD-10-CM

## 2020-09-14 LAB — B BURGDOR IGG+IGM SER QL: 0.05 (ref 0–0.89)

## 2020-09-17 ENCOUNTER — TRANSFERRED RECORDS (OUTPATIENT)
Dept: HEALTH INFORMATION MANAGEMENT | Facility: CLINIC | Age: 30
End: 2020-09-17

## 2020-09-23 NOTE — NURSING NOTE
WatchPat order sent to [facility] for scheduling.    Deshawn Jackman  Sleep Clinic Specialist - [place]

## 2020-09-25 ENCOUNTER — ANCILLARY PROCEDURE (OUTPATIENT)
Dept: CT IMAGING | Facility: CLINIC | Age: 30
End: 2020-09-25
Attending: PSYCHIATRY & NEUROLOGY
Payer: COMMERCIAL

## 2020-09-25 DIAGNOSIS — R40.0 SLEEPINESS: ICD-10-CM

## 2020-10-01 ENCOUNTER — OFFICE VISIT (OUTPATIENT)
Dept: PODIATRY | Facility: CLINIC | Age: 30
End: 2020-10-01
Payer: COMMERCIAL

## 2020-10-01 VITALS — BODY MASS INDEX: 36.82 KG/M2 | HEIGHT: 65 IN | WEIGHT: 221 LBS

## 2020-10-01 DIAGNOSIS — M72.2 PLANTAR FASCIITIS OF LEFT FOOT: Primary | ICD-10-CM

## 2020-10-01 PROCEDURE — 99203 OFFICE O/P NEW LOW 30 MIN: CPT | Performed by: PODIATRIST

## 2020-10-01 ASSESSMENT — MIFFLIN-ST. JEOR: SCORE: 1725.39

## 2020-10-01 NOTE — PATIENT INSTRUCTIONS
Thank you for choosing [facility] Podiatry / Foot & Ankle Surgery!    [facility] SCHEDULE SURGERY: [phone]   [address] BILLING QUESTIONS: [phone]   [place] [address] AFTER HOURS: [phone]   PH: [phone] [employer]ER [place] LINE:[phone]   FAX: [phone] APPOINTMENTS: [phone]     [facility] TriLok ankle brace     PLANTAR FASCIITIS    Plantar fasciitis is often referred to as heel spurs or heel pain. Plantar fasciitis is a very common problem that affects people of all foot shapes, age, weight and activity level. Pain may be in the arch or on the weight-bearing surface of the heel. The pain may come on without injury or identifiable cause. Pain is generally present when first getting out of bed in the morning or up from a seated break.     CAUSES  The plantar fascia is a dense fibrous band of tissue that stretches across the bottom surface of the foot. The fascia helps support the foot muscles and arch. Plantar fasciitis is thought to be caused by mechanical strain or overload. Frequent walking without shoes or wearing unsupportive shoes is thought to cause structural overload and ultimately inflammation of the plantar fascia. Some people have heel spurs that can be seen on x-ray. The heel spur is actually a minor component of plantar fascitis and is largely ignored.       SELF TREATMENT   The easiest solution is to stop walking around your home without shoes. Plantar fasciitis is largely a shoe problem. Shoes are either not being worn often enough or your current shoes are inadequate for your weight, foot structure or activity level. The majority of shoes on the market today are not sufficient to resist development of plantar fasciitis or to promote healing. Assume that your current shoes are inadequate and will need to be replaced. Even high quality shoes wear out with 6 months to one year of frequent use. Weight loss is another option. Losing ten  pounds in the next two months may be enough to resolve the problem. Ice applied to the area of pain two to three times per day for ten minutes each session can be very helpful. This should continue until the problem resolves. Achilles tendon stretching is essential. Stretch multiple times daily to promote healing and to prevent recurrence in the future.     MEDICAL TREATMENT  Medical treatments often include custom arch supports, cortisone injections, physical therapy, splints to be worn in bed, prescription medications and surgery. The home treatments listed above will be necessary regardless of these advanced medical treatments. Surgery is rarely needed but is very helpful in selected cases.     PROGNOSIS  Plantar fasciitis can last from one day to a lifetime. Some people get intermittent fascitis that is very short-lived. Others suffer daily for years. Excessive body weight, frequent bare foot walking, long hours on the feet, inadequate shoes, predisposing foot structures and excessive activity such as running are all potential issues that lead to chronic and/or recurring plantar fascitis. Having plantar fasciitis means that you are forever prone to this problem and will require modification of some of the above factors. Most people seek treatment within one to four months. Healing usually requires a similar one to four month time frame. Healing time is relative to the amount of effort spent treating the problem.   Plantar fasciitis is highly recurrent. Risk factors often continue, including return to bare foot walking, inadequate shoes, excessive body weight, excessive activities, etc. Your life style and foot structure may predispose you to recurrent plantar fasciitis. A daily prevention regimen can be very helpful. Ongoing use of shoe inserts, careful attention to appropriate shoes, daily Achilles stretching, etc. may prevent recurrence. Prompt attention at the earliest warning signs of heel pain can resolve  the problem in as short as a few days.     EXERCISES    Stair Exercise: Step on the stairs with the ball of your foot and hold your position for at least 15 seconds, then slowly step down with the heels of your foot. You can do this daily and as often as you want.   Picking the Towel: Sit comfortably and then pick the towel up with your toes. You can use any object other than a towel as long as the material can be soft and you can pick it up with your toes.  Rolling the Bottle: Use a small ball or frozen water bottle and then roll it around with your foot.   Flex the Toes: Sit comfortably and then flex your toes by pointing it towards the floor or towards your body. This will relax and flex your foot and exercise your plantar fascia, the calf, and the Achilles tendon. The inability of the foot to stretch often causes the bunching up of the plantar fascia area leading to the pain.  Calf/Achilles Stretching: Lay on you back and raise one foot, then point your toes towards the floor. See photo below:               Hold each stretch for 10 seconds. Stretch 10 times per set, three sets per day. Morning, afternoon and evening. If your heel pain is very severe in the morning, consider doing the first set of stretches before you get out of bed.    THERAPIES DISCUSSED:  1.  Supportive Shoes: minimizing barefoot ambulation helps to provide cushion, padding and support to the ligament that is inflamed. Socks, flip flops, flats and some slippers are not typically sufficient to provide support. Shoes should be worn even indoors  2.  Insert/Orthotics: ones with an arch support built in to them provide further stress relief for the ligament. See the information below on recommended inserts.  3. Icing: using a frozen water bottle or orange, and rolling it along the bottom of the arch/heel can help to alleviate discomfort, and can act as a tissue massage to the painful, inflamed ligament.  There is evidence that shows icing at least  three times daily can be beneficial  4.  Antiinflammatory (NSAID): Ibuprofen, Aleve, as well as Tylenol can be used to help decrease symptoms and improve pain levels. If you have high blood pressure, heart disease, stomach or kidney problems, use antiinflammatories sparingly. Tylenol should not be used if you have liver problems.   5. Activity Modifications: if there are certain things that you do, whether it's going barefoot or certain shoes/activities, you should try to minimize those activities as much as possible until your symptoms are sufficiently resolved. Certainly, some activities, such as running on the treadmill, are easier to take a break from versus others, such as work or chores at home. If there are certain activities that hurt your heel, and you keep doing those activities that hurt your heel, your heel will keep hurting.  **If these initial therapies are insufficient, we have our tier 2 therapies that can more aggressively work to improve your symptoms and get you back to the activities that you enjoy!    OVER THE COUNTER INSERTS    Most of these can be found at your local [employer], [employer], or online:    Tushky   Sofsole Fit [employer]   Power Step   Walk-Fit ([employer])  *For heel pain* Arch Cradles  *For heel pain*       **A good high quality over the counter insert should cost around $40-$50      [employer] SHOES [facility]  [address] [place] [address]  [phone]   [address]  [phone] [place]  [address]  [phone]   [place]  [address]  [phone]   [place]  [address] [facility] [address] [facility]  [phone] [place]  [address]  [phone]   [place]  [address]  [phone]   [place]  [address]  [phone] [place]  [address] [phone]

## 2020-10-01 NOTE — LETTER
10/1/2020         RE: Debbie Bah  [address]  [facility] [facility] [address]        Dear Colleague,    Thank you for referring your patient, Debbie Bah, to the [facility] PODIATRY. Please see a copy of my visit note below.      ASSESSMENT/PLAN:    Encounter Diagnosis   Name Primary?     Plantar fasciitis of left foot Yes     The patient was educated about the causes and nature of arch and heel pain.  The anatomy and function of the plantar fascia was discussed.  The treatment plan discussed included icing, calf and plantar fascial stretching, avoidance of barefoot walking, wearing sturdy, supportive, stiffer-soled athletic-type shoes, activity modification, and over-the-counter arch supports versus custom orthoses.  A comprehensive After-Visit Summary was provided.     We discussed the option of a Trilok brace with foot strap medial. She has been doing the low-dye taping technique. She did not take a brace today, yet will consider it.     I think the one thing that might make a big difference, and that she has not yet tried, is wearing stiffer soled shoes.      Issa Duval, LÓPEZ, FACFAS, MS    [place] [facility] of Podiatry/Foot & Ankle Surgery      ____________________________________________________________________    HPI:         Chief Complaint: plantar fasciitis, left   Onset of problem: 3 months  Pain/ discomfort is described as:  burning  Ratin/10    Frequency:  daily    The pain is made worse with standing  Previous treatment: inserts, stretching, NSIADs, massage, night splint  No relief  *  Patient Active Problem List   Diagnosis     Gastroesophageal reflux disease without esophagitis     Major depressive disorder, recurrent episode, mild (H)     Situational anxiety     Acne vulgaris     Obesity (BMI 35.0-39.9) with comorbidity (H)   *  *  Past Surgical History:   Procedure Laterality Date     NO HISTORY OF SURGERY     *  *  Current Outpatient Medications    Medication Sig Dispense Refill     buPROPion (WELLBUTRIN XL) 300 MG 24 hr tablet Take 300 mg by mouth daily       Cholecalciferol (VITAMIN D) 2000 UNITS CAPS Take 2 capsules by mouth        ibuprofen (ADVIL/MOTRIN) 800 MG tablet Take 200 mg by mouth daily as needed  0     Lactobacillus (PROBIOTIC ACIDOPHILUS) TABS Take 1 tablet by mouth       multivitamin, therapeutic with minerals (MULTI-VITAMIN) TABS tablet Take 1 tablet by mouth daily       mupirocin (BACTROBAN) 2 % external ointment Apply topically 3 times daily 30 g 1     norgestim-eth estrad triphasic (ORTHO TRI-CYCLEN LO) 0.18/0.215/0.25 MG-25 MCG tablet Take 1 tablet by mouth daily 84 tablet 4     omega-3 acid ethyl esters (LOVAZA) 1 G capsule Take 2 g by mouth 2 times daily       triamcinolone (KENALOG) 0.1 % external cream Apply 0.1 Tubes topically 2 times daily  3       ROS:     A 10-point review of systems was performed and is positive for that noted above in the HPI and as seen below.  All other areas are negative.     Numbness in feet?  no   Calf pain with walking? no  Recent foot/ankle injury? no  Weight change over past 12 months? 37# gain  Self perception as overweight? yes  Recent flu-like symptoms? no  Joint pain other than feet ? Low back    Social History: Employment:  yes;  Exercise/Physical activity:  Walking daily, biking 2-3days/ week;  Tobacco use:  no  Social History     Socioeconomic History     Marital status: Single     Spouse name: Not on file     Number of children: Not on file     Years of education: Not on file     Highest education level: Not on file   Occupational History     Not on file   Social Needs     Financial resource strain: Not on file     Food insecurity     Worry: Not on file     Inability: Not on file     Transportation needs     Medical: Not on file     Non-medical: Not on file   Tobacco Use     Smoking status: Never Smoker     Smokeless tobacco: Never Used   Substance and Sexual Activity     Alcohol use: Yes      "Alcohol/week: 0.0 standard drinks     Frequency: 2-4 times a month     Drinks per session: 1 or 2     Comment: 0-1 per week     Drug use: No     Sexual activity: Never   Lifestyle     Physical activity     Days per week: Not on file     Minutes per session: Not on file     Stress: Not on file   Relationships     Social connections     Talks on phone: Not on file     Gets together: Not on file     Attends Catholic service: Not on file     Active member of club or organization: Not on file     Attends meetings of clubs or organizations: Not on file     Relationship status: Not on file     Intimate partner violence     Fear of current or ex partner: Not on file     Emotionally abused: Not on file     Physically abused: Not on file     Forced sexual activity: Not on file   Other Topics Concern     Parent/sibling w/ CABG, MI or angioplasty before 65F 55M? 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 [place]    Hobbies: reading, painting,walking, plays guitar, hiking, camping       Family history:  Family History   Problem Relation Age of Onset     Asthma Mother      Hypertension Mother      Insomnia Mother      Hypertension Father      Arthritis Paternal Grandmother      Cerebrovascular Disease Paternal Grandmother 75     Cerebrovascular Disease Paternal Grandfather 82     Coronary Artery Disease Maternal Grandfather 65     Parkinsonism Other         maternal grandmother     Depression Half-Sister      Anxiety Disorder Half-Sister      Insomnia Brother      Diabetes No family hx of      Breast Cancer No family hx of        Rheumatoid arthritis:  no  Foot Problems: no  Diabetes: no      EXAM:    Vitals: Ht 1.638 m (5' 4.5\")   Wt 100.2 kg (221 lb)   BMI 37.35 kg/m    BMI: Body mass index is 37.35 kg/m .  Height: 5' 4.5\"    Constitutional/ general:  Pt is in no apparent distress, appears well-nourished.  Cooperative with history and physical exam.     Vascular:  Pedal " pulses are palpable bilaterally for both the DP and PT arteries.  CFT < 3 sec.  No edema.  Pedal hair growth noted.     Neuro:  Alert and oriented x 3. Coordinated gait.  Light touch sensation is intact to the L4, L5, S1 distributions. No obvious deficits.  No evidence of neurological-based weakness, spasticity, or contracture in the lower extremities.     Derm: Normal texture and turgor.  No erythema, ecchymosis, or cyanosis.  No open lesions.     Musculoskeletal:    Lower extremity muscle strength is normal.  Patient is ambulatory without an assistive device or brace .  No gross deformities. Pain on palpation to the plantar medial aspect of the left heel.  No significant pain with   side-to-side compression of the heel.  No nodularity noted.            Again, thank you for allowing me to participate in the care of your patient.        Sincerely,        Issa Duval DPM

## 2020-10-01 NOTE — PROGRESS NOTES
ASSESSMENT/PLAN:    Encounter Diagnosis   Name Primary?     Plantar fasciitis of left foot Yes     The patient was educated about the causes and nature of arch and heel pain.  The anatomy and function of the plantar fascia was discussed.  The treatment plan discussed included icing, calf and plantar fascial stretching, avoidance of barefoot walking, wearing sturdy, supportive, stiffer-soled athletic-type shoes, activity modification, and over-the-counter arch supports versus custom orthoses.  A comprehensive After-Visit Summary was provided.     We discussed the option of a Trilok brace with foot strap medial. She has been doing the low-dye taping technique. She did not take a brace today, yet will consider it.     I think the one thing that might make a big difference, and that she has not yet tried, is wearing stiffer soled shoes.      Issa Duval DPM, FACFAS, MS    [place] Department of Podiatry/Foot & Ankle Surgery      ____________________________________________________________________    HPI:         Chief Complaint: plantar fasciitis, left   Onset of problem: 3 months  Pain/ discomfort is described as:  burning  Ratin/10    Frequency:  daily    The pain is made worse with standing  Previous treatment: inserts, stretching, NSIADs, massage, night splint  No relief  *  Patient Active Problem List   Diagnosis     Gastroesophageal reflux disease without esophagitis     Major depressive disorder, recurrent episode, mild (H)     Situational anxiety     Acne vulgaris     Obesity (BMI 35.0-39.9) with comorbidity (H)   *  *  Past Surgical History:   Procedure Laterality Date     NO HISTORY OF SURGERY     *  *  Current Outpatient Medications   Medication Sig Dispense Refill     buPROPion (WELLBUTRIN XL) 300 MG 24 hr tablet Take 300 mg by mouth daily       Cholecalciferol (VITAMIN D) 2000 UNITS CAPS Take 2 capsules by mouth        ibuprofen (ADVIL/MOTRIN) 800 MG tablet Take 200 mg by mouth daily as needed  0      Lactobacillus (PROBIOTIC ACIDOPHILUS) TABS Take 1 tablet by mouth       multivitamin, therapeutic with minerals (MULTI-VITAMIN) TABS tablet Take 1 tablet by mouth daily       mupirocin (BACTROBAN) 2 % external ointment Apply topically 3 times daily 30 g 1     norgestim-eth estrad triphasic (ORTHO TRI-CYCLEN LO) 0.18/0.215/0.25 MG-25 MCG tablet Take 1 tablet by mouth daily 84 tablet 4     omega-3 acid ethyl esters (LOVAZA) 1 G capsule Take 2 g by mouth 2 times daily       triamcinolone (KENALOG) 0.1 % external cream Apply 0.1 Tubes topically 2 times daily  3       ROS:     A 10-point review of systems was performed and is positive for that noted above in the HPI and as seen below.  All other areas are negative.     Numbness in feet?  no   Calf pain with walking? no  Recent foot/ankle injury? no  Weight change over past 12 months? 37# gain  Self perception as overweight? yes  Recent flu-like symptoms? no  Joint pain other than feet ? Low back    Social History: Employment:  yes;  Exercise/Physical activity:  Walking daily, biking 2-3days/ week;  Tobacco use:  no  Social History     Socioeconomic History     Marital status: Single     Spouse name: Not on file     Number of children: Not on file     Years of education: Not on file     Highest education level: Not on file   Occupational History     Not on file   Social Needs     Financial resource strain: Not on file     Food insecurity     Worry: Not on file     Inability: Not on file     Transportation needs     Medical: Not on file     Non-medical: Not on file   Tobacco Use     Smoking status: Never Smoker     Smokeless tobacco: Never Used   Substance and Sexual Activity     Alcohol use: Yes     Alcohol/week: 0.0 standard drinks     Frequency: 2-4 times a month     Drinks per session: 1 or 2     Comment: 0-1 per week     Drug use: No     Sexual activity: Never   Lifestyle     Physical activity     Days per week: Not on file     Minutes per session: Not on file      "Stress: Not on file   Relationships     Social connections     Talks on phone: Not on file     Gets together: Not on file     Attends [employer] service: Not on file     Active member of club or organization: Not on file     Attends meetings of clubs or organizations: Not on file     Relationship status: Not on file     Intimate partner violence     Fear of current or ex partner: Not on file     Emotionally abused: Not on file     Physically abused: Not on file     Forced sexual activity: Not on file   Other Topics Concern     Parent/sibling w/ CABG, MI or angioplasty before 65F 55M? 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 [place]    Hobbies: reading, painting,walking, plays guitar, hiking, camping       Family history:  Family History   Problem Relation Age of Onset     Asthma Mother      Hypertension Mother      Insomnia Mother      Hypertension Father      Arthritis Paternal Grandmother      Cerebrovascular Disease Paternal Grandmother 75     Cerebrovascular Disease Paternal Grandfather 82     Coronary Artery Disease Maternal Grandfather 65     Parkinsonism Other         maternal grandmother     Depression Half-Sister      Anxiety Disorder Half-Sister      Insomnia Brother      Diabetes No family hx of      Breast Cancer No family hx of        Rheumatoid arthritis:  no  Foot Problems: no  Diabetes: no      EXAM:    Vitals: Ht 1.638 m (5' 4.5\")   Wt 100.2 kg (221 lb)   BMI 37.35 kg/m    BMI: Body mass index is 37.35 kg/m .  Height: 5' 4.5\"    Constitutional/ general:  Pt is in no apparent distress, appears well-nourished.  Cooperative with history and physical exam.     Vascular:  Pedal pulses are palpable bilaterally for both the DP and PT arteries.  CFT < 3 sec.  No edema.  Pedal hair growth noted.     Neuro:  Alert and oriented x 3. Coordinated gait.  Light touch sensation is intact to the L4, L5, S1 distributions. No obvious deficits.  No evidence of " neurological-based weakness, spasticity, or contracture in the lower extremities.     Derm: Normal texture and turgor.  No erythema, ecchymosis, or cyanosis.  No open lesions.     Musculoskeletal:    Lower extremity muscle strength is normal.  Patient is ambulatory without an assistive device or brace .  No gross deformities. Pain on palpation to the plantar medial aspect of the left heel.  No significant pain with   side-to-side compression of the heel.  No nodularity noted.

## 2020-10-19 ENCOUNTER — TRANSFERRED RECORDS (OUTPATIENT)
Dept: HEALTH INFORMATION MANAGEMENT | Facility: CLINIC | Age: 30
End: 2020-10-19

## 2020-10-28 ENCOUNTER — MYC MEDICAL ADVICE (OUTPATIENT)
Dept: SLEEP MEDICINE | Facility: CLINIC | Age: 30
End: 2020-10-28

## 2020-11-04 ENCOUNTER — MYC MEDICAL ADVICE (OUTPATIENT)
Dept: SLEEP MEDICINE | Facility: CLINIC | Age: 30
End: 2020-11-04

## 2020-11-13 ENCOUNTER — OFFICE VISIT (OUTPATIENT)
Dept: SLEEP MEDICINE | Facility: CLINIC | Age: 30
End: 2020-11-13
Payer: COMMERCIAL

## 2020-11-13 DIAGNOSIS — G47.10 HYPERSOMNIA: ICD-10-CM

## 2020-11-13 DIAGNOSIS — E66.01 MORBID OBESITY (H): ICD-10-CM

## 2020-11-13 DIAGNOSIS — R06.83 SNORING: Primary | ICD-10-CM

## 2020-11-13 PROCEDURE — 95800 SLP STDY UNATTENDED: CPT | Performed by: INTERNAL MEDICINE

## 2020-11-15 ENCOUNTER — MYC MEDICAL ADVICE (OUTPATIENT)
Dept: FAMILY MEDICINE | Facility: CLINIC | Age: 30
End: 2020-11-15

## 2020-11-15 DIAGNOSIS — Z11.59 SCREENING FOR VIRAL DISEASE: Primary | ICD-10-CM

## 2020-11-16 NOTE — TELEPHONE ENCOUNTER
My chart message sent to patient.    Anaid Alex RN   [facility][note 3]

## 2020-11-18 DIAGNOSIS — Z11.59 SCREENING FOR VIRAL DISEASE: ICD-10-CM

## 2020-11-18 PROCEDURE — 86769 SARS-COV-2 COVID-19 ANTIBODY: CPT | Performed by: NURSE PRACTITIONER

## 2020-11-18 PROCEDURE — 36415 COLL VENOUS BLD VENIPUNCTURE: CPT | Performed by: NURSE PRACTITIONER

## 2020-11-18 NOTE — PROGRESS NOTES
Watchpat was scored using 1B 3% hypopnea rule and it is ready for interpretation.     PAHI: 4.7    Pt will follow up with sleep provider to determine appropriate therapy.     Ordering Provider, Faye Berry MD C. Oyugi, RPSGT, RST

## 2020-11-19 LAB
COVID-19 SPIKE RBD ABY TITER: NORMAL
COVID-19 SPIKE RBD ABY: NEGATIVE

## 2020-11-20 ENCOUNTER — TRANSFERRED RECORDS (OUTPATIENT)
Dept: HEALTH INFORMATION MANAGEMENT | Facility: CLINIC | Age: 30
End: 2020-11-20

## 2020-11-20 NOTE — RESULT ENCOUNTER NOTE
Debbie,    Negative covid antibody, unfortuantely.  If you have any questions, please feel free to contact the clinic.    MARCOS Stark

## 2020-11-23 ENCOUNTER — MYC MEDICAL ADVICE (OUTPATIENT)
Dept: FAMILY MEDICINE | Facility: CLINIC | Age: 30
End: 2020-11-23

## 2020-11-24 NOTE — TELEPHONE ENCOUNTER
Erika,    See patients my chart reply    Thanks  Anaid Alex RN   [facility]

## 2020-12-02 ENCOUNTER — VIRTUAL VISIT (OUTPATIENT)
Dept: SLEEP MEDICINE | Facility: CLINIC | Age: 30
End: 2020-12-02
Payer: COMMERCIAL

## 2020-12-02 DIAGNOSIS — R06.83 SNORING: Primary | ICD-10-CM

## 2020-12-02 DIAGNOSIS — R53.83 FATIGUE, UNSPECIFIED TYPE: ICD-10-CM

## 2020-12-02 DIAGNOSIS — E66.01 MORBID OBESITY (H): ICD-10-CM

## 2020-12-02 PROCEDURE — 99214 OFFICE O/P EST MOD 30 MIN: CPT | Mod: 95 | Performed by: INTERNAL MEDICINE

## 2020-12-02 NOTE — PROGRESS NOTES
"Debbie Bah is a 30 year old female who is being evaluated via a billable video visit.      The patient has been notified of following:     \"This video visit will be conducted via a call between you and your physician/provider. We have found that certain health care needs can be provided without the need for an in-person physical exam.  This service lets us provide the care you need with a video conversation.  If a prescription is necessary we can send it directly to your pharmacy.  If lab work is needed we can place an order for that and you can then stop by our lab to have the test done at a later time.    Video visits are billed at different rates depending on your insurance coverage.  Please reach out to your insurance provider with any questions.    If during the course of the call the physician/provider feels a video visit is not appropriate, you will not be charged for this service.\"    Patient has given verbal consent for Video visit? Yes  How would you like to obtain your AVS? MyChart  If you are dropped from the video visit, the video invite should be resent to: Send to e-mail at: [email or web address]  Will anyone else be joining your video visit? No        Video-Visit Details    Type of service:  Video Visit    Video Start Time: 9:34 AM  Video End Time: 10:11 AM    Originating Location (pt. Location): Home    Distant Location (provider location):  Home Office    Platform used for Video Visit: Estelle Berry MD     Chief complaint:Follow up sleep study    History of Present Illness:30-year-old female with excessive daytime sleepiness since teen years. She is here to follow-up recent home sleep apnea testing.  She was originally evaluated in 2017.  Efforts were made to optimize circadian rhythm and her shift work schedule.  Currently she is working 3 shifts a week of 3 PM to midnight.  She had works every other weekend.  She typically gets into bed 1230 to 1 AM after working and " usually can fall asleep within an hour.  She does watch Netflix and leaves it on until he goes to sleep.  She is usually up for the day around 730 to 8 AM.  She has tried light blocking shades in the past but found them depressing.  She would use them when taking naps.  She continues to take naps a couple of times a week.  Currently for about an hour around 12 45-1 45 PM.  On nonwork nights she may get into bed as early as 10 and can be asleep by 11.  She will sleep until 7 AM.  She continues to report fatigue but not necessarily drowsiness.  No cataplexy hypnagogic hallucinations or sleep paralysis.  She was told by her psychiatrist to ask about modafinil.    Results of study show no significant sleep apnea by AHI.  Study was scored by more sensitive rolling and still did not meet criteria.    Total score - [place]: 7 (12/2/2020 10:00 AM) (Less than 10 normal)    Past Medical History:   Diagnosis Date     Binge eating disorder      Depression 2015     GERD (gastroesophageal reflux disease)     situational (resolved)       Allergies   Allergen Reactions     Doxycycline      esophagitis     Mushroom Cramps, Diarrhea and GI Disturbance       Current Outpatient Medications   Medication     buPROPion (WELLBUTRIN XL) 300 MG 24 hr tablet     Cholecalciferol (VITAMIN D) 2000 UNITS CAPS     ibuprofen (ADVIL/MOTRIN) 800 MG tablet     Lactobacillus (PROBIOTIC ACIDOPHILUS) TABS     multivitamin, therapeutic with minerals (MULTI-VITAMIN) TABS tablet     norgestim-eth estrad triphasic (ORTHO TRI-CYCLEN LO) 0.18/0.215/0.25 MG-25 MCG tablet     omega-3 acid ethyl esters (LOVAZA) 1 G capsule     No current facility-administered medications for this visit.        Social History     Socioeconomic History     Marital status: Single     Spouse name: Not on file     Number of children: Not on file     Years of education: Not on file     Highest education level: Not on file   Occupational History     Not on file   Social Needs      Financial resource strain: Not on file     Food insecurity     Worry: Not on file     Inability: Not on file     Transportation needs     Medical: Not on file     Non-medical: Not on file   Tobacco Use     Smoking status: Never Smoker     Smokeless tobacco: Never Used   Substance and Sexual Activity     Alcohol use: Yes     Alcohol/week: 0.0 standard drinks     Frequency: 2-4 times a month     Drinks per session: 1 or 2     Comment: 0-1 per week     Drug use: No     Sexual activity: Never   Lifestyle     Physical activity     Days per week: Not on file     Minutes per session: Not on file     Stress: Not on file   Relationships     Social connections     Talks on phone: Not on file     Gets together: Not on file     Attends Buddhist service: Not on file     Active member of club or organization: Not on file     Attends meetings of clubs or organizations: Not on file     Relationship status: Not on file     Intimate partner violence     Fear of current or ex partner: Not on file     Emotionally abused: Not on file     Physically abused: Not on file     Forced sexual activity: Not on file   Other Topics Concern     Parent/sibling w/ CABG, MI or angioplasty before 65F 55M? 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 [place]    Hobbies: reading, painting,walking, plays guitar, hiking, camping       Family History   Problem Relation Age of Onset     Asthma Mother      Hypertension Mother      Insomnia Mother      Hypertension Father      Arthritis Paternal Grandmother      Cerebrovascular Disease Paternal Grandmother 75     Cerebrovascular Disease Paternal Grandfather 82     Coronary Artery Disease Maternal Grandfather 65     Parkinsonism Other         maternal grandmother     Depression Half-Sister      Anxiety Disorder Half-Sister      Insomnia Brother      Diabetes No family hx of      Breast Cancer No family hx of          EXAM:  There were no vitals taken for this  visit.  GENERAL: Healthy, alert and no distress  EYES: Eyes grossly normal to inspection.  No discharge or erythema, or obvious scleral/conjunctival abnormalities.  RESP: No audible wheeze, cough, or visible cyanosis.  No visible retractions or increased work of breathing.    SKIN: Visible skin clear. No significant rash, abnormal pigmentation or lesions.  NEURO: Cranial nerves grossly intact.  Mentation and speech appropriate for age.  PSYCH: Mentation appears normal, affect normal/bright, judgement and insight intact, normal speech and appearance well-groomed.       HSAT WatchPat  11/17/2020  Weight 225 lbs BMI 28.6  (3%hypopnea scoring rule) pAHI 4.7, pRDI 14.8, Lowest O2 SAT 93%    Overnight oximetry on room air 1/22/2020  Analysis time 7 hours and 20 minutes  Oxygen desaturation index 0.5 (4% drop definition)  Lowest oxygen saturation 92%        ASSESSMENT:  30-year-old female with history of depression daytime fatigue and not excessive drowsiness/sleepiness as [place] was it is in with a normal range.  Reported sleep schedule suggests some nights of sleep deprivation with a short as 5-1/2 to 6-1/2 hours of sleep a few nights a week.  She is also continuing to nap which can impact nightly sleep quality.  No evidence for significant sleep disordered breathing by sensitive scoring of home sleep apnea test.  Patient is not particularly interested in a trial for possible upper airways resistance syndrome as element as evidenced by slightly elevated RDI.    PLAN:  We discussed the indications for modafinil which would be to be used during his shift if suffering from shiftwork sleep disorder which she appears to not be suffering.  She does not meet criteria for obstructive sleep apnea.  Will not pursue CPAP at this time.  Patient is urged to try to optimize sleep schedule and routine to ensure her average of 7 hours of sleep or more nightly.  Urged her to shorten daytime naps as this may be impacting her nighttime  sleep and ability to fall asleep.  Urged her to work with her primary care to optimize wellness, regularize exercise and lose weight.  Continue to work with psychiatry to optimize treatment of mood disorder.  At this point do not recommend pursuing in lab polysomnography with MSLT as she does not meet criteria for hypersomnia.  Patient is agreeable with this plan.  I did urge her to keep some detailed sleep logs to monitor her sleep schedule.  She would like to follow-up in sleep medicine as needed.        Faye Berry M.D.  Pulmonary/Critical Care/Sleep Medicine    [facility]   Floor 1, [address]   [address] [place] [address]. [place] [address]   Appointments: [phone]    The above note was dictated using voice recognition software and may include typographical errors. Please contact the author for any clarifications.

## 2020-12-02 NOTE — PATIENT INSTRUCTIONS
link to sleep diary:  [email or web address]    Shorten/eliminate naps  Optimize nutrition/diet, exercise regularly.  Keep bedroom cool, dark and quiet    Consider follow up with primary care for fatigue and assistance with optimizing health and weight management

## 2020-12-02 NOTE — LETTER
"    12/2/2020        RE: Debbie Bah  [address]  [facility] [address]        Debbie Bah is a 30 year old female who is being evaluated via a billable video visit.      The patient has been notified of following:     \"This video visit will be conducted via a call between you and your physician/provider. We have found that certain health care needs can be provided without the need for an in-person physical exam.  This service lets us provide the care you need with a video conversation.  If a prescription is necessary we can send it directly to your pharmacy.  If lab work is needed we can place an order for that and you can then stop by our lab to have the test done at a later time.    Video visits are billed at different rates depending on your insurance coverage.  Please reach out to your insurance provider with any questions.    If during the course of the call the physician/provider feels a video visit is not appropriate, you will not be charged for this service.\"    Patient has given verbal consent for Video visit? Yes  How would you like to obtain your AVS? MyChart  If you are dropped from the video visit, the video invite should be resent to: Send to e-mail at: [email or web address]  Will anyone else be joining your video visit? No        Video-Visit Details    Type of service:  Video Visit    Video Start Time: 9:34 AM  Video End Time: 10:11 AM    Originating Location (pt. Location): Home    Distant Location (provider location):  Home Office    Platform used for Video Visit: Estelle Berry MD     Chief complaint:Follow up sleep study    History of Present Illness:30-year-old female with excessive daytime sleepiness since teen years. She is here to follow-up recent home sleep apnea testing.  She was originally evaluated in 2017.  Efforts were made to optimize circadian rhythm and her shift work schedule.  Currently she is working 3 shifts a week of 3 PM to midnight.  " She had works every other weekend.  She typically gets into bed 1230 to 1 AM after working and usually can fall asleep within an hour.  She does watch Netflix and leaves it on until he goes to sleep.  She is usually up for the day around 730 to 8 AM.  She has tried light blocking shades in the past but found them depressing.  She would use them when taking naps.  She continues to take naps a couple of times a week.  Currently for about an hour around 12 45-1 45 PM.  On nonwork nights she may get into bed as early as 10 and can be asleep by 11.  She will sleep until 7 AM.  She continues to report fatigue but not necessarily drowsiness.  No cataplexy hypnagogic hallucinations or sleep paralysis.  She was told by her psychiatrist to ask about modafinil.    Results of study show no significant sleep apnea by AHI.  Study was scored by more sensitive rolling and still did not meet criteria.    Total score - [place]: 7 (12/2/2020 10:00 AM) (Less than 10 normal)    Past Medical History:   Diagnosis Date     Binge eating disorder      Depression 2015     GERD (gastroesophageal reflux disease)     situational (resolved)       Allergies   Allergen Reactions     Doxycycline      esophagitis     Mushroom Cramps, Diarrhea and GI Disturbance       Current Outpatient Medications   Medication     buPROPion (WELLBUTRIN XL) 300 MG 24 hr tablet     Cholecalciferol (VITAMIN D) 2000 UNITS CAPS     ibuprofen (ADVIL/MOTRIN) 800 MG tablet     Lactobacillus (PROBIOTIC ACIDOPHILUS) TABS     multivitamin, therapeutic with minerals (MULTI-VITAMIN) TABS tablet     norgestim-eth estrad triphasic (ORTHO TRI-CYCLEN LO) 0.18/0.215/0.25 MG-25 MCG tablet     omega-3 acid ethyl esters (LOVAZA) 1 G capsule     No current facility-administered medications for this visit.        Social History     Socioeconomic History     Marital status: Single     Spouse name: Not on file     Number of children: Not on file     Years of education: Not on file      Highest education level: Not on file   Occupational History     Not on file   Social Needs     Financial resource strain: Not on file     Food insecurity     Worry: Not on file     Inability: Not on file     Transportation needs     Medical: Not on file     Non-medical: Not on file   Tobacco Use     Smoking status: Never Smoker     Smokeless tobacco: Never Used   Substance and Sexual Activity     Alcohol use: Yes     Alcohol/week: 0.0 standard drinks     Frequency: 2-4 times a month     Drinks per session: 1 or 2     Comment: 0-1 per week     Drug use: No     Sexual activity: Never   Lifestyle     Physical activity     Days per week: Not on file     Minutes per session: Not on file     Stress: Not on file   Relationships     Social connections     Talks on phone: Not on file     Gets together: Not on file     Attends Holiness service: Not on file     Active member of club or organization: Not on file     Attends meetings of clubs or organizations: Not on file     Relationship status: Not on file     Intimate partner violence     Fear of current or ex partner: Not on file     Emotionally abused: Not on file     Physically abused: Not on file     Forced sexual activity: Not on file   Other Topics Concern     Parent/sibling w/ CABG, MI or angioplasty before 65F 55M? 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 [place]    Hobbies: reading, painting,walking, plays guitar, hiking, camping       Family History   Problem Relation Age of Onset     Asthma Mother      Hypertension Mother      Insomnia Mother      Hypertension Father      Arthritis Paternal Grandmother      Cerebrovascular Disease Paternal Grandmother 75     Cerebrovascular Disease Paternal Grandfather 82     Coronary Artery Disease Maternal Grandfather 65     Parkinsonism Other         maternal grandmother     Depression Half-Sister      Anxiety Disorder Half-Sister      Insomnia Brother      Diabetes No family  hx of      Breast Cancer No family hx of          EXAM:  There were no vitals taken for this visit.  GENERAL: Healthy, alert and no distress  EYES: Eyes grossly normal to inspection.  No discharge or erythema, or obvious scleral/conjunctival abnormalities.  RESP: No audible wheeze, cough, or visible cyanosis.  No visible retractions or increased work of breathing.    SKIN: Visible skin clear. No significant rash, abnormal pigmentation or lesions.  NEURO: Cranial nerves grossly intact.  Mentation and speech appropriate for age.  PSYCH: Mentation appears normal, affect normal/bright, judgement and insight intact, normal speech and appearance well-groomed.       HSAT WatchPat  11/17/2020  Weight 225 lbs BMI 28.6  (3%hypopnea scoring rule) pAHI 4.7, pRDI 14.8, Lowest O2 SAT 93%    Overnight oximetry on room air 1/22/2020  Analysis time 7 hours and 20 minutes  Oxygen desaturation index 0.5 (4% drop definition)  Lowest oxygen saturation 92%        ASSESSMENT:  30-year-old female with history of depression daytime fatigue and not excessive drowsiness/sleepiness as [place] was it is in with a normal range.  Reported sleep schedule suggests some nights of sleep deprivation with a short as 5-1/2 to 6-1/2 hours of sleep a few nights a week.  She is also continuing to nap which can impact nightly sleep quality.  No evidence for significant sleep disordered breathing by sensitive scoring of home sleep apnea test.  Patient is not particularly interested in a trial for possible upper airways resistance syndrome as element as evidenced by slightly elevated RDI.    PLAN:  We discussed the indications for modafinil which would be to be used during his shift if suffering from shiftwork sleep disorder which she appears to not be suffering.  She does not meet criteria for obstructive sleep apnea.  Will not pursue CPAP at this time.  Patient is urged to try to optimize sleep schedule and routine to ensure her average of 7 hours of sleep  or more nightly.  Urged her to shorten daytime naps as this may be impacting her nighttime sleep and ability to fall asleep.  Urged her to work with her primary care to optimize wellness, regularize exercise and lose weight.  Continue to work with psychiatry to optimize treatment of mood disorder.  At this point do not recommend pursuing in lab polysomnography with MSLT as she does not meet criteria for hypersomnia.  Patient is agreeable with this plan.  I did urge her to keep some detailed sleep logs to monitor her sleep schedule.  She would like to follow-up in sleep medicine as needed.        Faye Berry M.D.  Pulmonary/Critical Care/Sleep Medicine    [facility]   Floor 1, [address]   [address] [place] [address]. [place] [address]   Appointments: [phone]    The above note was dictated using voice recognition software and may include typographical errors. Please contact the author for any clarifications.                        Sincerely,        Faye Berry MD

## 2020-12-02 NOTE — PROCEDURES
"WatchPAT - HOME SLEEP STUDY INTERPRETATION    Patient: Debbie Bah  MRN: [id number]  YOB: 1990  Study Date: 11/13/2020  Referring Provider: Sarahi Santos CNP  Ordering Provider: Faye Berry MD    Chain of custody patient verification was  Not enabled.       Indications for Home Study: Debbie Bah is a 30 year old female with a history of depression/anxiety who presents with symptoms suggestive of obstructive sleep apnea.    Estimated body mass index is 37.35 kg/m  as calculated from the following:    Height as of 10/1/20: 1.638 m (5' 4.5\").    Weight as of 10/1/20: 100.2 kg (221 lb).  Total score - [place]: 7 (12/2/2020 10:00 AM)  [employer]Bang Total Score: 2 (9/11/2020  1:00 PM)    Data: A full night home sleep study was performed recording the standard physiologic parameters including peripheral arterial tonometry (PAT), sound/snoring, body position,  movement, sound, and oxygen saturation by pulse oximetry. Pulse rate was estimated by oximetry recording. Sleep staging (wake, REM, light, and deep sleep) was derived from PAT signal.  This study was considered adequate based on > 4 hours of quality oximetry and respiratory recording. As specified by the AASM Manual for the Scoring of Sleep and Associated events, version 2.3, Rule VIII.D 1B, 4% oxygen desaturation scoring for hypopneas is used as a standard of care on all home sleep apnea testing.    Total Recording Time: 9 hrs, 49 min  Total Sleep Time: 8 hrs, 35 min  % of Sleep Time REM: 18.3%    Respiratory:  Snoring: Snoring was present.  Respiratory events: The PAT respiratory disturbance index [pRDI] was 14.8 events per hour.  The PAT apnea/hypopnea index [pAHI] was 4.7 events per hour.  ANGELIQUE was 1.3 events per hour.  During REM sleep the pAHI was 18.5.  Sleep Associated Hypoxemia: sustained hypoxemia was not present. Mean oxygen saturation was 97%.  Minimum was 93%.  Time with saturation less than 88% was 0 minutes.    Heart " Rate: By pulse oximetry normal rate was noted.     Position: Percent of time spent: supine - 69%, prone - 26%, on right - 2%, on left - 3%.  pAHI was 4.4 per hour supine, 2.9 per hour prone, 23.4 per hour on right side, and 17.4 per hour on left side.     Assessment:   Snoring without obstructive sleep apnea.  Sleep associated hypoxemia was not present.    Recommendations:  Consider in lab polysomnography if clinically indicated..  Suggest optimizing sleep hygiene and avoiding sleep deprivation.  Weight management.    Diagnosis Code(s): Snoring R06.83    Faye Berry MD, December 2, 2020   Diplomate, American Board of Internal Medicine, Sleep Medicine

## 2020-12-21 ENCOUNTER — MYC MEDICAL ADVICE (OUTPATIENT)
Dept: FAMILY MEDICINE | Facility: CLINIC | Age: 30
End: 2020-12-21

## 2020-12-21 DIAGNOSIS — M72.2 PLANTAR FASCIITIS: Primary | ICD-10-CM

## 2020-12-21 NOTE — TELEPHONE ENCOUNTER
Route to provider pool    See pt MyChart message  Pt had Evisit 8/28/2020 regarding    Referral cued    Lia Chadwick, RN   [facility]

## 2020-12-27 ENCOUNTER — HEALTH MAINTENANCE LETTER (OUTPATIENT)
Age: 30
End: 2020-12-27

## 2021-02-15 ENCOUNTER — TRANSFERRED RECORDS (OUTPATIENT)
Dept: HEALTH INFORMATION MANAGEMENT | Facility: CLINIC | Age: 31
End: 2021-02-15

## 2021-03-24 NOTE — TELEPHONE ENCOUNTER
FUTURE VISIT INFORMATION      FUTURE VISIT INFORMATION:    Date: 5.25.21    Time: 10:00    [place] [facility] [place]:    Referring provider:  sukhwinder    Referring providers clinic:  [facility]    Reason for visit/diagnosis  New, had an allergy to mushrooms all her life that she wants tested per pt    RECORDS REQUESTED FROM:       Clinic name Comments Records Status Imaging Status    No previous records related to allergies

## 2021-03-29 ENCOUNTER — TRANSFERRED RECORDS (OUTPATIENT)
Dept: HEALTH INFORMATION MANAGEMENT | Facility: CLINIC | Age: 31
End: 2021-03-29

## 2021-04-08 ENCOUNTER — MYC MEDICAL ADVICE (OUTPATIENT)
Dept: FAMILY MEDICINE | Facility: CLINIC | Age: 31
End: 2021-04-08

## 2021-04-24 ENCOUNTER — HEALTH MAINTENANCE LETTER (OUTPATIENT)
Age: 31
End: 2021-04-24

## 2021-05-11 ENCOUNTER — TRANSFERRED RECORDS (OUTPATIENT)
Dept: HEALTH INFORMATION MANAGEMENT | Facility: CLINIC | Age: 31
End: 2021-05-11

## 2021-05-25 ENCOUNTER — OFFICE VISIT (OUTPATIENT)
Dept: ALLERGY | Facility: CLINIC | Age: 31
End: 2021-05-25
Payer: COMMERCIAL

## 2021-05-25 ENCOUNTER — PRE VISIT (OUTPATIENT)
Dept: ALLERGY | Facility: CLINIC | Age: 31
End: 2021-05-25

## 2021-05-25 DIAGNOSIS — Z91.018 FOOD ALLERGY: Primary | ICD-10-CM

## 2021-05-25 DIAGNOSIS — Z88.9 ATOPY: ICD-10-CM

## 2021-05-25 PROCEDURE — 95004 PERQ TESTS W/ALRGNC XTRCS: CPT | Performed by: DERMATOLOGY

## 2021-05-25 PROCEDURE — 95024 IQ TESTS W/ALLERGENIC XTRCS: CPT | Performed by: DERMATOLOGY

## 2021-05-25 PROCEDURE — 99203 OFFICE O/P NEW LOW 30 MIN: CPT | Mod: 25 | Performed by: DERMATOLOGY

## 2021-05-25 RX ORDER — ESCITALOPRAM OXALATE 10 MG/1
TABLET ORAL
COMMUNITY
Start: 2021-05-05 | End: 2021-06-18

## 2021-05-25 ASSESSMENT — PAIN SCALES - GENERAL: PAINLEVEL: NO PAIN (0)

## 2021-05-25 NOTE — LETTER
5/25/2021         RE: Debbie Bah  [address]  [facility] [id number]        Dear Colleague,    Thank you for referring your patient, Debbie Bah, to the [facility] [place]. Please see a copy of my visit note below.    [facility] Dermato-allergology Note  Office visit  Encounter Date: May 25, 2021  ____________________________________________    CC: No chief complaint on file.      HPI:  Ms. Debbie Bah is a(n) 30 year old female who presents today as a new patient for allergy consultation  - for the last decade patient develops within 1h after eating mushrooms (any kind of mushroom = portobello, shitake) bloating, cramping, abdominal pain, diarrhoea, not nausea and vomiting --> lasts up to 6 hours, pepto-bismol 3h.; even small amount of mushrooms can induce that.   - otherwise feeling well in usual state of health    Physical exam:  General: In no acute distress, well-developed, well-nourished  Eyes: no conjunctivitis  ENT: no signs of rhinitis   Pulmonary: no wheezing or coughing  Skin: Focused examination of the skin on test sites was performed = see test results below    Past Medical History:   Patient Active Problem List   Diagnosis     Major depressive disorder, recurrent episode, mild (H)     Situational anxiety     Acne vulgaris     Obesity (BMI 35.0-39.9) with comorbidity (H)     Past Medical History:   Diagnosis Date     Binge eating disorder      Depression 2015     GERD (gastroesophageal reflux disease)     situational (resolved)       Allergies:  Allergies   Allergen Reactions     Doxycycline      esophagitis     Mushroom Cramps, Diarrhea and GI Disturbance       Medications:  Current Outpatient Medications   Medication     buPROPion (WELLBUTRIN XL) 300 MG 24 hr tablet     Cholecalciferol (VITAMIN D) 2000 UNITS CAPS     ibuprofen (ADVIL/MOTRIN) 800 MG tablet     Lactobacillus (PROBIOTIC ACIDOPHILUS) TABS      multivitamin, therapeutic with minerals (MULTI-VITAMIN) TABS tablet     norgestim-eth estrad triphasic (ORTHO TRI-CYCLEN LO) 0.18/0.215/0.25 MG-25 MCG tablet     omega-3 acid ethyl esters (LOVAZA) 1 G capsule     No current facility-administered medications for this visit.        Social History:    Family History:  Family History   Problem Relation Age of Onset     Asthma Mother      Hypertension Mother      Insomnia Mother      Hypertension Father      Arthritis Paternal Grandmother      Cerebrovascular Disease Paternal Grandmother 75     Cerebrovascular Disease Paternal Grandfather 82     Coronary Artery Disease Maternal Grandfather 65     Parkinsonism Other         maternal grandmother     Depression Half-Sister      Anxiety Disorder Half-Sister      Insomnia Brother      Diabetes No family hx of      Breast Cancer No family hx of        Previous Labs, Allergy Tests, Dermatopathology, Imaging:    Referred By: Referred Self, MD  No address on file     Allergy Tests:    Past Allergy Test    Order for Future Allergy Testing:    [x] Outpatient  [] Inpatient: [facility]..../ Bed ....       Skin Atopy (atopic dermatitis) [] Yes   [x] No  Contact allergies:   [] Yes   [x] No  Urticaria/Angioedema  [] Yes   [x] No  Rhinitis/Sinusitis:   [] Yes   [x] No   [] seasonal [] perennial            Allergic Asthma:   [] Yes   [x] No  Pets :  [] Yes   [] No  which?......  Food Allergy:   [x] Yes   [] No with mushrooms cramping and abdominal pain  Drug allergies:    [x] Yes   [] No Doxycycline Esophagitis = side  effect  Leg ulcers:   [] Yes   [x] No  Hand eczema:   [] Yes   [x] No   Leading hand:   [] R   [] L       [] Ambidextrous                          Order for PRICK TESTS    Reason for tests (suspected allergy): severe GI symptoms with various mushrooms (molds on mushrooms)  Known previous allergies: none    Standardized prick panels  [x] Atopic panel (20 tests)  [] Pediatric Panel (12 tests)  [] Milk, Meat, Eggs, Grains (20  tests)   [] Dust, Epithelia, Feathers (10 tests)  [] Fish, Seafood, Shellfish (17 tests)  [] Nuts, Beans (14 tests)  [] Spice, Vegetable, Fruit (17 tests)  [] Others: ...      [x] Patient's own products (next time): prick/prick with various    mushrooms      Standardized intradermal tests  [x] Penicillium notatum [x] Aspergillus fumigatus [] House dust mites  [] Bee venom   [] Wasp venom !!Specific protocol with dilutions!!  [] Others: ...      Atopy Screen (Placed 05/25/21 )    No Substance Readings (15 min) Evaluation   POS Histamine 1mg/ml ++    NEG NaCl 0.9% -      No Substance Readings (15 min) Evaluation   1 Alternaria alternata (tenuis)  -    2 Cladosporium herbarum -    3 Aspergillus fumigatus -    4 Penicillium notatum -    5 Dermatophagoides pteronyssinus -    6 Dermatophagoides farinae -    7 Dog epithelium (canis spp) -    8 Cat hair (liza catus) -    9 Cockroach   (Blatella americana & germanica) -    10 Grass mix midwest   (Jewels, Orchard, Redtop, Akshat) -    11 Castro grass (sorghum halepense) -    12 Weed mix   (common Cocklebur, Lamb s quarters, rough redroot Pigweed, Dock/Sorrel) -    13 Mug wort (artemisia vulgare) -    14 Ragweed giant/short (ambrosia spp) -    15 English Plantain (plantago lanceolata) -    16 Tree mix 1 (Pecan, Maple BHR, Oak RVW, american [place], black [place]) +/++    17 Red cedar (juniperus [place]) -    18 Tree mix 2   (white [place], river/red Birch, black [place], common [place], american Elm) -    19 Box elder/Maple mix (acer spp) -    20 [place] shagbark (carya ovata) -       -      Conclusion:       Intradermal Testing (Placed 05/25/21 )    No Substance Conc.  Readings (15min) Evaluation   -     -    +     -    DF Cladosporium herbarum 1:10 -    DP Alternaria alternatia 1:10 -    A Aspergillus fumigatus  1:10 (+) 6mmP/7mmE   P Penicillium notatum 1:10 -    Conclusion: no relevant immediate type reaction to molds    _____________________________    Assessment &  Plan:    ==> Final Diagnosis:     # severe GI symptoms with cramping/diarrhoea after eating mushrooms  * chronic with acute exacerbation      # atopy? = prick and IDT negatives    ==> Treatment Plan:  See later    Procedures Performed: Allergy tests, including prick, intradermal tests    Staff:  Provider    Follow-up in Derm-Allergy clinic for prick/prick with various mushrooms    I spent a total of 30 minutes with Debbie Bah. This time was spent counseling the patient and/or coordinating care, explaining the allergy tests, performing allergy tests and assessing the clinical relevance.        Again, thank you for allowing me to participate in the care of your patient.        Sincerely,        Juliano Garcia MD

## 2021-05-25 NOTE — NURSING NOTE
Dermatology Rooming Note    Debbie Bah's goals for this visit include:   Chief Complaint   Patient presents with     Allergy Consult     Angella Lyman CMA

## 2021-05-25 NOTE — PROGRESS NOTES
[facility] Dermato-allergology Note  Office visit  Encounter Date: May 25, 2021  ____________________________________________    CC: No chief complaint on file.      HPI:  Ms. Debbie Bah is a(n) 30 year old female who presents today as a new patient for allergy consultation  - for the last decade patient develops within 1h after eating mushrooms (any kind of mushroom = portobello, shitake) bloating, cramping, abdominal pain, diarrhoea, not nausea and vomiting --> lasts up to 6 hours, pepto-bismol 3h.; even small amount of mushrooms can induce that.   - otherwise feeling well in usual state of health    Physical exam:  General: In no acute distress, well-developed, well-nourished  Eyes: no conjunctivitis  ENT: no signs of rhinitis   Pulmonary: no wheezing or coughing  Skin: Focused examination of the skin on test sites was performed = see test results below    Past Medical History:   Patient Active Problem List   Diagnosis     Major depressive disorder, recurrent episode, mild (H)     Situational anxiety     Acne vulgaris     Obesity (BMI 35.0-39.9) with comorbidity (H)     Past Medical History:   Diagnosis Date     Binge eating disorder      Depression 2015     GERD (gastroesophageal reflux disease)     situational (resolved)       Allergies:  Allergies   Allergen Reactions     Doxycycline      esophagitis     Mushroom Cramps, Diarrhea and GI Disturbance       Medications:  Current Outpatient Medications   Medication     buPROPion (WELLBUTRIN XL) 300 MG 24 hr tablet     Cholecalciferol (VITAMIN D) 2000 UNITS CAPS     ibuprofen (ADVIL/MOTRIN) 800 MG tablet     Lactobacillus (PROBIOTIC ACIDOPHILUS) TABS     multivitamin, therapeutic with minerals (MULTI-VITAMIN) TABS tablet     norgestim-eth estrad triphasic (ORTHO TRI-CYCLEN LO) 0.18/0.215/0.25 MG-25 MCG tablet     omega-3 acid ethyl esters (LOVAZA) 1 G capsule     No current facility-administered medications for this visit.        Social  History:    Family History:  Family History   Problem Relation Age of Onset     Asthma Mother      Hypertension Mother      Insomnia Mother      Hypertension Father      Arthritis Paternal Grandmother      Cerebrovascular Disease Paternal Grandmother 75     Cerebrovascular Disease Paternal Grandfather 82     Coronary Artery Disease Maternal Grandfather 65     Parkinsonism Other         maternal grandmother     Depression Half-Sister      Anxiety Disorder Half-Sister      Insomnia Brother      Diabetes No family hx of      Breast Cancer No family hx of        Previous Labs, Allergy Tests, Dermatopathology, Imaging:    Referred By: Referred Self, MD  No address on file     Allergy Tests:    Past Allergy Test    Order for Future Allergy Testing:    [x] Outpatient  [] Inpatient: [facility]..../ Bed ....       Skin Atopy (atopic dermatitis) [] Yes   [x] No  Contact allergies:   [] Yes   [x] No  Urticaria/Angioedema  [] Yes   [x] No  Rhinitis/Sinusitis:   [] Yes   [x] No   [] seasonal [] perennial            Allergic Asthma:   [] Yes   [x] No  Pets :  [] Yes   [] No  which?......  Food Allergy:   [x] Yes   [] No with mushrooms cramping and abdominal pain  Drug allergies:    [x] Yes   [] No Doxycycline Esophagitis = side  effect  Leg ulcers:   [] Yes   [x] No  Hand eczema:   [] Yes   [x] No   Leading hand:   [] R   [] L       [] Ambidextrous                          Order for PRICK TESTS    Reason for tests (suspected allergy): severe GI symptoms with various mushrooms (molds on mushrooms)  Known previous allergies: none    Standardized prick panels  [x] Atopic panel (20 tests)  [] Pediatric Panel (12 tests)  [] Milk, Meat, Eggs, Grains (20 tests)   [] Dust, Epithelia, Feathers (10 tests)  [] Fish, Seafood, Shellfish (17 tests)  [] Nuts, Beans (14 tests)  [] Spice, Vegetable, Fruit (17 tests)  [] Others: ...      [x] Patient's own products (next time): prick/prick with various    mushrooms      Standardized intradermal  tests  [x] Penicillium notatum [x] Aspergillus fumigatus [] House dust mites  [] Bee venom   [] Wasp venom !!Specific protocol with dilutions!!  [] Others: ...      Atopy Screen (Placed 05/25/21 )    No Substance Readings (15 min) Evaluation   POS Histamine 1mg/ml ++    NEG NaCl 0.9% -      No Substance Readings (15 min) Evaluation   1 Alternaria alternata (tenuis)  -    2 Cladosporium herbarum -    3 Aspergillus fumigatus -    4 Penicillium notatum -    5 Dermatophagoides pteronyssinus -    6 Dermatophagoides farinae -    7 Dog epithelium (canis spp) -    8 Cat hair (liza catus) -    9 Cockroach   (Blatella americana & germanica) -    10 Grass mix midwest   (Jewels, Orchard, Redtop, Akshat) -    11 [place] grass (sorghum halepense) -    12 Weed mix   (common Cocklebur, Lamb s quarters, rough redroot Pigweed, Dock/Sorrel) -    13 Mug wort (artemisia vulgare) -    14 Ragweed giant/short (ambrosia spp) -    15 English Plantain (plantago lanceolata) -    16 Tree mix 1 (Pecan, Maple BHR, Oak RVW, american [place], black [place]) +/++    17 Red cedar (juniperus virginia) -    18 Tree mix 2   (white [place], river/red Birch, black [place], common [place], american Elm) -    19 Box elder/Maple mix (acer spp) -    20 [place] shagbark (carya ovata) -       -      Conclusion:       Intradermal Testing (Placed 05/25/21 )    No Substance Conc.  Readings (15min) Evaluation   -     -    +     -    DF Cladosporium herbarum 1:10 -    DP Alternaria alternatia 1:10 -    A Aspergillus fumigatus  1:10 (+) 6mmP/7mmE   P Penicillium notatum 1:10 -    Conclusion: no relevant immediate type reaction to molds    _____________________________    Assessment & Plan:    ==> Final Diagnosis:     # severe GI symptoms with cramping/diarrhoea after eating mushrooms  * chronic with acute exacerbation      # atopy? = prick and IDT negatives    ==> Treatment Plan:  See later    Procedures Performed: Allergy tests, including prick, intradermal  tests    Staff:  Provider    Follow-up in Derm-Allergy clinic for prick/prick with various mushrooms    I spent a total of 30 minutes with Debbie Bah. This time was spent counseling the patient and/or coordinating care, explaining the allergy tests, performing allergy tests and assessing the clinical relevance.

## 2021-05-25 NOTE — PROGRESS NOTES
[facility] Dermato-allergology Note  Office visit  Encounter Date: May 25, 2021  ____________________________________________    CC: No chief complaint on file.      HPI:  Ms. Debbie Bah is a(n) 30 year old female who presents today as a new patient for allergy consultation  - for the last decade patient develops within 1h after eating mushrooms (any kind of mushroom = portobello, shitake) bloating, cramping, abdominal pain, diarrhoea, not nausea and vomiting --> lasts up to 6 hours, pepto-bismol 3h.; even small amount of mushrooms can induce that.   - otherwise feeling well in usual state of health    Physical exam:  General: In no acute distress, well-developed, well-nourished  Eyes: no conjunctivitis  ENT: no signs of rhinitis   Pulmonary: no wheezing or coughing  Skin: Focused examination of the skin on test sites was performed = see test results below    Past Medical History:   Patient Active Problem List   Diagnosis     Major depressive disorder, recurrent episode, mild (H)     Situational anxiety     Acne vulgaris     Obesity (BMI 35.0-39.9) with comorbidity (H)     Past Medical History:   Diagnosis Date     Binge eating disorder      Depression 2015     GERD (gastroesophageal reflux disease)     situational (resolved)       Allergies:  Allergies   Allergen Reactions     Doxycycline      esophagitis     Mushroom Cramps, Diarrhea and GI Disturbance       Medications:  Current Outpatient Medications   Medication     buPROPion (WELLBUTRIN XL) 300 MG 24 hr tablet     Cholecalciferol (VITAMIN D) 2000 UNITS CAPS     ibuprofen (ADVIL/MOTRIN) 800 MG tablet     Lactobacillus (PROBIOTIC ACIDOPHILUS) TABS     multivitamin, therapeutic with minerals (MULTI-VITAMIN) TABS tablet     norgestim-eth estrad triphasic (ORTHO TRI-CYCLEN LO) 0.18/0.215/0.25 MG-25 MCG tablet     omega-3 acid ethyl esters (LOVAZA) 1 G capsule     No current facility-administered medications for this visit.        Social  History:    Family History:  Family History   Problem Relation Age of Onset     Asthma Mother      Hypertension Mother      Insomnia Mother      Hypertension Father      Arthritis Paternal Grandmother      Cerebrovascular Disease Paternal Grandmother 75     Cerebrovascular Disease Paternal Grandfather 82     Coronary Artery Disease Maternal Grandfather 65     Parkinsonism Other         maternal grandmother     Depression Half-Sister      Anxiety Disorder Half-Sister      Insomnia Brother      Diabetes No family hx of      Breast Cancer No family hx of        Previous Labs, Allergy Tests, Dermatopathology, Imaging:    Referred By: Referred Self, MD  No address on file     Allergy Tests:    Past Allergy Test    Order for Future Allergy Testing:    [x] Outpatient  [] Inpatient: [facility]..../ Bed ....       Skin Atopy (atopic dermatitis) [] Yes   [x] No  Contact allergies:   [] Yes   [x] No  Urticaria/Angioedema  [] Yes   [x] No  Rhinitis/Sinusitis:   [] Yes   [x] No   [] seasonal [] perennial            Allergic Asthma:   [] Yes   [x] No  Pets :  [] Yes   [] No  which?......  Food Allergy:   [x] Yes   [] No with mushrooms cramping and abdominal pain  Drug allergies:    [x] Yes   [] No Doxycycline Esophagitis = side  effect  Leg ulcers:   [] Yes   [x] No  Hand eczema:   [] Yes   [x] No   Leading hand:   [] R   [] L       [] Ambidextrous                          Order for PRICK TESTS    Reason for tests (suspected allergy): severe GI symptoms with various mushrooms (molds on mushrooms)  Known previous allergies: none    Standardized prick panels  [x] Atopic panel (20 tests)  [] Pediatric Panel (12 tests)  [] Milk, Meat, Eggs, Grains (20 tests)   [] Dust, Epithelia, Feathers (10 tests)  [] Fish, Seafood, Shellfish (17 tests)  [] Nuts, Beans (14 tests)  [] Spice, Vegetable, Fruit (17 tests)  [] Others: ...      [x] Patient's own products (next time): prick/prick with various    mushrooms      Standardized intradermal  tests  [x] Penicillium notatum [x] Aspergillus fumigatus [] House dust mites  [] Bee venom   [] Wasp venom !!Specific protocol with dilutions!!  [] Others: ...      [] Patient needs consultation with Allergy team (changes of tests may apply)  [] Tests discussed with Allergy team (can have direct appointment for allergy tests)     ________________________________    Assessment & Plan:    ==> Final Diagnosis:     # severe GI symptoms with cramping/diarrhoea after eating mushrooms  * chronic with acute exacerbation  {jgallergytestfinal:321735}  - ***    # atopy?  {jgstatus:559542}  {jgallergytestfinal:607331}  - ***    # ***  {jgstatus:399821}  {jgallergytestfinal:331393}  - ***    ==> Treatment Plan:  ***    Procedures Performed: Allergy tests, including prick, intradermal tests    Staff:  Provider    Follow-up in Derm-Allergy clinic for prick/prick with various mushrooms    I spent a total of 30 minutes with Debbie Bah. This time was spent counseling the patient and/or coordinating care, explaining the allergy tests, performing allergy tests and assessing the clinical relevance.

## 2021-05-27 NOTE — PROGRESS NOTES
[facility] Dermato-allergology Note  Office visit  Encounter Date: May 28, 2021  ____________________________________________    CC: No chief complaint on file.      HPI:  Ms. Debbie Bah is a(n) 30 year old female who presents today as a return patient for allergy consultation  - Follow-up in Derm-Allergy clinic for prick/prick with various mushrooms  - otherwise feeling well in usual state of health    Physical exam:  General: In no acute distress, well-developed, well-nourished  Eyes: no conjunctivitis  ENT: no signs of rhinitis   Pulmonary: no wheezing or coughing  Skin:Focused examination of the skin on test sites was performed = see test results below    Earlier History and Allergy exams:  - for the last decade patient develops within 1h after eating mushrooms (any kind of mushroom = portobello, shitake) bloating, cramping, abdominal pain, diarrhoea, not nausea and vomiting --> lasts up to 6 hours, pepto-bismol 3h.; even small amount of mushrooms can induce that.   - otherwise feeling well in usual state of health      Past Medical History:   Patient Active Problem List   Diagnosis     Major depressive disorder, recurrent episode, mild (H)     Situational anxiety     Acne vulgaris     Obesity (BMI 35.0-39.9) with comorbidity (H)     Past Medical History:   Diagnosis Date     Binge eating disorder      Depression 2015     GERD (gastroesophageal reflux disease)     situational (resolved)       Allergies:  Allergies   Allergen Reactions     Doxycycline      esophagitis     Mushroom Cramps, Diarrhea and GI Disturbance       Medications:  Current Outpatient Medications   Medication     buPROPion (WELLBUTRIN XL) 300 MG 24 hr tablet     Cholecalciferol (VITAMIN D) 2000 UNITS CAPS     escitalopram (LEXAPRO) 10 MG tablet     ibuprofen (ADVIL/MOTRIN) 800 MG tablet     Lactobacillus (PROBIOTIC ACIDOPHILUS) TABS     multivitamin, therapeutic with minerals (MULTI-VITAMIN) TABS tablet      norgestim-eth estrad triphasic (ORTHO TRI-CYCLEN LO) 0.18/0.215/0.25 MG-25 MCG tablet     omega-3 acid ethyl esters (LOVAZA) 1 G capsule     No current facility-administered medications for this visit.        Social History:    Family History:  Family History   Problem Relation Age of Onset     Asthma Mother      Hypertension Mother      Insomnia Mother      Hypertension Father      Arthritis Paternal Grandmother      Cerebrovascular Disease Paternal Grandmother 75     Cerebrovascular Disease Paternal Grandfather 82     Coronary Artery Disease Maternal Grandfather 65     Parkinsonism Other         maternal grandmother     Depression Half-Sister      Anxiety Disorder Half-Sister      Insomnia Brother      Diabetes No family hx of      Breast Cancer No family hx of        Previous Labs, Allergy Tests, Dermatopathology, Imaging:    Referred By: Referred Self, MD  No address on file     Allergy Tests:    Past Allergy Test    Order for Future Allergy Testing:    [x] Outpatient  [] Inpatient: [facility]..../ Bed ....       Skin Atopy (atopic dermatitis) [] Yes   [x] No  Contact allergies:   [] Yes   [x] No  Urticaria/Angioedema  [] Yes   [x] No  Rhinitis/Sinusitis:   [] Yes   [x] No   [] seasonal [] perennial            Allergic Asthma:   [] Yes   [x] No  Pets :  [] Yes   [] No  which?......  Food Allergy:   [x] Yes   [] No with mushrooms cramping and abdominal pain  Drug allergies:    [x] Yes   [] No Doxycycline Esophagitis = side  effect  Leg ulcers:   [] Yes   [x] No  Hand eczema:   [] Yes   [x] No   Leading hand:   [] R   [] L       [] Ambidextrous                          Order for PRICK TESTS    Reason for tests (suspected allergy): severe GI symptoms with various mushrooms (molds on mushrooms)  Known previous allergies: none    Standardized prick panels  [x] Atopic panel (20 tests)  [] Pediatric Panel (12 tests)  [] Milk, Meat, Eggs, Grains (20 tests)   [] Dust, Epithelia, Feathers (10 tests)  [] Fish, Seafood, Shellfish  (17 tests)  [] Nuts, Beans (14 tests)  [] Spice, Vegetable, Fruit (17 tests)  [] Others: ...      [x] Patient's own products (next time): prick/prick with various    mushrooms      Standardized intradermal tests  [x] Penicillium notatum [x] Aspergillus fumigatus [] House dust mites  [] Bee venom   [] Wasp venom !!Specific protocol with dilutions!!  [] Others: ...      Atopy Screen (Placed 05/25/21 )    No Substance Readings (15 min) Evaluation   POS Histamine 1mg/ml ++    NEG NaCl 0.9% -      No Substance Readings (15 min) Evaluation   1 Alternaria alternata (tenuis)  -    2 Cladosporium herbarum -    3 Aspergillus fumigatus -    4 Penicillium notatum -    5 Dermatophagoides pteronyssinus -    6 Dermatophagoides farinae -    7 Dog epithelium (canis spp) -    8 Cat hair (liza catus) -    9 Cockroach   (Blatella americana & germanica) -    10 Grass mix midwest   (Jewels, Orchard, Redtop, Akshat) -    11 Castro grass (sorghum halepense) -    12 Weed mix   (common Cocklebur, Lamb s quarters, rough redroot Pigweed, Dock/Sorrel) -    13 Mug wort (artemisia vulgare) -    14 Ragweed giant/short (ambrosia spp) -    15 English Plantain (plantago lanceolata) -    16 Tree mix 1 (Pecan, Maple BHR, Oak RVW, american [place], black [place]) +/++    17 Red cedar (juniperus virginia) -    18 Tree mix 2   (white Juan Daniel, river/red Birch, black [place], common [place], american Elm) -    19 Box elder/Maple mix (acer spp) -    20 [place] shagbark (carya ovata) -       -        Intradermal Testing (Placed 05/25/21 )    No Substance Conc.  Readings (15min) Evaluation   DF Cladosporium herbarum 1:10 -    DP Alternaria alternatia 1:10 -    A Aspergillus fumigatus  1:10 (+) 6mmP/7mmE   P Penicillium notatum 1:10 -    Conclusion: no relevant immediate type reaction to molds.    Patients Own Products ( 05/28/21 )    No Substance Readings (15min) Evaluation   POS Histamine 1mg/ml +/++    NEG NaCl 0.9% - neg     No Substance Readings (15min)  Evaluation   1 Oyster mushroom +    2 cremini mushroom ++    3 Fresh shitake +/++    4 Dried shitake +    5 Fresh portobello ++    6 Dried portobello +    7 Prick Agaricus campestris     Conclusion: strong reaction to various types of mushrooms. It seems that the dried mushrooms have a little bit less reactivity    _____________________________    Assessment & Plan:    ==> Final Diagnosis:     # severe GI symptoms with cramping/diarrhoea after eating mushrooms with proven sensitization in prick test to various types of mushrooms  * chronic with acute exacerbation      # atopy? = prick and IDT negatives    ==> Treatment Plan:  >> avoid mushrooms as much as possible  >> prescribed emergency set with 100mg Prednisone and 2 Tabl Cetirizine = info given  >> try to find a specialist in mushroom to find the culprit antigen. Patient would be interested and would agree to be contacted by us.    Staff:  Provider      Procedures Performed:  Allergy tests, including prick tests    Follow-up in Derm-Allergy clinic if necessary  ___________________________    I spent a total of 30 minutes with Debbie Bah during today s  visit. This time was spent discussing all the individual test results, correlating them to the clinical relevance, counseling the patient and/or coordinating care and performing allergy tests or procedures.

## 2021-05-28 ENCOUNTER — OFFICE VISIT (OUTPATIENT)
Dept: ALLERGY | Facility: CLINIC | Age: 31
End: 2021-05-28
Payer: COMMERCIAL

## 2021-05-28 DIAGNOSIS — Z91.018 FOOD ALLERGY: Primary | ICD-10-CM

## 2021-05-28 PROCEDURE — 99214 OFFICE O/P EST MOD 30 MIN: CPT | Mod: 25 | Performed by: DERMATOLOGY

## 2021-05-28 PROCEDURE — 95004 PERQ TESTS W/ALRGNC XTRCS: CPT | Performed by: DERMATOLOGY

## 2021-05-28 RX ORDER — CETIRIZINE HYDROCHLORIDE 10 MG/1
10 TABLET ORAL DAILY
Qty: 10 TABLET | Refills: 0 | Status: SHIPPED | OUTPATIENT
Start: 2021-05-28 | End: 2022-02-11

## 2021-05-28 RX ORDER — PREDNISONE 50 MG/1
TABLET ORAL
Qty: 2 TABLET | Refills: 3 | Status: SHIPPED | OUTPATIENT
Start: 2021-05-28 | End: 2022-08-14

## 2021-05-28 NOTE — LETTER
5/28/2021         RE: Debbie Bah  [address]  [facility] [address]        Dear Colleague,    Thank you for referring your patient, Debbie Bah, to the [facility] [place]. Please see a copy of my visit note below.    [facility] Dermato-allergology Note  Office visit  Encounter Date: May 28, 2021  ____________________________________________    CC: No chief complaint on file.      HPI:  Ms. Debbie Bah is a(n) 30 year old female who presents today as a return patient for allergy consultation  - Follow-up in Derm-Allergy clinic for prick/prick with various mushrooms  - otherwise feeling well in usual state of health    Physical exam:  General: In no acute distress, well-developed, well-nourished  Eyes: no conjunctivitis  ENT: no signs of rhinitis   Pulmonary: no wheezing or coughing  Skin:Focused examination of the skin on test sites was performed = see test results below    Earlier History and Allergy exams:  - for the last decade patient develops within 1h after eating mushrooms (any kind of mushroom = portobello, shitake) bloating, cramping, abdominal pain, diarrhoea, not nausea and vomiting --> lasts up to 6 hours, pepto-bismol 3h.; even small amount of mushrooms can induce that.   - otherwise feeling well in usual state of health      Past Medical History:   Patient Active Problem List   Diagnosis     Major depressive disorder, recurrent episode, mild (H)     Situational anxiety     Acne vulgaris     Obesity (BMI 35.0-39.9) with comorbidity (H)     Past Medical History:   Diagnosis Date     Binge eating disorder      Depression 2015     GERD (gastroesophageal reflux disease)     situational (resolved)       Allergies:  Allergies   Allergen Reactions     Doxycycline      esophagitis     Mushroom Cramps, Diarrhea and GI Disturbance       Medications:  Current Outpatient Medications   Medication     buPROPion (WELLBUTRIN XL) 300  MG 24 hr tablet     Cholecalciferol (VITAMIN D) 2000 UNITS CAPS     escitalopram (LEXAPRO) 10 MG tablet     ibuprofen (ADVIL/MOTRIN) 800 MG tablet     Lactobacillus (PROBIOTIC ACIDOPHILUS) TABS     multivitamin, therapeutic with minerals (MULTI-VITAMIN) TABS tablet     norgestim-eth estrad triphasic (ORTHO TRI-CYCLEN LO) 0.18/0.215/0.25 MG-25 MCG tablet     omega-3 acid ethyl esters (LOVAZA) 1 G capsule     No current facility-administered medications for this visit.        Social History:    Family History:  Family History   Problem Relation Age of Onset     Asthma Mother      Hypertension Mother      Insomnia Mother      Hypertension Father      Arthritis Paternal Grandmother      Cerebrovascular Disease Paternal Grandmother 75     Cerebrovascular Disease Paternal Grandfather 82     Coronary Artery Disease Maternal Grandfather 65     Parkinsonism Other         maternal grandmother     Depression Half-Sister      Anxiety Disorder Half-Sister      Insomnia Brother      Diabetes No family hx of      Breast Cancer No family hx of        Previous Labs, Allergy Tests, Dermatopathology, Imaging:    Referred By: Referred Self, MD  No address on file     Allergy Tests:    Past Allergy Test    Order for Future Allergy Testing:    [x] Outpatient  [] Inpatient: [facility]..../ Bed ....       Skin Atopy (atopic dermatitis) [] Yes   [x] No  Contact allergies:   [] Yes   [x] No  Urticaria/Angioedema  [] Yes   [x] No  Rhinitis/Sinusitis:   [] Yes   [x] No   [] seasonal [] perennial            Allergic Asthma:   [] Yes   [x] No  Pets :  [] Yes   [] No  which?......  Food Allergy:   [x] Yes   [] No with mushrooms cramping and abdominal pain  Drug allergies:    [x] Yes   [] No Doxycycline Esophagitis = side  effect  Leg ulcers:   [] Yes   [x] No  Hand eczema:   [] Yes   [x] No   Leading hand:   [] R   [] L       [] Ambidextrous                          Order for PRICK TESTS    Reason for tests (suspected allergy): severe GI symptoms with  various mushrooms (molds on mushrooms)  Known previous allergies: none    Standardized prick panels  [x] Atopic panel (20 tests)  [] Pediatric Panel (12 tests)  [] Milk, Meat, Eggs, Grains (20 tests)   [] Dust, Epithelia, Feathers (10 tests)  [] Fish, Seafood, Shellfish (17 tests)  [] Nuts, Beans (14 tests)  [] Spice, Vegetable, Fruit (17 tests)  [] Others: ...      [x] Patient's own products (next time): prick/prick with various    mushrooms      Standardized intradermal tests  [x] Penicillium notatum [x] Aspergillus fumigatus [] House dust mites  [] Bee venom   [] Wasp venom !!Specific protocol with dilutions!!  [] Others: ...      Atopy Screen (Placed 05/25/21 )    No Substance Readings (15 min) Evaluation   POS Histamine 1mg/ml ++    NEG NaCl 0.9% -      No Substance Readings (15 min) Evaluation   1 Alternaria alternata (tenuis)  -    2 Cladosporium herbarum -    3 Aspergillus fumigatus -    4 Penicillium notatum -    5 Dermatophagoides pteronyssinus -    6 Dermatophagoides farinae -    7 Dog epithelium (canis spp) -    8 Cat hair (liza catus) -    9 Cockroach   (Blatella americana & germanica) -    10 Grass mix midwest   (Jewels, Orchard, Redtop, Akshat) -    11 Castro grass (sorghum halepense) -    12 Weed mix   (common Cocklebur, Lamb s quarters, rough redroot Pigweed, Dock/Sorrel) -    13 Mug wort (artemisia vulgare) -    14 Ragweed giant/short (ambrosia spp) -    15 English Plantain (plantago lanceolata) -    16 Tree mix 1 (Pecan, Maple BHR, Oak RVW, american [place], black [place]) +/++    17 Red cedar (juniperus virginia) -    18 Tree mix 2   (white Juan Daniel, river/red Birch, black [place], common [place], american Elm) -    19 Box elder/Maple mix (acer spp) -    20 [place] shagbark (carya ovata) -       -        Intradermal Testing (Placed 05/25/21 )    No Substance Conc.  Readings (15min) Evaluation   DF Cladosporium herbarum 1:10 -    DP Alternaria alternatia 1:10 -    A Aspergillus fumigatus  1:10 (+)  6mmP/7mmE   P Penicillium notatum 1:10 -    Conclusion: no relevant immediate type reaction to molds.    Patients Own Products ( 05/28/21 )    No Substance Readings (15min) Evaluation   POS Histamine 1mg/ml +/++    NEG NaCl 0.9% - neg     No Substance Readings (15min) Evaluation   1 Oyster mushroom +    2 cremini mushroom ++    3 Fresh shitake +/++    4 Dried shitake +    5 Fresh portobello ++    6 Dried portobello +    7 Prick Agaricus campestris     Conclusion: strong reaction to various types of mushrooms. It seems that the dried mushrooms have a little bit less reactivity    _____________________________    Assessment & Plan:    ==> Final Diagnosis:     # severe GI symptoms with cramping/diarrhoea after eating mushrooms with proven sensitization in prick test to various types of mushrooms  * chronic with acute exacerbation      # atopy? = prick and IDT negatives    ==> Treatment Plan:  >> avoid mushrooms as much as possible  >> prescribed emergency set with 100mg Prednisone and 2 Tabl Cetirizine = info given  >> try to find a specialist in mushroom to find the culprit antigen. Patient would be interested and would agree to be contacted by us.    Staff:  Provider      Procedures Performed:  Allergy tests, including prick tests    Follow-up in Derm-Allergy clinic if necessary  ___________________________    I spent a total of 30 minutes with Debbie Bah during today s  visit. This time was spent discussing all the individual test results, correlating them to the clinical relevance, counseling the patient and/or coordinating care and performing allergy tests or procedures.           Again, thank you for allowing me to participate in the care of your patient.        Sincerely,        Juliano Garcia MD

## 2021-06-03 ENCOUNTER — MYC MEDICAL ADVICE (OUTPATIENT)
Dept: FAMILY MEDICINE | Facility: CLINIC | Age: 31
End: 2021-06-03

## 2021-06-03 DIAGNOSIS — Z13.1 SCREENING FOR DIABETES MELLITUS: ICD-10-CM

## 2021-06-03 DIAGNOSIS — Z13.220 LIPID SCREENING: Primary | ICD-10-CM

## 2021-06-03 DIAGNOSIS — G25.81 RESTLESS LEGS SYNDROME: ICD-10-CM

## 2021-06-09 NOTE — TELEPHONE ENCOUNTER
Erika    See pt [employer] message    Labs cued if you agree    Lia Farrukh RN   [facility]

## 2021-06-14 ENCOUNTER — TRANSFERRED RECORDS (OUTPATIENT)
Dept: HEALTH INFORMATION MANAGEMENT | Facility: CLINIC | Age: 31
End: 2021-06-14
Payer: COMMERCIAL

## 2021-06-17 PROBLEM — L70.0 ACNE VULGARIS: Status: RESOLVED | Noted: 2018-03-09 | Resolved: 2021-06-17

## 2021-06-18 ENCOUNTER — OFFICE VISIT (OUTPATIENT)
Dept: FAMILY MEDICINE | Facility: CLINIC | Age: 31
End: 2021-06-18
Payer: COMMERCIAL

## 2021-06-18 VITALS
DIASTOLIC BLOOD PRESSURE: 76 MMHG | WEIGHT: 223.5 LBS | SYSTOLIC BLOOD PRESSURE: 128 MMHG | BODY MASS INDEX: 37.24 KG/M2 | OXYGEN SATURATION: 98 % | HEART RATE: 78 BPM | TEMPERATURE: 98.4 F | HEIGHT: 65 IN

## 2021-06-18 DIAGNOSIS — Z00.00 ROUTINE GENERAL MEDICAL EXAMINATION AT A HEALTH CARE FACILITY: ICD-10-CM

## 2021-06-18 DIAGNOSIS — Z12.4 SCREENING FOR CERVICAL CANCER: ICD-10-CM

## 2021-06-18 DIAGNOSIS — Z13.220 LIPID SCREENING: ICD-10-CM

## 2021-06-18 DIAGNOSIS — Z13.1 SCREENING FOR DIABETES MELLITUS: ICD-10-CM

## 2021-06-18 DIAGNOSIS — G25.81 RESTLESS LEGS SYNDROME: ICD-10-CM

## 2021-06-18 LAB
CHOLEST SERPL-MCNC: 205 MG/DL
ERYTHROCYTE [DISTWIDTH] IN BLOOD BY AUTOMATED COUNT: 13 % (ref 10–15)
FERRITIN SERPL-MCNC: 59 NG/ML (ref 12–150)
GLUCOSE SERPL-MCNC: 87 MG/DL (ref 70–99)
HBA1C MFR BLD: 5.2 % (ref 0–5.6)
HCT VFR BLD AUTO: 39.5 % (ref 35–47)
HDLC SERPL-MCNC: 79 MG/DL
HGB BLD-MCNC: 12.9 G/DL (ref 11.7–15.7)
LDLC SERPL CALC-MCNC: 110 MG/DL
MCH RBC QN AUTO: 30 PG (ref 26.5–33)
MCHC RBC AUTO-ENTMCNC: 32.7 G/DL (ref 31.5–36.5)
MCV RBC AUTO: 92 FL (ref 78–100)
NONHDLC SERPL-MCNC: 126 MG/DL
PLATELET # BLD AUTO: 343 10E9/L (ref 150–450)
RBC # BLD AUTO: 4.3 10E12/L (ref 3.8–5.2)
TRIGL SERPL-MCNC: 79 MG/DL
WBC # BLD AUTO: 7.2 10E9/L (ref 4–11)

## 2021-06-18 PROCEDURE — 85027 COMPLETE CBC AUTOMATED: CPT | Performed by: NURSE PRACTITIONER

## 2021-06-18 PROCEDURE — 82947 ASSAY GLUCOSE BLOOD QUANT: CPT | Performed by: NURSE PRACTITIONER

## 2021-06-18 PROCEDURE — 82728 ASSAY OF FERRITIN: CPT | Performed by: NURSE PRACTITIONER

## 2021-06-18 PROCEDURE — 36415 COLL VENOUS BLD VENIPUNCTURE: CPT | Performed by: NURSE PRACTITIONER

## 2021-06-18 PROCEDURE — 80061 LIPID PANEL: CPT | Performed by: NURSE PRACTITIONER

## 2021-06-18 PROCEDURE — 99395 PREV VISIT EST AGE 18-39: CPT | Performed by: NURSE PRACTITIONER

## 2021-06-18 PROCEDURE — 83036 HEMOGLOBIN GLYCOSYLATED A1C: CPT | Performed by: NURSE PRACTITIONER

## 2021-06-18 RX ORDER — CHOLECALCIFEROL (VITAMIN D3) 50 MCG
2.5 TABLET ORAL DAILY
COMMUNITY
Start: 2021-06-18 | End: 2021-09-20

## 2021-06-18 RX ORDER — MULTIVIT-MIN/IRON/FOLIC ACID/K 18-600-40
4 CAPSULE ORAL
COMMUNITY
Start: 2021-06-18 | End: 2021-06-18

## 2021-06-18 ASSESSMENT — PATIENT HEALTH QUESTIONNAIRE - PHQ9: SUM OF ALL RESPONSES TO PHQ QUESTIONS 1-9: 6

## 2021-06-18 ASSESSMENT — MIFFLIN-ST. JEOR: SCORE: 1745.28

## 2021-06-18 NOTE — PATIENT INSTRUCTIONS
"My current clinic hours are as follows:    I will be on leave from June 25 through Sept 23rd    Clinic notes    Lab and imaging results are now available for you to view immediately on completion.  Please know that I often have not seen the result before you see results.  I will interpret and discuss the results and meaning of the results as soon as I am able to review them.     [employer] is the best way to reach the clinic directly.  When you send a [employer] message this will be read by our clinic RNs.    Please know that when you call the clinic number, you are not speaking to a staff member from our local clinic.  You can ask that staff to speak to a clinic staff directly if you wish.    You will be able to read my documentation (\"provider notes\") when I finish up and close your chart.  I encourage you to read through to understand your diagnosis and the plan and how we arrived there.  It is also an opportunity to make sure I fully understood your concerns.      1.\"Eat food.  Not too much.  Mostly plants\" - Issa Pollen - see link below  - Aim for 5-7 servings of vegetables (raw vegetables - 1 serving = 1/2 cup; raw greens - 1 serving = 1 cup)   - Eat grains, but don't make them the superstar of your meal and DO make them whole grains  2. AVOID sugar.  There is no nutritional benefit to sugar.  3. Drink lots of water (avoid juice and soda)  4. MOVE your body daily - even if some days this means 5 minutes of movement. Walking is great for your bones and brain.  Do aim for 150 minutes per week of activity that raises your heart rate, but \"don't let the ideal get in the way of the good enough\"  5. Get good sleep (6-8 hours/night- less sleep is associated with disease/illness/obesity)   6. Smile and laugh every day - even in the face of tragedy  7. Start a meditation or mindfulness practice    CALCIUM: The average recommended intake for women is 7874-2162 mg calcium daily. It is best to get this from your diet (Milk, " yogurt, and cheese, vegetables such as Chinese cabbage, kale, spinach and broccoli). If you are not eating enough calcium, then I recommend Calcium Citrate/vitD supplements daily.     Vitamin D is an essential nutritient that many [place] lack, especially in the winter. It is vital for healthy immune system functioning and can be linked to diseases such as obesity, diabetes, body aches/pain, etc. It is super safe and highly beneficial for a [place] to take a vitamin D3 2000 IU once daily during winter months. Daily vitamin D for life is recommended for anyone who has ever been found deficient.    Other things to consider: do not drive while under the influence of alcohol, do not drive while texting, always wear a seatbelt, wear a helmet when biking, wear sunscreen to help prevent skin cancer, use DEET mosquito spray when outdoors to prevent mosquito and tick bites, & avoid smoking. If you do get bit by a DEER tick, please contact my office immediately to consider lyme prophylaxis. Dental visits recommended every 6-12 months.    Issa Jose's 7 Rules for Eating  https://www.[employer]/food-recipes/news/20090323/7-rules-for-eating#1        Preventive Health Recommendations  Female Ages 26 - 39  Yearly exam:   See your health care provider every year in order to    Review health changes.     Discuss preventive care.      Review your medicines if you your doctor has prescribed any.    Until age 30: Get a Pap test every three years (more often if you have had an abnormal result).    After age 30: Talk to your doctor about whether you should have a Pap test every 3 years or have a Pap test with HPV screening every 5 years.   You do not need a Pap test if your uterus was removed (hysterectomy) and you have not had cancer.  You should be tested each year for STDs (sexually transmitted diseases), if you're at risk.   Talk to your provider about how often to have your cholesterol checked.  If you are at risk for  diabetes, you should have a diabetes test (fasting glucose).  Shots: Get a flu shot each year. Get a tetanus shot every 10 years.   Nutrition:     Eat at least 5 servings of fruits and vegetables each day.    Eat whole-grain bread, whole-wheat pasta and brown rice instead of white grains and rice.    Get adequate Calcium and Vitamin D.     Lifestyle    Exercise at least 150 minutes a week (30 minutes a day, 5 days of the week). This will help you control your weight and prevent disease.    Limit alcohol to one drink per day.    No smoking.     Wear sunscreen to prevent skin cancer.    See your dentist every six months for an exam and cleaning.

## 2021-06-18 NOTE — RESULT ENCOUNTER NOTE
Debbie,    It was so nice to see you and catch up.  I am very excited for you.  Your labs all look great.  If you have any questions, please feel free to contact the clinic.    MARCOS Stark

## 2021-06-18 NOTE — PROGRESS NOTES
SUBJECTIVE:   CC: Debbie Bah is an 30 year old woman who presents for preventive health visit.     {  Patient has been advised of split billing requirements and indicates understanding: Yes  Healthy Habits:    Do you get at least three servings of calcium containing foods daily (dairy, green leafy vegetables, etc.)? yes    Amount of exercise or daily activities, outside of work: 4 day(s) per week    Problems taking medications regularly No    Medication side effects: No    Have you had an eye exam in the past two years? yes    Do you see a dentist twice per year? yes    Do you have sleep apnea, excessive snoring or daytime drowsiness?no    School is going well and loving it.  FP clinicals in the spring in [place] and urgent care now, ED in the fall    Tried Lexapro briefly, but gained weight and didn't have mood benefit.  Continues on Wellbutrin.  Still having lower energy and binge eating.  Effexor caused palpitations.  Perfectionism with school, but not a lot of anxiety  Went casual at work    A couple weeks ago had blurry vision for a couple days that then resolved    Today's PHQ-2 Score:   PHQ-2 ( 1999 [employer]) 2/9/2020 2/13/2019   Q1: Little interest or pleasure in doing things 1 1   Q2: Feeling down, depressed or hopeless 1 1   PHQ-2 Score 2 2   Q1: Little interest or pleasure in doing things Several days -   Q2: Feeling down, depressed or hopeless Several days -   PHQ-2 Score 2 -       Abuse: Current or Past(Physical, Sexual or Emotional)- No  Do you feel safe in your environment? Yes    Have you ever done Advance Care Planning? (For example, a Health Directive, POLST, or a discussion with a medical provider or your loved ones about your wishes): No, advance care planning information given to patient to review.  Patient plans to discuss their wishes with loved ones or provider.      Social History     Tobacco Use     Smoking status: Never Smoker     Smokeless tobacco: Never Used   Substance Use  Topics     Alcohol use: Yes     Alcohol/week: 0.0 standard drinks     Frequency: 2-4 times a month     Drinks per session: 1 or 2     Comment: 0-1 per week     If you drink alcohol do you typically have >3 drinks per day or >7 drinks per week? No                     Reviewed orders with patient.  Reviewed health maintenance and updated orders accordingly - Yes  Lab work is in process  Labs reviewed in EPIC    FSH-7: No flowsheet data found.  click delete button to remove this line now  Patient under 40 years of age: Routine Mammogram Screening not recommended.   Pertinent mammograms are reviewed under the imaging tab.    Pertinent mammograms are reviewed under the imaging tab.  History of abnormal Pap smear: NO - age 21-29 PAP every 3 years recommended  PAP / HPV 4/18/2018   PAP NIL     Reviewed and updated as needed this visit by clinical staff    Meds              Reviewed and updated as needed this visit by Provider                    ROS:  CONSTITUTIONAL: NEGATIVE for fever, chills, change in weight  INTEGUMENTARU/SKIN: NEGATIVE for worrisome rashes, moles or lesions  EYES: NEGATIVE for vision changes or irritation  ENT: NEGATIVE for ear, mouth and throat problems  RESP: NEGATIVE for significant cough or SOB  BREAST: NEGATIVE for masses, tenderness or discharge  CV: NEGATIVE for chest pain, palpitations or peripheral edema  GI: NEGATIVE for nausea, abdominal pain, heartburn, or change in bowel habits  : NEGATIVE for unusual urinary or vaginal symptoms. Periods are regular.  MUSCULOSKELETAL: NEGATIVE for significant arthralgias or myalgia  NEURO: NEGATIVE for weakness, dizziness or paresthesias  PSYCHIATRIC: NEGATIVE for changes in mood or affect    OBJECTIVE:   /76   Pulse 78   Temp 98.4  F (36.9  C) (Temporal)   Wt 101.4 kg (223 lb 8 oz)   SpO2 98%   BMI 37.77 kg/m    EXAM:  GENERAL: healthy, alert and no distress  EYES: Eyes grossly normal to inspection, PERRL and conjunctivae and sclerae  "normal  HENT: ear canals and TM's normal, nose and mouth without ulcers or lesions  NECK: no adenopathy, no asymmetry, masses, or scars and thyroid normal to palpation  RESP: lungs clear to auscultation - no rales, rhonchi or wheezes  BREAST: normal without masses, tenderness or nipple discharge and no palpable axillary masses or adenopathy  CV: regular rate and rhythm, normal S1 S2, no S3 or S4, no murmur, click or rub, no peripheral edema and peripheral pulses strong  ABDOMEN: soft, nontender, no hepatosplenomegaly, no masses and bowel sounds normal   (female): normal female external genitalia, normal urethral meatus, vaginal mucosa pink, moist, well rugated, and normal cervix/adnexa/uterus without masses or discharge  MS: no gross musculoskeletal defects noted, no edema  SKIN: no suspicious lesions or rashes  NEURO: Normal strength and tone, mentation intact and speech normal  PSYCH: mentation appears normal, affect normal/bright  LYMPH: no cervical, supraclavicular, axillary, or inguinal adenopathy    Diagnostic Test Results:  Labs reviewed in Epic  pending    ASSESSMENT/PLAN:   (Z00.00) Routine general medical examination at a health care facility  Comment:   Plan:     (Z12.4) Screening for cervical cancer  Comment:   Plan: PAP imaged thin layer screen reflex to HPV if         ASCUS - recommended age 25 - 29 years              Patient has been advised of split billing requirements and indicates understanding: Yes  COUNSELING:   Reviewed preventive health counseling, as reflected in patient instructions    Estimated body mass index is 37.77 kg/m  as calculated from the following:    Height as of 10/1/20: 1.638 m (5' 4.5\").    Weight as of this encounter: 101.4 kg (223 lb 8 oz).    Weight management plan: Discussed healthy diet and exercise guidelines    She reports that she has never smoked. She has never used smokeless tobacco.      Counseling Resources:  ATP IV Guidelines  Pooled Cohorts Equation " Calculator  Breast Cancer Risk Calculator  BRCA-Related Cancer Risk Assessment: FHS-7 Tool  FRAX Risk Assessment  ICSI Preventive Guidelines  Dietary Guidelines for Americans, 2010  USDA's MyPlate  ASA Prophylaxis  Lung CA Screening    HARJINDER Vasquez CNP  [facility]

## 2021-06-19 ENCOUNTER — TRANSFERRED RECORDS (OUTPATIENT)
Dept: HEALTH INFORMATION MANAGEMENT | Facility: CLINIC | Age: 31
End: 2021-06-19

## 2021-06-21 ENCOUNTER — TELEPHONE (OUTPATIENT)
Dept: FAMILY MEDICINE | Facility: CLINIC | Age: 31
End: 2021-06-21

## 2021-06-21 PROCEDURE — G0145 SCR C/V CYTO,THINLAYER,RESCR: HCPCS | Performed by: NURSE PRACTITIONER

## 2021-06-21 NOTE — TELEPHONE ENCOUNTER
Forms have been completed and faxed back to [facility] @ [phone] , also placed into abstraction to scan into patients chart.        Navya Trivedi MA

## 2021-06-22 ENCOUNTER — OFFICE VISIT (OUTPATIENT)
Dept: OPTOMETRY | Facility: CLINIC | Age: 31
End: 2021-06-22
Payer: COMMERCIAL

## 2021-06-22 DIAGNOSIS — H52.13 MYOPIA OF BOTH EYES: Primary | ICD-10-CM

## 2021-06-22 ASSESSMENT — REFRACTION_CURRENTRX
OS_DIAMETER: 14.0
OS_BASECURVE: 8.4
OS_SPHERE: -1.00
OD_BASECURVE: 8.4
OD_DIAMETER: 14.0
OD_BRAND: ACUVUE OASYS
OS_BRAND: ACUVUE OASYS
OD_SPHERE: -1.00

## 2021-06-22 ASSESSMENT — VISUAL ACUITY
CORRECTION_TYPE: CONTACTS
OS_CC: 20/20
METHOD: SNELLEN - LINEAR
OD_CC: 20/20

## 2021-06-22 ASSESSMENT — REFRACTION_MANIFEST
OS_SPHERE: -1.00
OD_SPHERE: -1.00
OS_CYLINDER: SPHERE
OD_CYLINDER: SPHERE

## 2021-06-22 ASSESSMENT — TONOMETRY
IOP_METHOD: ICARE
OS_IOP_MMHG: 16
OD_IOP_MMHG: 15

## 2021-06-22 ASSESSMENT — CONF VISUAL FIELD
OS_NORMAL: 1
OD_NORMAL: 1

## 2021-06-22 NOTE — PROGRESS NOTES
Assessment/Plan  (H52.13) Myopia of both eyes  (primary encounter diagnosis)  Comment: Ocular health within normal limits   Plan: REFRACTION [[id number]]        SRx updated and released. Return every 1-2 years for complete exam. Return sooner with new flashes/floaters/curtain over vision.       Complete documentation of historical and exam elements from today's encounter can  be found in the full encounter summary report (not reduplicated in this progress  note). I personally obtained the chief complaint(s) and history of present illness. I  confirmed and edited as necessary the review of systems, past medical/surgical  history, family history, social history, and examination findings as documented by  others; and I examined the patient myself. I personally reviewed the relevant tests,  images, and reports as documented above. I formulated and edited as necessary the  assessment and plan and discussed the findings and management plan with the  patient and family.    Bret Hernandez, OD

## 2021-06-23 LAB
COPATH REPORT: NORMAL
PAP: NORMAL

## 2021-06-23 ASSESSMENT — SLIT LAMP EXAM - LIDS
COMMENTS: NORMAL
COMMENTS: NORMAL

## 2021-06-23 ASSESSMENT — EXTERNAL EXAM - LEFT EYE: OS_EXAM: NORMAL

## 2021-06-23 ASSESSMENT — CUP TO DISC RATIO
OS_RATIO: 0.25
OD_RATIO: 0.25

## 2021-06-23 ASSESSMENT — EXTERNAL EXAM - RIGHT EYE: OD_EXAM: NORMAL

## 2021-08-20 ENCOUNTER — VIRTUAL VISIT (OUTPATIENT)
Dept: ENDOCRINOLOGY | Facility: CLINIC | Age: 31
End: 2021-08-20
Payer: COMMERCIAL

## 2021-08-20 VITALS — WEIGHT: 230 LBS | BODY MASS INDEX: 38.23 KG/M2

## 2021-08-20 DIAGNOSIS — E66.01 MORBID OBESITY (H): Primary | ICD-10-CM

## 2021-08-20 PROCEDURE — 99215 OFFICE O/P EST HI 40 MIN: CPT | Mod: 95 | Performed by: INTERNAL MEDICINE

## 2021-08-20 RX ORDER — PHENTERMINE HYDROCHLORIDE 37.5 MG/1
TABLET ORAL
Qty: 45 TABLET | Refills: 1 | Status: SHIPPED | OUTPATIENT
Start: 2021-08-20 | End: 2021-09-30

## 2021-08-20 NOTE — NURSING NOTE
Chief Complaint   Patient presents with     Weight Problem     RMWM       Vitals:    08/20/21 1109   Weight: 104.3 kg (230 lb)       Body mass index is 38.23 kg/m .    Amirah Morejon CMA

## 2021-08-20 NOTE — LETTER
"2021       RE: Debbie Bah  [address]  [place] [address]     Dear Colleague,    Thank you for referring your patient, Debbie Bah, to the [facility] [place] at [facility]. Please see a copy of my visit note below.          Return Medical Weight Management Note     Debbie Bah  MRN:  [id number]  :  1990  LARRY:  2021    Dear Sarahi Santos, HARJINDER CNP,    I had the pleasure of seeing your patient Debbie Bah. She is a 30 year old female who I am continuing to see for treatment of obesity related to:       2020   I have the following health issues associated with obesity: None of the above   I have the following symptoms associated with obesity: Depression       INTERVAL HISTORY:  Visit with my colleague Dr Mendez , first visit with me today. Reviewed and relevant history is as follows, as extracted from earlier note--    -------------------------------------------------------  \"Debbie has been struggled with weight since her adult life but mostly in the past 2 years when she has started grad school and continued to work part time. 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 She gained from 180 lbs up to 250 lbs in the past 2 years. She reports increased stress eating, emotional eating, larger portion side and increased fast food consumption. She reports frequent late night eating just to relax before going to sleep. She feels that she has food thought all the time. She sleeps about 7-8 hours per day but still feels very fatigue.      She was admitted at [facility] for 4 months for binges eating from the end of 2018 to early 2019.      She works with psychiatrist at [facility]. She was prescribed topiramate but it caused extreme paresthesia. She also was on Vyvanse but it caused her to feel not well. She also see therapist " "to work on stress coping mechanism. She is currently taking bupropion.      She does not exercise regularly. She feels that she has no time to exercise. She probably walks about 1-2 times per week.  She works from 3 pm to 11 pm. She usually eats before going to work and during works hours. Diet is mostly riches with carbohydrate. \"  -------------------------------------------------------    Since last seen she notes the following    --50# wt loss over last yr (post stopping the naltrexone [n/HAs on that so stopped it]).  Used myfitness and Noom each for a little while, began walking reguarly  --then regain of 15 and then another 10# recently with new anti-depressant    --topiramate and Vyvanse were tried with psychiatrist to help with wt loss (they were not effective)    --notes that Prozac did help with appetite control prior; she will reach out to her psychiatrist about a trial of this again as well as discussing with them possible trial of phentermine. If we have concurrence from her psychiatrist she can start the phentermine.    Discussed potential side effects and action; fact sheet being sent to her      CURRENT WEIGHT:   230 lbs 0 oz  Body mass index is 38.23 kg/m .                Changes and Difficulties 8/20/2021   I have made the following changes to my diet since my last visit: no   With regards to my diet, I am still struggling with: binging   I have made the following changes to my activity/exercise since my last visit: yes, exercise more like daily walk   With regards to my activity/exercise, I am still struggling with: due to other health issues, struggle with longer walks       VITALS:  Wt 104.3 kg (230 lb)   BMI 38.23 kg/m      MEDICATIONS:   Current Outpatient Medications   Medication Sig Dispense Refill     buPROPion (WELLBUTRIN XL) 300 MG 24 hr tablet Take 300 mg by mouth daily       cetirizine (ZYRTEC) 10 MG tablet Take 1 tablet (10 mg) by mouth daily 10 tablet 0     multivitamin, therapeutic " "with minerals (MULTI-VITAMIN) TABS tablet Take 1 tablet by mouth daily       predniSONE (DELTASONE) 50 MG tablet Emergency set: if severe allergic reaction take immediately 100mg Prednisone (2x50mg) and 2 Tabl Cetirizine 10mg and then write precise 12h retrospective diary.If less severe reaction take only the 2 Tabl Cetirizine 2 tablet 3     vitamin D3 (CHOLECALCIFEROL) 50 mcg (2000 units) tablet Take 2.5 tablets (125 mcg) by mouth daily         Weight Loss Medication History Reviewed With Patient 8/20/2021   Which weight loss medications are you currently taking on a regular basis?  None   Are you having any side effects from the weight loss medication that we have prescribed you? No       She had a height of 5' 4.5\", an entry weight of 257 lbs 12.8 oz, and the calculated Body mass index is 43.57 kg/m . (Feb 2020)      ASSESSMENT/PLAN:  Debbie is a patient with mature onset obesity with significant element of familial/genetic influence and with current health consequences. She does not need aggressive weight loss plan.  Debbie Bah endorses binging, eats a high carb diet, eats a high fat diet, eats fast food once or more per week, uses food as a reward, uses food as mood management, mostly eats during the evening and has a disorganized meal pattern.     Her problem is complicated by strong craving/reward pathways, binges eating and poor lifestyle choices     Her ability to lose weight is impacted by current work life and lack of confidence.     PLAN:    (continuing)  Decrease portion sizes  Decrease eating out  Purge house of food triggers  Dietician visit of education  Calorie/low fat diet  Meal planning  Increase activity      Continue to follow up with psychiatrist and therapist for stress coping     Craving/Reward   Ancillary testing:  N/A.  Food Plan:  High protein/low carbohydrate.   Activity Plan:  Exercise after meals.  Supplementary:  Continue to follow up with psychiatrist and therapist for " stress coping.   Medication:  The patient will begin medication in pursuit of improved medical status as influenced by body weight. She will trial phentermine (beginning 1/2 tablet AM). There is a mutual understanding of the goals and risks of this therapy. The patient is in agreement. She is educated on dosage regimen and possible side effects.       --phentermine 1/2 tabet initial AM dose  Lauren Bloch in about 4 wks  Return with me in about 2-3 mo  Health psych referral also    Time: approx 40 min spent on evaluation, management, counseling, education, & motivational interviewing (video) combined with previsit prep and post visit charting/follow up care same day.    Sincerely,    Natalie Carbajal MD

## 2021-08-20 NOTE — PROGRESS NOTES
"Debbie is a 30 year old who is being evaluated via a billable video visit.      How would you like to obtain your AVS? MyChart  If the video visit is dropped, the invitation should be resent by:  Will anyone else be joining your video visit? No      Video Start Time: 11:56  Video-Visit Details    Type of service:  Video Visit    Video End Time:12:21    Originating Location (pt. Location): Home    Distant Location (provider location):  [facility] [place]     Platform used for Video Visit: Stop Being Watched         [facility] Weight Management Note     Debbie Bah  MRN:  [id number]  :  1990  LARRY:  2021    Dear Sarahi Santos, HARJINDER CNP,    I had the pleasure of seeing your patient Debbie Bah. She is a 30 year old female who I am continuing to see for treatment of obesity related to:       2020   I have the following health issues associated with obesity: None of the above   I have the following symptoms associated with obesity: Depression       INTERVAL HISTORY:  Visit with my colleague Dr Mendez , first visit with me today. Reviewed and relevant history is as follows, as extracted from earlier note--    -------------------------------------------------------  \"Debbie has been struggled with weight since her adult life but mostly in the past 2 years when she has started grad school and continued to work part time. 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 She gained from 180 lbs up to 250 lbs in the past 2 years. She reports increased stress eating, emotional eating, larger portion side and increased fast food consumption. She reports frequent late night eating just to relax before going to sleep. She feels that she has food thought all the time. She sleeps about 7-8 hours per day but still feels very fatigue.      She was admitted at [facility] for 4 months for binges eating from the end of 2018 to early 2019. " "     She works with psychiatrist at [facility]. She was prescribed topiramate but it caused extreme paresthesia. She also was on Vyvanse but it caused her to feel not well. She also see therapist to work on stress coping mechanism. She is currently taking bupropion.      She does not exercise regularly. She feels that she has no time to exercise. She probably walks about 1-2 times per week.  She works from 3 pm to 11 pm. She usually eats before going to work and during works hours. Diet is mostly riches with carbohydrate. \"  -------------------------------------------------------    Since last seen she notes the following    --50# wt loss over last yr (post stopping the naltrexone [n/HAs on that so stopped it]).  Used myfitness and Noom each for a little while, began walking reguarly  --then regain of 15 and then another 10# recently with new anti-depressant    --topiramate and Vyvanse were tried with psychiatrist to help with wt loss (they were not effective)    --notes that Prozac did help with appetite control prior; she will reach out to her psychiatrist about a trial of this again as well as discussing with them possible trial of phentermine. If we have concurrence from her psychiatrist she can start the phentermine.    Discussed potential side effects and action; fact sheet being sent to her      CURRENT WEIGHT:   230 lbs 0 oz  Body mass index is 38.23 kg/m .                Changes and Difficulties 8/20/2021   I have made the following changes to my diet since my last visit: no   With regards to my diet, I am still struggling with: binging   I have made the following changes to my activity/exercise since my last visit: yes, exercise more like daily walk   With regards to my activity/exercise, I am still struggling with: due to other health issues, struggle with longer walks       VITALS:  Wt 104.3 kg (230 lb)   BMI 38.23 kg/m      MEDICATIONS:   Current Outpatient Medications   Medication Sig " "Dispense Refill     buPROPion (WELLBUTRIN XL) 300 MG 24 hr tablet Take 300 mg by mouth daily       cetirizine (ZYRTEC) 10 MG tablet Take 1 tablet (10 mg) by mouth daily 10 tablet 0     multivitamin, therapeutic with minerals (MULTI-VITAMIN) TABS tablet Take 1 tablet by mouth daily       predniSONE (DELTASONE) 50 MG tablet Emergency set: if severe allergic reaction take immediately 100mg Prednisone (2x50mg) and 2 Tabl Cetirizine 10mg and then write precise 12h retrospective diary.If less severe reaction take only the 2 Tabl Cetirizine 2 tablet 3     vitamin D3 (CHOLECALCIFEROL) 50 mcg (2000 units) tablet Take 2.5 tablets (125 mcg) by mouth daily         Weight Loss Medication History Reviewed With Patient 8/20/2021   Which weight loss medications are you currently taking on a regular basis?  None   Are you having any side effects from the weight loss medication that we have prescribed you? No       She had a height of 5' 4.5\", an entry weight of 257 lbs 12.8 oz, and the calculated Body mass index is 43.57 kg/m . (Feb 2020)      ASSESSMENT/PLAN:  Debbie is a patient with mature onset obesity with significant element of familial/genetic influence and with current health consequences. She does not need aggressive weight loss plan.  Debbie Bah endorses binging, eats a high carb diet, eats a high fat diet, eats fast food once or more per week, uses food as a reward, uses food as mood management, mostly eats during the evening and has a disorganized meal pattern.     Her problem is complicated by strong craving/reward pathways, binges eating and poor lifestyle choices     Her ability to lose weight is impacted by current work life and lack of confidence.     PLAN:    (continuing)  Decrease portion sizes  Decrease eating out  Purge house of food triggers  Dietician visit of education  Calorie/low fat diet  Meal planning  Increase activity      Continue to follow up with psychiatrist and therapist for stress " coping     Craving/Reward   Ancillary testing:  N/A.  Food Plan:  High protein/low carbohydrate.   Activity Plan:  Exercise after meals.  Supplementary:  Continue to follow up with psychiatrist and therapist for stress coping.   Medication:  The patient will begin medication in pursuit of improved medical status as influenced by body weight. She will trial phentermine (beginning 1/2 tablet AM). There is a mutual understanding of the goals and risks of this therapy. The patient is in agreement. She is educated on dosage regimen and possible side effects.       --phentermine 1/2 tabet initial AM dose  Lauren Bloch in about 4 wks  Return with me in about 2-3 mo  Health psych referral also          Time: approx 40 min spent on evaluation, management, counseling, education, & motivational interviewing (video) combined with previsit prep and post visit charting/follow up care same day.    Sincerely,    Natalie Carbajal MD

## 2021-08-21 NOTE — PATIENT INSTRUCTIONS
Thank you for allowing us the privilege of caring for you. We hope we provided you with the excellent service you deserve.    Please let us know if there is anything else we can do for you so that we can be sure you are completely satisfied with your care experience.    Your visit was with Dr. Carbajal today.    Instructions per today's visit:  --phentermine 1/2 tabet initial AM dose (see fact sheet information below)  Lauren Bloch in about 4 wks (medication management pharmacy visit)  Return with Dr. Carbajal in about 2-3 mo  Health psych referral also      Please call our contact center at [phone] to schedule your next appointments.    Meal Replacement Products:    Here is the link to our new e-store where you can purchase our meal replacement products    [email or web address]    The one week starter kit is a great way to sample a variety of products and see what works for you.    If you want more information about the product go to: [facility]    Free Shipping for orders over $75    Benefits of meal replacements products:    Portion and calorie control  Improved nutrition  Structured eating  Simplified food choices  Avoid contact with trigger foods    Interested in working with a health ?  Health coaches work with you to improve your overall health and wellbeing.  They look at the whole person, and may involve discussion of different areas of life, including, but not limited to the four pillars of health (sleep, exercise, nutrition, and stress management). Discuss with your care team if you would like to start working a health .    Health Coaching-3 Pack: Schedule by calling [phone]    $99 for three health coaching visits    Visits may be done in person or via phone    Coaching is a partnership between the  and the client; Coaches do not prescribe or diagnose    Coaching helps inspire the client to reach his/her personal goals    Bluetooth  Scale:    We hope to provide you with high quality telephone and virtual healthcare visits while social distancing for COVID-19 is necessary, as well as in the future when virtual visits may be more convenient for you.    Our technology team made it possible for Bluetooth scales to send weight measurements to our electronic medical record. This allows weights from you weighing at home to securely flow into the medical record, which will improve telephone and virtual visits.  Additionally, studies have shown that adults actually lose more weight when their weights are automatically sent to someone else, and also that this process is not stressful for those adults.    Below is a link for purchasing the scale, with a discount code for our patients. You may call your insurance company to see if they will reimburse you for the cost of the scale, as a piece of durable medical equipment. The scales only go up to a weight of 400 pounds. This is an issue and we are working with the developer on increasing this. We found no scales that go over 400lb that have blue-tooth for connecting to Adictiz.    Scale to purchase: the Smarp  Body  Scale: [email or web address]?[email or web address]=aw.ds    Discount Code: We have a discount code for our patients to bring the cost down to $50, the code is:  withmed     Steps to link the scale to Adictiz via an Android Phone (you can always disconnect at any point in the future):  1. The order must be placed first before the patient can access Track My Health within Adictiz.  2. Download Google Fit samantha from the Google Play Store  a. Log in or register using your Google account  3. Download the Adictiz samantha from Google Play Store  a. Select add organization  b. Search for Query Hunter and select it  c. Log into Adictiz  d. Select Track My Health  e. Select the green connect my account button  f. When prompted log into your DeskMetrics  account  g. Select okay to confirm the account  4. Download the Withings Health Mate jhonatan from Google Play Store  a. Higgins for Backpack  b. Go to profile  c. Tap google fit under the Apps section  d. Select the option to activate Google Fit integration  e. Select the same Google account  f. Select okay to confirm the account  g.  Steps to link the scale to Skoovy via an iPhone (you can always disconnect at any point in the future):  **Note AppNeta is not available for download on an iPad**  1. The order must be placed first before the patient can access Track My Health within Skoovy.  2. Locate the Health jhonatan on your iPhone.  a. Set up your Apple Health account as prompted  b. The Sources page will show Apps that communicate with your Health jhonatan. Once all steps are completed, you should see Opbeat and redBus.inhart listed under the Apps section and your iPhone under the devices section.  i. Select Health Mate  1. Under 'ALLOW  HEALTH 6connect  TO WRITE DATA ensure the toggle is on for Weight.  2. This will allow the scale to add your weight to the Apple Health  ii. Select redBus.inhart  1. Under 'ALLOW  Jagex  TO READ DATA ensure the toggle is on for Weight.  2. This allows Skoovy to grab the weight from AppNeta so your provider can see your weights.  3. Download the Skoovy jhonatan from the Jhonatan Store  a. Select add organization                                                  b. Search for Ciapple and select it  c. Log into Skoovy  d. Select Track My Health  e. Select the green connect my account button  f. Follow prompts to link your device to Skoovy.  4. Download the Withings health mate jhonatan in the Jhonatan Store  a. Higgins for Backpack  b. Go to profile  c. Tap Health under the Apps section  d. If prompted to allow access with the Health Jhonatan, toggle weight on for read and write access.      For any questions/concerns contact Sophia Li LPN at [phone]    To schedule appointments with our team, please  call [phone]    Please call during clinic hours Monday through Friday 8:00a - 4:00p if you have questions or you can contact us via Airside Mobile at anytime.      Lab results will be communicated through My Chart or letter (if My Chart not used). Please call the clinic if you have not received communication after 1 week or if you have any questions.    Clinic Fax: [phone]    Thank you,  Medical Weight Management Team      MEDICATION STARTED AT THIS APPOINTMENT    We are starting Phentermine. Take one-half tablet in the morning.  Call the nurse if you have any questions or concerns. (Do not stop taking it if you don't think it's working. For some people it works without them knowing it.)    Phentermine is being prescribed because you identified hunger as one of the main causes for your extra weight.      Our patients on Phentermine find that they:    >feel less hunger    >find it easier to push the plate away   >have an easier time eating less    For some of our patients, these feelings are very real and immediate. For other patients, the feelings are less obvious. They don't feel much of a change but find they've lost weight. Like all weight loss medications, Phentermine  works best when you help it work. This means:  1. Having less tempting high calorie (fattening) food around the house or office. (For people with strong cravings this is very important.)   2. Staying away from situations or people that may trigger your cravings .   3. Eating out only one time or less each week.  4. Eating your meals at a table with the TV or computer off.    Side-effects. Phentermine is generally well tolerated. The main side-effects we see are feelings of racing pulse or rapid heart beat. Some people can get an elevated blood pressure. Because of this we may have you come back within a week or so of starting the medication for a blood pressure check.         In order to get refills of this or any medication we prescribe you  must be seen in the medical weight mgmt clinic every 2-3 months. Please have your pharmacy fax a refill request.

## 2021-08-22 ENCOUNTER — NURSE TRIAGE (OUTPATIENT)
Dept: NURSING | Facility: CLINIC | Age: 31
End: 2021-08-22

## 2021-08-22 ENCOUNTER — HOSPITAL ENCOUNTER (EMERGENCY)
Facility: CLINIC | Age: 31
Discharge: HOME | End: 2021-08-22
Attending: EMERGENCY MEDICINE | Admitting: EMERGENCY MEDICINE
Payer: COMMERCIAL

## 2021-08-22 ENCOUNTER — TELEPHONE (OUTPATIENT)
Dept: OPHTHALMOLOGY | Facility: CLINIC | Age: 31
End: 2021-08-22

## 2021-08-22 VITALS
BODY MASS INDEX: 38.32 KG/M2 | OXYGEN SATURATION: 95 % | DIASTOLIC BLOOD PRESSURE: 92 MMHG | SYSTOLIC BLOOD PRESSURE: 130 MMHG | WEIGHT: 230 LBS | HEART RATE: 54 BPM | RESPIRATION RATE: 16 BRPM | HEIGHT: 65 IN | TEMPERATURE: 98.2 F

## 2021-08-22 DIAGNOSIS — S05.01XA ABRASION OF RIGHT CORNEA, INITIAL ENCOUNTER: ICD-10-CM

## 2021-08-22 DIAGNOSIS — S05.02XA ABRASION OF LEFT CORNEA, INITIAL ENCOUNTER: ICD-10-CM

## 2021-08-22 PROCEDURE — 99283 EMERGENCY DEPT VISIT LOW MDM: CPT

## 2021-08-22 PROCEDURE — 250N000009 HC RX 250: Performed by: EMERGENCY MEDICINE

## 2021-08-22 PROCEDURE — 99284 EMERGENCY DEPT VISIT MOD MDM: CPT | Performed by: EMERGENCY MEDICINE

## 2021-08-22 RX ORDER — ERYTHROMYCIN 5 MG/G
0.5 OINTMENT OPHTHALMIC 4 TIMES DAILY
Qty: 3.5 G | Refills: 0 | Status: SHIPPED | OUTPATIENT
Start: 2021-08-22 | End: 2021-08-27

## 2021-08-22 RX ORDER — TRAMADOL HYDROCHLORIDE 50 MG/1
50 TABLET ORAL EVERY 6 HOURS PRN
Qty: 10 TABLET | Refills: 0 | Status: SHIPPED | OUTPATIENT
Start: 2021-08-22 | End: 2021-08-25

## 2021-08-22 RX ORDER — CARBOXYMETHYLCELLULOSE SODIUM 5 MG/ML
1 SOLUTION/ DROPS OPHTHALMIC ONCE
Status: DISCONTINUED | OUTPATIENT
Start: 2021-08-22 | End: 2021-08-22 | Stop reason: HOSPADM

## 2021-08-22 RX ORDER — TETRACAINE HYDROCHLORIDE 5 MG/ML
1-2 SOLUTION OPHTHALMIC ONCE
Status: COMPLETED | OUTPATIENT
Start: 2021-08-22 | End: 2021-08-22

## 2021-08-22 RX ORDER — POLYVINYL ALCOHOL 14 MG/ML
1 SOLUTION/ DROPS OPHTHALMIC 4 TIMES DAILY
Qty: 15 ML | Refills: 0 | Status: SHIPPED | OUTPATIENT
Start: 2021-08-22 | End: 2021-09-20

## 2021-08-22 RX ADMIN — TETRACAINE HYDROCHLORIDE 2 DROP: 5 SOLUTION OPHTHALMIC at 10:36

## 2021-08-22 ASSESSMENT — VISUAL ACUITY
OS: 20/50
OD: 20/70

## 2021-08-22 ASSESSMENT — ENCOUNTER SYMPTOMS
EYE PAIN: 1
PHOTOPHOBIA: 1

## 2021-08-22 ASSESSMENT — MIFFLIN-ST. JEOR: SCORE: 1774.15

## 2021-08-22 NOTE — ED TRIAGE NOTES
Coming in with with feeling that she has a scratch on right eye. Wears contacts. + photophobia starting this morning.

## 2021-08-22 NOTE — TELEPHONE ENCOUNTER
Patient is calling and feels that she may have scratced her cornea and she wears contacts.  Today woke up with pain and patient cannot open eye as it hurts and feel scratchy.  Right eye.  Today is very painful.    COVID 19 Nurse Triage Plan/Patient Instructions    Please be aware that novel coronavirus (COVID-19) may be circulating in the community. If you develop symptoms such as fever, cough, or SOB or if you have concerns about the presence of another infection including coronavirus (COVID-19), please contact your health care provider or visit [email or web address].     Disposition/Instructions    ED Visit recommended. Follow protocol based instructions.     Bring Your Own Device:  Please also bring your smart device(s) (smart phones, tablets, laptops) and their charging cables for your personal use and to communicate with your care team during your visit.    Thank you for taking steps to prevent the spread of this virus.  o Limit your contact with others.  o Wear a simple mask to cover your cough.  o Wash your hands well and often.    Resources    [facility]: About COVID-19: [email or web address]    CDC: What to Do If You're Sick: www.cdc.gov/coronavirus/2019-ncov/about/steps-when-sick.html    CDC: Ending Home Isolation: www.cdc.gov/coronavirus/2019-ncov/hcp/disposition-in-home-patients.html     CDC: Caring for Someone: www.cdc.gov/coronavirus/2019-ncov/if-you-are-sick/care-for-someone.html     [facility]: Interim Guidance for Hospital Discharge to Home: www.health.[facility]/diseases/coronavirus/hcp/hospdischarge.pdf    [facility] clinical trials (COVID-19 research studies): clinicalaffairs.[email or web address].edu/umn-clinical-trials     Below are the COVID-19 hotlines at the [place] Department of Health ([facility]). Interpreters are available.   o For health questions: Call 699-906-3027 or 1-118.855.4576 (7 a.m. to 7 p.m.)  o For questions about schools and childcare: Call 278-462-7511 or  9-661-849-5135 (7 a.m. to 7 p.m.)                       Reason for Disposition    Severe eye pain    Protocols used: EYE PAIN-A-AH

## 2021-08-22 NOTE — TELEPHONE ENCOUNTER
Received phone call about this patient who was found to have corneal abrasion today. Provider states two days ago she was cooking and toucher her contact lenses after touching chili peppers. She felt burning at that time and immediately removed her contacts and left them soaking for two days. Used her contacts again today and developed more irritation so presented. On ED exam patient has corneal abrasion and provider asked for recommendations for pain control. Told provider I have prescribed AT PRN, AT ointment at bedtime, erythromycin ointment TID in the past. Recommended not reusing the contact lenses. Will message  to arrange follow up in ophthalmology clinic in 2-3 days with return instructions for worsening pain.    Discussed this patient with Dr. Peñaloza who agrees with this assessment and plan.    Zulema Wilson MD  Resident Physician - PGY2  Department of Ophthalmology   [facility]

## 2021-08-22 NOTE — DISCHARGE INSTRUCTIONS
Thank you for choosing [facility] for your care. It was a pleasure taking care of you today in our Emergency Department.     Please follow up with ophthalmology on Wednesday of this week. However, if you have continued worsening symptoms, please return to the emergency department.

## 2021-08-22 NOTE — ED PROVIDER NOTES
"    [facility] ([facility])  August 22, 2021  Triage 9:50 AM   History     Chief Complaint   Patient presents with     Eye Problem     The history is provided by the patient and medical records.     Debbie Bah is a 30 year old female who presents with right eye irritation and pain.  Patient wears contact lenses as well as glasses. Patient states that 2 days ago she was cooking a dish with surrounding peppers, cilantro and scallions and then accidentally touched her eye causing burning irritation. She washed her hands and removed her contact lenses and put on her glasses.  She cleaned her contact lenses with a \"intense cleanser\" containing hydrogen peroxide, thought that this was enough to salvage them. Yesterday put her existing contacts back in and had a little irritaton but it went away without issue.  These were the same contact lenses that may have gotten chili oil in them.  Throughout the day she developed blurry vision in both eyes. She has had similar bilateral eye blurring in the past. She is followed by Bret Hernandez, optometrist, saw them on 8/6/21.  Patient states that she mentioned bilateral eye blurring at the appointment, states that she was told that this is normal with wearing contact lenses. However last night she was driving home and noticed headlights and street lights had a \"halo\" glare but otherwise no pain. She took her contacts out and went to bed.  She woke up this morning she noticed excruciating pain in her right eye.  She has had a lot of eye watering and light sensitivity with this.  She has been rubbing her eyes and noticed some left eye discomfort as well.  She took an Uber here and did bring her eyeglasses with her. She denies any foreign body sensation, is concerned for corneal abrasion.  No prior episodes of similar symptoms.  She denies any other events that may have introduced a foreign body or irritant to her eye.  She went on a hike but " doesn't remember anything getting in her eye at that time. Nose is starting to run due to eye watering. No fever or chills. No ear pain, sore throat headache, abdominal pain, nausea, vomiting, diarrhea, rashes. No medical problems. On bupropion for depression.    Patient is a nurse on [employer], scheduled to work soon and does not know she can work with her eye pain.  She is also an NP student, due to graduate soon.    PAST MEDICAL HISTORY:   Past Medical History:   Diagnosis Date     Binge eating disorder      Depression 2015       PAST SURGICAL HISTORY:   Past Surgical History:   Procedure Laterality Date     NO HISTORY OF SURGERY         Past medical history, past surgical history, medications, and allergies were reviewed with the patient. Additional pertinent items: None    FAMILY HISTORY:   Family History   Problem Relation Age of Onset     Asthma Mother      Hypertension Mother      Insomnia Mother      Hypertension Father      Arthritis Paternal Grandmother      Cerebrovascular Disease Paternal Grandmother 75     Cerebrovascular Disease Paternal Grandfather 82     Coronary Artery Disease Maternal Grandfather 65     Parkinsonism Other         maternal grandmother     Depression Half-Sister      Anxiety Disorder Half-Sister      Insomnia Brother      Diabetes No family hx of      Breast Cancer No family hx of        SOCIAL HISTORY:   Social History     Tobacco Use     Smoking status: Never Smoker     Smokeless tobacco: Never Used   Substance Use Topics     Alcohol use: Yes     Alcohol/week: 0.0 standard drinks     Comment: 0-1 per week     Social history was reviewed with the patient. Additional pertinent items: None      Discharge Medication List as of 8/22/2021 10:28 AM      START taking these medications    Details   artificial tears OINT ophthalmic ointment Place 0.5 g into both eyes At Bedtime, Disp-3.5 g, R-0, E-Prescribe      erythromycin (ROMYCIN) 5 MG/GM ophthalmic ointment Place 0.5 inches into both eyes 4  "times daily for 5 daysDisp-3.5 g, [id number]      polyvinyl alcohol (LIQUIFILM TEARS) 1.4 % ophthalmic solution Place 1 drop into both eyes 4 times daily, Disp-15 mL, R-0, Local Print         CONTINUE these medications which have NOT CHANGED    Details   buPROPion (WELLBUTRIN XL) 300 MG 24 hr tablet Take 300 mg by mouth daily, Historical      cetirizine (ZYRTEC) 10 MG tablet Take 1 tablet (10 mg) by mouth daily, Disp-10 tablet, R-0, E-PrescribePart of emergency set      multivitamin, therapeutic with minerals (MULTI-VITAMIN) TABS tablet Take 1 tablet by mouth daily, Historical      phentermine (ADIPEX-P) 37.5 MG tablet 1/2 tablet each morning, Disp-45 tablet, R-1, E-Prescribe      predniSONE (DELTASONE) 50 MG tablet Emergency set: if severe allergic reaction take immediately 100mg Prednisone (2x50mg) and 2 Tabl Cetirizine 10mg and then write precise 12h retrospective diary.If less severe reaction take only the 2 Tabl Cetirizine, Disp-2 tablet, R-3, E-PrescribePleas e provide patient with key chain box for these emergency medications      vitamin D3 (CHOLECALCIFEROL) 50 mcg (2000 units) tablet Take 2.5 tablets (125 mcg) by mouth daily, Historical                Allergies   Allergen Reactions     Doxycycline      esophagitis     Mushroom Cramps, Diarrhea and GI Disturbance        Review of Systems   Eyes: Positive for photophobia and pain. Negative for visual disturbance.     A complete review of systems was performed with pertinent positives and negatives noted in the HPI, and all other systems negative.    Physical Exam   BP: 126/83  Pulse: 54  Temp: 98.2  F (36.8  C)  Resp: 16  Height: 165.1 cm (5' 5\")  Weight: 104.3 kg (230 lb)  SpO2: 99 %      Physical Exam  Eyes:      General: Lids are normal. Lids are everted, no foreign bodies appreciated. Gaze aligned appropriately.         Right eye: No foreign body, discharge or hordeolum.         Left eye: No foreign body, discharge or hordeolum.      Extraocular " Movements: Extraocular movements intact.      Conjunctiva/sclera:      Right eye: Right conjunctiva is injected. No chemosis, exudate or hemorrhage.     Left eye: Left conjunctiva is injected. No chemosis, exudate or hemorrhage.     Pupils: Pupils are equal, round, and reactive to light.      Right eye: Corneal abrasion and fluorescein uptake present.      Left eye: Corneal abrasion and fluorescein uptake present.      Slit lamp exam:     Right eye: Anterior chamber quiet.      Left eye: Anterior chamber quiet.        Comments: Visual Acuity Left (with glasses): 20/50  Visual Acuity Right (with glasses): 20/200    PH: 7 both eyes    OD: Two regions of fluorescein uptake.  There is a bean-shaped 3 mm area of fluorescein uptake over the the center of the pupil as well as a larger area of fluorescein uptake over the sclera at the 8 o'clock position    OS: Fan-shaped area of fluorescein uptake approximately 6 mm in width and height over the sclera just adjacent to the pupil, spanning 1 o'clock position to 3 o'clock            ED Course      Patient was evaluated and had a normal pH of both eyes.  She had fluorescein uptake of both eyes consistent with corneal abrasion.  Patient no evidence of foreign bodies.  Visual acuity was decreased but likely due to abrasion specifically over the pupil.  However consulted ophthalmology and they recommended artificial tears and artificial tear ointment, as well as erythromycin and following up with ophthalmology in 3 days.    Patient I also discussed analgesia.  I wrote a prescription for tramadol in case this is needed as patient is in significant pain with her corneal abrasions.  However ophthalmology stated not to prescribe anti-inflammatory drops for the eyes.  Patient is in agreement with this plan.      Patient discharged in good condition with questions answered. She was given epic discharge instructions and follow-up recommendations. Patient will return to the ED if symptoms  worsen or she develops any of the concerning signs/symptoms as outlined in the EPIC d/c instructions. Otherwise she will follow up in clinic as recommended in the d/c instructions.              Assessments & Plan (with Medical Decision Making)     I have reviewed the nursing notes.    I have reviewed the findings, diagnosis, plan and need for follow up with the patient.    Discharge Medication List as of 8/22/2021 10:28 AM      START taking these medications    Details   artificial tears OINT ophthalmic ointment Place 0.5 g into both eyes At Bedtime, Disp-3.5 g, R-0, E-Prescribe      erythromycin (ROMYCIN) 5 MG/GM ophthalmic ointment Place 0.5 inches into both eyes 4 times daily for 5 daysDisp-3.5 g, [id number]      polyvinyl alcohol (LIQUIFILM TEARS) 1.4 % ophthalmic solution Place 1 drop into both eyes 4 times daily, Disp-15 mL, R-0, Local Print             Final diagnoses:   Abrasion of left cornea, initial encounter   Abrasion of right cornea, initial encounter     I, Lili Bower, am serving as a trained medical scribe to document services personally performed by Nancy Sanchez MD based on the provider's statements to me on August 22, 2021.  This document has been checked and approved by the attending provider.    I, Nancy Sanchez MD, was physically present and have reviewed and verified the accuracy of this note documented by Lili Bower, medical scribe.      Nancy Sanchez MD       8/22/2021   [facility] EMERGENCY DEPARTMENT     Nancy Sanchez MD  08/22/21 1040

## 2021-08-23 ENCOUNTER — TELEPHONE (OUTPATIENT)
Dept: NURSING | Facility: CLINIC | Age: 31
End: 2021-08-23

## 2021-08-23 ENCOUNTER — TELEPHONE (OUTPATIENT)
Dept: OPHTHALMOLOGY | Facility: CLINIC | Age: 31
End: 2021-08-23

## 2021-08-23 NOTE — TELEPHONE ENCOUNTER
----- Message from Zulema Wilson MD sent at 8/22/2021  5:14 PM CDT -----  Received phone call about this patient who presented to ED with corneal abrasion. Provider requesting follow up in acute ophthalmology clinic on 8/24 or 8/25. Please call patient to schedule follow up in acute ophthalmology clinic. Thank you.

## 2021-08-23 NOTE — TELEPHONE ENCOUNTER
Telephone call:    Seen in the er yesterday corneal abrasion was given directions to stay home for 3 days.  Today feeling 100% better. Wants to if she can go back to work tomorrow.      Patient advised  to follow up with her ophthalmologist.    Clarie Anderson RN   [facility] Nurse Advisor  8:11 AM 8/23/2021

## 2021-08-23 NOTE — TELEPHONE ENCOUNTER
LM for patient to call, gave direct line to general clinic phone and leave a message if I don't answer as well as main scheduling line (instructed she could ask to be transferred to triage from that line)     ALAINA Alonso 7:31 AM 08/23/2021

## 2021-08-25 ENCOUNTER — OFFICE VISIT (OUTPATIENT)
Dept: OPHTHALMOLOGY | Facility: CLINIC | Age: 31
End: 2021-08-25
Payer: COMMERCIAL

## 2021-08-25 DIAGNOSIS — H18.823 CORNEAL ABRASION DUE TO CONTACT LENS, BILATERAL: Primary | ICD-10-CM

## 2021-08-25 PROCEDURE — 92012 INTRM OPH EXAM EST PATIENT: CPT | Performed by: OPTOMETRIST

## 2021-08-25 ASSESSMENT — VISUAL ACUITY
OD_CC: 20/20
CORRECTION_TYPE: GLASSES
OD_CC+: -1
METHOD: SNELLEN - LINEAR
OS_CC: 20/20

## 2021-08-25 ASSESSMENT — TONOMETRY
IOP_METHOD: ICARE
OD_IOP_MMHG: 19
OS_IOP_MMHG: 18

## 2021-08-25 ASSESSMENT — CUP TO DISC RATIO
OD_RATIO: 0.25
OS_RATIO: 0.25

## 2021-08-25 ASSESSMENT — REFRACTION_WEARINGRX
OD_CYLINDER: +0.25
OD_AXIS: 105
OS_CYLINDER: SPHERE
OD_SPHERE: -1.00
OS_SPHERE: -1.00

## 2021-08-25 ASSESSMENT — CONF VISUAL FIELD
METHOD: COUNTING FINGERS
OD_NORMAL: 1
OS_NORMAL: 1

## 2021-08-25 ASSESSMENT — EXTERNAL EXAM - LEFT EYE: OS_EXAM: NORMAL

## 2021-08-25 ASSESSMENT — EXTERNAL EXAM - RIGHT EYE: OD_EXAM: NORMAL

## 2021-08-25 ASSESSMENT — SLIT LAMP EXAM - LIDS
COMMENTS: NORMAL
COMMENTS: NORMAL

## 2021-08-25 NOTE — NURSING NOTE
"Chief Complaints and History of Present Illnesses   Patient presents with     Corneal Abrasion Follow Up     Chief Complaint(s) and History of Present Illness(es)     Corneal Abrasion Follow Up     Laterality: right eye    Duration: 3 days    Associated symptoms: blurred vision (\"tiny bit hazy\" unsure if it is due to the anna. last used anna 8 am, but feels that it may be back to normal but unsure. ).  Negative for eye pain, photophobia, redness, discharge and tearing    Pain scale: 0/10    Course: gradually improving    Treatments tried: eye drops    Response to treatment: no improvement              Comments     Patient wears contact lenses as well as glasses. Patient states that 2 days ago she was cooking a dish with surrounding peppers, cilantro and scallions and then accidentally touched her eye causing burning irritation. She washed her hands and removed her contact lenses and put on her glasses.  She cleaned her contact lenses with a \"intense cleanser\" containing hydrogen peroxide, thought that this was enough to salvage them. Yesterday put her existing contacts back in and had a little irritaton but it went away without issue.  These were the same contact lenses that may have gotten chili oil in them.  Throughout the day she developed blurry vision in both eyes. She has had similar bilateral eye blurring in the past.  Above HPI pulled from ED visit 08/22/21  Margarita Sorenson COT August 25, 2021 12:02 PM    Pt is using EES anna QID OU  AT \"hasn't had to use those as much\"    Margarita Sorenson COT August 25, 2021 12:04 PM                      "

## 2021-08-25 NOTE — PROGRESS NOTES
Assessment/Plan  (H18.823) Corneal abrasion due to contact lens, bilateral  (primary encounter diagnosis)  Comment: Resolved today- likely due to irritation from getting peppers in her eyes  Plan: Ok to resume contact lens wear- new lenses and ClearCare dispensed to patient today. Return to clinic if symptoms return. Follow up as needed for routine care.       Complete documentation of historical and exam elements from today's encounter can  be found in the full encounter summary report (not reduplicated in this progress  note). I personally obtained the chief complaint(s) and history of present illness. I  confirmed and edited as necessary the review of systems, past medical/surgical  history, family history, social history, and examination findings as documented by  others; and I examined the patient myself. I personally reviewed the relevant tests,  images, and reports as documented above. I formulated and edited as necessary the  assessment and plan and discussed the findings and management plan with the  patient and family.    Bret Hernandez, OD

## 2021-08-27 ENCOUNTER — TELEPHONE (OUTPATIENT)
Dept: GASTROENTEROLOGY | Facility: CLINIC | Age: 31
End: 2021-08-27

## 2021-08-27 NOTE — TELEPHONE ENCOUNTER
LVM with pod number. Pt to schedule mental health referral placed by Dr. Carbajal. Schedule with Dr. Erika Quintana next available. Sent [facility].

## 2021-09-20 ENCOUNTER — VIRTUAL VISIT (OUTPATIENT)
Dept: PHARMACY | Facility: CLINIC | Age: 31
End: 2021-09-20
Payer: COMMERCIAL

## 2021-09-20 DIAGNOSIS — F32.A DEPRESSION, UNSPECIFIED DEPRESSION TYPE: ICD-10-CM

## 2021-09-20 DIAGNOSIS — E66.01 MORBID OBESITY (H): Primary | ICD-10-CM

## 2021-09-20 DIAGNOSIS — E55.9 VITAMIN D DEFICIENCY: ICD-10-CM

## 2021-09-20 DIAGNOSIS — Z91.018 FOOD ALLERGY: ICD-10-CM

## 2021-09-20 PROCEDURE — 99605 MTMS BY PHARM NP 15 MIN: CPT | Performed by: PHARMACIST

## 2021-09-20 NOTE — PROGRESS NOTES
"Medication Therapy Management (MTM) Encounter    ASSESSMENT:                            Medication Adherence/Access: No issues identified    Obesity: Stable. Discussed potential of increasing phentermine but as patient is currently tolerating and finding benefit, can hold off for now. Can consider increase phentermine in future.     Depression:Stable.     Mushroom Allergy: Stable.      Vitamin D Deficiency: Stable.     PLAN:                            1. Continue current regimen for now.     Future: phentermine increase?    Follow-up: 3 months     SUBJECTIVE/OBJECTIVE:                          Debbie Bah is a 31 year old female called for an initial visit. She was referred to me from Dr. Carbajal.      Reason for visit: phentermine follow up.    Allergies/ADRs: Reviewed in chart  Past Medical History: Reviewed in chart  Tobacco: She reports that she has never smoked. She has never used smokeless tobacco.  Alcohol: not currently using    Medication Adherence/Access: no issues reported    Obesity:   Phentermine 18.75 mg once daily in AM  Krish 60 mg as needed    Followed by Dr. Natalie Carbajal, seen 8/20/21 for Return Medical Weight Management. Phentermine started at that time. Patient is experiencing the follow side effects: None. No negative impact on sleep. No increased anxiety. Blood pressure and pulse similar to last reading, blood pressure 128/84 mmHg; HR 88. Over the month, she has noticed small changes, somewhat disinterest in food, \"not constantly thinking about food\". Is able to stop eating more easily. Singular incident of late night eating last night. Overall, improved binge eating the last couple of weeks. She uses Krish daily with different foods.   Weight History: History of binge eating disorder, admitted to [facility] in 2018 and 2019. Reports she had issue of weight gain with new antidepressant.   Diet/Eating Habits: Patient reports she understands the behavioral component regarding [facility] " "Program.    Exercise/Activity: Patient reports walking regularly.   Failed medications include: Topiramate up to 100 mg daily: ineffective and stopped due to paraesthesias; Vyvanse: ineffective, unsure of dose. Naltrexone - headache.    Current weight today: 226.2 lb     Wt Readings from Last 4 Encounters:   08/22/21 230 lb (104.3 kg)   08/20/21 230 lb (104.3 kg)   06/18/21 223 lb 8 oz (101.4 kg)   10/01/20 221 lb (100.2 kg)     Estimated body mass index is 38.27 kg/m  as calculated from the following:    Height as of 8/22/21: 5' 5\" (1.651 m).    Weight as of 8/22/21: 230 lb (104.3 kg).    Depression:  Bupropion  mg once daily.     Pt reports that depression symptoms are stable. She works with psychiatrist. Reports that she has been stable for a while. Feels good.   PHQ-9 SCORE 12/3/2019 2/12/2020 6/18/2021   PHQ-9 Total Score [facility] 8 (Mild depression) - -   PHQ-9 Total Score 8 9 6     Mushroom Allergy:   Cetirizine 10 mg as needed   Prednisone 50 mg as needed     In case of mushroom allergy.      Vitamin D Deficiency:  Vitamin D 5000 international unit(s) daily     No concerns.   Lab Results   Component Value Date    VITDT 53 11/28/2018     ----------------    I spent 39 minutes with this patient today. A copy of the visit note was provided to the patient's referring provider.    The patient was sent via Eyeview a summary of these recommendations.     Lauren Bloch, PharmD  Medication Therapy Management Pharmacist   [facility] [place]    Telemedicine Visit Details  Type of service:  Telephone visit  Start Time: 10:30 AM  End Time: 11:09 AM  Originating Location (patient location): Home  Distant Location (provider location):  [facility]     Medication Therapy Recommendations  No medication therapy recommendations to display       "

## 2021-09-21 NOTE — PATIENT INSTRUCTIONS
Recommendations from today's MTM visit:                                                    MTM (medication therapy management) is a service provided by a clinical pharmacist designed to help you get the most of out of your medicines.   Today we reviewed what your medicines are for, how to know if they are working, that your medicines are safe and how to make your medicine regimen as easy as possible.      1. Continue current regimen for now.     Future: phentermine increase?    Follow-up: 3 months     It was great to speak with you today.  I value your experience and would be very thankful for your time with providing feedback on our clinic survey. You may receive a survey via email or text message in the next few days.       My Clinical Pharmacist's contact information:                                                      Please feel free to contact me with any questions or concerns you have.      Lauren Bloch, PharmD  Medication Therapy Management Pharmacist   [facility] [place]

## 2021-09-28 ENCOUNTER — VIRTUAL VISIT (OUTPATIENT)
Dept: GASTROENTEROLOGY | Facility: CLINIC | Age: 31
End: 2021-09-28
Attending: INTERNAL MEDICINE
Payer: COMMERCIAL

## 2021-09-28 DIAGNOSIS — F32.89 OTHER DEPRESSION: ICD-10-CM

## 2021-09-28 DIAGNOSIS — F50.819 BINGE EATING DISORDER: Primary | ICD-10-CM

## 2021-09-28 DIAGNOSIS — E66.01 MORBID OBESITY (H): ICD-10-CM

## 2021-09-28 PROCEDURE — 90791 PSYCH DIAGNOSTIC EVALUATION: CPT | Mod: 95 | Performed by: PSYCHOLOGIST

## 2021-09-28 NOTE — LETTER
9/28/2021         RE: Debbie Bah  [address]  [place] [address]        Dear Colleague,    Thank you for referring your patient, Debbie Bah, to the [facility] [place]. Please see a copy of my visit note below.    This telehealth service is appropriate and effective for delivering services in light of the necessity for social distancing to mitigate the COVID-19 epidemic and for conservation of PPE.     Patient has agreed to receiving telehealth services after being informed about it: Yes    Patient prefers video invitation/information to be sent by:   email    Time service started: 1:06p  Time service ended: 2:07 PM    Mode of transmission: Evercam    Location of originating:  Home of the patient    Distance site:  Home office of provider for [facility]    The patient has been notified that:  Video visits will be conducted via a call with their psychologist to provide the care they need with a video conversation. Video visits may be billed at different rates depending on insurance coverage.  Patients are advised to please contact their insurance provider with any questions about their health insurance coverage. If during the course of a call the psychologist feels a video visit is not appropriate, patients will not be charged for this service.      Health Psychology                      Department of Medicine  [facility] Mail Code [id number]     [address] [place] [id number]              Annabel Gerardo, Ph.D., L.P [phone]  Adilia Subramanian, Ph.D., L.P. [phone]  Kay Toro, Ph.D. [phone]  Erika Quintana, Ph.D., L.P. [phone]  Michael Chapman, Ph.D., A.B.P.P., L.P. [phone]         Leia Cooper, Ph.D., L.P. [phone]     [facility], 3rd Floor  [address] [place]   [address]    Confidential Summary of Standard  Psychodiagnostic Evaluation*    Referral Source:  Natalie Carbajal MD    Reason for Referral:  Weight management     Sources of Information:  Information was obtained from a clinical interview with the patient, review of available medical records, and administration of psychological assessments.     Informed Consent:  Informed consent included a review of the nature and purpose of the assessment, fees and billing, and confidentiality and limits thereof. Discussed role of health psychologist in multidisciplinary care team and documentation of visit in EMR.     History of Presenting Concerns:  Debbie Bah is a 31 year old female with history of depression and binge eating disorder who is currently working with Dr. Carbajal in the medical weight management program.  She was referred for health psychology intervention in the context of ongoing disordered eating to provide a multidisciplinary treatment plan.  Currently she is using phentermine 18.75 mg daily and raz 60 mg as needed for medical weight management support.  She has a history of attending [facility] intensive outpatient program from 20 18-20 19 for 4 months for binge eating disorder.  She describes significant improvement in management of symptoms but ongoing loss of control during nighttime eating and eating for emotional comfort.  Other psychotropic medications currently include Wellbutrin.  Briefly, history of weight has been documented in medical weight management visits, but during 2020 she lost 50 pounds in the context of diet and lifestyle changes but gained about 20 pounds in the spring 2021 following use of new SSRI.  This weight has been hard to lose despite lifestyle interventions.  She describes restrictive eating patterns leading to binge eating historically and attempts to be quite aware of not restricting her diet to the types and quantities of food she consumes.  Current eating patterns were reported to be breakfast between 8:53 AM  that includes coffee with a slice of a vegetable quiche, lunch of a frozen meal or a plant-based wrap, or dinner where she goes out to eat.  There is an afternoon snack of fruit and she is in an evening snack.  She described phentermine has provided some benefit and reduced appetite and interest in food especially in the evening which she has found helpful.  However this has returned over time and she is noticing that eating at night provides relief from feelings of discomfort, inability to go to sleep and in times of loneliness or social isolation.  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  She also feels weight management could improve body image concerns.  She stated that disordered eating likely began to some degree in middle school but became problematic in college.  Currently she reports eating for comfort or sensation of loss of control during eating every other day.        Medical History:    Past Medical History:   Diagnosis Date     Binge eating disorder      Depression 2015       Past Surgical History:   Procedure Laterality Date     NO HISTORY OF SURGERY         Current Outpatient Medications   Medication     buPROPion (WELLBUTRIN XL) 300 MG 24 hr tablet     cetirizine (ZYRTEC) 10 MG tablet     orlistat (ERNESTINE) 60 MG capsule     phentermine (ADIPEX-P) 37.5 MG tablet     predniSONE (DELTASONE) 50 MG tablet     Vitamin D3 (CHOLECALCIFEROL) 125 MCG (5000 UT) tablet     No current facility-administered medications for this visit.       Patient Care Team:  Sarahi Santos APRN CNP as PCP - General (Nurse Practitioner - Family)  Star Helm MD as Assigned Neuroscience Provider  Issa Duval DPM as Assigned Musculoskeletal Provider  Faye Berry MD as Assigned Pulmonology Provider  Sarahi Santos APRN CNP as Assigned PCP  Bret Hernandez OD as Assigned Surgical  "Provider  Natalie Carbajal MD as Assigned Endocrinology Provider  Bloch, Lauren Turner, [facility] as Pharmacist (Pharmacist)     Psychiatric History:  Includes [facility] in [facility] in 2018 for binge eating disorder.  Currently works with a psychiatrist and therapist at the [facility] and is satisfied with these providers.  She endorsed history of suicidal ideation without plan or intent in the fall 2018 in the context of feeling overwhelmed with graduate school and weight gain which led to initiating treatment with the [facility].  No current suicidal or homicidal ideation was reported.  Her mother was reported to have displayed disordered eating behaviors throughout her childhood.    Substance Use History:  She endorsed occasional alcohol use with no recreational drug use or tobacco use.  She reports no history of chemical dependency treatment or concerns.    Social History:  She is currently a  in a doctorate of nurse practitioner program in her last year.  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  Lives with her roommate and is satisfied with her living situation.  She is single, has never been , and has no children.  Described her upbringing as overall \"great\" with her family of origin although she acknowledged that there is a history of focus on appearance and excellence which may have driven stress as a child.    Psychological Assessment:  The patient completed the following battery of assessments during this psychological evaluation: World Health Organization Disability Assessment Schedule 2.0 12-item (WHODAS), Patient Health Questionnaire-9 (PHQ-9), Generalized Anxiety Disorder-7 screener (TRAVIS-7), and the CAGE Questionnaire Adapted to Include Drugs (CAGE-AID).    The WHODAS measures disability and functional impairment due to health conditions including diseases, illnesses, injuries, " mental or emotional problems, and problems with alcohol or drugs. The possible range of scores is 12-60 and higher scores indicate higher levels of disability.   WHODAS 2.0 Total Score 9/28/2021   Total Score 13   Total Score MyChart 13       The PHQ-9 is an instrument for screening, diagnosing, monitoring and measuring the severity of depression. Scores of 5, 10, 15, and 20 represent cutpoints for mild, moderate, moderately severe and severe depression, respectively.   PHQ-9 SCORE 2/12/2020 6/18/2021 9/28/2021   PHQ-9 Total Score MyChart - - 4 (Minimal depression)   PHQ-9 Total Score 9 6 4       The TRAVIS-7 is an instrument for screening, diagnosing, monitoring and measuring the severity of anxiety. Scores of 5, 10, and 15 represent cutpoints for mild, moderate, and severe anxiety, respectively.  TRAVIS-7 SCORE 3/8/2016 9/28/2021   Total Score - 0 (minimal anxiety)   Total Score 1 0       The CAGE-AID questionnaire is used to screen for alcohol or drug abuse and dependence in adults. A CAGE-AID score  > 1 is a positive screen, suggesting further discussion is needed to determine if evaluation for alcohol or substance abuse is appropriate. A score > 2 is considered clinically significant, suggesting further evaluation of alcohol or substance-related problems is indicated.  CAGE-AID Total Score 9/28/2021   Total Score 0   Total Score MyChart 0 (A total score of 2 or greater is considered clinically significant)       Mental Status Examination:  Appearance/Behavior/Orientation: Patient was on time, appropriately groomed and dressed, and demonstrated good eye contact. Alert and oriented to person, place, time, and situation. No evidence of psychomotor agitation.     Cooperation/Reliability: Patient was open and cooperative throughout the session.    Speech/Language: Speech was clear, coherent, and of normal rate, rhythm and volume.   Thought Form: Overall logical and organized.   Thought Content: Appropriate to interview  and situation.  Cognition/Memory: Not formally assessed, but no difficulties apparent upon interview.   Attention/Concentration: Good throughout interview.    Fund of knowledge: Consistent with age and level of education.    Abstract reasoning: Not assessed.   Judgment: Intact.    Mood/Affect: Mood euthymic; appropriate range of affect.    Insight/Motivation: Good, good  Suicide/Assault: Patient denies suicidal or assaultive ideation, plan, or intent.    Impression:  Debbie Bah is a 31 year old with history of depression and binge eating disorder who endorses interest in using a multidisciplinary team component to enhance outcomes from her weight weight management efforts.  General mental health concerns appear to be well managed with her psychiatrist and therapist although she is seeking incorporation of health psychology to discuss additional approaches to coping effectively with emotional eating and disordered eating patterns.  Factors that are triggers for these eating patterns include using food as a reward, comfort, and to avoid negative emotions.  She has good insight to these triggers and would benefit from ongoing cognitive behavioral and mindfulness based interventions to help adapt to these coping patterns.    Diagnosis:  Binge eating disorder  Depressive disorder, unspecified      Recommendation/Plan:  I recommended mindfulness based cognitive behavioral interventions focused on treating disordered eating behaviors.  Recommended that she continue to address general mental health concerns with her outside providers.  She agreed to this.  Provided brief psychoeducation about rationale and course of care with sessions about every 4 weeks.  She agreed with recommendations.  Provided brief intervention today focused on using a cognitive intervention to identify thoughts, feelings and urges in response to emotions or urges to eat as well as a samantha/resource to augment mindful eating  patterns.      Informed the patient about my plan for maternity leave which will likely start in early December 2021. Discussed options of how to approach psychological services during this time, which includes waiting until I return from maternity leave to resume therapy, being given the name(s) of another psychologist to schedule with if needed while I am on leave, or to proactively schedule with another psychologist while I am out on maternity leave.  Agreed to discuss what the patient feels would be best closer to my leave dates.  She agreed to start care with myself and transition to another provider as needed later this fall.         Erika Quintana, PhD,   Clinical Health Psychologist    *In accordance with the Rules of the [place] Board of [employer], it is noted that psychological descriptions and scientific procedures underlying psychological evaluations have limitations.  Absolute predictions cannot be made based on information in this report.    *no letter    This note was completed using Dragon voice recognition software.  Although reviewed after completion, some word and grammatical errors may occur.            Again, thank you for allowing me to participate in the care of your patient.        Sincerely,        Erika Quintana, PhD

## 2021-09-28 NOTE — LETTER
Date:September 29, 2021      Provider requested that no letter be sent. Do not send.       [facility]

## 2021-09-28 NOTE — PROGRESS NOTES
This telehealth service is appropriate and effective for delivering services in light of the necessity for social distancing to mitigate the COVID-19 epidemic and for conservation of PPE.     Patient has agreed to receiving telehealth services after being informed about it: Yes    Patient prefers video invitation/information to be sent by:   email    Time service started: 1:06p  Time service ended: 2:07 PM    Mode of transmission: [employer]    Location of originating:  Home of the patient    Distance site:  Home office of provider for [facility]    The patient has been notified that:  Video visits will be conducted via a call with their psychologist to provide the care they need with a video conversation. Video visits may be billed at different rates depending on insurance coverage.  Patients are advised to please contact their insurance provider with any questions about their health insurance coverage. If during the course of a call the psychologist feels a video visit is not appropriate, patients will not be charged for this service.      Health Psychology                      Department of Medicine  [facility] Mail Code [id number]     [address] [place], [place] [address]              Annabel Gerardo, Ph.D., L.P [phone]  Adilia Subramanian, Ph.D., L.P. [phone]  Kay Toro, Ph.D. [phone]  Erika Quintana, Ph.D., L.P. [phone]  Michael Chapman, Ph.D., A.B.P.P., L.P. [phone]         Leia Cooper, Ph.D., L.P. [phone]     [facility], 3rd Floor  [address] [place] [address]   [id number]    Confidential Summary of Standard Psychodiagnostic Evaluation*    Referral Source:  Natalie Carbajal MD    Reason for Referral:  Weight management     Sources of Information:  Information was obtained from a clinical interview with the patient, review of available medical records, and administration of psychological  assessments.     Informed Consent:  Informed consent included a review of the nature and purpose of the assessment, fees and billing, and confidentiality and limits thereof. Discussed role of health psychologist in multidisciplinary care team and documentation of visit in EMR.     History of Presenting Concerns:  Debbie Bah is a 31 year old female with history of depression and binge eating disorder who is currently working with Dr. Carbajal in the medical weight management program.  She was referred for health psychology intervention in the context of ongoing disordered eating to provide a multidisciplinary treatment plan.  Currently she is using phentermine 18.75 mg daily and raz 60 mg as needed for medical weight management support.  She has a history of attending [facility] intensive outpatient program from 20 18-20 19 for 4 months for binge eating disorder.  She describes significant improvement in management of symptoms but ongoing loss of control during nighttime eating and eating for emotional comfort.  Other psychotropic medications currently include Wellbutrin.  Briefly, history of weight has been documented in medical weight management visits, but during 2020 she lost 50 pounds in the context of diet and lifestyle changes but gained about 20 pounds in the spring 2021 following use of new SSRI.  This weight has been hard to lose despite lifestyle interventions.  She describes restrictive eating patterns leading to binge eating historically and attempts to be quite aware of not restricting her diet to the types and quantities of food she consumes.  Current eating patterns were reported to be breakfast between 8:53 AM that includes coffee with a slice of a vegetable quiche, lunch of a frozen meal or a plant-based wrap, or dinner where she goes out to eat.  There is an afternoon snack of fruit and she is in an evening snack.  She described phentermine has provided some benefit and reduced appetite  and interest in food especially in the evening which she has found helpful.  However this has returned over time and she is noticing that eating at night provides relief from feelings of discomfort, inability to go to sleep and in times of loneliness or social isolation.  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  She also feels weight management could improve body image concerns.  She stated that disordered eating likely began to some degree in middle school but became problematic in college.  Currently she reports eating for comfort or sensation of loss of control during eating every other day.        Medical History:    Past Medical History:   Diagnosis Date     Binge eating disorder      Depression 2015       Past Surgical History:   Procedure Laterality Date     NO HISTORY OF SURGERY         Current Outpatient Medications   Medication     buPROPion (WELLBUTRIN XL) 300 MG 24 hr tablet     cetirizine (ZYRTEC) 10 MG tablet     orlistat (ERNESTINE) 60 MG capsule     phentermine (ADIPEX-P) 37.5 MG tablet     predniSONE (DELTASONE) 50 MG tablet     Vitamin D3 (CHOLECALCIFEROL) 125 MCG (5000 UT) tablet     No current facility-administered medications for this visit.       Patient Care Team:  Sarahi Santos APRN CNP as PCP - General (Nurse Practitioner - Family)  Star Helm MD as Assigned Neuroscience Provider  Issa Duval DPM as Assigned Musculoskeletal Provider  Faye Berry MD as Assigned Pulmonology Provider  Sarahi Santos APRN CNP as Assigned PCP  Bret Hernandez OD as Assigned Surgical Provider  Natalie Carbajal MD as Assigned Endocrinology Provider  Bloch, Lauren Turner, [facility] as Pharmacist (Pharmacist)     Psychiatric History:  Includes participation in [facility] program in 2018 for binge eating disorder.  Currently works with a psychiatrist  "and therapist at the [facility] and is satisfied with these providers.  She endorsed history of suicidal ideation without plan or intent in the fall 2018 in the context of feeling overwhelmed with graduate school and weight gain which led to initiating treatment with the [facility].  No current suicidal or homicidal ideation was reported.  Her mother was reported to have displayed disordered eating behaviors throughout her childhood.    Substance Use History:  She endorsed occasional alcohol use with no recreational drug use or tobacco use.  She reports no history of chemical dependency treatment or concerns.    Social History:  She is currently a  in a doctorate of nurse practitioner program in her last year.  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  Lives with her roommate and is satisfied with her living situation.  She is single, has never been , and has no children.  Described her upbringing as overall \"great\" with her family of origin although she acknowledged that there is a history of focus on appearance and excellence which may have driven stress as a child.    Psychological Assessment:  The patient completed the following battery of assessments during this psychological evaluation: World Health Organization Disability Assessment Schedule 2.0 12-item (WHODAS), Patient Health Questionnaire-9 (PHQ-9), Generalized Anxiety Disorder-7 screener (TRAVIS-7), and the CAGE Questionnaire Adapted to Include Drugs (CAGE-AID).    The WHODAS measures disability and functional impairment due to health conditions including diseases, illnesses, injuries, mental or emotional problems, and problems with alcohol or drugs. The possible range of scores is 12-60 and higher scores indicate higher levels of disability.   WHODAS 2.0 Total Score 9/28/2021   Total Score 13   Total Score Dawnhart 13       The PHQ-9 is an instrument for screening, diagnosing, " monitoring and measuring the severity of depression. Scores of 5, 10, 15, and 20 represent cutpoints for mild, moderate, moderately severe and severe depression, respectively.   PHQ-9 SCORE 2/12/2020 6/18/2021 9/28/2021   PHQ-9 Total Score MyChart - - 4 (Minimal depression)   PHQ-9 Total Score 9 6 4       The TRAVIS-7 is an instrument for screening, diagnosing, monitoring and measuring the severity of anxiety. Scores of 5, 10, and 15 represent cutpoints for mild, moderate, and severe anxiety, respectively.  TRAVIS-7 SCORE 3/8/2016 9/28/2021   Total Score - 0 (minimal anxiety)   Total Score 1 0       The CAGE-AID questionnaire is used to screen for alcohol or drug abuse and dependence in adults. A CAGE-AID score  > 1 is a positive screen, suggesting further discussion is needed to determine if evaluation for alcohol or substance abuse is appropriate. A score > 2 is considered clinically significant, suggesting further evaluation of alcohol or substance-related problems is indicated.  CAGE-AID Total Score 9/28/2021   Total Score 0   Total Score MyChart 0 (A total score of 2 or greater is considered clinically significant)       Mental Status Examination:  Appearance/Behavior/Orientation: Patient was on time, appropriately groomed and dressed, and demonstrated good eye contact. Alert and oriented to person, place, time, and situation. No evidence of psychomotor agitation.     Cooperation/Reliability: Patient was open and cooperative throughout the session.    Speech/Language: Speech was clear, coherent, and of normal rate, rhythm and volume.   Thought Form: Overall logical and organized.   Thought Content: Appropriate to interview and situation.  Cognition/Memory: Not formally assessed, but no difficulties apparent upon interview.   Attention/Concentration: Good throughout interview.    Fund of knowledge: Consistent with age and level of education.    Abstract reasoning: Not assessed.   Judgment: Intact.    Mood/Affect: Mood  euthymic; appropriate range of affect.    Insight/Motivation: Good, good  Suicide/Assault: Patient denies suicidal or assaultive ideation, plan, or intent.    Impression:  Debbie Bah is a 31 year old with history of depression and binge eating disorder who endorses interest in using a multidisciplinary team component to enhance outcomes from her weight weight management efforts.  General mental health concerns appear to be well managed with her psychiatrist and therapist although she is seeking incorporation of health psychology to discuss additional approaches to coping effectively with emotional eating and disordered eating patterns.  Factors that are triggers for these eating patterns include using food as a reward, comfort, and to avoid negative emotions.  She has good insight to these triggers and would benefit from ongoing cognitive behavioral and mindfulness based interventions to help adapt to these coping patterns.    Diagnosis:  Binge eating disorder  Depressive disorder, unspecified      Recommendation/Plan:  I recommended mindfulness based cognitive behavioral interventions focused on treating disordered eating behaviors.  Recommended that she continue to address general mental health concerns with her outside providers.  She agreed to this.  Provided brief psychoeducation about rationale and course of care with sessions about every 4 weeks.  She agreed with recommendations.  Provided brief intervention today focused on using a cognitive intervention to identify thoughts, feelings and urges in response to emotions or urges to eat as well as a samantha/resource to augment mindful eating patterns.      Informed the patient about my plan for maternity leave which will likely start in early December 2021. Discussed options of how to approach psychological services during this time, which includes waiting until I return from maternity leave to resume therapy, being given the name(s) of another psychologist  to schedule with if needed while I am on leave, or to proactively schedule with another psychologist while I am out on maternity leave.  Agreed to discuss what the patient feels would be best closer to my leave dates.  She agreed to start care with myself and transition to another provider as needed later this fall.         Erika Quintana, PhD,   Clinical Health Psychologist    *In accordance with the Rules of the [place] Board of [employer], it is noted that psychological descriptions and scientific procedures underlying psychological evaluations have limitations.  Absolute predictions cannot be made based on information in this report.    *no letter    This note was completed using Dragon voice recognition software.  Although reviewed after completion, some word and grammatical errors may occur.

## 2021-10-04 ENCOUNTER — HEALTH MAINTENANCE LETTER (OUTPATIENT)
Age: 31
End: 2021-10-04

## 2021-10-07 ENCOUNTER — TELEPHONE (OUTPATIENT)
Dept: ENDOCRINOLOGY | Facility: CLINIC | Age: 31
End: 2021-10-07

## 2021-10-08 NOTE — TELEPHONE ENCOUNTER
Central Prior Authorization Team   Phone: [phone]      PA Initiation    Medication: phentermine (ADIPEX-P) 37.5 MG tablet-PA initiated  Insurance Company: [employer] - Phone [phone] Fax [phone]  Pharmacy Filling the Rx: [facility] -- [place], [place] - [address]  Filling Pharmacy Phone: [phone]  Filling Pharmacy Fax:    Start Date: 10/8/2021

## 2021-10-12 NOTE — TELEPHONE ENCOUNTER
Prior Authorization Not Needed per Insurance-Per Francoise at [employer], this is excluded from coverage and not eligible for a PA>    Medication: phentermine (ADIPEX-P) 37.5 MG tablet-PA Not Needed  Insurance Company: [employer] - Phone [phone] Fax [phone]  Expected CoPay:      [facility] the Rx[facility] -- [place] [place] [facility] [address]  Pharmacy Notified: No  Patient Notified: No

## 2021-10-16 ENCOUNTER — E-VISIT (OUTPATIENT)
Dept: URGENT CARE | Facility: URGENT CARE | Age: 31
End: 2021-10-16
Payer: COMMERCIAL

## 2021-10-16 DIAGNOSIS — L02.91 ABSCESS: Primary | ICD-10-CM

## 2021-10-16 PROCEDURE — 99421 OL DIG E/M SVC 5-10 MIN: CPT | Performed by: PHYSICIAN ASSISTANT

## 2021-10-16 RX ORDER — CEPHALEXIN 500 MG/1
500 CAPSULE ORAL 3 TIMES DAILY
Qty: 30 CAPSULE | Refills: 0 | Status: SHIPPED | OUTPATIENT
Start: 2021-10-16 | End: 2021-10-26

## 2021-10-16 NOTE — PATIENT INSTRUCTIONS
Looks like an abscess.  Please apply hot pack to area for 20 minutes 3-4 times a day.  I sent over a prescription for an antibiotic (Keflex).  Please be seen in clinic if it persists.

## 2021-11-05 ENCOUNTER — VIRTUAL VISIT (OUTPATIENT)
Dept: ENDOCRINOLOGY | Facility: CLINIC | Age: 31
End: 2021-11-05
Payer: COMMERCIAL

## 2021-11-05 VITALS — HEIGHT: 64 IN | WEIGHT: 213 LBS | BODY MASS INDEX: 36.37 KG/M2

## 2021-11-05 DIAGNOSIS — E66.01 MORBID OBESITY (H): ICD-10-CM

## 2021-11-05 PROCEDURE — 99213 OFFICE O/P EST LOW 20 MIN: CPT | Mod: 95 | Performed by: INTERNAL MEDICINE

## 2021-11-05 RX ORDER — PHENTERMINE HYDROCHLORIDE 37.5 MG/1
37.5 TABLET ORAL
Qty: 90 TABLET | Refills: 1 | Status: SHIPPED | OUTPATIENT
Start: 2021-11-05 | End: 2022-01-24

## 2021-11-05 RX ORDER — FERROUS SULFATE 325(65) MG
325 TABLET, DELAYED RELEASE (ENTERIC COATED) ORAL DAILY
COMMUNITY
End: 2022-02-11

## 2021-11-05 ASSESSMENT — PAIN SCALES - GENERAL: PAINLEVEL: NO PAIN (0)

## 2021-11-05 ASSESSMENT — MIFFLIN-ST. JEOR: SCORE: 1676.16

## 2021-11-05 NOTE — LETTER
"2021       RE: Debbie Bah  [address]  [facility] [address]     Dear Colleague,    Thank you for referring your patient, Debbie Bah, to the [facility] WEIGHT MANAGEMENT [facility]IC [place] at [facility]. Please see a copy of my visit note below.      Return Medical Weight Management Note     Debbie Bah  MRN:  [id number]  :  1990  LARRY:  2021    Dear Sarahi Santos, HARJINDER CNP,    I had the pleasure of seeing your patient Debbie Bah. She is a 31 year old female who I am continuing to see for treatment of obesity related to:       2020   I have the following health issues associated with obesity: None of the above   I have the following symptoms associated with obesity: Depression   --needs to check BP/pulse (no palpitations or racing heart)  --talked with Corine and started Orlistat (Krish); taking Krish regularly (does not limit fat with meals)    INTERVAL HISTORY:    Last visit about 3 mo ago. Reviewed and relevant history is as follows, as extracted from earlier note--     -------------------------------------------------------  \"Debbie has been struggled with weight since her adult life but mostly in the past 2 years when she has started grad school and continued to work part time. 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 She gained from 180 lbs up to 250 lbs in the past 2 years. She reports increased stress eating, emotional eating, larger portion side and increased fast food consumption. She reports frequent late night eating just to relax before going to sleep. She feels that she has food thought all the time. She sleeps about 7-8 hours per day but still feels very fatigue.      She was admitted at [facility] for 4 months for binges eating from the end of 2018 to early 2019.      She works with psychiatrist at [facility]. She was prescribed topiramate but " "it caused extreme paresthesia. She also was on Vyvanse but it caused her to feel not well. She also see therapist to work on stress coping mechanism. She is currently taking bupropion.      She does not exercise regularly. She feels that she has no time to exercise. She probably walks about 1-2 times per week.  She works from 3 pm to 11 pm. She usually eats before going to work and during works hours. Diet is mostly riches with carbohydrate.      --50# wt loss over last yr (post stopping the naltrexone [n/HAs on that so stopped it]).  Used myfitness and Noom each for a little while, began walking reguarly  --then regain of 15 and then another 10# recently with new anti-depressant     --topiramate and Vyvanse were tried with psychiatrist to help with wt loss (they were not effective)\"  --------------------------------------------     She started phentermine in the interim; no palpitations or racing heart.  Met with pharmacist for med titration and with health psych in the interim. Doing very well.        LAST WEIGHT:   230 lbs 0 oz  Body mass index is 38.23 kg/m .    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  Getting good appetitive control with the current med regimen f phentermine and using Krish (as noted below)  --Orlistat does not appear to be covered by her insurance plan; Krish is working for her    CURRENT WEIGHT:   213 lbs 0 oz   Body mass index is 36.56 kg/m .    Initial Weight (lbs): 257.8 lbs  Last Visits Weight: 104.3 kg (230 lb)  Cumulative weight loss (lbs): 44.8  Weight Loss Percentage: 17.38%    Changes and Difficulties 11/5/2021   I have made the following changes to my diet since my last visit: Smaller meals, stop when I'm full.   With regards to my diet, I am still " "struggling with: -   I have made the following changes to my activity/exercise since my last visit: Increased strenuous exercise   With regards to my activity/exercise, I am still struggling with: Exercising on work days       VITALS:  Ht 1.626 m (5' 4\")   Wt 96.6 kg (213 lb)   BMI 36.56 kg/m      MEDICATIONS:   Current Outpatient Medications   Medication Sig Dispense Refill     buPROPion (WELLBUTRIN XL) 300 MG 24 hr tablet Take 300 mg by mouth daily       cetirizine (ZYRTEC) 10 MG tablet Take 1 tablet (10 mg) by mouth daily 10 tablet 0     ferrous sulfate (FE TABS) 325 (65 Fe) MG EC tablet Take 325 mg by mouth daily       orlistat (ERNESTINE) 60 MG capsule Take 60 mg by mouth 3 times daily as needed       phentermine (ADIPEX-P) 37.5 MG tablet Take 1 tablet (37.5 mg) by mouth every morning (before breakfast) 90 tablet 0     predniSONE (DELTASONE) 50 MG tablet Emergency set: if severe allergic reaction take immediately 100mg Prednisone (2x50mg) and 2 Tabl Cetirizine 10mg and then write precise 12h retrospective diary.If less severe reaction take only the 2 Tabl Cetirizine (Patient taking differently: Emergency set: if severe allergic reaction take immediately 100mg Prednisone (2x50mg) and 2 Tabl Cetirizine 10mg and then write precise 12h retrospective diary.If less severe reaction take only the 2 Tabl Cetirizine    For mushroom allergy) 2 tablet 3     Vitamin D3 (CHOLECALCIFEROL) 125 MCG (5000 UT) tablet Take 1 tablet by mouth daily         Weight Loss Medication History Reviewed With Patient 11/5/2021   Which weight loss medications are you currently taking on a regular basis?  Phentermine   Are you having any side effects from the weight loss medication that we have prescribed you? No         ASSESSMENT/PLAN:  Debbie is a patient with mature onset obesity with significant element of familial/genetic influence and with current health consequences. She does not need aggressive weight loss plan.  Debbie NAVA Olvin " endorses binging, eats a high carb diet, eats a high fat diet, eats fast food once or more per week, uses food as a reward, uses food as mood management, mostly eats during the evening and has a disorganized meal pattern.     Her problem is complicated by strong craving/reward pathways, binges eating and poor lifestyle choices     Her ability to lose weight is impacted by current work life and lack of confidence.     PLAN:    (continuing)  Decrease portion sizes  Decrease eating out  Purge house of food triggers  Dietician visit of education  Calorie/low fat diet  Meal planning  Increase activity      Continue to follow up with psychiatrist and therapist for stress coping     Craving/Reward   Ancillary testing:  N/A.  Food Plan:  High protein/low carbohydrate.   Activity Plan:  Exercise after meals.  Supplementary:  Continue to follow up with psychiatrist and therapist for stress coping.   Medication:  The patient will continue medication in pursuit of improved medical status as influenced by body weight. There is a mutual understanding of the goals and risks of this therapy. The patient is in agreement. She is educated on dosage regimen and possible side effects.      additionally  --continue phentermine 1 tabet AM dose  --RTC with me again in about 3 mo           Time: approx about 24 min spent on evaluation, management, counseling, education, & motivational interviewing (video) combined with previsit prep and post visit charting/follow up care same day.      Sincerely,    Natalie Carbajal MD

## 2021-11-05 NOTE — NURSING NOTE
"(   Chief Complaint   Patient presents with     RECHECK     Return MW    )    ( Weight: 96.6 kg (213 lb) (Patient reported) )  ( Height: 162.6 cm (5' 4\") )  ( BMI (Calculated): 36.56 )  (   )  (   )  (   )  (   )  (   )  (   )    (   )  (   )  (   )  (   )  (   )  (   )  (   )    (   Patient Active Problem List   Diagnosis     Obesity (BMI 35.0-39.9) with comorbidity (H)    )  (   Current Outpatient Medications   Medication Sig Dispense Refill     buPROPion (WELLBUTRIN XL) 300 MG 24 hr tablet Take 300 mg by mouth daily       cetirizine (ZYRTEC) 10 MG tablet Take 1 tablet (10 mg) by mouth daily 10 tablet 0     ferrous sulfate (FE TABS) 325 (65 Fe) MG EC tablet Take 325 mg by mouth daily       orlistat (ERNESTINE) 60 MG capsule Take 60 mg by mouth 3 times daily as needed       phentermine (ADIPEX-P) 37.5 MG tablet Take 1 tablet (37.5 mg) by mouth every morning (before breakfast) 90 tablet 0     predniSONE (DELTASONE) 50 MG tablet Emergency set: if severe allergic reaction take immediately 100mg Prednisone (2x50mg) and 2 Tabl Cetirizine 10mg and then write precise 12h retrospective diary.If less severe reaction take only the 2 Tabl Cetirizine (Patient taking differently: Emergency set: if severe allergic reaction take immediately 100mg Prednisone (2x50mg) and 2 Tabl Cetirizine 10mg and then write precise 12h retrospective diary.If less severe reaction take only the 2 Tabl Cetirizine    For mushroom allergy) 2 tablet 3     Vitamin D3 (CHOLECALCIFEROL) 125 MCG (5000 UT) tablet Take 1 tablet by mouth daily      )  ( Diabetes Eval:    )    ( Pain Eval:  No Pain (0) )    ( Wound Eval:       )    (   History   Smoking Status     Never Smoker   Smokeless Tobacco     Never Used    )    ( Signed By:  Yolis Rao, EMT; November 5, 2021; 10:05 AM )    "

## 2021-11-05 NOTE — PATIENT INSTRUCTIONS
Thank you for allowing us the privilege of caring for you. We hope we provided you with the excellent service you deserve.    Please let us know if there is anything else we can do for you so that we can be sure you are completely satisfied with your care experience.    Your visit was with Dr. Carbajal today.    Instructions per today's visit:  --we are continuing the current medications in support of healthy lifestyle changes (and prescription Orlistat does not appear to be covered by your insurance so please continue with the Krish)  --please schedule a follow up with Dr. Carbajal in about 3 mo    Please call our contact center at [phone] to schedule your next appointments.    Meal Replacement Products:    Here is the link to our new e-store where you can purchase our meal replacement products    [employer]/store    The one week starter kit is a great way to sample a variety of products and see what works for you.    If you want more information about the product go to: [employer]    Free Shipping for orders over $75    Benefits of meal replacements products:    Portion and calorie control  Improved nutrition  Structured eating  Simplified food choices  Avoid contact with trigger foods    Interested in working with a health ?  Health coaches work with you to improve your overall health and wellbeing.  They look at the whole person, and may involve discussion of different areas of life, including, but not limited to the four pillars of health (sleep, exercise, nutrition, and stress management). Discuss with your care team if you would like to start working a health .    Health Coaching-3 Pack: Schedule by calling [phone]    $99 for three health coaching visits    Visits may be done in person or via phone    Coaching is a partnership between the  and the client; Coaches do not prescribe or diagnose    Coaching helps inspire the client to  reach his/her personal goals    Bluetooth Scale:    We hope to provide you with high quality telephone and virtual healthcare visits while social distancing for COVID-19 is necessary, as well as in the future when virtual visits may be more convenient for you.    Our technology team made it possible for Bluetooth scales to send weight measurements to our electronic medical record. This allows weights from you weighing at home to securely flow into the medical record, which will improve telephone and virtual visits.  Additionally, studies have shown that adults actually lose more weight when their weights are automatically sent to someone else, and also that this process is not stressful for those adults.    Below is a link for purchasing the scale, with a discount code for our patients. You may call your insurance company to see if they will reimburse you for the cost of the scale, as a piece of durable medical equipment. The scales only go up to a weight of 400 pounds. This is an issue and we are working with the developer on increasing this. We found no scales that go over 400lb that have blue-tooth for connecting to Pandoo TEK.    Scale to purchase: the eyeSight [employer] Technologies  Body  Scale: [email or web address]=aw.ds    Discount Code: We have a discount code for our patients to bring the cost down to $50, the code is:  withmed     Steps to link the scale to Pandoo TEK via an Android Phone (you can always disconnect at any point in the future):  1. The order must be placed first before the patient can access Track My Health within Pandoo TEK.  2. Download Google Fit samantha from the Google Play Store  a. Log in or register using your Google account  3. Download the Pandoo TEK samantha from Google Play Store  a. Select add organization  b. Search for [employer] and select it  c. Log into Pandoo TEK  d. Select Track My Health  e. Select the green connect my account  button  f. When prompted log into your Google account  g. Select okay to confirm the account  4. Download the Withings Health Mate jhonatan from Google Play Store  a. Falcon for [employer]  b. Go to profile  c. Tap google fit under the Apps section  d. Select the option to activate Google Fit integration  e. Select the same Google account  f. Select okay to confirm the account  g.  Steps to link the scale to [employer] via an iPhone (you can always disconnect at any point in the future):  **Note The Grommet is not available for download on an iPad**  1. The order must be placed first before the patient can access Track My Health within [employer].  2. Locate the Health jhonatan on your iPhone.  a. Set up your Apple Health account as prompted  b. The Sources page will show Apps that communicate with your Health jhonatan. Once all steps are completed, you should see Care at Hand and 1SDKhart listed under the Apps section and your iPhone under the devices section.  i. Select Health [employer]  1. Under 'ALLOW  [employer]  TO WRITE DATA ensure the toggle is on for Weight.  2. This will allow the scale to add your weight to the Apple Health  ii. Select 1SDKhart  1. Under 'ALLOW  [employer]  TO READ DATA ensure the toggle is on for Weight.  2. This allows [employer] to grab the weight from The Grommet so your provider can see your weights.  3. Download the Pontabat jhonatan from the Jhonatan Store  a. Select add organization                                                  b. Search for [employer] and select it  c. Log into [employer]  d. Select Track My Health  e. Select the green connect my account button  f. Follow prompts to link your device to [employer].  4. Download the Withings health mate jhonatan in the Jhonatan Store  a. Falcon for [employer]  b. Go to profile  c. Tap Health under the Apps section  d. If prompted to allow access with the Health Jhonatan, toggle weight on for read and write access.      For any questions/concerns contact Sophia Li LPN at [phone]    To  schedule appointments with our team, please call [phone]    Please call during clinic hours Monday through Friday 8:00a - 4:00p if you have questions or you can contact us via [employer] at anytime.      Lab results will be communicated through My Chart or letter (if My Chart not used). Please call the clinic if you have not received communication after 1 week or if you have any questions.    Clinic Fax: [phone]    Thank you,  Medical Weight Management Team

## 2021-11-05 NOTE — PROGRESS NOTES
"Debbie is a 31 year old who is being evaluated via a billable video visit.      How would you like to obtain your AVS? MyChart  If the video visit is dropped, the invitation should be resent by: [phone]    Will anyone else be joining your video visit? No  During this virtual visit the patient is located in [place], patient verifies this as the location during the entirety of this visit.   Video Start Time: 10:44  Video-Visit Details    Type of service:  Video Visit    Video End Time:10:57    Originating Location (pt. Location): Home    Distant Location (provider location):  [facility] [place]     Platform used for Video Visit: Mister Mario         [facility] Weight Management Note     Debbie Bah  MRN:  [id number]  :  1990  LARRY:  2021    Dear Sarahi Santos, HARJINDER MCCORMICK,    I had the pleasure of seeing your patient Debbie Bah. She is a 31 year old female who I am continuing to see for treatment of obesity related to:       2020   I have the following health issues associated with obesity: None of the above   I have the following symptoms associated with obesity: Depression   --needs to check BP/pulse (no palpitations or racing heart)  --talked with Corine and started Orlistat (Krish); taking Krish regularly (does not limit fat with meals)    INTERVAL HISTORY:    Last visit about 3 mo ago. Reviewed and relevant history is as follows, as extracted from earlier note--     -------------------------------------------------------  \"Debbie has been struggled with weight since her adult life but mostly in the past 2 years when she has started grad school and continued to work part time. 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 She gained from 180 lbs up to 250 lbs in the past 2 years. She reports increased stress eating, emotional eating, larger portion side and increased fast food consumption. She reports frequent late night eating just " "to relax before going to sleep. She feels that she has food thought all the time. She sleeps about 7-8 hours per day but still feels very fatigue.      She was admitted at [facility] for 4 months for binges eating from the end of 2018 to early 2019.      She works with psychiatrist at [facility]. She was prescribed topiramate but it caused extreme paresthesia. She also was on Vyvanse but it caused her to feel not well. She also see therapist to work on stress coping mechanism. She is currently taking bupropion.      She does not exercise regularly. She feels that she has no time to exercise. She probably walks about 1-2 times per week.  She works from 3 pm to 11 pm. She usually eats before going to work and during works hours. Diet is mostly riches with carbohydrate.      --50# wt loss over last yr (post stopping the naltrexone [n/HAs on that so stopped it]).  Used myfitness and Noom each for a little while, began walking reguarly  --then regain of 15 and then another 10# recently with new anti-depressant     --topiramate and Vyvanse were tried with psychiatrist to help with wt loss (they were not effective)\"  --------------------------------------------     She started phentermine in the interim; no palpitations or racing heart.  Met with pharmacist for med titration and with health psych in the interim. Doing very well.        LAST WEIGHT:   230 lbs 0 oz  Body mass index is 38.23 kg/m .    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  Getting good appetitive control with the current med regimen f phentermine and using Krish (as noted below)  --Orlistat does not appear to be covered by her insurance plan; Krish is working for her    CURRENT WEIGHT:   213 lbs 0 oz   Body mass " "index is 36.56 kg/m .    Initial Weight (lbs): 257.8 lbs  Last Visits Weight: 104.3 kg (230 lb)  Cumulative weight loss (lbs): 44.8  Weight Loss Percentage: 17.38%    Changes and Difficulties 11/5/2021   I have made the following changes to my diet since my last visit: Smaller meals, stop when I'm full.   With regards to my diet, I am still struggling with: -   I have made the following changes to my activity/exercise since my last visit: Increased strenuous exercise   With regards to my activity/exercise, I am still struggling with: Exercising on work days       VITALS:  Ht 1.626 m (5' 4\")   Wt 96.6 kg (213 lb)   BMI 36.56 kg/m       Blood pressure yesterday was 112/75 and HR was 80. (11/12/21)      MEDICATIONS:   Current Outpatient Medications   Medication Sig Dispense Refill     buPROPion (WELLBUTRIN XL) 300 MG 24 hr tablet Take 300 mg by mouth daily       cetirizine (ZYRTEC) 10 MG tablet Take 1 tablet (10 mg) by mouth daily 10 tablet 0     ferrous sulfate (FE TABS) 325 (65 Fe) MG EC tablet Take 325 mg by mouth daily       orlistat (ERNESTINE) 60 MG capsule Take 60 mg by mouth 3 times daily as needed       phentermine (ADIPEX-P) 37.5 MG tablet Take 1 tablet (37.5 mg) by mouth every morning (before breakfast) 90 tablet 0     predniSONE (DELTASONE) 50 MG tablet Emergency set: if severe allergic reaction take immediately 100mg Prednisone (2x50mg) and 2 Tabl Cetirizine 10mg and then write precise 12h retrospective diary.If less severe reaction take only the 2 Tabl Cetirizine (Patient taking differently: Emergency set: if severe allergic reaction take immediately 100mg Prednisone (2x50mg) and 2 Tabl Cetirizine 10mg and then write precise 12h retrospective diary.If less severe reaction take only the 2 Tabl Cetirizine    For mushroom allergy) 2 tablet 3     Vitamin D3 (CHOLECALCIFEROL) 125 MCG (5000 UT) tablet Take 1 tablet by mouth daily         Weight Loss Medication History Reviewed With Patient 11/5/2021   Which weight " loss medications are you currently taking on a regular basis?  Phentermine   Are you having any side effects from the weight loss medication that we have prescribed you? No         ASSESSMENT/PLAN:  Debbie is a patient with mature onset obesity with significant element of familial/genetic influence and with current health consequences. She does not need aggressive weight loss plan.  Debbie Bah endorses binging, eats a high carb diet, eats a high fat diet, eats fast food once or more per week, uses food as a reward, uses food as mood management, mostly eats during the evening and has a disorganized meal pattern.     Her problem is complicated by strong craving/reward pathways, binges eating and poor lifestyle choices     Her ability to lose weight is impacted by current work life and lack of confidence.     PLAN:    (continuing)  Decrease portion sizes  Decrease eating out  Purge house of food triggers  Dietician visit of education  Calorie/low fat diet  Meal planning  Increase activity      Continue to follow up with psychiatrist and therapist for stress coping     Craving/Reward   Ancillary testing:  N/A.  Food Plan:  High protein/low carbohydrate.   Activity Plan:  Exercise after meals.  Supplementary:  Continue to follow up with psychiatrist and therapist for stress coping.   Medication:  The patient will continue medication in pursuit of improved medical status as influenced by body weight. There is a mutual understanding of the goals and risks of this therapy. The patient is in agreement. She is educated on dosage regimen and possible side effects.      additionally  --continue phentermine 1 tabet AM dose  --RTC with me again in about 3 mo           Time: approx about 24 min spent on evaluation, management, counseling, education, & motivational interviewing (video) combined with previsit prep and post visit charting/follow up care same day.      Sincerely,    Natalie Carbajal MD

## 2021-11-08 ENCOUNTER — TELEPHONE (OUTPATIENT)
Dept: ENDOCRINOLOGY | Facility: CLINIC | Age: 31
End: 2021-11-08
Payer: COMMERCIAL

## 2021-11-08 NOTE — TELEPHONE ENCOUNTER
jadem to schedule 3 Month follow up with NITA [place], left call center phone number and [facility]

## 2021-11-14 ENCOUNTER — MYC MEDICAL ADVICE (OUTPATIENT)
Dept: FAMILY MEDICINE | Facility: CLINIC | Age: 31
End: 2021-11-14
Payer: COMMERCIAL

## 2021-11-30 ENCOUNTER — VIRTUAL VISIT (OUTPATIENT)
Dept: PSYCHOLOGY | Facility: CLINIC | Age: 31
End: 2021-11-30
Payer: COMMERCIAL

## 2021-11-30 DIAGNOSIS — F32.A DEPRESSION, UNSPECIFIED DEPRESSION TYPE: ICD-10-CM

## 2021-11-30 DIAGNOSIS — F50.819 BINGE EATING DISORDER: Primary | ICD-10-CM

## 2021-11-30 PROCEDURE — 90834 PSYTX W PT 45 MINUTES: CPT | Mod: 95 | Performed by: PSYCHOLOGIST

## 2021-11-30 NOTE — PROGRESS NOTES
Health Psychology                      Annabel Gerardo, Ph.D., L.P [phone]  Adilia Subramanian, Ph.D., L.P. [phone]  Kay Toro, Ph.D. [phone]  Erika Quintana, Ph.D., L.P. [phone]  Michael Chapman, Ph.D., A.B.P.P., L.P. [phone]         Leia Cooper, Ph.D., L.P. [phone]     [facility], 3rd Floor  [address] [place] [address]    Health Psychology Follow-Up Note    SUBJECTIVE:  Debbie Bah was seen for individual psychotherapy. Reviewed emotional and physical health since our last session. 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 Attributed this to increased consistency in meals and use of strengths in school and feeling connected with family. Described phentermine as helpful in having her recognize hunger and fullness cues. She reported benefit from use of the cognitive restructuring intervention discussed last session.    A treatment plan was deferred to the next session given upcoming transfer to another provider due to parental leave.    [facility] focused on increasing awareness of emotions with the intent to build adaptive coping responses.  Discussed ways to increase awareness of emotional experiences including building a stronger emotional vocabulary -provided resources for this.  Provided education about adaptive aspects of various emotions, difference between secondary and primary emotions, and purpose of emotions. She endorsed a possible propensity for strong emotional experiences.     OBJECTIVE:  Appearance/Behavior/Orientation: Patient was neatly groomed and dressed, and demonstrated good eye contact. Alert and oriented to person, place, time, and situation. No evidence of psychomotor agitation.     Cooperation/Reliability: Patient was open and cooperative throughout the session.    Speech/Language: Speech was  clear, coherent, and of normal rate, rhythm and volume.   Thought Form: Overall logical and organized.   Mood/Affect: Mood euthymic; appropriate range of affect.    Insight/Motivation: Good, good  Suicide/Assault: Patient reports no suicidal or assaultive ideation, plan, or intent.    ASSESSMENT:  Engaged in treatment.    DIAGNOSIS:  Binge eating disorder  Depressive disorder, unspecified    PLAN:  RTC for continued psychotherapy.     Start: 4:05 PM  Stop: 4:54 PM  Erika Quintana, PhD,   Clinical Health Psychologist    Tx plan completed: Deferred until next session  Tx plan due:  -    *no letter    This note was completed using Dragon voice recognition software.  Although reviewed after completion, some word and grammatical errors may occur.

## 2021-12-13 ENCOUNTER — ALLIED HEALTH/NURSE VISIT (OUTPATIENT)
Dept: FAMILY MEDICINE | Facility: CLINIC | Age: 31
End: 2021-12-13
Payer: COMMERCIAL

## 2021-12-13 DIAGNOSIS — Z11.1 SCREENING FOR TUBERCULOSIS: Primary | ICD-10-CM

## 2021-12-13 PROCEDURE — 99207 PR NO CHARGE NURSE ONLY: CPT

## 2021-12-13 PROCEDURE — 86580 TB INTRADERMAL TEST: CPT

## 2021-12-15 ENCOUNTER — ALLIED HEALTH/NURSE VISIT (OUTPATIENT)
Dept: NURSING | Facility: CLINIC | Age: 31
End: 2021-12-15
Payer: COMMERCIAL

## 2021-12-15 DIAGNOSIS — Z11.1 SCREENING EXAMINATION FOR PULMONARY TUBERCULOSIS: Primary | ICD-10-CM

## 2021-12-15 LAB
PPDINDURATION: 0 MM (ref 0–4.99)
PPDREDNESS: NORMAL

## 2021-12-15 PROCEDURE — 99207 PR NO CHARGE NURSE ONLY: CPT

## 2021-12-15 NOTE — PROGRESS NOTES
Patient is here today for a Mantoux (TST) test results.    Did patient return to clinic 48-72 hours from Mantoux (TST) placement:   Yes -     PPD Induration   Date Value Ref Range Status   12/15/2021 0 0 - 4.99 mm Final     PPD Redness   Date Value Ref Range Status   12/15/2021 Not Present  Final           Induration Size? Induration <5mm - Enter results in Enter/Edit Activity. Route results to ordering provider.     Patient needs form signed? No    Patient reports having previously had the BCG Vaccine: No    Does patient need a two step? No      Radha Kaur RN

## 2021-12-15 NOTE — PATIENT INSTRUCTIONS
PPD Induration   Date Value Ref Range Status   12/15/2021 0 0 - 4.99 mm Final     PPD Redness   Date Value Ref Range Status   12/15/2021 Not Present  Final     Radha Kaur RN

## 2022-01-04 ENCOUNTER — OFFICE VISIT (OUTPATIENT)
Dept: FAMILY MEDICINE | Facility: CLINIC | Age: 32
End: 2022-01-04
Payer: COMMERCIAL

## 2022-01-04 VITALS
WEIGHT: 206.5 LBS | RESPIRATION RATE: 16 BRPM | TEMPERATURE: 97.8 F | HEIGHT: 66 IN | BODY MASS INDEX: 33.19 KG/M2 | DIASTOLIC BLOOD PRESSURE: 73 MMHG | OXYGEN SATURATION: 98 % | SYSTOLIC BLOOD PRESSURE: 110 MMHG | HEART RATE: 78 BPM

## 2022-01-04 DIAGNOSIS — G56.03 BILATERAL CARPAL TUNNEL SYNDROME: Primary | ICD-10-CM

## 2022-01-04 PROBLEM — E66.811 CLASS 1 OBESITY IN ADULT: Status: ACTIVE | Noted: 2018-11-28

## 2022-01-04 PROCEDURE — 99213 OFFICE O/P EST LOW 20 MIN: CPT | Performed by: NURSE PRACTITIONER

## 2022-01-04 ASSESSMENT — MIFFLIN-ST. JEOR: SCORE: 1671.3

## 2022-01-04 ASSESSMENT — PAIN SCALES - GENERAL: PAINLEVEL: NO PAIN (0)

## 2022-01-04 NOTE — PROGRESS NOTES
"  Assessment & Plan     Bilateral carpal tunnel syndrome  Counseled on self-care measures including: wearing braces at night, use ergonomic gel pads for keyboard and mouse, ice, OTC naproxen (take with food); and warning signs of when to seek medical care including: symptoms that do not improve or that worsen.  - Wrist/Arm/Hand Supplies Order for DME - ONLY FOR DME    See Patient Instructions    Return in about 4 weeks (around 2/1/2022), or if symptoms worsen or fail to improve.    HARJINDER Caban CNP  M [facility] MOIZ Lewis is a 31 year old who presents for the following health issues     HPI     Pain History:  When did you first notice your pain? A month ago.   Have you seen anyone else for your pain? No  Where in your body do you have pain? Musculoskeletal problem/pain  Description  Location: wrist - bilateral  Joint Swelling: no  Redness: no  Pain: YES  Warmth: no  Numbness into thumb, pointer, and middle finger mostly. Has felt numbness on lateral hand/wrist a few times.   Intensity:  mild  Progression of Symptoms:  same and intermittent  Accompanying signs and symptoms:   Fevers: no  Numbness/tingling/weakness: YES  History  Trauma to the area: no, but she does type a lot.   Recent illness:  no  Previous similar problem: no  Previous evaluation:  no  Precipitating or alleviating factors:  Aggravating factors include: Typing, flexion, extension.   Therapies tried and outcome: CBD oil, aleve, capsaicin cream - some relief.       Review of Systems   Constitutional, HEENT, cardiovascular, pulmonary, gi and gu systems are negative, except as otherwise noted.      Objective    /73   Pulse 78   Temp 97.8  F (36.6  C) (Oral)   Resp 16   Ht 1.665 m (5' 5.55\")   Wt 93.7 kg (206 lb 8 oz)   LMP 12/03/2021   SpO2 98%   BMI 33.79 kg/m    Body mass index is 33.79 kg/m .  Physical Exam   GENERAL: healthy, alert and no distress  MS: no gross musculoskeletal defects " noted, no edema  NEURO: Normal strength and tone, mentation intact and speech normal  PSYCH: mentation appears normal, affect normal/bright

## 2022-01-04 NOTE — PATIENT INSTRUCTIONS
Patient Education     Carpal Tunnel Syndrome    Carpal tunnel syndrome is a painful condition of the wrist and arm. It is caused by pressure on the median nerve. The median nerve is one of the nerves that give feeling and movement to the hand. It passes through a tunnel in the wrist called the carpal tunnel. This tunnel is made up of bones and ligaments. Narrowing of this tunnel or swelling of the tissues inside the tunnel puts pressure on the median nerve. This causes numbness, pins and needles, or electric shooting pains in your hand and forearm. Often the pain is worse at night and may wake you when you are asleep.  Carpal tunnel syndrome may occur during pregnancy and with use of birth control pills. It is more common in workers who must often bend their wrists. It is also common in people who work with power tools that cause strong vibrations.  Home care    Rest the painful wrist. Avoid repeated bending of the wrist back and forth. This puts pressure on the median nerve. Avoid using power tools with strong vibrations.    If you were given a splint, wear it at night while you sleep. You may also wear it during the day for comfort.    Move your fingers and wrists often to prevent stiffness.    Elevate your arms on pillows when you lie down.    Try using the unaffected hand more.    Try not to hold your wrists in a bent, downward position.    Sometimes changes in the work place may ease symptoms. If you type most of the day, it may help to change the position of your keyboard or add a wrist support. Your wrist should be in a neutral position and not bent back when typing.    You may use over-the-counter pain medicine to treat pain and inflammation, unless another medicine was prescribed. Anti-inflammatory pain medicines, such as ibuprofen or naproxen may be more effective than acetaminophen, which treats pain, but not inflammation. If you have chronic liver or kidney disease or ever had a stomach ulcer or  gastrointestinal bleeding, talk with your healthcare provider before using these medicines.    Opioid pain medicine will only give temporary relief and does not treat the problem. If pain continues, you may need a shot of a steroid drug into your wrist.    If the above methods fail, you may need surgery. This will open the carpal tunnel and release the pressure on the trapped nerve.  Follow-up care  Follow up with your healthcare provider, or as advised. If X-rays were taken, you will be notified of any new findings that may affect your care.  When to seek medical advice  Call your healthcare provider right away if any of these occur:    Pain not improving with the above treatment    Fingers or hand become cold, blue, numb, or tingly    Your whole arm becomes swollen or weak  [employer] last reviewed this educational content on 5/1/2018 2000-2021 [employer]. All rights reserved. This information is not intended as a substitute for professional medical care. Always follow your healthcare professional's instructions.           Patient Education     Carpal Tunnel Syndrome Prevention Tips  Carpal tunnel syndrome is a painful condition in the hand and wrist. It occurs when there is too much pressure on the median nerve at the wrist. The median nerve runs from your forearm to the palm of your hand. It may get squeezed or pressed when it passes through the carpal tunnel from your wrist to your hand. You may then feel numbness, tingling, pain, or weakness in your hand and up your forearm.  Doing the same hand activities over and over can put you at higher risk for carpal tunnel syndrome. But you can reduce your risk. Learn how to change the way you use your hands. Below are tips for at home and on the job. Also follow the hand and wrist safety policies at your workplace.      Keep your wrist in a straight (neutral) position when exercising.      Keep your wrist in neutral  Keep a straight (neutral) wrist  position as often as you can. Don t use your wrist in a bent (flexed) position for long periods of time. This includes extended or twisted positions.  When you sleep, don't have your wrist flexed (don't sleep all curled up on your side). And don't put extra pressure on your wrist for long periods of time (don't sleep on your stomach with your hands under you).  Watch your   Don t use only your thumb and index finger to grasp or lift something. This can put stress on your wrist. When you can, use your whole hand and all its fingers to grasp an object.  Minimize repetition  Don t move your arms or hands the same way for long periods of time. And don't hold an object in the same way for long periods of time. Even simple, light tasks can cause injury this way. Instead, switch tasks or switch hands.  Rest your hands  Give your hands a break from time to time with a rest. Even a few minutes once an hour can help.  Reduce speed and force  Slow down when you do a forceful, repetitive motion. This gives your wrist time to recover from the effort. Use power tools to help reduce the force.  Strengthen the muscles  Weak muscles may lead to a poor wrist or arm position. Exercises will make your hand and arm muscles stronger. This can help you keep a better position.  [employer] last reviewed this educational content on 1/1/2018 2000-2021 [employer]. All rights reserved. This information is not intended as a substitute for professional medical care. Always follow your healthcare professional's instructions.

## 2022-01-07 ENCOUNTER — TELEPHONE (OUTPATIENT)
Dept: PSYCHOLOGY | Facility: CLINIC | Age: 32
End: 2022-01-07
Payer: COMMERCIAL

## 2022-01-07 NOTE — CONFIDENTIAL NOTE
Received referral from Dr. Chapman/Dr. Quintana to see this patient while Dr. Quintana is on leave. Called today and scheduled for a virtual appointment  1/10 at 10:00 AM.    Annabel Gerardo, PhD,   Health Psychology  [phone]

## 2022-01-10 ENCOUNTER — VIRTUAL VISIT (OUTPATIENT)
Dept: PSYCHOLOGY | Facility: CLINIC | Age: 32
End: 2022-01-10
Payer: COMMERCIAL

## 2022-01-10 DIAGNOSIS — F50.819 BINGE EATING DISORDER: Primary | ICD-10-CM

## 2022-01-10 DIAGNOSIS — F32.A DEPRESSION, UNSPECIFIED DEPRESSION TYPE: ICD-10-CM

## 2022-01-10 PROCEDURE — 90837 PSYTX W PT 60 MINUTES: CPT | Mod: 95 | Performed by: PSYCHOLOGIST

## 2022-01-10 NOTE — PROGRESS NOTES
"  Health Psychology                      Annabel Gerardo, Ph.D., L.P [phone]  Adilia Subramanian, Ph.D., L.P. [phone]  Kay Toro, Ph.D. [phone]  Erika Quintana, Ph.D., L.P. [phone]  Michael Chapman, Ph.D., A.B.P.P., L.P. [phone]         Leia Cooper, Ph.D., L.P. [phone]     [facility], 3rd Floor  [address] [place] [address]    The patient has been notified of following:   \"This video visit will be conducted via a call between you and your physician/provider. We have found   that certain health care needs can be provided without the need for an in-person physical exam. This   service lets us provide the care you need with a video conversation. If a prescription is necessary we can   send it directly to your pharmacy. If lab work is needed we can place an order for that and you can then   stop by our lab to have the test done at a later time. If during the course of the call the physician/provider feels a video visit is not appropriate, you will not be charged for this service.\" YES    Reason that telemedicine is appropriate: COVID-19 pandemic mitigation requiring social distancing.  Patient has given verbal consent for video visit: Yes  Mode of transmission: Secure real time interactive audio and visual telecommunication system via [email or web address]  Location of originating and distant sites:    Originating site (patient location): patient's home    Distant site (provider site): home office of provider    Health Psychology Follow-Up Note    SUBJECTIVE:  Debbie Bah was seen for individual psychotherapy. Reviewed emotional and physical health since our last session. Reviewed and discussed treatment plan, allowed time for questions. Spent session building rapport and reviewing progress towards goals. She feels she struggles with boredom and the craving for food. Attempted to set timer for 90 seconds to delay eating and found it " "uncomfortable, will attempt 60 seconds. She also has found journaling helpful, however, she tends to do it after the event or emotion. Discussed the usefulness of journaling in the moment. She does want to find a balance between managing her binge eating and also potentially losing weight. Her self-reported weight was 204 and height is 5 foot 5 inches. She described feeling good at approximately 180 to 185 pounds previously. She currently weighs herself most days. She is experiencing loss of control eating on most days as well, at times small amounts of food and at times large enough to be considered a binge episode. She identified that being engaged, challenged\" in the flow \"help her to not be overly focused on food. She is working on incorporating more of these activities that help her to feel engaged. This has been difficult due to COVID and she said that many of her friends are starting to have children and are limited in their time. She walks most days, and enjoys hiking and biking when the weather allows. Reviewed previous dieting behaviors that included black or white thinking related to food and cutting out foods, she describes struggling with balance. We'll also continue to work on helping increase her feelings of connectedness and purpose.    Ongoing list of binge eating/LOC triggers  Boredom, fatigue, insomnia (eating helps fall asleep), discomfort, overwhelmed (doesn't like being needed/demanded upon/depleated), lonely     OBJECTIVE:  Appearance/Behavior/Orientation: Patient was neatly groomed and dressed, and demonstrated good eye contact. Alert and oriented to person, place, time, and situation. No evidence of psychomotor agitation.     Cooperation/Reliability: Patient was open and cooperative throughout the session.    Speech/Language: Speech was clear, coherent, and of normal rate, rhythm and volume.   Thought Form: Overall logical and organized.   Mood/Affect: Mood euthymic; appropriate range of " affect.    Insight/Motivation: Good, good  Suicide/Assault: Patient reports no suicidal or assaultive ideation, plan, or intent.    ASSESSMENT:  Was engaged and interactive throughout the session. She is likely to benefit from CBT and mindfulness based approach to binge eating disorder.  1/10/22  Objective Binge episodes: 2-3  Subjective binge episodes: 7-10    DIAGNOSIS:  Binge eating disorder [F50.81]  Depression, unspecified depression type [F32.A]    PLAN:  RTC for continued psychotherapy.     2/2 12:00    Goals:   -60 second timer before deciding to eat (and then being okay with eating if does), felt 90 seconds was too much.  -Continue with journaling (perhaps during high emotion, not just after)    Start: 10:01  Stop: 10:58    Annabel Gerardo, PhD,   Health Psychology  Clinical Psychologist    Extended session due to onset of treatment, length of interval    Tx plan completed: 1/10/2022   Tx plan due:  1/10/2023    This note was completed using Dragon voice recognition software.  Although reviewed after completion, some word and grammatical errors may occur.    OUTPATIENT TREATMENT PLAN SUMMARY    Date of Treatment Plan: 1/10/2022  90-Day Review Date: N/A  Date of Initial Service: 1/10/2022      1. DSM-V Diagnosis (include numeric code)  Binge eating disorder [F50.81]  Depression, unspecified depression type [F32.A]  2. Current symptoms and circumstances that substantiate the diagnosis:  See intake 9/28/21    3. How symptoms and/or behaviors are affecting level of function:   See intake 9/28/21 and note 1/10/22    4. Risk Assessment:  Suicide:  Assessed Level of Immediate Risk: None  Ideation: No   Plan:  No   Means: No   Intent: No     Assessed Level of Immediate Risk: None  Ideation: No   Plan:  No   Means: No   Intent: No    If on a medication, please include name and dosage:  See chart    Symptom/Problem Measurable Goals Interventions Gains Made   1.BED 1. Decrease binge eating/loss of control frequency  1.CBT for BED 1. N/A   2.Depression 2. Maintain minimal/mild PHQ-9 scores 2. CBT for depression 2. N/A     5. Frequency of Sessions: 1-2x/month    6. Discharge and Aftercare Goals: TBD    7. Expected duration of treatment:  TBD    8. Participants in therapy plan (family, friends, support network): TBD    Treatment plan available to patient via mychart/medical records.    Regulatory Guidelines for Updating Treatment Plan  [place] Medical Assistance: Reviewed & signed at least every 90days  Medicare:  Update per policy

## 2022-01-24 DIAGNOSIS — E66.01 MORBID OBESITY (H): ICD-10-CM

## 2022-01-24 RX ORDER — PHENTERMINE HYDROCHLORIDE 37.5 MG/1
37.5 TABLET ORAL
Qty: 90 TABLET | Refills: 0 | Status: SHIPPED | OUTPATIENT
Start: 2022-01-24 | End: 2022-02-11

## 2022-01-24 NOTE — TELEPHONE ENCOUNTER
Patient requesting Phentermine script be sent to [facility] pharmacy in [place]. Patient has 90 day supply remaining on file and an appointment scheduled next month. Routing to available provider for sign off.

## 2022-01-25 ENCOUNTER — TELEPHONE (OUTPATIENT)
Dept: ENDOCRINOLOGY | Facility: CLINIC | Age: 32
End: 2022-01-25
Payer: COMMERCIAL

## 2022-01-26 NOTE — TELEPHONE ENCOUNTER
Medication Appeal Request    Please initiate an appeal for the requested medication: phentermine (ADIPEX-P) 37.5 MG tablet - EPA DENIED     Has a letter of medical necessity been completed in EPIC?   yes    Any additional lab values/information to include?     Would you like to include any research articles?               If yes please include the hyperlink(s) below or fax to    [phone] for Specialty/Retail               [phone] for Infusion/Clinic Administered.                Include the patients name and MRN on the fax cover sheet.

## 2022-01-26 NOTE — TELEPHONE ENCOUNTER
PRIOR AUTHORIZATION DENIED    Medication: phentermine (ADIPEX-P) 37.5 MG tablet - EPA DENIED     Denial Date: 1/25/2022    Denial Rationale:        Appeal Information: If provider would like to appeal this decision we will need a detailed letter of medical necessity to start the process. Then re-route this request back to the PA pool.

## 2022-01-31 NOTE — TELEPHONE ENCOUNTER
Medication Appeal Initiation    We have initiated an appeal for the requested medication:  Medication: phentermine (ADIPEX-P) 37.5 MG tablet - EPA DENIED   Appeal Start Date:  1/31/2022  Insurance Company: [employer] - Phone [phone] Fax [phone]  Comments:       Faxed letter of medical necessity to insurance.

## 2022-02-02 ENCOUNTER — VIRTUAL VISIT (OUTPATIENT)
Dept: PSYCHOLOGY | Facility: CLINIC | Age: 32
End: 2022-02-02
Payer: COMMERCIAL

## 2022-02-02 DIAGNOSIS — F50.819 BINGE EATING DISORDER: Primary | ICD-10-CM

## 2022-02-02 DIAGNOSIS — F32.A DEPRESSION, UNSPECIFIED DEPRESSION TYPE: ICD-10-CM

## 2022-02-02 PROCEDURE — 90837 PSYTX W PT 60 MINUTES: CPT | Mod: 95 | Performed by: PSYCHOLOGIST

## 2022-02-02 NOTE — PROGRESS NOTES
"  Health Psychology                      Annabel Gerardo, Ph.D., L.P [phone]  Adilia Subramanian, Ph.D., L.P. [phone]  Kay Toro, Ph.D. [phone]  Erika Quintana, Ph.D., L.P. [phone]  Michael Chapman, Ph.D., A.B.P.P., L.P. [phone]         Leia Cooper, Ph.D., L.P. [phone]     [facility], 3rd Floor  [address] [place] [address]    The patient has been notified of following:   \"This video visit will be conducted via a call between you and your physician/provider. We have found   that certain health care needs can be provided without the need for an in-person physical exam. This   service lets us provide the care you need with a video conversation. If a prescription is necessary we can   send it directly to your pharmacy. If lab work is needed we can place an order for that and you can then   stop by our lab to have the test done at a later time. If during the course of the call the physician/provider feels a video visit is not appropriate, you will not be charged for this service.\" YES    Reason that telemedicine is appropriate: COVID-19 pandemic mitigation requiring social distancing.  Patient has given verbal consent for video visit: Yes  Mode of transmission: Secure real time interactive audio and visual telecommunication system via [email or web address]  Location of originating and distant sites:    Originating site (patient location): patient's home    Distant site (provider site): home office of provider    Health Psychology Follow-Up Note    SUBJECTIVE:  Debbie Bah was seen for individual psychotherapy. Reviewed emotional and physical health since our last session. Has been trying timer. Discussed plans for managing regular eating and hunger for rotations. Discussed evening snacks and urge to eat before bed and how to manage. Utilized problem solving to create plan.     Ongoing list of binge eating/LOC triggers  Boredom, fatigue, " insomnia (eating helps fall asleep), discomfort, overwhelmed (doesn't like being needed/demanded upon/depleated), lonely   Being sick    OBJECTIVE:  Appearance/Behavior/Orientation: Patient was neatly groomed and dressed, and demonstrated good eye contact. Alert and oriented to person, place, time, and situation. No evidence of psychomotor agitation.     Cooperation/Reliability: Patient was open and cooperative throughout the session.    Speech/Language: Speech was clear, coherent, and of normal rate, rhythm and volume.   Thought Form: Overall logical and organized.   Mood/Affect: Mood euthymic; appropriate range of affect.    Insight/Motivation: Good, good  Suicide/Assault: Patient reports no suicidal or assaultive ideation, plan, or intent.    ASSESSMENT:  Was engaged and interactive throughout the session. She is likely to benefit from CBT and mindfulness based approach to binge eating disorder.  1/10/22  Objective Binge episodes: 2-3  Subjective binge episodes: 7-10    2/2/22  Objective Binge episodes: 3-4    DIAGNOSIS:  Binge eating disorder [F50.81]  Depression, unspecified depression type [F32.A]    PLAN:  RTC for continued psychotherapy.     3/2 1:00    Goals:   -60 second timer before deciding to eat (and then being okay with eating if does), felt 90 seconds was too much.  -Grocery shop for snacks for long commute  -Buy snacks for evening snack/plan for it.   -Continue with journaling (perhaps during high emotion, not just after)  -Create list of positive outcomes from in control eating (weight loss, having foods around that previously were trigger foods).    Start: 12:01  Stop: 12:55  Extended due to length of interval    Annabel Gerardo, PhD,   Health Psychology  Clinical Psychologist    Extended session due to onset of treatment, length of interval    Tx plan completed: 1/10/2022   Tx plan due:  1/10/2023    This note was completed using Dragon voice recognition software.  Although reviewed after  completion, some word and grammatical errors may occur.    OUTPATIENT TREATMENT PLAN SUMMARY    Date of Treatment Plan: 1/10/2022  90-Day Review Date: N/A  Date of Initial Service: 1/10/2022      1. DSM-V Diagnosis (include numeric code)  Binge eating disorder [F50.81]  Depression, unspecified depression type [F32.A]  2. Current symptoms and circumstances that substantiate the diagnosis:  See intake 9/28/21    3. How symptoms and/or behaviors are affecting level of function:   See intake 9/28/21 and note 1/10/22    4. Risk Assessment:  Suicide:  Assessed Level of Immediate Risk: None  Ideation: No   Plan:  No   Means: No   Intent: No     Assessed Level of Immediate Risk: None  Ideation: No   Plan:  No   Means: No   Intent: No    If on a medication, please include name and dosage:  See chart    Symptom/Problem Measurable Goals Interventions Gains Made   1.BED 1. Decrease binge eating/loss of control frequency 1.CBT for BED 1. N/A   2.Depression 2. Maintain minimal/mild PHQ-9 scores 2. CBT for depression 2. N/A     5. Frequency of Sessions: 1-2x/month    6. Discharge and Aftercare Goals: TBD    7. Expected duration of treatment:  TBD    8. Participants in therapy plan (family, friends, support network): TBD    Treatment plan available to patient via [facility]/medical records.    Regulatory Guidelines for Updating Treatment Plan  [place] Medical Assistance: Reviewed & signed at least every 90days  Medicare:  Update per policy

## 2022-02-11 ENCOUNTER — VIRTUAL VISIT (OUTPATIENT)
Dept: ENDOCRINOLOGY | Facility: CLINIC | Age: 32
End: 2022-02-11
Payer: COMMERCIAL

## 2022-02-11 VITALS — BODY MASS INDEX: 32.96 KG/M2 | WEIGHT: 197.8 LBS | HEIGHT: 65 IN

## 2022-02-11 DIAGNOSIS — E66.01 MORBID OBESITY (H): Primary | ICD-10-CM

## 2022-02-11 PROCEDURE — 99213 OFFICE O/P EST LOW 20 MIN: CPT | Mod: 95 | Performed by: INTERNAL MEDICINE

## 2022-02-11 RX ORDER — PHENTERMINE HYDROCHLORIDE 37.5 MG/1
37.5 TABLET ORAL
Qty: 90 TABLET | Refills: 1 | Status: SHIPPED | OUTPATIENT
Start: 2022-02-11 | End: 2022-07-15

## 2022-02-11 ASSESSMENT — MIFFLIN-ST. JEOR: SCORE: 1623.09

## 2022-02-11 ASSESSMENT — PAIN SCALES - GENERAL: PAINLEVEL: NO PAIN (0)

## 2022-02-11 NOTE — LETTER
"2022     RE: Debbie Bah  [address]  [facility] [address]     Dear Colleague,    Thank you for referring your patient, Debbie Bah, to the [facility] [place] at [facility]. Please see a copy of my visit note below.    Debbie is a 31 year old who is being evaluated via a billable video visit.      How would you like to obtain your AVS? MyChart  If the video visit is dropped, the invitation should be resent by: [phone]  Will anyone else be joining your video visit? No  During this virtual visit the patient is located in [place], patient verifies this as the location during the entirety of this visit.     Video Start Time: 11:43  Video-Visit Details  Type of service:  Video Visit  Video End Time:11:49  Originating Location (pt. Location): Home  Distant Location (provider location):  [facility] [place]   Platform used for Video Visit: [employer] [facility] Weight Management Note     Debbie Bah  MRN:  [id number]  :  1990  LARRY:  2022    Dear Sarahi Santos, HARJINDER CNP,    I had the pleasure of seeing your patient Debbie Bah. She is a 31 year old female who I am continuing to see for treatment of obesity related to:       2020   I have the following health issues associated with obesity: None of the above   I have the following symptoms associated with obesity: Depression     INTERVAL HISTORY:    Last visit about 3-4 mo ago. Reviewed and relevant history is as follows, as extracted from earlier note--     -------------------------------------------------------  \"Debbie has been struggled with weight since her adult life but mostly in the past 2 years when she has started grad school and continued to work part time. 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 She gained from 180 lbs up to 250 lbs in the past 2 " "years. She reports increased stress eating, emotional eating, larger portion side and increased fast food consumption. She reports frequent late night eating just to relax before going to sleep. She feels that she has food thought all the time. She sleeps about 7-8 hours per day but still feels very fatigue.      She was admitted at [facility] for 4 months for binges eating from the end of 2018 to early 2019.      She works with psychiatrist at [facility]. She was prescribed topiramate but it caused extreme paresthesia. She also was on Vyvanse but it caused her to feel not well. She also see therapist to work on stress coping mechanism. She is currently taking bupropion.      She does not exercise regularly. She feels that she has no time to exercise. She probably walks about 1-2 times per week.  She works from 3 pm to 11 pm. She usually eats before going to work and during works hours. Diet is mostly riches with carbohydrate.      --50# wt loss over last yr (post stopping the naltrexone [n/HAs on that so stopped it]).  Used myfitness and Noom each for a little while, began walking reguarly  --then regain of 15 and then another 10# recently with new anti-depressant     --topiramate and Vyvanse were tried with psychiatrist to help with wt loss (they were not effective)\"  --------------------------------------------     She started phentermine in the interim; no palpitations or racing heart.  Met with pharmacist for med titration and with health psych in the interim. Doing very well.          Last visit she noted the following--  --exercise--doing clinical rotation in [place] (7a-5p), exercising about 3 times per wk  --with eating (does not restrict fat but aims for healthier fats much of the time loves to cook but notes she is satisfied with less food than prior.  Getting good appetitive control with the current med regimen f phentermine and using Krish (as noted below)  --Orlistat does not appear to be " "covered by her insurance plan; Ernestine is working for her     LAST WEIGHT:   213 lbs 0 oz   Body mass index is 36.56 kg/m .    Since last seen--    --struggles:  1.  stress eating can still be an issue (overate but was able to stop before it led to binge; was dealing with driving home in a snow storm)  2.  Other emotional triggers (like bordeom)    Has been working with health psych and that has helped a lot    No hunger/cravings (had been an issue before)          CURRENT WEIGHT:   197 lbs 12.8 oz   Body mass index is 32.92 kg/m .    Initial Weight (lbs): 257.8 lbs  Last Visits Weight: 96.6 kg (213 lb)  Cumulative weight loss (lbs): 60  Weight Loss Percentage: 23.27%    Changes and Difficulties 2/9/2022   I have made the following changes to my diet since my last visit: eating less beef, eating smaller dinners, planning for an evening snack instead of finding myself binging   With regards to my diet, I am still struggling with: Nighttime/bedtime eating when not hungry, stress eating   I have made the following changes to my activity/exercise since my last visit: Have begun running very small amounts, continuing to walk for at least 30 minutes 5-7 days/week   With regards to my activity/exercise, I am still struggling with: Motivation when it's cold       VITALS:  Ht 1.651 m (5' 5\")   Wt 89.7 kg (197 lb 12.8 oz)   BMI 32.92 kg/m      MEDICATIONS:   Current Outpatient Medications   Medication Sig Dispense Refill     buPROPion (WELLBUTRIN XL) 300 MG 24 hr tablet Take 300 mg by mouth daily       orlistat (ERNESTINE) 60 MG capsule Take 60 mg by mouth 3 times daily as needed       phentermine (ADIPEX-P) 37.5 MG tablet Take 1 tablet (37.5 mg) by mouth every morning (before breakfast) 90 tablet 0     predniSONE (DELTASONE) 50 MG tablet Emergency set: if severe allergic reaction take immediately 100mg Prednisone (2x50mg) and 2 Tabl Cetirizine 10mg and then write precise 12h retrospective diary.If less severe reaction take only " the 2 Tabl Cetirizine (Patient taking differently: Emergency set: if severe allergic reaction take immediately 100mg Prednisone (2x50mg) and 2 Tabl Cetirizine 10mg and then write precise 12h retrospective diary.If less severe reaction take only the 2 Tabl Cetirizine    For mushroom allergy) 2 tablet 3     Vitamin D3 (CHOLECALCIFEROL) 125 MCG (5000 UT) tablet Take 1 tablet by mouth daily       cetirizine (ZYRTEC) 10 MG tablet Take 1 tablet (10 mg) by mouth daily 10 tablet 0     ferrous sulfate (FE TABS) 325 (65 Fe) MG EC tablet Take 325 mg by mouth daily         Weight Loss Medication History Reviewed With Patient 2/9/2022   Which weight loss medications are you currently taking on a regular basis?  Phentermine   Are you having any side effects from the weight loss medication that we have prescribed you? No        ASSESSMENT/PLAN:  Debbie is a patient with mature onset obesity with significant element of familial/genetic influence and with current health consequences.  Debbie Bah endorses binging, eats a high carb diet, eats a high fat diet, eats fast food once or more per week, uses food as a reward, uses food as mood management, mostly eats during the evening and has a disorganized meal pattern.     Her problem is complicated by strong craving/reward pathways, binges eating and poor lifestyle choices     Her ability to lose weight is impacted by current work life and lack of confidence.     PLAN:    (continuing)  Decrease portion sizes  Decrease eating out  Purge house of food triggers  Dietician visit of education  Calorie/low fat diet  Meal planning  Increase activity      Continue to follow up with psychiatrist and therapist for stress coping     Craving/Reward   Ancillary testing:  N/A.  Food Plan:  High protein/low carbohydrate.   Activity Plan:  Exercise after meals.  Supplementary:  Continue to follow up with psychiatrist and therapist for stress coping.   Medication:  The patient will continue  medication in pursuit of improved medical status as influenced by body weight. There is a mutual understanding of the goals and risks of this therapy. The patient is in agreement. She is educated on dosage regimen and possible side effects.      additionally  --continue with same meds (phentermine 1 tablet daily and Orlistat)/psychology suuport  --RTC with me again in about 3 mo      Time: approx about 23 min spent on evaluation, management, counseling, education, & motivational interviewing (video) combined with previsit prep and post visit charting/follow up care same day.    Sincerely,    Natalie Carbajal MD

## 2022-02-11 NOTE — NURSING NOTE
"(   Chief Complaint   Patient presents with     RECHECK     Return MW    )    ( Weight: 89.7 kg (197 lb 12.8 oz) (Patient reported) )  ( Height: 165.1 cm (5' 5\") )  ( BMI (Calculated): 32.92 )  (   )  (   )  (   )  (   )  (   )  (   )    (   )  (   )  (   )  (   )  (   )  (   )  (   )    (   Patient Active Problem List   Diagnosis     Obesity (BMI 30.0-34.9)    )  (   Current Outpatient Medications   Medication Sig Dispense Refill     buPROPion (WELLBUTRIN XL) 300 MG 24 hr tablet Take 300 mg by mouth daily       orlistat (ERNESTINE) 60 MG capsule Take 60 mg by mouth 3 times daily as needed       phentermine (ADIPEX-P) 37.5 MG tablet Take 1 tablet (37.5 mg) by mouth every morning (before breakfast) 90 tablet 0     predniSONE (DELTASONE) 50 MG tablet Emergency set: if severe allergic reaction take immediately 100mg Prednisone (2x50mg) and 2 Tabl Cetirizine 10mg and then write precise 12h retrospective diary.If less severe reaction take only the 2 Tabl Cetirizine (Patient taking differently: Emergency set: if severe allergic reaction take immediately 100mg Prednisone (2x50mg) and 2 Tabl Cetirizine 10mg and then write precise 12h retrospective diary.If less severe reaction take only the 2 Tabl Cetirizine    For mushroom allergy) 2 tablet 3     Vitamin D3 (CHOLECALCIFEROL) 125 MCG (5000 UT) tablet Take 1 tablet by mouth daily       cetirizine (ZYRTEC) 10 MG tablet Take 1 tablet (10 mg) by mouth daily 10 tablet 0     ferrous sulfate (FE TABS) 325 (65 Fe) MG EC tablet Take 325 mg by mouth daily      )  ( Diabetes Eval:    )    ( Pain Eval:  No Pain (0) )    ( Wound Eval:       )    (   History   Smoking Status     Never Smoker   Smokeless Tobacco     Never Used    )    ( Signed By:  Yolis Roa, EMT; February 11, 2022; 11:15 AM )    "

## 2022-02-11 NOTE — PATIENT INSTRUCTIONS
Thank you for allowing us the privilege of caring for you. We hope we provided you with the excellent service you deserve.    Please let us know if there is anything else we can do for you so that we can be sure you are completely satisfied with your care experience.    Your visit was with Dr. Uribe today.    Instructions per today's visit:  --continue with same meds (phentermine 1 tablet daily and Orlistat)/psychology suuport  --we can plan a return appointment with Dr. Uribe again in about 3 mo    Please call our contact center at [phone] to schedule your next appointments.    Meal Replacement Products:    Here is the link to our new e-store where you can purchase our meal replacement products    [email or web address]    The one week starter kit is a great way to sample a variety of products and see what works for you.    If you want more information about the product go to: FTRANS    Free Shipping for orders over $75    Benefits of meal replacements products:    Portion and calorie control  Improved nutrition  Structured eating  Simplified food choices  Avoid contact with trigger foods    Interested in working with a health ?  Health coaches work with you to improve your overall health and wellbeing.  They look at the whole person, and may involve discussion of different areas of life, including, but not limited to the four pillars of health (sleep, exercise, nutrition, and stress management). Discuss with your care team if you would like to start working a health .    Health Coaching-3 Pack: Schedule by calling [phone]    $99 for three health coaching visits    Visits may be done in person or via phone    Coaching is a partnership between the  and the client; Coaches do not prescribe or diagnose    Coaching helps inspire the client to reach his/her personal goals    Bluetooth Scale:    We hope to provide you with high quality  telephone and virtual healthcare visits while social distancing for COVID-19 is necessary, as well as in the future when virtual visits may be more convenient for you.    Our technology team made it possible for Bluetooth scales to send weight measurements to our electronic medical record. This allows weights from you weighing at home to securely flow into the medical record, which will improve telephone and virtual visits.  Additionally, studies have shown that adults actually lose more weight when their weights are automatically sent to someone else, and also that this process is not stressful for those adults.    Below is a link for purchasing the scale, with a discount code for our patients. You may call your insurance company to see if they will reimburse you for the cost of the scale, as a piece of durable medical equipment. The scales only go up to a weight of 400 pounds. This is an issue and we are working with the developer on increasing this. We found no scales that go over 400lb that have blue-tooth for connecting to [employer]rief[employer].    Scale to purchase: the [employer]  Body  Scale: [email or web address]/us/en/body/[email or web address]=aw.ds    Discount Code: We have a discount code for our patients to bring the cost down to $50, the code is:  withmed     Steps to link the scale to TradeBriefs via an Android Phone (you can always disconnect at any point in the future):  1. The order must be placed first before the patient can access Track My Health within [employer].  2. Download Google Fit samantha from the Google Play Store  a. Log in or register using your Google account  3. Download the TradeBriefs samantha from Google Play Store  a. Select add organization  b. Search for [employer] and select it  c. Log into [employer]Briefs  d. Select Track My Health  e. Select the green connect my account button  f. When prompted log into your Google account  g. Select okay to confirm the account  4.  Download the [employer] Health Mate jhonatan from Google Play Store  a. [place] for [employer]  b. Go to profile  c. Tap google fit under the Apps section  d. Select the option to activate Google Fit integration  e. Select the same Google account  f. Select okay to confirm the account  g.  Steps to link the scale to KeepRecipes via an iPhone (you can always disconnect at any point in the future):  **Note Tres [place]as is not available for download on an iPad**  1. The order must be placed first before the patient can access Track My Health within KeepRecipes.  2. Locate the Health jhonatan on your iPhone.  a. Set up your Apple Health account as prompted  b. The Sources page will show Apps that communicate with your Health jhonatan. Once all steps are completed, you should see Booklr and [employer]verse [employer] listed under the Apps section and your iPhone under the devices section.  i. Select Health Mate  1. Under 'ALLOW  HEALTH wiseri  TO WRITE DATA ensure the toggle is on for Weight.  2. This will allow the scale to add your weight to the Apple Health  ii. Select Roadhophart  1. Under 'ALLOW  TopChalks  TO READ DATA ensure the toggle is on for Weight.  2. This allows KeepRecipes to grab the weight from Tres Amigas so your provider can see your weights.  3. Download the KeepRecipes jhonatan from the Jhonatan Store  a. Select add organization                                                  b. Search for Eventful and select it  c. Log into KeepRecipes  d. Select Track My Health  e. Select the green connect my account button  f. Follow prompts to link your device to KeepRecipes.  4. Download the Withings health mate jhonatan in the Jhonatan Store  a. [place] for Withings  b. Go to profile  c. Tap Health under the Apps section  d. If prompted to allow access with the Health Jhonatan, toggle weight on for read and write access.      For any questions/concerns contact Sophia Li LPN at [phone]    To schedule appointments with our team, please call [phone]    Please call during clinic hours  Monday through Friday 8:00a - 4:00p if you have questions or you can contact us via Sociall at anytime.      Lab results will be communicated through My Chart or letter (if My Chart not used). Please call the clinic if you have not received communication after 1 week or if you have any questions.    Clinic Fax: [phone]    Thank you,  Medical Weight Management Team

## 2022-02-11 NOTE — PROGRESS NOTES
"Debbie is a 31 year old who is being evaluated via a billable video visit.      How would you like to obtain your AVS? MyChart  If the video visit is dropped, the invitation should be resent by: [phone]  Will anyone else be joining your video visit? No  During this virtual visit the patient is located in [place], patient verifies this as the location during the entirety of this visit.     Video Start Time: 11:43  Video-Visit Details    Type of service:  Video Visit    Video End Time:11:49    Originating Location (pt. Location): Home    Distant Location (provider location):  [facility] [place]     Platform used for Video Visit: Bluepay         [facility] Weight Management Note     Debbie Bah  MRN:  [id number]  :  1990  LARRY:  2022    Dear Sarahi Santos, HARJINDER MCCORMICK,    I had the pleasure of seeing your patient Debbie Bah. She is a 31 year old female who I am continuing to see for treatment of obesity related to:       2020   I have the following health issues associated with obesity: None of the above   I have the following symptoms associated with obesity: Depression       INTERVAL HISTORY:    Last visit about 3-4 mo ago. Reviewed and relevant history is as follows, as extracted from earlier note--     -------------------------------------------------------  \"Debbie has been struggled with weight since her adult life but mostly in the past 2 years when she has started grad school and continued to work part time. 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 She gained from 180 lbs up to 250 lbs in the past 2 years. She reports increased stress eating, emotional eating, larger portion side and increased fast food consumption. She reports frequent late night eating just to relax before going to sleep. She feels that she has food thought all the time. She sleeps about 7-8 hours per day but still feels very fatigue.      She was " "admitted at [facility] for 4 months for binges eating from the end of 2018 to early 2019.      She works with psychiatrist at [facility]. She was prescribed topiramate but it caused extreme paresthesia. She also was on Vyvanse but it caused her to feel not well. She also see therapist to work on stress coping mechanism. She is currently taking bupropion.      She does not exercise regularly. She feels that she has no time to exercise. She probably walks about 1-2 times per week.  She works from 3 pm to 11 pm. She usually eats before going to work and during works hours. Diet is mostly riches with carbohydrate.      --50# wt loss over last yr (post stopping the naltrexone [n/HAs on that so stopped it]).  Used myfitness and Noom each for a little while, began walking reguarly  --then regain of 15 and then another 10# recently with new anti-depressant     --topiramate and Vyvanse were tried with psychiatrist to help with wt loss (they were not effective)\"  --------------------------------------------     She started phentermine in the interim; no palpitations or racing heart.  Met with pharmacist for med titration and with health psych in the interim. Doing very well.          Last visit she noted the following--  --exercise--doing clinical rotation in [place] (7a-5p), exercising about 3 times per wk  --with eating (does not restrict fat but aims for healthier fats much of the time loves to cook but notes she is satisfied with less food than prior.  Getting good appetitive control with the current med regimen f phentermine and using Krish (as noted below)  --Orlistat does not appear to be covered by her insurance plan; Krish is working for her     LAST WEIGHT:   213 lbs 0 oz   Body mass index is 36.56 kg/m .        Since last seen--    --struggles:  1.  stress eating can still be an issue (overate but was able to stop before it led to binge; was dealing with driving home in a snow storm)  2.  Other emotional " "triggers (like darlene)    Has been working with health psych and that has helped a lot    No hunger/cravings (had been an issue before)          CURRENT WEIGHT:   197 lbs 12.8 oz   Body mass index is 32.92 kg/m .    Initial Weight (lbs): 257.8 lbs  Last Visits Weight: 96.6 kg (213 lb)  Cumulative weight loss (lbs): 60  Weight Loss Percentage: 23.27%    Changes and Difficulties 2/9/2022   I have made the following changes to my diet since my last visit: eating less beef, eating smaller dinners, planning for an evening snack instead of finding myself binging   With regards to my diet, I am still struggling with: Nighttime/bedtime eating when not hungry, stress eating   I have made the following changes to my activity/exercise since my last visit: Have begun running very small amounts, continuing to walk for at least 30 minutes 5-7 days/week   With regards to my activity/exercise, I am still struggling with: Motivation when it's cold       VITALS:  Ht 1.651 m (5' 5\")   Wt 89.7 kg (197 lb 12.8 oz)   BMI 32.92 kg/m      MEDICATIONS:   Current Outpatient Medications   Medication Sig Dispense Refill     buPROPion (WELLBUTRIN XL) 300 MG 24 hr tablet Take 300 mg by mouth daily       orlistat (ERNESTINE) 60 MG capsule Take 60 mg by mouth 3 times daily as needed       phentermine (ADIPEX-P) 37.5 MG tablet Take 1 tablet (37.5 mg) by mouth every morning (before breakfast) 90 tablet 0     predniSONE (DELTASONE) 50 MG tablet Emergency set: if severe allergic reaction take immediately 100mg Prednisone (2x50mg) and 2 Tabl Cetirizine 10mg and then write precise 12h retrospective diary.If less severe reaction take only the 2 Tabl Cetirizine (Patient taking differently: Emergency set: if severe allergic reaction take immediately 100mg Prednisone (2x50mg) and 2 Tabl Cetirizine 10mg and then write precise 12h retrospective diary.If less severe reaction take only the 2 Tabl Cetirizine    For mushroom allergy) 2 tablet 3     Vitamin D3 " (CHOLECALCIFEROL) 125 MCG (5000 UT) tablet Take 1 tablet by mouth daily       cetirizine (ZYRTEC) 10 MG tablet Take 1 tablet (10 mg) by mouth daily 10 tablet 0     ferrous sulfate (FE TABS) 325 (65 Fe) MG EC tablet Take 325 mg by mouth daily         Weight Loss Medication History Reviewed With Patient 2/9/2022   Which weight loss medications are you currently taking on a regular basis?  Phentermine   Are you having any side effects from the weight loss medication that we have prescribed you? No          ASSESSMENT/PLAN:  Debbie is a patient with mature onset obesity with significant element of familial/genetic influence and with current health consequences.  Debbie Bah endorses binging, eats a high carb diet, eats a high fat diet, eats fast food once or more per week, uses food as a reward, uses food as mood management, mostly eats during the evening and has a disorganized meal pattern.     Her problem is complicated by strong craving/reward pathways, binges eating and poor lifestyle choices     Her ability to lose weight is impacted by current work life and lack of confidence.     PLAN:    (continuing)  Decrease portion sizes  Decrease eating out  Purge house of food triggers  Dietician visit of education  Calorie/low fat diet  Meal planning  Increase activity      Continue to follow up with psychiatrist and therapist for stress coping     Craving/Reward   Ancillary testing:  N/A.  Food Plan:  High protein/low carbohydrate.   Activity Plan:  Exercise after meals.  Supplementary:  Continue to follow up with psychiatrist and therapist for stress coping.   Medication:  The patient will continue medication in pursuit of improved medical status as influenced by body weight. There is a mutual understanding of the goals and risks of this therapy. The patient is in agreement. She is educated on dosage regimen and possible side effects.      additionally  --continue with same meds (phentermine 1 tablet daily and  Orlistat)/psychology suuport  --RTC with me again in about 3 mo           Time: approx about 23 min spent on evaluation, management, counseling, education, & motivational interviewing (video) combined with previsit prep and post visit charting/follow up care same day.           Sincerely,    Natalie Carbajal MD

## 2022-02-15 NOTE — TELEPHONE ENCOUNTER
MEDICATION APPEAL APPROVED    Medication: phentermine (ADIPEX-P) 37.5 MG tablet   Authorization Effective Date: 12/31/2021  Authorization Expiration Date: 1/31/2023  Approved Dose/Quantity:   Reference #:     Insurance Company: [employer] - Phone [phone] Fax [phone]  Which Pharmacy is filling the prescription (Not needed for infusion/clinic administered): [id number] - [place], [place] - [address] AT [facility] [address]    Approved 12/31/21-1/31/23. Notified pharmacy

## 2022-03-01 ASSESSMENT — ANXIETY QUESTIONNAIRES
GAD7 TOTAL SCORE: 0
5. BEING SO RESTLESS THAT IT IS HARD TO SIT STILL: NOT AT ALL
GAD7 TOTAL SCORE: 0
7. FEELING AFRAID AS IF SOMETHING AWFUL MIGHT HAPPEN: NOT AT ALL
4. TROUBLE RELAXING: NOT AT ALL
3. WORRYING TOO MUCH ABOUT DIFFERENT THINGS: NOT AT ALL
1. FEELING NERVOUS, ANXIOUS, OR ON EDGE: NOT AT ALL
7. FEELING AFRAID AS IF SOMETHING AWFUL MIGHT HAPPEN: NOT AT ALL
6. BECOMING EASILY ANNOYED OR IRRITABLE: NOT AT ALL
2. NOT BEING ABLE TO STOP OR CONTROL WORRYING: NOT AT ALL

## 2022-03-02 ENCOUNTER — VIRTUAL VISIT (OUTPATIENT)
Dept: PSYCHOLOGY | Facility: CLINIC | Age: 32
End: 2022-03-02
Payer: COMMERCIAL

## 2022-03-02 DIAGNOSIS — F32.A DEPRESSION, UNSPECIFIED DEPRESSION TYPE: ICD-10-CM

## 2022-03-02 DIAGNOSIS — F50.819 BINGE EATING DISORDER: Primary | ICD-10-CM

## 2022-03-02 PROCEDURE — 90834 PSYTX W PT 45 MINUTES: CPT | Mod: 95 | Performed by: PSYCHOLOGIST

## 2022-03-02 ASSESSMENT — ANXIETY QUESTIONNAIRES: GAD7 TOTAL SCORE: 0

## 2022-03-02 NOTE — PROGRESS NOTES
"  Health Psychology                      Annabel Gerardo, Ph.D., L.P [phone]  Adilia Subramanian, Ph.D., L.P. [phone]  Kay Toro, Ph.D. [phone]  Erika Quintana, Ph.D., L.P. [phone]  Michael Chapman, Ph.D., A.B.P.P., L.P. [phone]         Leia Cooper, Ph.D., L.P. [phone]     [facility], 3rd Floor  [address] [place] [address]    The patient has been notified of following:   \"This video visit will be conducted via a call between you and your physician/provider. We have found   that certain health care needs can be provided without the need for an in-person physical exam. This   service lets us provide the care you need with a video conversation. If a prescription is necessary we can   send it directly to your pharmacy. If lab work is needed we can place an order for that and you can then   stop by our lab to have the test done at a later time. If during the course of the call the physician/provider feels a video visit is not appropriate, you will not be charged for this service.\" YES  *Patient late and was unable to access [employer], she preferred to have phone session rather than connect on [facility]    Health Psychology Follow-Up Note    SUBJECTIVE:  Debbie Bah was seen for individual psychotherapy. Reviewed emotional and physical health since our last session. Met goals. Struggled with increasing cravings, didn't realize that she was getting her period, once cycle started, the cravings/urges decreased. Experienced negative automatic thoughts related to her progress with eating during this premenstrual time, discussed options for addressing. Snacks in car has been going well. Has felt in control more often than not. Uses food at times to sooth while driving, discussed alternatives as well since she is spending a great deal of time in her care over the next 2-3 months for school.     Ongoing list of binge eating/LOC " triggers  Boredom, fatigue, insomnia (eating helps fall asleep), discomfort, overwhelmed (doesn't like being needed/demanded upon/depleated), lonely   Being sick    OBJECTIVE:  Appearance/Behavior/Orientation: Phone session. Alert and oriented to person, place, time, and situation. No evidence of psychomotor agitation.     Cooperation/Reliability: Patient was open and cooperative throughout the session.    Speech/Language: Speech was clear, coherent, and of normal rate, rhythm and volume.   Thought Form: Overall logical and organized.   Mood/Affect: Mood euthymic; appropriate range of affect.    Insight/Motivation: Good, good    ASSESSMENT:  Was engaged and interactive throughout the session. She is likely to benefit from CBT and mindfulness based approach to binge eating disorder.  1/10/22  Objective Binge episodes: 2-3  Subjective binge episodes: 7-10  2/2/22  Objective Binge episodes: 3-4    Answers for HPI/ROS submitted by the patient on 3/1/2022  TRAVIS 7 TOTAL SCORE: 0    DIAGNOSIS:  Binge eating disorder [F50.81]  Depression, unspecified depression type [F32.A]    PLAN:  RTC for continued psychotherapy.     4/11 10:00    Goals:   -Continue 60 second timer before deciding to eat (and then being okay with eating if does), felt 90 seconds was too much.  -Continue plans for snacking on long commute   -Continue with journaling (perhaps during high emotion, not just after)  -Continue list of positive outcomes from in control eating (weight loss, having foods around that previously were trigger foods)  -Start tracking period  -Implement ways to decrease frustration with long commute (e.g., save enjoyed podcasts, add organization, invest in keeping clean, get blanket).    Start: 1:26  Stop:  2:04    Annabel Gerardo, PhD,   Health Psychology  Clinical Psychologist    Extended session due to onset of treatment, length of interval    Tx plan completed: 1/10/2022   Tx plan due:  1/10/2023      OUTPATIENT TREATMENT PLAN  SUMMARY    Date of Treatment Plan: 1/10/2022  90-Day Review Date: N/A  Date of Initial Service: 1/10/2022      1. DSM-V Diagnosis (include numeric code)  Binge eating disorder [F50.81]  Depression, unspecified depression type [F32.A]  2. Current symptoms and circumstances that substantiate the diagnosis:  See intake 9/28/21    3. How symptoms and/or behaviors are affecting level of function:   See intake 9/28/21 and note 1/10/22    4. Risk Assessment:  Suicide:  Assessed Level of Immediate Risk: None  Ideation: No   Plan:  No   Means: No   Intent: No     Assessed Level of Immediate Risk: None  Ideation: No   Plan:  No   Means: No   Intent: No    If on a medication, please include name and dosage:  See chart    Symptom/Problem Measurable Goals Interventions Gains Made   1.BED 1. Decrease binge eating/loss of control frequency 1.CBT for BED 1. N/A   2.Depression 2. Maintain minimal/mild PHQ-9 scores 2. CBT for depression 2. N/A     5. Frequency of Sessions: 1-2x/month    6. Discharge and Aftercare Goals: TBD    7. Expected duration of treatment:  TBD    8. Participants in therapy plan (family, friends, support network): TBD    Treatment plan available to patient via [facility]/medical records.    Regulatory Guidelines for Updating Treatment Plan  [place] Medical Assistance: Reviewed & signed at least every 90days  Medicare:  Update per policy

## 2022-04-11 ENCOUNTER — VIRTUAL VISIT (OUTPATIENT)
Dept: PSYCHOLOGY | Facility: CLINIC | Age: 32
End: 2022-04-11
Payer: COMMERCIAL

## 2022-04-11 DIAGNOSIS — F50.819 BINGE EATING DISORDER: Primary | ICD-10-CM

## 2022-04-11 DIAGNOSIS — F32.A DEPRESSION, UNSPECIFIED DEPRESSION TYPE: ICD-10-CM

## 2022-04-11 PROCEDURE — 90837 PSYTX W PT 60 MINUTES: CPT | Mod: 95 | Performed by: PSYCHOLOGIST

## 2022-04-11 ASSESSMENT — ANXIETY QUESTIONNAIRES
3. WORRYING TOO MUCH ABOUT DIFFERENT THINGS: NOT AT ALL
7. FEELING AFRAID AS IF SOMETHING AWFUL MIGHT HAPPEN: NOT AT ALL
7. FEELING AFRAID AS IF SOMETHING AWFUL MIGHT HAPPEN: NOT AT ALL
4. TROUBLE RELAXING: NOT AT ALL
2. NOT BEING ABLE TO STOP OR CONTROL WORRYING: NOT AT ALL
GAD7 TOTAL SCORE: 0
GAD7 TOTAL SCORE: 0
5. BEING SO RESTLESS THAT IT IS HARD TO SIT STILL: NOT AT ALL
6. BECOMING EASILY ANNOYED OR IRRITABLE: NOT AT ALL
1. FEELING NERVOUS, ANXIOUS, OR ON EDGE: NOT AT ALL

## 2022-04-11 NOTE — PROGRESS NOTES
"  Health Psychology                      Annabel Gerardo, Ph.D., L.P [phone]  Adilia Subramanian, Ph.D., L.P. [phone]  Kay Toro, Ph.D. [phone]  Erika Quintana, Ph.D., L.P. [phone]  Michael Chapman, Ph.D., A.B.P.P., L.P. [phone]         Leia Cooper, Ph.D., L.P. [phone]     [facility], 3rd Floor  [address] [place] [address]    The patient has been notified of following:   \"This video visit will be conducted via a call between you and your physician/provider. We have found   that certain health care needs can be provided without the need for an in-person physical exam. This   service lets us provide the care you need with a video conversation. If a prescription is necessary we can   send it directly to your pharmacy. If lab work is needed we can place an order for that and you can then   stop by our lab to have the test done at a later time. If during the course of the call the physician/provider feels a video visit is not appropriate, you will not be charged for this service.\" YES  Reason that telemedicine is appropriate: COVID-19 pandemic mitigation requiring social distancing.  Patient has given verbal consent for video visit: Yes  Mode of transmission: Secure real time interactive audio and visual telecommunication system via [employer]  Location of originating and distant sites:    Originating site (patient location): patient's home    Distant site (provider site): home office of provider      Health Psychology Follow-Up Note    SUBJECTIVE:  Debbie NAVA Carljuan luis was seen for individual psychotherapy. Reviewed updates to emotional and physical health and progress towards goals since our last session. 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 Discussed options for summer plans, and ways of maintaining health habits. Also created plans to address remaining feared/fobidden foods, " including ice cream and cereal.     Ongoing list of binge eating/LOC triggers  Boredom, fatigue, insomnia (eating helps fall asleep), discomfort, overwhelmed (doesn't like being needed/demanded upon/depleated), lonely   Being sick    Ongoing list of feared foods:  Cereal and cows milk (10) - not specific (sweet)  Pint of ice cream (ice cream sandwiches ok) (8-9)   Chinese food (7-8)    OBJECTIVE:  Appearance/Behavior/Orientation: Phone session. Alert and oriented to person, place, time, and situation. No evidence of psychomotor agitation.     Cooperation/Reliability: Patient was open and cooperative throughout the session.    Speech/Language: Speech was clear, coherent, and of normal rate, rhythm and volume.   Thought Form: Overall logical and organized.   Mood/Affect: Mood euthymic; appropriate range of affect.    Insight/Motivation: Good, good    ASSESSMENT:  Was engaged and interactive throughout the session. She is likely to benefit from CBT and mindfulness based approach to binge eating disorder.  1/10/22  Objective Binge episodes: 2-3  Subjective binge episodes: 7-10  2/2/22  Objective Binge episodes: 3-4    Answers for HPI/ROS submitted by the patient on 3/1/2022  TRAVIS 7 TOTAL SCORE: 0    DIAGNOSIS:  Binge eating disorder [F50.81]  Depression, unspecified depression type [F32.A]    PLAN:  RTC for continued psychotherapy.     5/9 2:00    Working Goals:   -Continue 60 second timer before deciding to eat (and then being okay with eating if does), felt 90 seconds was too much.  -Continue plans for snacking on long commute   -Continue with journaling (perhaps during high emotion, not just after)  -Continue list of positive outcomes from in control eating (weight loss, having foods around that previously were trigger foods)  -Start tracking period  -Implement ways to decrease frustration with long commute (e.g., save enjoyed podcasts, add organization, invest in keeping clean, get blanket).  -Incorporate feared food  of ice cream  -consider cooking class for this summer    Start: 10:57  Stop:  10:52  Extended session due to length of interval    Annabel Gerardo, PhD,   Health Psychology  Clinical Psychologist    Extended session due to onset of treatment, length of interval    Tx plan completed: 1/10/2022   Tx plan due:  1/10/2023      OUTPATIENT TREATMENT PLAN SUMMARY    Date of Treatment Plan: 1/10/2022  90-Day Review Date: N/A  Date of Initial Service: 1/10/2022      1. DSM-V Diagnosis (include numeric code)  Binge eating disorder [F50.81]  Depression, unspecified depression type [F32.A]  2. Current symptoms and circumstances that substantiate the diagnosis:  See intake 9/28/21    3. How symptoms and/or behaviors are affecting level of function:   See intake 9/28/21 and note 1/10/22    4. Risk Assessment:  Suicide:  Assessed Level of Immediate Risk: None  Ideation: No   Plan:  No   Means: No   Intent: No     Assessed Level of Immediate Risk: None  Ideation: No   Plan:  No   Means: No   Intent: No    If on a medication, please include name and dosage:  See chart    Symptom/Problem Measurable Goals Interventions Gains Made   1.BED 1. Decrease binge eating/loss of control frequency 1.CBT for BED 1. N/A   2.Depression 2. Maintain minimal/mild PHQ-9 scores 2. CBT for depression 2. N/A     5. Frequency of Sessions: 1-2x/month    6. Discharge and Aftercare Goals: TBD    7. Expected duration of treatment:  TBD    8. Participants in therapy plan (family, friends, support network): TBD    Treatment plan available to patient via mychart/medical records.    Regulatory Guidelines for Updating Treatment Plan  [place] Medical Assistance: Reviewed & signed at least every 90days  Medicare:  Update per policy

## 2022-04-12 ASSESSMENT — ANXIETY QUESTIONNAIRES: GAD7 TOTAL SCORE: 0

## 2022-05-03 ENCOUNTER — OFFICE VISIT (OUTPATIENT)
Dept: FAMILY MEDICINE | Facility: CLINIC | Age: 32
End: 2022-05-03

## 2022-05-03 ENCOUNTER — LAB (OUTPATIENT)
Dept: LAB | Facility: CLINIC | Age: 32
End: 2022-05-03
Payer: COMMERCIAL

## 2022-05-03 VITALS
HEIGHT: 64 IN | DIASTOLIC BLOOD PRESSURE: 82 MMHG | TEMPERATURE: 97.5 F | WEIGHT: 197.6 LBS | OXYGEN SATURATION: 100 % | SYSTOLIC BLOOD PRESSURE: 110 MMHG | HEART RATE: 89 BPM | BODY MASS INDEX: 33.73 KG/M2

## 2022-05-03 DIAGNOSIS — Z11.59 NEED FOR HEPATITIS C SCREENING TEST: ICD-10-CM

## 2022-05-03 DIAGNOSIS — Z11.4 SCREENING FOR HIV (HUMAN IMMUNODEFICIENCY VIRUS): ICD-10-CM

## 2022-05-03 DIAGNOSIS — Z92.29 HISTORY OF HEPATITIS B VACCINATION: ICD-10-CM

## 2022-05-03 DIAGNOSIS — N64.81 BREAST PTOSIS: ICD-10-CM

## 2022-05-03 DIAGNOSIS — Z01.818 PRE-OP EXAM: ICD-10-CM

## 2022-05-03 DIAGNOSIS — Z01.818 PRE-OP EXAM: Primary | ICD-10-CM

## 2022-05-03 LAB
ANION GAP SERPL CALCULATED.3IONS-SCNC: 6 MMOL/L (ref 3–14)
BUN SERPL-MCNC: 8 MG/DL (ref 7–30)
CALCIUM SERPL-MCNC: 8.7 MG/DL (ref 8.5–10.1)
CHLORIDE BLD-SCNC: 107 MMOL/L (ref 94–109)
CO2 SERPL-SCNC: 25 MMOL/L (ref 20–32)
CREAT SERPL-MCNC: 0.73 MG/DL (ref 0.52–1.04)
GFR SERPL CREATININE-BSD FRML MDRD: >90 ML/MIN/1.73M2
GLUCOSE BLD-MCNC: 89 MG/DL (ref 70–99)
HBV SURFACE AB SERPL IA-ACNC: >1000 M[IU]/ML
HCV AB SERPL QL IA: NONREACTIVE
HGB BLD-MCNC: 12.7 G/DL (ref 11.7–15.7)
HIV 1+2 AB+HIV1 P24 AG SERPL QL IA: NONREACTIVE
POTASSIUM BLD-SCNC: 3.9 MMOL/L (ref 3.4–5.3)
SODIUM SERPL-SCNC: 138 MMOL/L (ref 133–144)

## 2022-05-03 PROCEDURE — 99214 OFFICE O/P EST MOD 30 MIN: CPT | Performed by: NURSE PRACTITIONER

## 2022-05-03 PROCEDURE — 85018 HEMOGLOBIN: CPT

## 2022-05-03 PROCEDURE — 80048 BASIC METABOLIC PNL TOTAL CA: CPT

## 2022-05-03 PROCEDURE — 86803 HEPATITIS C AB TEST: CPT

## 2022-05-03 PROCEDURE — 36415 COLL VENOUS BLD VENIPUNCTURE: CPT

## 2022-05-03 PROCEDURE — 86706 HEP B SURFACE ANTIBODY: CPT

## 2022-05-03 PROCEDURE — 87389 HIV-1 AG W/HIV-1&-2 AB AG IA: CPT

## 2022-05-03 RX ORDER — DROSPIRENONE AND ETHINYL ESTRADIOL 0.02-3(28)
1 KIT ORAL DAILY
COMMUNITY
Start: 2022-05-03 | End: 2023-06-01

## 2022-05-03 ASSESSMENT — ANXIETY QUESTIONNAIRES
GAD7 TOTAL SCORE: 0
7. FEELING AFRAID AS IF SOMETHING AWFUL MIGHT HAPPEN: NOT AT ALL
2. NOT BEING ABLE TO STOP OR CONTROL WORRYING: NOT AT ALL
1. FEELING NERVOUS, ANXIOUS, OR ON EDGE: NOT AT ALL
6. BECOMING EASILY ANNOYED OR IRRITABLE: NOT AT ALL
3. WORRYING TOO MUCH ABOUT DIFFERENT THINGS: NOT AT ALL
5. BEING SO RESTLESS THAT IT IS HARD TO SIT STILL: NOT AT ALL

## 2022-05-03 ASSESSMENT — PATIENT HEALTH QUESTIONNAIRE - PHQ9
5. POOR APPETITE OR OVEREATING: NOT AT ALL
SUM OF ALL RESPONSES TO PHQ QUESTIONS 1-9: 4

## 2022-05-03 ASSESSMENT — PAIN SCALES - GENERAL: PAINLEVEL: NO PAIN (0)

## 2022-05-03 NOTE — PROGRESS NOTES
[facility]  [address] [place] [address] SO  [address]  [facility] [address]  Phone: [phone]  Fax: [phone]  Primary Provider: Sarahi Romano  Pre-op Performing Provider: SARAHI ROMANO      PREOPERATIVE EVALUATION:  Today's date: 5/3/2022    Debbie Bah is a 31 year old female who presents for a preoperative evaluation.    Surgical Information:  Surgery/Procedure: Mastoplexy  Surgery Location: [facility]  Surgeon: Dr. Castorena  Surgery Date: 05/25/22  Time of Surgery: unknown  Where patient plans to recover: At home with family  Fax number for surgical facility: [phone]    Type of Anesthesia Anticipated: General    Assessment & Plan     The proposed surgical procedure is considered INTERMEDIATE risk.    Pre-op exam  - Basic metabolic panel  (Ca, Cl, CO2, Creat, Gluc, K, Na, BUN); Future  - Hemoglobin; Future    Screening for HIV (human immunodeficiency virus)  - per recommended screening  - HIV Antigen Antibody Combo; Future    Need for hepatitis C screening test  - per recommended screening  - Hepatitis C Screen Reflex to HCV RNA Quant and Genotype; Future    Breast ptosis  - Basic metabolic panel  (Ca, Cl, CO2, Creat, Gluc, K, Na, BUN); Future  - Hemoglobin; Future    History of hepatitis B vaccination  - has had vaccination, but vaccinations not accessible.  Will confirm immunity  - Hepatitis B Surface Antibody; Future      Risks and Recommendations:  The patient has the following additional risks and recommendations for perioperative complications:   - No identified additional risk factors other than previously addressed    Medication Instructions:  Patient is to take all scheduled medications on the day of surgery   Phentermine schedule per weight clinic    RECOMMENDATION:  APPROVAL GIVEN to proceed with proposed procedure, without further diagnostic evaluation.    Review of external notes as documented above   Review of the result(s) of each unique test - hgb,  bmp, hep B masoud      Subjective     HPI related to upcoming procedure: changes with breasts with weight changes and having mastoplexy    Preop Questions 5/2/2022   1. Have you ever had a heart attack or stroke? No   2. Have you ever had surgery on your heart or blood vessels, such as a stent placement, a coronary artery bypass, or surgery on an artery in your head, neck, heart, or legs? No   3. Do you have chest pain with activity? No   4. Do you have a history of  heart failure? No   5. Do you currently have a cold, bronchitis or symptoms of other infection? No   6. Do you have a cough, shortness of breath, or wheezing? No   7. Do you or anyone in your family have previous history of blood clots? No   8. Do you or does anyone in your family have a serious bleeding problem such as prolonged bleeding following surgeries or cuts? No   9. Have you ever had problems with anemia or been told to take iron pills? No   10. Have you had any abnormal blood loss such as black, tarry or bloody stools, or abnormal vaginal bleeding? No   11. Have you ever had a blood transfusion? No   12. Are you willing to have a blood transfusion if it is medically needed before, during, or after your surgery? Yes   13. Have you or any of your relatives ever had problems with anesthesia? No   14. Do you have sleep apnea, excessive snoring or daytime drowsiness? No   15. Do you have any artifical heart valves or other implanted medical devices like a pacemaker, defibrillator, or continuous glucose monitor? No   16. Do you have artificial joints? No   17. Are you allergic to latex? No   18. Is there any chance that you may be pregnant? No       Health Care Directive:  Patient does not have a Health Care Directive or Living Will: Discussed advance care planning with patient; however, patient declined at this time.    Preoperative Review of :   reviewed - controlled substances prescribed by other outside provider(s).    Status of Chronic  Conditions:  See problem list for active medical problems.  Problems all longstanding and stable, except as noted/documented.  See ROS for pertinent symptoms related to these conditions.      Review of Systems  CONSTITUTIONAL: NEGATIVE for fever, chills, change in weight  INTEGUMENTARY/SKIN: NEGATIVE for worrisome rashes, moles or lesions  EYES: NEGATIVE for vision changes or irritation  ENT/MOUTH: NEGATIVE for ear, mouth and throat problems  RESP: NEGATIVE for significant cough or SOB  CV: NEGATIVE for chest pain, palpitations or peripheral edema  GI: NEGATIVE for nausea, abdominal pain, heartburn, or change in bowel habits  : NEGATIVE for frequency, dysuria, or hematuria  MUSCULOSKELETAL: NEGATIVE for significant arthralgias or myalgia  NEURO: NEGATIVE for weakness, dizziness or paresthesias  ENDOCRINE: NEGATIVE for temperature intolerance, skin/hair changes  HEME: NEGATIVE for bleeding problems  PSYCHIATRIC: NEGATIVE for changes in mood or affect    Patient Active Problem List    Diagnosis Date Noted     Obesity (BMI 30.0-34.9) 11/28/2018     Priority: Medium      Past Medical History:   Diagnosis Date     Binge eating disorder      Depression 2015     Past Surgical History:   Procedure Laterality Date     NO HISTORY OF SURGERY       Current Outpatient Medications   Medication Sig Dispense Refill     buPROPion (WELLBUTRIN XL) 300 MG 24 hr tablet Take 300 mg by mouth daily       drospirenone-ethinyl estradiol (ADALID) 3-0.02 MG tablet Take 1 tablet by mouth daily       phentermine (ADIPEX-P) 37.5 MG tablet Take 1 tablet (37.5 mg) by mouth every morning (before breakfast) 90 tablet 1     predniSONE (DELTASONE) 50 MG tablet Emergency set: if severe allergic reaction take immediately 100mg Prednisone (2x50mg) and 2 Tabl Cetirizine 10mg and then write precise 12h retrospective diary.If less severe reaction take only the 2 Tabl Cetirizine (Patient taking differently: Emergency set: if severe allergic reaction take  "immediately 100mg Prednisone (2x50mg) and 2 Tabl Cetirizine 10mg and then write precise 12h retrospective diary.If less severe reaction take only the 2 Tabl Cetirizine    For mushroom allergy) 2 tablet 3     Vitamin D3 (CHOLECALCIFEROL) 125 MCG (5000 UT) tablet Take 1 tablet by mouth daily         Allergies   Allergen Reactions     Doxycycline      esophagitis     Mushroom Cramps, Diarrhea and GI Disturbance        Social History     Tobacco Use     Smoking status: Never Smoker     Smokeless tobacco: Never Used   Substance Use Topics     Alcohol use: Yes     Alcohol/week: 0.0 standard drinks     Comment: 0-1 per week     Family History   Problem Relation Age of Onset     Asthma Mother      Hypertension Mother      Insomnia Mother      Hyperlipidemia Father      Insomnia Brother      No Known Problems Brother      Parkinsonism Maternal Grandmother      Coronary Artery Disease Maternal Grandfather 65     Arthritis Paternal Grandmother      Cerebrovascular Disease Paternal Grandmother 75     Cerebrovascular Disease Paternal Grandfather 82     Depression Half-Sister      Anxiety Disorder Half-Sister      Diabetes No family hx of      Breast Cancer No family hx of      History   Drug Use No         Objective     /82   Pulse 89   Temp 97.5  F (36.4  C) (Temporal)   Ht 1.626 m (5' 4\")   Wt 89.6 kg (197 lb 9.6 oz)   LMP 03/03/2022 (Approximate)   SpO2 100%   Breastfeeding No   BMI 33.92 kg/m      Physical Exam    GENERAL APPEARANCE: healthy, alert and no distress     EYES: EOMI, PERRL     HENT: ear canals and TM's normal and nose and mouth without ulcers or lesions     NECK: no adenopathy, no asymmetry, masses, or scars and thyroid normal to palpation     RESP: lungs clear to auscultation - no rales, rhonchi or wheezes     CV: regular rates and rhythm, normal S1 S2, no S3 or S4 and no murmur, click or rub     ABDOMEN:  soft, nontender, no HSM or masses and bowel sounds normal     MS: extremities normal- no " gross deformities noted, no evidence of inflammation in joints, FROM in all extremities.     SKIN: no suspicious lesions or rashes     NEURO: Normal strength and tone, sensory exam grossly normal, mentation intact and speech normal     PSYCH: mentation appears normal. and affect normal/bright     LYMPHATICS: No cervical adenopathy    Recent Labs   Lab Test 06/18/21  0909   HGB 12.9      A1C 5.2        Diagnostics:  Labs pending at this time.  Results will be reviewed when available.   No EKG required, no history of coronary heart disease, significant arrhythmia, peripheral arterial disease or other structural heart disease.    Revised Cardiac Risk Index (RCRI):  The patient has the following serious cardiovascular risks for perioperative complications:   - No serious cardiac risks = 0 points     RCRI Interpretation: 0 points: Class I (very low risk - 0.4% complication rate)           Signed Electronically by: HARJINDER Vasquez CNP  Copy of this evaluation report is provided to requesting physician.

## 2022-05-04 ASSESSMENT — ANXIETY QUESTIONNAIRES: GAD7 TOTAL SCORE: 0

## 2022-05-09 ENCOUNTER — VIRTUAL VISIT (OUTPATIENT)
Dept: PSYCHOLOGY | Facility: CLINIC | Age: 32
End: 2022-05-09
Payer: COMMERCIAL

## 2022-05-09 DIAGNOSIS — F50.819 BINGE EATING DISORDER: Primary | ICD-10-CM

## 2022-05-09 DIAGNOSIS — F32.A DEPRESSION, UNSPECIFIED DEPRESSION TYPE: ICD-10-CM

## 2022-05-09 PROCEDURE — 90837 PSYTX W PT 60 MINUTES: CPT | Mod: 95 | Performed by: PSYCHOLOGIST

## 2022-05-09 ASSESSMENT — ANXIETY QUESTIONNAIRES
7. FEELING AFRAID AS IF SOMETHING AWFUL MIGHT HAPPEN: NOT AT ALL
GAD7 TOTAL SCORE: 0
1. FEELING NERVOUS, ANXIOUS, OR ON EDGE: NOT AT ALL
5. BEING SO RESTLESS THAT IT IS HARD TO SIT STILL: NOT AT ALL
7. FEELING AFRAID AS IF SOMETHING AWFUL MIGHT HAPPEN: NOT AT ALL
4. TROUBLE RELAXING: NOT AT ALL
6. BECOMING EASILY ANNOYED OR IRRITABLE: NOT AT ALL
GAD7 TOTAL SCORE: 0
3. WORRYING TOO MUCH ABOUT DIFFERENT THINGS: NOT AT ALL
GAD7 TOTAL SCORE: 0
2. NOT BEING ABLE TO STOP OR CONTROL WORRYING: NOT AT ALL
8. IF YOU CHECKED OFF ANY PROBLEMS, HOW DIFFICULT HAVE THESE MADE IT FOR YOU TO DO YOUR WORK, TAKE CARE OF THINGS AT HOME, OR GET ALONG WITH OTHER PEOPLE?: NOT DIFFICULT AT ALL

## 2022-05-09 NOTE — PROGRESS NOTES
"  Health Psychology                      Annabel Gerardo, Ph.D., L.P [phone]  Adilia Subramanian, Ph.D., L.P. [phone]  Kay Toro, Ph.D. [phone]  Erika Quintana, Ph.D., L.P. [phone]  Michael Chapman, Ph.D., A.B.P.P., L.P. [phone]         Leia Cooper, Ph.D., L.P. [phone]     [facility], 3rd Floor  [address] [place] [address]    The patient has been notified of following:   \"This video visit will be conducted via a call between you and your physician/provider. We have found   that certain health care needs can be provided without the need for an in-person physical exam. This   service lets us provide the care you need with a video conversation. If a prescription is necessary we can   send it directly to your pharmacy. If lab work is needed we can place an order for that and you can then   stop by our lab to have the test done at a later time. If during the course of the call the physician/provider feels a video visit is not appropriate, you will not be charged for this service.\" YES  Reason that telemedicine is appropriate: COVID-19 pandemic mitigation requiring social distancing.  Patient has given verbal consent for video visit: Yes  Mode of transmission: Secure real time interactive audio and visual telecommunication system via [email or web address]  Location of originating and distant sites:    Originating site (patient location): patient's home    Distant site (provider site): home office of provider      Health Psychology Follow-Up Note    SUBJECTIVE:  Debbie BRANDY Bah was seen for individual psychotherapy. Reviewed updates to emotional and physical health and progress towards goals since our last session. Has been practicing with ice cream and cereal and generally been successful. Signed up for cooking class with friend but wasn't able to find one that is ongoing. Problem solved around upcoming surgery to decrease chances of binge eating " during recovery.    Ongoing list of binge eating/LOC triggers  Boredom, fatigue, insomnia (eating helps fall asleep), discomfort, overwhelmed (doesn't like being needed/demanded upon/depleated), lonely   Being sick    Ongoing list of feared foods:  Cereal and cows milk (10) - not specific (sweet)  Pint of ice cream (ice cream sandwiches ok) (8-9)   Chinese food (7-8)    OBJECTIVE:  Appearance/Behavior/Orientation: Phone session. Alert and oriented to person, place, time, and situation. No evidence of psychomotor agitation.     Cooperation/Reliability: Patient was open and cooperative throughout the session.    Speech/Language: Speech was clear, coherent, and of normal rate, rhythm and volume.   Thought Form: Overall logical and organized.   Mood/Affect: Mood euthymic; appropriate range of affect.    Insight/Motivation: Good, good    ASSESSMENT:  Was engaged and interactive throughout the session. She is likely to benefit from CBT and mindfulness based approach to binge eating disorder.  1/10/22  Objective Binge episodes: 2-3  Subjective binge episodes: 7-10  2/2/22  Objective Binge episodes: 3-4  5/9/22  OBEs-2-3   SBE - 3-5    TRAVIS-7 SCORE 4/11/2022 5/3/2022 5/9/2022   Total Score 0 (minimal anxiety) - 0 (minimal anxiety)   Total Score 0 0 0       PHQ 6/18/2021 9/28/2021 5/3/2022   PHQ-9 Total Score 6 4 4   Q9: Thoughts of better off dead/self-harm past 2 weeks Not at all Not at all Not at all   F/U: Thoughts of suicide or self-harm - - -   F/U: Safety concerns - - -     DIAGNOSIS:  Binge eating disorder [F50.81]  Depression, unspecified depression type [F32.A]    PLAN:  RTC for continued psychotherapy.     6/7 10:00    Working Goals:   -Continue 60 second timer before deciding to eat (and then being okay with eating if does), felt 90 seconds was too much.  -Continue with journaling (perhaps during high emotion, not just after)  -Continue list of positive outcomes from in control eating (weight loss, having foods  around that previously were trigger foods)  -Start tracking period  -Incorporate feared food of ice cream and cereal  -Consider adding in more vigorous exercise such as biking and hiking this summer.   -Implement plan for following surgery.     Start: 1:58  Stop:  2:52  Extended session due to length of interval    Annabel Gerardo, PhD,   Health Psychology  Clinical Psychologist    Extended session due to onset of treatment, length of interval    Tx plan completed: 1/10/2022   Tx plan due:  1/10/2023      OUTPATIENT TREATMENT PLAN SUMMARY    Date of Treatment Plan: 1/10/2022  90-Day Review Date: N/A  Date of Initial Service: 1/10/2022      1. DSM-V Diagnosis (include numeric code)  Binge eating disorder [F50.81]  Depression, unspecified depression type [F32.A]  2. Current symptoms and circumstances that substantiate the diagnosis:  See intake 9/28/21    3. How symptoms and/or behaviors are affecting level of function:   See intake 9/28/21 and note 1/10/22    4. Risk Assessment:  Suicide:  Assessed Level of Immediate Risk: None  Ideation: No   Plan:  No   Means: No   Intent: No     Assessed Level of Immediate Risk: None  Ideation: No   Plan:  No   Means: No   Intent: No    If on a medication, please include name and dosage:  See chart    Symptom/Problem Measurable Goals Interventions Gains Made   1.BED 1. Decrease binge eating/loss of control frequency 1.CBT for BED 1. N/A   2.Depression 2. Maintain minimal/mild PHQ-9 scores 2. CBT for depression 2. N/A     5. Frequency of Sessions: 1-2x/month    6. Discharge and Aftercare Goals: TBD    7. Expected duration of treatment:  TBD    8. Participants in therapy plan (family, friends, support network): TBD    Treatment plan available to patient via [facility]/medical records.    Regulatory Guidelines for Updating Treatment Plan  [place] Medical Assistance: Reviewed & signed at least every 90days  Medicare:  Update per policy

## 2022-05-10 ASSESSMENT — ANXIETY QUESTIONNAIRES: GAD7 TOTAL SCORE: 0

## 2022-05-13 ENCOUNTER — VIRTUAL VISIT (OUTPATIENT)
Dept: ENDOCRINOLOGY | Facility: CLINIC | Age: 32
End: 2022-05-13
Payer: COMMERCIAL

## 2022-05-13 VITALS — WEIGHT: 197.5 LBS | HEIGHT: 65 IN | BODY MASS INDEX: 32.9 KG/M2

## 2022-05-13 DIAGNOSIS — E66.09 CLASS 1 OBESITY DUE TO EXCESS CALORIES WITHOUT SERIOUS COMORBIDITY WITH BODY MASS INDEX (BMI) OF 32.0 TO 32.9 IN ADULT: Primary | ICD-10-CM

## 2022-05-13 DIAGNOSIS — E66.811 CLASS 1 OBESITY DUE TO EXCESS CALORIES WITHOUT SERIOUS COMORBIDITY WITH BODY MASS INDEX (BMI) OF 32.0 TO 32.9 IN ADULT: Primary | ICD-10-CM

## 2022-05-13 PROCEDURE — 99214 OFFICE O/P EST MOD 30 MIN: CPT | Mod: 95 | Performed by: INTERNAL MEDICINE

## 2022-05-13 ASSESSMENT — PAIN SCALES - GENERAL: PAINLEVEL: NO PAIN (0)

## 2022-05-13 NOTE — LETTER
"2022       RE: Debbie Bah  [address]  [facility] [address]     Dear Colleague,    Thank you for referring your patient, Debbie Bah, to the [facility] [place] [facility]. Please see a copy of my visit note below.    Return Medical Weight Management Note     Debbie Bah  MRN:  [id number]  :  1990  LARRY:  2022    Dear Sarahi Santos, HARJINDER CNP,    I had the pleasure of seeing your patient Debbie Bah. She is a 31 year old female who I am continuing to see for treatment of obesity related to:       2020   I have the following health issues associated with obesity: None of the above   I have the following symptoms associated with obesity: Depression       INTERVAL HISTORY:    Last visit about 3 mo ago. Reviewed and relevant history is as follows, as extracted from earlier note--     -------------------------------------------------------  \"Debbie has been struggled with weight since her adult life but mostly in the past 2 years when she has started grad school and continued to work part time. 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 She gained from 180 lbs up to 250 lbs in the past 2 years. She reports increased stress eating, emotional eating, larger portion side and increased fast food consumption. She reports frequent late night eating just to relax before going to sleep. She feels that she has food thought all the time. She sleeps about 7-8 hours per day but still feels very fatigue.      She was admitted at [facility] for 4 months for binges eating from the end of 2018 to early 2019.      She works with psychiatrist at [facility]. She was prescribed topiramate but it caused extreme paresthesia. She also was on Vyvanse but it caused her to feel not well. She also see therapist to work on stress coping mechanism. She is currently " "taking bupropion.      She does not exercise regularly. She feels that she has no time to exercise. She probably walks about 1-2 times per week.  She works from 3 pm to 11 pm. She usually eats before going to work and during works hours. Diet is mostly riches with carbohydrate.      --50# wt loss over last yr (post stopping the naltrexone [n/HAs on that so stopped it]).  Used myfitness and Noom each for a little while, began walking reguarly  --then regain of 15 and then another 10# recently with new anti-depressant     --topiramate and Vyvanse were tried with psychiatrist to help with wt loss (they were not effective)\"  --------------------------------------------     She started phentermine; no palpitations or racing heart.  Met with pharmacist for med titration and has met with health psych. Doing very well.     --struggles:  1.  stress eating can still be an issue (overate but was able to stop before it led to binge; was dealing with driving home in a snow storm)  2.  Other emotional triggers (like bordeom)     Has been working with health psych and that has helped a lot     No hunger/cravings (had been an issue before)              LAST WEIGHT:   197 lbs 12.8 oz   Body mass index is 32.92 kg/m .      Since last vosot--  --Feb and Mar things were good, April was not as good (stopped going out and walking)  --had gotten down to 192# at one point  --graduating tomorrow and boards in 2 wks, all of which has made things more hectic and created difficulty with wt loss  --was able to stop taking Krish (loose stools) and still maintaing, which is current goal    CURRENT WEIGHT:   197 lbs 8 oz   Body mass index is 32.87 kg/m .    Initial Weight (lbs): 257.8 lbs  Last Visits Weight: 89.7 kg (197 lb 12.8 oz)  Cumulative weight loss (lbs): 60.3  Weight Loss Percentage: 23.39%    Changes and Difficulties 5/9/2022   I have made the following changes to my diet since my last visit: Adding ice cream and cereal - trigger foods " "  With regards to my diet, I am still struggling with: nighttime eating   I have made the following changes to my activity/exercise since my last visit: more daily walking, stopped in April because the weather was so awful, started gardening again   With regards to my activity/exercise, I am still struggling with: moderate/high intensity       VITALS:  Ht 1.651 m (5' 5\")   Wt 89.6 kg (197 lb 8 oz)   BMI 32.87 kg/m      MEDICATIONS:   Current Outpatient Medications   Medication Sig Dispense Refill     buPROPion (WELLBUTRIN XL) 300 MG 24 hr tablet Take 300 mg by mouth daily       drospirenone-ethinyl estradiol (ADALID) 3-0.02 MG tablet Take 1 tablet by mouth daily       phentermine (ADIPEX-P) 37.5 MG tablet Take 1 tablet (37.5 mg) by mouth every morning (before breakfast) 90 tablet 1     predniSONE (DELTASONE) 50 MG tablet Emergency set: if severe allergic reaction take immediately 100mg Prednisone (2x50mg) and 2 Tabl Cetirizine 10mg and then write precise 12h retrospective diary.If less severe reaction take only the 2 Tabl Cetirizine (Patient taking differently: Emergency set: if severe allergic reaction take immediately 100mg Prednisone (2x50mg) and 2 Tabl Cetirizine 10mg and then write precise 12h retrospective diary.If less severe reaction take only the 2 Tabl Cetirizine    For mushroom allergy) 2 tablet 3     Vitamin D3 (CHOLECALCIFEROL) 125 MCG (5000 UT) tablet Take 1 tablet by mouth daily         Weight Loss Medication History Reviewed With Patient 5/9/2022   Which weight loss medications are you currently taking on a regular basis?  Phentermine   Are you having any side effects from the weight loss medication that we have prescribed you? No       ASSESSMENT/PLAN:  Debbie is a patient with mature onset obesity with significant element of familial/genetic influence and with current health consequences.  Debbie Bah endorsed binging, ate a high carb diet, ate a high fat diet, ate fast food once or more " per week, used food as a reward, used food as mood management, mostly ate during the evening and had a disorganized meal pattern.     Her problem was complicated by strong craving/reward pathways, binge eating and poor lifestyle choices     Her ability to lose weight was impacted by current work life and lack of confidence.     PLAN:    (continuing)  Decrease portion sizes  Decrease eating out  Purge house of food triggers  Dietician visit of education  Calorie/low fat diet  Meal planning  Increase activity      Continue to follow up with psychiatrist and therapist for stress coping     Craving/Reward   Ancillary testing:  N/A.  Food Plan:  High protein/low carbohydrate.   Activity Plan:  Exercise after meals.  Supplementary:  Continue to follow up with psychiatrist and therapist for stress coping.   Medication:  The patient will continue medication in pursuit of improved medical status as influenced by body weight. There is a mutual understanding of the goals and risks of this therapy. The patient is in agreement. She is educated on dosage regimen and possible side effects.      additionally  --RTC in 2 mo  --having surgery on the 5/25; will taper off of phentermine and then re-escalate post surgery  --considering Plenity in the future  --she will add psyllium powder, or benefiber, or metamucil as directed           Time: approx about 30 min spent on evaluation, management, counseling, education, & motivational interviewing (video) combined with previsit prep and post visit charting/follow up care same day.    Sincerely,    Natalie Carbajal MD

## 2022-05-13 NOTE — PATIENT INSTRUCTIONS
Thank you for allowing us the privilege of caring for you. We hope we provided you with the excellent service you deserve.    Please let us know if there is anything else we can do for you so that we can be sure you are completely satisfied with your care experience.    Your visit was with Dr. Uribe today.    Instructions per today's visit:  --we can plan a return in about 2 mo  --you are having surgery on the 5/25; you can taper off the phentermine (1/2 tablet for about 5 days and then discontinue)  and then re-escalate post surgery to help with food goals  --we discussed/are considering Plenity in the future  --you can will add psyllium powder, or benefiber, or metamucil, as directed, to help with satiety.    Please call our contact center at [phone] to schedule your next appointments.    Meal Replacement Products:    Here is the link to our new e-store where you can purchase our meal replacement products    [email or web address]    The one week starter kit is a great way to sample a variety of products and see what works for you.    If you want more information about the product go to: inContact    Free Shipping for orders over $75    Benefits of meal replacements products:    Portion and calorie control  Improved nutrition  Structured eating  Simplified food choices  Avoid contact with trigger foods    Interested in working with a health ?  Health coaches work with you to improve your overall health and wellbeing.  They look at the whole person, and may involve discussion of different areas of life, including, but not limited to the four pillars of health (sleep, exercise, nutrition, and stress management). Discuss with your care team if you would like to start working a health .    Health Coaching-3 Pack: Schedule by calling [phone]    $99 for three health coaching visits    Visits may be done in person or via phone    Coaching is a  partnership between the  and the client; Coaches do not prescribe or diagnose    Coaching helps inspire the client to reach his/her personal goals    Bluetooth Scale:    We hope to provide you with high quality telephone and virtual healthcare visits while social distancing for COVID-19 is necessary, as well as in the future when virtual visits may be more convenient for you.    Our technology team made it possible for Bluetooth scales to send weight measurements to our electronic medical record. This allows weights from you weighing at home to securely flow into the medical record, which will improve telephone and virtual visits.  Additionally, studies have shown that adults actually lose more weight when their weights are automatically sent to someone else, and also that this process is not stressful for those adults.    Below is a link for purchasing the scale, with a discount code for our patients. You may call your insurance company to see if they will reimburse you for the cost of the scale, as a piece of durable medical equipment. The scales only go up to a weight of 400 pounds. This is an issue and we are working with the developer on increasing this. We found no scales that go over 400lb that have blue-tooth for connecting to Shiftgig.    Scale to purchase: the Wallix  Body  Scale: [email or web address]=aw.ds    Discount Code: We have a discount code for our patients to bring the cost down to $50, the code is:  withmed     Steps to link the scale to Shiftgig via an Android Phone (you can always disconnect at any point in the future):  1. The order must be placed first before the patient can access Track My Health within Shiftgig.  2. Download Google Fit samantha from the Google Play Store  a. Log in or register using your Google account  3. Download the Shiftgig samantha from Google Play Store  a. Select add organization  b.  Search for [employer] and select it  c. Log into InPronto  d. Select Track [facility]  e. Select the green connect my account button  f. When prompted log into your Google account  g. Select okay to confirm the account  4. Download the [employer]ings Health Mate jhonatan from Google Play Store  a. [place] for [employer]  b. Go to profile  c. Tap google fit under the Apps section  d. Select the option to activate Google Fit integration  e. Select the same Google account  f. Select okay to confirm the account  g.  Steps to link the scale to InPronto via an iPhone (you can always disconnect at any point in the future):  **Note Synerscope is not available for download on an iPad**  1. The order must be placed first before the patient can access Dragon Law within InPronto.  2. Locate the Health jhonatan on your iPhone.  a. Set up your Apple Health account as prompted  b. The Sources page will show Apps that communicate with your Health jhonatan. Once all steps are completed, you should see FundRazr and [employer]Homeshart listed under the Apps section and your iPhone under the devices section.  i. Select Health Mate  1. Under 'ALLOW  HEALTH MATE  TO WRITE DATA ensure the toggle is on for Weight.  2. This will allow the scale to add your weight to the Apple Health  ii. Select MyChart  1. Under 'ALLOW  Platform [employer]  TO READ DATA ensure the toggle is on for Weight.  2. This allows InPronto to grab the weight from Synerscope so your provider can see your weights.  3. Download the The Other [employer]yst jhonatan from the Jhonatan Store  a. Select add organization                                                  b. Search for [employer] and select it  c. Log into InPronto  d. Select Track [facility]  e. Select the green connect my account button  f. Follow prompts to link your device to InPronto.  4. Download the [employer] health mate jhonatan in the Jhonatan Store  a. [place] for [employer]  b. Go to profile  c. Tap Health under the Apps section  d. If prompted to allow access with the Health Jhonatan,  toggle weight on for read and write access.      For any questions/concerns contact Sophia Li LPN at [phone]    To schedule appointments with our team, please call [phone]    Please call during clinic hours Monday through Friday 8:00a - 4:00p if you have questions or you can contact us via [employer] at anytime.      Lab results will be communicated through My Chart or letter (if My Chart not used). Please call the clinic if you have not received communication after 1 week or if you have any questions.    Clinic Fax: [phone]    Thank you,  Medical Weight Management Team

## 2022-05-13 NOTE — NURSING NOTE
"(   Chief Complaint   Patient presents with     RECHECK     Return MW    )    ( Weight: 89.6 kg (197 lb 8 oz) (Patient reported) )  ( Height: 165.1 cm (5' 5\") )  ( BMI (Calculated): 32.87 )  (   )  (   )  (   )  (   )  (   )  (   )    (   )  (   )  (   )  (   )  (   )  (   )  (   )    (   Patient Active Problem List   Diagnosis     Obesity (BMI 30.0-34.9)    )  (   Current Outpatient Medications   Medication Sig Dispense Refill     buPROPion (WELLBUTRIN XL) 300 MG 24 hr tablet Take 300 mg by mouth daily       drospirenone-ethinyl estradiol (ADALID) 3-0.02 MG tablet Take 1 tablet by mouth daily       phentermine (ADIPEX-P) 37.5 MG tablet Take 1 tablet (37.5 mg) by mouth every morning (before breakfast) 90 tablet 1     predniSONE (DELTASONE) 50 MG tablet Emergency set: if severe allergic reaction take immediately 100mg Prednisone (2x50mg) and 2 Tabl Cetirizine 10mg and then write precise 12h retrospective diary.If less severe reaction take only the 2 Tabl Cetirizine (Patient taking differently: Emergency set: if severe allergic reaction take immediately 100mg Prednisone (2x50mg) and 2 Tabl Cetirizine 10mg and then write precise 12h retrospective diary.If less severe reaction take only the 2 Tabl Cetirizine    For mushroom allergy) 2 tablet 3     Vitamin D3 (CHOLECALCIFEROL) 125 MCG (5000 UT) tablet Take 1 tablet by mouth daily      )  ( Diabetes Eval:    )    ( Pain Eval:  No Pain (0) )    ( Wound Eval:       )    (   History   Smoking Status     Never Smoker   Smokeless Tobacco     Never Used    )    ( Signed By:  Yolis Roa, EMT; May 13, 2022; 9:46 AM )    "

## 2022-05-13 NOTE — PROGRESS NOTES
"Debbie is a 31 year old who is being evaluated via a billable video visit.      How would you like to obtain your AVS? MyChart  If the video visit is dropped, the invitation should be resent by: [phone]  Will anyone else be joining your video visit? No    During this virtual visit the patient is located in [place], patient verifies this as the location during the entirety of this visit.   Video Start Time: 10:51  Video-Visit Details    Type of service:  Video Visit    Video End Time:11:10    Originating Location (pt. Location): Home    Distant Location (provider location):  [facility] [place]     Platform used for Video Visit: StudyBlue         [facility] Weight Management Note     Debbie Bah  MRN:  [id number]  :  1990  LARRY:  2022    Dear HARJINDER Vasquez CNP,    I had the pleasure of seeing your patient Debbie Bah. She is a 31 year old female who I am continuing to see for treatment of obesity related to:       2020   I have the following health issues associated with obesity: None of the above   I have the following symptoms associated with obesity: Depression       INTERVAL HISTORY:    Last visit about 3 mo ago. Reviewed and relevant history is as follows, as extracted from earlier note--     -------------------------------------------------------  \"Debbie has been struggled with weight since her adult life but mostly in the past 2 years when she has started grad school and continued to work part time. 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 She gained from 180 lbs up to 250 lbs in the past 2 years. She reports increased stress eating, emotional eating, larger portion side and increased fast food consumption. She reports frequent late night eating just to relax before going to sleep. She feels that she has food thought all the time. She sleeps about 7-8 hours per day but still feels very fatigue.      She was " "admitted at [facility] for 4 months for binges eating from the end of 2018 to early 2019.      She works with psychiatrist at [facility]. She was prescribed topiramate but it caused extreme paresthesia. She also was on Vyvanse but it caused her to feel not well. She also see therapist to work on stress coping mechanism. She is currently taking bupropion.      She does not exercise regularly. She feels that she has no time to exercise. She probably walks about 1-2 times per week.  She works from 3 pm to 11 pm. She usually eats before going to work and during works hours. Diet is mostly riches with carbohydrate.      --50# wt loss over last yr (post stopping the naltrexone [n/HAs on that so stopped it]).  Used myfitness and Noom each for a little while, began walking reguarly  --then regain of 15 and then another 10# recently with new anti-depressant     --topiramate and Vyvanse were tried with psychiatrist to help with wt loss (they were not effective)\"  --------------------------------------------     She started phentermine; no palpitations or racing heart.  Met with pharmacist for med titration and has met with health psych. Doing very well.     --struggles:  1.  stress eating can still be an issue (overate but was able to stop before it led to binge; was dealing with driving home in a snow storm)  2.  Other emotional triggers (like bordeom)     Has been working with health psych and that has helped a lot     No hunger/cravings (had been an issue before)              LAST WEIGHT:   197 lbs 12.8 oz   Body mass index is 32.92 kg/m .      Since last vosot--  --Feb and Mar things were good, April was not as good (stopped going out and walking)  --had gotten down to 192# at one point  --graduating tomorrow and boards in 2 wks, all of which has made things more hectic and created difficulty with wt loss  --was able to stop taking Krish (loose stools) and still maintaing, which is current goal    CURRENT " "WEIGHT:   197 lbs 8 oz   Body mass index is 32.87 kg/m .    Initial Weight (lbs): 257.8 lbs  Last Visits Weight: 89.7 kg (197 lb 12.8 oz)  Cumulative weight loss (lbs): 60.3  Weight Loss Percentage: 23.39%    Changes and Difficulties 5/9/2022   I have made the following changes to my diet since my last visit: Adding ice cream and cereal - trigger foods   With regards to my diet, I am still struggling with: nighttime eating   I have made the following changes to my activity/exercise since my last visit: more daily walking, stopped in April because the weather was so awful, started gardening again   With regards to my activity/exercise, I am still struggling with: moderate/high intensity       VITALS:  Ht 1.651 m (5' 5\")   Wt 89.6 kg (197 lb 8 oz)   BMI 32.87 kg/m      MEDICATIONS:   Current Outpatient Medications   Medication Sig Dispense Refill     buPROPion (WELLBUTRIN XL) 300 MG 24 hr tablet Take 300 mg by mouth daily       drospirenone-ethinyl estradiol (ADALID) 3-0.02 MG tablet Take 1 tablet by mouth daily       phentermine (ADIPEX-P) 37.5 MG tablet Take 1 tablet (37.5 mg) by mouth every morning (before breakfast) 90 tablet 1     predniSONE (DELTASONE) 50 MG tablet Emergency set: if severe allergic reaction take immediately 100mg Prednisone (2x50mg) and 2 Tabl Cetirizine 10mg and then write precise 12h retrospective diary.If less severe reaction take only the 2 Tabl Cetirizine (Patient taking differently: Emergency set: if severe allergic reaction take immediately 100mg Prednisone (2x50mg) and 2 Tabl Cetirizine 10mg and then write precise 12h retrospective diary.If less severe reaction take only the 2 Tabl Cetirizine    For mushroom allergy) 2 tablet 3     Vitamin D3 (CHOLECALCIFEROL) 125 MCG (5000 UT) tablet Take 1 tablet by mouth daily         Weight Loss Medication History Reviewed With Patient 5/9/2022   Which weight loss medications are you currently taking on a regular basis?  Phentermine   Are you having " any side effects from the weight loss medication that we have prescribed you? No       ASSESSMENT/PLAN:  Debbie is a patient with mature onset obesity with significant element of familial/genetic influence and with current health consequences.  Debbie Bah endorsed binging, ate a high carb diet, ate a high fat diet, ate fast food once or more per week, used food as a reward, used food as mood management, mostly ate during the evening and had a disorganized meal pattern.     Her problem was complicated by strong craving/reward pathways, binge eating and poor lifestyle choices     Her ability to lose weight was impacted by current work life and lack of confidence.     PLAN:    (continuing)  Decrease portion sizes  Decrease eating out  Purge house of food triggers  Dietician visit of education  Calorie/low fat diet  Meal planning  Increase activity      Continue to follow up with psychiatrist and therapist for stress coping     Craving/Reward   Ancillary testing:  N/A.  Food Plan:  High protein/low carbohydrate.   Activity Plan:  Exercise after meals.  Supplementary:  Continue to follow up with psychiatrist and therapist for stress coping.   Medication:  The patient will continue medication in pursuit of improved medical status as influenced by body weight. There is a mutual understanding of the goals and risks of this therapy. The patient is in agreement. She is educated on dosage regimen and possible side effects.      additionally  --RTC in 2 mo  --having surgery on the 5/25; will taper off of phentermine and then re-escalate post surgery  --considering Plenity in the future  --she will add psyllium powder, or benefiber, or metamucil as directed           Time: approx about 30 min spent on evaluation, management, counseling, education, & motivational interviewing (video) combined with previsit prep and post visit charting/follow up care same day.    Sincerely,    Natalie Carbajal MD

## 2022-06-20 ENCOUNTER — VIRTUAL VISIT (OUTPATIENT)
Dept: PSYCHOLOGY | Facility: CLINIC | Age: 32
End: 2022-06-20
Payer: COMMERCIAL

## 2022-06-20 DIAGNOSIS — F50.819 BINGE EATING DISORDER: Primary | ICD-10-CM

## 2022-06-20 DIAGNOSIS — F32.A DEPRESSION, UNSPECIFIED DEPRESSION TYPE: ICD-10-CM

## 2022-06-20 PROCEDURE — 90837 PSYTX W PT 60 MINUTES: CPT | Mod: 95 | Performed by: PSYCHOLOGIST

## 2022-06-20 ASSESSMENT — ANXIETY QUESTIONNAIRES
5. BEING SO RESTLESS THAT IT IS HARD TO SIT STILL: NOT AT ALL
4. TROUBLE RELAXING: NOT AT ALL
GAD7 TOTAL SCORE: 0
7. FEELING AFRAID AS IF SOMETHING AWFUL MIGHT HAPPEN: NOT AT ALL
GAD7 TOTAL SCORE: 0
GAD7 TOTAL SCORE: 0
8. IF YOU CHECKED OFF ANY PROBLEMS, HOW DIFFICULT HAVE THESE MADE IT FOR YOU TO DO YOUR WORK, TAKE CARE OF THINGS AT HOME, OR GET ALONG WITH OTHER PEOPLE?: NOT DIFFICULT AT ALL
6. BECOMING EASILY ANNOYED OR IRRITABLE: NOT AT ALL
7. FEELING AFRAID AS IF SOMETHING AWFUL MIGHT HAPPEN: NOT AT ALL
1. FEELING NERVOUS, ANXIOUS, OR ON EDGE: NOT AT ALL
3. WORRYING TOO MUCH ABOUT DIFFERENT THINGS: NOT AT ALL
2. NOT BEING ABLE TO STOP OR CONTROL WORRYING: NOT AT ALL

## 2022-06-20 NOTE — PROGRESS NOTES
"  Health Psychology                      Annabel Gerardo, Ph.D., L.P [phone]  Adilia Subramanian, Ph.D., L.P. [phone]  Kay Toro, Ph.D. [phone]  Erika Quintana, Ph.D., L.P. [phone]  Michael Chapman, Ph.D., A.B.P.P., L.P. [phone]         Leia Cooper, Ph.D., L.P. [phone]     [facility] [place], 3rd Floor  [address] [place] [address]    The patient has been notified of following:   \"This video visit will be conducted via a call between you and your physician/provider. We have found   that certain health care needs can be provided without the need for an in-person physical exam. This   service lets us provide the care you need with a video conversation. If a prescription is necessary we can   send it directly to your pharmacy. If lab work is needed we can place an order for that and you can then   stop by our lab to have the test done at a later time. If during the course of the call the physician/provider feels a video visit is not appropriate, you will not be charged for this service.\" YES  Reason that telemedicine is appropriate: COVID-19 pandemic mitigation requiring social distancing.  Patient has given verbal consent for video visit: Yes  Mode of transmission: Secure real time interactive audio and visual telecommunication system via [employer]  Location of originating and distant sites:    Originating site (patient location): patient's home    Distant site (provider site): home office of provider      Health Psychology Follow-Up Note    SUBJECTIVE:  Debbie Bah was seen for individual psychotherapy. Reviewed updates to emotional and physical health and progress towards goals since our last session. Discussed recovery from breast reduction surgery. She was surprised at how much she missed being able to move as she prefers to on a daily basis (e.g., daily walks, walks to grocery store). Decided to consider dating this summer. Dating " "is bringing up body image \"things.\" Speaks very negatively to herself about her appearance. Discussed negative automatic thoughts related to body image and how to challenge them. Discussed how to manage body image/ED thoughts on date tomorrow and practice compassion/mindfulness.    Ongoing list of binge eating/LOC triggers  Boredom, fatigue, insomnia (eating helps fall asleep), discomfort, overwhelmed (doesn't like being needed/demanded upon/depleated), lonely   Being sick    Ongoing list of feared foods:  Cereal and cows milk (10) - not specific (sweet)  Pint of ice cream (ice cream sandwiches ok) (8-9)   Chinese food (7-8)    OBJECTIVE:  Appearance/Behavior/Orientation: Phone session. Alert and oriented to person, place, time, and situation. No evidence of psychomotor agitation.     Cooperation/Reliability: Patient was open and cooperative throughout the session.    Speech/Language: Speech was clear, coherent, and of normal rate, rhythm and volume.   Thought Form: Overall logical and organized.   Mood/Affect: Mood euthymic; appropriate range of affect.    Insight/Motivation: Good, good    ASSESSMENT:  Was engaged and interactive throughout the session. She is likely to benefit from CBT and mindfulness based approach to binge eating disorder.  1/10/22  Objective Binge episodes: 2-3  Subjective binge episodes: 7-10  2/2/22  Objective Binge episodes: 3-4  5/9/22  OBEs-2-3   SBE - 3-5    TRAVIS-7 SCORE 5/9/2022 6/6/2022 6/20/2022   Total Score 0 (minimal anxiety) 0 (minimal anxiety) 0 (minimal anxiety)   Total Score 0 0 0       PHQ 6/18/2021 9/28/2021 5/3/2022   PHQ-9 Total Score 6 4 4   Q9: Thoughts of better off dead/self-harm past 2 weeks Not at all Not at all Not at all   F/U: Thoughts of suicide or self-harm - - -   F/U: Safety concerns - - -     DIAGNOSIS:  Binge eating disorder [F50.81]  Depression, unspecified depression type [F32.A]    PLAN:  RTC for continued psychotherapy.     7/5 11:00  Extended session due " to length of interval    Working Goals:   -Continue 60 second timer before deciding to eat (and then being okay with eating if does), felt 90 seconds was too much.  -Continue with journaling (perhaps during high emotion, not just after)  -Continue list of positive outcomes from in control eating (weight loss, having foods around that previously were trigger foods)  -Start tracking period  -Incorporate feared food of ice cream and cereal  -Consider adding in more vigorous exercise such as biking and hiking this summer.   -Decrease body checking  -Journal following date tomorrow    Start: 2:00  Stop:  2:54  Extended session due to length of interval    Annabel Gerardo, PhD,   Health Psychology  Clinical Psychologist    Extended session due to onset of treatment, length of interval    Tx plan completed: 1/10/2022   Tx plan due:  1/10/2023      OUTPATIENT TREATMENT PLAN SUMMARY    Date of Treatment Plan: 1/10/2022  90-Day Review Date: N/A  Date of Initial Service: 1/10/2022      1. DSM-V Diagnosis (include numeric code)  Binge eating disorder [F50.81]  Depression, unspecified depression type [F32.A]  2. Current symptoms and circumstances that substantiate the diagnosis:  See intake 9/28/21    3. How symptoms and/or behaviors are affecting level of function:   See intake 9/28/21 and note 1/10/22    4. Risk Assessment:  Suicide:  Assessed Level of Immediate Risk: None  Ideation: No   Plan:  No   Means: No   Intent: No     Assessed Level of Immediate Risk: None  Ideation: No   Plan:  No   Means: No   Intent: No    If on a medication, please include name and dosage:  See chart    Symptom/Problem Measurable Goals Interventions Gains Made   1.BED 1. Decrease binge eating/loss of control frequency 1.CBT for BED 1. N/A   2.Depression 2. Maintain minimal/mild PHQ-9 scores 2. CBT for depression 2. N/A     5. Frequency of Sessions: 1-2x/month    6. Discharge and Aftercare Goals: TBD    7. Expected duration of treatment:   TBD    8. Participants in therapy plan (family, friends, support network): TBD    Treatment plan available to patient via mychart/medical records.    Regulatory Guidelines for Updating Treatment Plan  [place] Medical Assistance: Reviewed & signed at least every 90days  Medicare:  Update per policy

## 2022-07-15 ENCOUNTER — VIRTUAL VISIT (OUTPATIENT)
Dept: ENDOCRINOLOGY | Facility: CLINIC | Age: 32
End: 2022-07-15
Payer: COMMERCIAL

## 2022-07-15 VITALS — HEIGHT: 65 IN | BODY MASS INDEX: 31.32 KG/M2 | WEIGHT: 188 LBS

## 2022-07-15 DIAGNOSIS — E66.01 MORBID OBESITY (H): Primary | ICD-10-CM

## 2022-07-15 PROCEDURE — 99213 OFFICE O/P EST LOW 20 MIN: CPT | Mod: 95 | Performed by: INTERNAL MEDICINE

## 2022-07-15 RX ORDER — PHENTERMINE HYDROCHLORIDE 37.5 MG/1
18.75 TABLET ORAL
Qty: 45 TABLET | Refills: 1 | Status: SHIPPED | OUTPATIENT
Start: 2022-07-15 | End: 2022-07-18

## 2022-07-15 ASSESSMENT — PAIN SCALES - GENERAL: PAINLEVEL: NO PAIN (0)

## 2022-07-15 NOTE — NURSING NOTE
"Chief Complaint   Patient presents with     ELIS Lewis, is participating visit today for a follow up, no new concerns at this time, as reported by patient.       Vitals:    07/15/22 0957   Weight: 85.3 kg (188 lb)   Height: 1.651 m (5' 5\")       Body mass index is 31.28 kg/m .      Carolyn Choi LPN    "

## 2022-07-15 NOTE — PATIENT INSTRUCTIONS
Thank you for allowing us the privilege of caring for you. We hope we provided you with the excellent service you deserve.    Please let us know if there is anything else we can do for you so that we can be sure you are completely satisfied with your care experience.    Your visit was with Dr. Carbajal today.    Instructions per today's visit:  --we will drop to 1/2 of an adipex tablet daily  --we can plan follow up again in about 3 mo; please let us know if you have any interim questions    Please call our contact center at [phone] to schedule your next appointments.    Meal Replacement Products:    Here is the link to our new e-store where you can purchase our meal replacement products    [email or web address]    The one week starter kit is a great way to sample a variety of products and see what works for you.    If you want more information about the product go to: [employer]    Free Shipping for orders over $75    Benefits of meal replacements products:    Portion and calorie control  Improved nutrition  Structured eating  Simplified food choices  Avoid contact with trigger foods    Interested in working with a health ?  Health coaches work with you to improve your overall health and wellbeing.  They look at the whole person, and may involve discussion of different areas of life, including, but not limited to the four pillars of health (sleep, exercise, nutrition, and stress management). Discuss with your care team if you would like to start working a health .    Health Coaching-3 Pack: Schedule by calling [phone]    $99 for three health coaching visits    Visits may be done in person or via phone    Coaching is a partnership between the  and the client; Coaches do not prescribe or diagnose    Coaching helps inspire the client to reach his/her personal goals    Bluetooth Scale:    We hope to provide you with high quality telephone and  virtual healthcare visits while social distancing for COVID-19 is necessary, as well as in the future when virtual visits may be more convenient for you.    Our technology team made it possible for Bluetooth scales to send weight measurements to our electronic medical record. This allows weights from you weighing at home to securely flow into the medical record, which will improve telephone and virtual visits.  Additionally, studies have shown that adults actually lose more weight when their weights are automatically sent to someone else, and also that this process is not stressful for those adults.    Below is a link for purchasing the scale, with a discount code for our patients. You may call your insurance company to see if they will reimburse you for the cost of the scale, as a piece of durable medical equipment. The scales only go up to a weight of 400 pounds. This is an issue and we are working with the developer on increasing this. We found no scales that go over 400lb that have blue-tooth for connecting to Blink for iPhone and Android.    Scale to purchase: the [employer]  Body  Scale: [email or web address]/[email or web address]=aw.ds    Discount Code: We have a discount code for our patients to bring the cost down to $50, the code is:  withmed     Steps to link the scale to Blink for iPhone and Android via an Android Phone (you can always disconnect at any point in the future):  1. The order must be placed first before the patient can access Track My Health within Blink for iPhone and Android.  2. Download Google Fit samantha from the Google Play Store  a. Log in or register using your Google account  3. Download the Blink for iPhone and Android samantha from Google Play Store  a. Select add organization  b. Search for [employer] and select it  c. Log into Blink for iPhone and Android  d. Select Track My Health  e. Select the green connect my account button  f. When prompted log into your Google account  g. Select okay to confirm the account  4. Download the  [employer] Health Mate jhonatan from Google Play Store  a. [place] [employer]  b. Go to profile  c. Tap google fit under the Apps section  d. Select the option to activate Google Fit integration  e. Select the same Google account  f. Select okay to confirm the account  g.  Steps to link the scale to [employer] via an iPhone (you can always disconnect at any point in the future):  **Note [employer] is not available for download on an iPad**  1. The order must be placed first before the patient can access Track My Health within [employer]o.  2. Locate the Health jhonatan on your iPhone.  a. Set up your Apple Health account as prompted  b. The Sources page will show Apps that communicate with your Health jhonatan. Once all steps are completed, you should see [employer] and [employer] listed under the Apps section and your iPhone under the devices section.  i. Select Health Mate  1. Under 'ALLOW  HEALTH MATE  TO WRITE DATA ensure the toggle is on for Weight.  2. This will allow the scale to add your weight to the Apple Health  ii. Select [employer]ont  1. Under 'ALLOW  [employer]otify  TO READ DATA ensure the toggle is on for Weight.  2. This allows [employer]o to grab the weight from [employer] so your provider can see your weights.  3. Download the [employer]o jhonatan from the Jhonatan Store  a. Select add organization                                                  b. Search for [employer] and select it  c. Log into O[employer]o  d. Select Track My Health  e. Select the green connect my account button  f. Follow prompts to link your device to [employer]o.  4. Download the Withings health mate jhonatan in the Jhonatan Store  a. [employer] [employer]  b. Go to profile  c. [employer] Health under the Apps section  d. If prompted to allow access with the Health Jhonatan, toggle weight on for read and write access.      For any questions/concerns contact Sophia Li LPN at [phone]    To schedule appointments with our team, please call [phone]    Please call during clinic hours Monday  through Friday 8:00a - 4:00p if you have questions or you can contact us via MIOX at anytime.      Lab results will be communicated through My Chart or letter (if My Chart not used). Please call the clinic if you have not received communication after 1 week or if you have any questions.    Clinic Fax: [phone]    Thank you,  Medical Weight Management Team

## 2022-07-15 NOTE — PROGRESS NOTES
"    Return Medical Weight Management Note     Debbie Bah  MRN:  [id number]  :  1990  LARRY:  7/15/2022    Dear HARJINDER Vasquez CNP,    I had the pleasure of seeing your patient Debbie Bah. She is a 31 year old female who I am continuing to see for treatment of obesity related to:        2020   I have the following health issues associated with obesity: None of the above   I have the following symptoms associated with obesity: Depression       INTERVAL HISTORY:  Recently had COVID 3 weeks which affected her diet. Feels as though she had \"reset\" after her illness. She eats when she's hungry and stops when she's full. 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   Prior to this her triggers for eating were fatigue, stress, boredom. Feels her binge eating has been well-controlled over the past month. Does experience occasional episodes of over-eating. Diet mainly consists of dairy, eggs, breads. She is still taking phentermine but is concerned about difficulty achieving an orgasm than previously, her libido has remained stable. This has been ongoing for 6 weeks. She would like to try decreasing the dose.     CURRENT WEIGHT:   188 lbs 0 oz  Body mass index is 31.28 kg/m .     Wt Readings from Last 3 Encounters:   07/15/22 85.3 kg (188 lb)   22 89.6 kg (197 lb 8 oz)   22 89.6 kg (197 lb 9.6 oz)                   Changes and Difficulties 2022   I have made the following changes to my diet since my last visit: -   With regards to my diet, I am still struggling with: -   I have made the following changes to my activity/exercise since my last visit: Eating more of what i like, eating when i feel hungry   With regards to my activity/exercise, I am still struggling with: N/a       VITALS:  Ht 1.651 m (5' 5\")   Wt 85.3 kg (188 lb)   BMI 31.28 kg/m      MEDICATIONS:   Current Outpatient Medications   Medication " Sig Dispense Refill     buPROPion (WELLBUTRIN XL) 300 MG 24 hr tablet Take 300 mg by mouth daily       drospirenone-ethinyl estradiol (ADALID) 3-0.02 MG tablet Take 1 tablet by mouth daily       phentermine (ADIPEX-P) 37.5 MG tablet Take 1 tablet (37.5 mg) by mouth every morning (before breakfast) 90 tablet 1     predniSONE (DELTASONE) 50 MG tablet Emergency set: if severe allergic reaction take immediately 100mg Prednisone (2x50mg) and 2 Tabl Cetirizine 10mg and then write precise 12h retrospective diary.If less severe reaction take only the 2 Tabl Cetirizine (Patient taking differently: Emergency set: if severe allergic reaction take immediately 100mg Prednisone (2x50mg) and 2 Tabl Cetirizine 10mg and then write precise 12h retrospective diary.If less severe reaction take only the 2 Tabl Cetirizine    For mushroom allergy) 2 tablet 3     Vitamin D3 (CHOLECALCIFEROL) 125 MCG (5000 UT) tablet Take 1 tablet by mouth daily         Weight Loss Medication History Reviewed With Patient 7/14/2022   Which weight loss medications are you currently taking on a regular basis?  -   Are you having any side effects from the weight loss medication that we have prescribed you? Yes   If you are having side effects please describe: Sexual dysfunction       ASSESSMENT:   Debbie is a 32 yo F who is following up for obesity and is inquiring about decreasing her dose of phentermine due to difficulty reaching orgasm. She is otherwise doing well. 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 She currently eats when hungry and stops when full. She has not had any binge eating episodes over the past 1 month.    PLAN:   - Decrease phentermine by half (18.75 mg daily) and assess response  - Follow up with Behavioral Health as scheduled    FOLLOW-UP:    RTC in 12 weeks    Time: 45 min    Sincerely,    Helen Shin MD      Attending addendum--pt visit with Dr. Shin which began as video but had to be conerted to phone [employer] platform) d/t issues with the video visit; I joined the [employer] call for part of this visit. Located in [place]. My total time spent--visit combined with previsit prep and post visit follow up clinical care/charting same day--21 min.

## 2022-07-15 NOTE — PROGRESS NOTES
Debbie is a 31 year old who is being evaluated via a billable video visit.      How would you like to obtain your AVS? MyChart  If the video visit is dropped, the invitation should be resent by: Text to cell phone: [phone]  Will anyone else be joining your video visit? No

## 2022-07-15 NOTE — LETTER
"7/15/2022       RE: Debbie Bah  [address]  [facility] [facility] [address]     Dear Colleague,    Thank you for referring your patient, Debbie Bah, to the [facility]NA[facility] [place] at [facility]. Please see a copy of my visit note below.        Return Medical Weight Management Note     Debbie Bah  MRN:  [id number]  :  1990  LARRY:  7/15/2022    Dear Sarahi Santos, HARJINDER CNP,    I had the pleasure of seeing your patient Debbie Bah. She is a 31 year old female who I am continuing to see for treatment of obesity related to:        2020   I have the following health issues associated with obesity: None of the above   I have the following symptoms associated with obesity: Depression       INTERVAL HISTORY:  Recently had COVID 3 weeks which affected her diet. Feels as though she had \"reset\" after her illness. She eats when she's hungry and stops when she's full. 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   Prior to this her triggers for eating were fatigue, stress, boredom. Feels her binge eating has been well-controlled over the past month. Does experience occasional episodes of over-eating. Diet mainly consists of dairy, eggs, breads. She is still taking phentermine but is concerned about difficulty achieving an orgasm than previously, her libido has remained stable. This has been ongoing for 6 weeks. She would like to try decreasing the dose.     CURRENT WEIGHT:   188 lbs 0 oz  Body mass index is 31.28 kg/m .     Wt Readings from Last 3 Encounters:   07/15/22 85.3 kg (188 lb)   22 89.6 kg (197 lb 8 oz)   22 89.6 kg (197 lb 9.6 oz)                   Changes and Difficulties 2022   I have made the following changes to my diet since my last visit: -   With regards to my diet, I am still struggling with: -   I " "have made the following changes to my activity/exercise since my last visit: Eating more of what i like, eating when i feel hungry   With regards to my activity/exercise, I am still struggling with: N/a       VITALS:  Ht 1.651 m (5' 5\")   Wt 85.3 kg (188 lb)   BMI 31.28 kg/m      MEDICATIONS:   Current Outpatient Medications   Medication Sig Dispense Refill     buPROPion (WELLBUTRIN XL) 300 MG 24 hr tablet Take 300 mg by mouth daily       drospirenone-ethinyl estradiol (ADALID) 3-0.02 MG tablet Take 1 tablet by mouth daily       phentermine (ADIPEX-P) 37.5 MG tablet Take 1 tablet (37.5 mg) by mouth every morning (before breakfast) 90 tablet 1     predniSONE (DELTASONE) 50 MG tablet Emergency set: if severe allergic reaction take immediately 100mg Prednisone (2x50mg) and 2 Tabl Cetirizine 10mg and then write precise 12h retrospective diary.If less severe reaction take only the 2 Tabl Cetirizine (Patient taking differently: Emergency set: if severe allergic reaction take immediately 100mg Prednisone (2x50mg) and 2 Tabl Cetirizine 10mg and then write precise 12h retrospective diary.If less severe reaction take only the 2 Tabl Cetirizine    For mushroom allergy) 2 tablet 3     Vitamin D3 (CHOLECALCIFEROL) 125 MCG (5000 UT) tablet Take 1 tablet by mouth daily         Weight Loss Medication History Reviewed With Patient 7/14/2022   Which weight loss medications are you currently taking on a regular basis?  -   Are you having any side effects from the weight loss medication that we have prescribed you? Yes   If you are having side effects please describe: Sexual dysfunction       ASSESSMENT:   Debbie is a 32 yo F who is following up for obesity and is inquiring about decreasing her dose of phentermine due to difficulty reaching orgasm. She is otherwise doing well. 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 She " currently eats when hungry and stops when full. She has not had any binge eating episodes over the past 1 month.    PLAN:   - Decrease phentermine by half (18.75 mg daily) and assess response  - Follow up with Behavioral Health as scheduled    FOLLOW-UP:    RTC in 12 weeks    Time: 45 min    Sincerely,    Helen Shin MD     Attending addendum--pt visit with Dr. Shin which began as video but had to be conerted to phone ThinkLink platform) d/t issues with the video visit; I joined the [employer] call for part of this visit. Located in [place]. My total time spent--visit combined with previsit prep and post visit follow up clinical care/charting same day--21 min.    Debbie is a 31 year old who is being evaluated via a billable video visit.            Again, thank you for allowing me to participate in the care of your patient.      Sincerely,    Natalie Carbajal MD

## 2022-07-17 ENCOUNTER — MYC MEDICAL ADVICE (OUTPATIENT)
Dept: ENDOCRINOLOGY | Facility: CLINIC | Age: 32
End: 2022-07-17

## 2022-07-17 DIAGNOSIS — E66.01 MORBID OBESITY (H): ICD-10-CM

## 2022-07-18 RX ORDER — PHENTERMINE HYDROCHLORIDE 37.5 MG/1
18.75 TABLET ORAL
Qty: 45 TABLET | Refills: 1 | Status: SHIPPED | OUTPATIENT
Start: 2022-07-18 | End: 2022-12-13

## 2022-07-19 ENCOUNTER — VIRTUAL VISIT (OUTPATIENT)
Dept: PSYCHOLOGY | Facility: CLINIC | Age: 32
End: 2022-07-19
Payer: COMMERCIAL

## 2022-07-19 DIAGNOSIS — F32.A DEPRESSION, UNSPECIFIED DEPRESSION TYPE: ICD-10-CM

## 2022-07-19 DIAGNOSIS — F50.819 BINGE EATING DISORDER: Primary | ICD-10-CM

## 2022-07-19 PROCEDURE — 90837 PSYTX W PT 60 MINUTES: CPT | Mod: 95 | Performed by: PSYCHOLOGIST

## 2022-07-19 NOTE — PROGRESS NOTES
"  Health Psychology                      Annabel Gerardo, Ph.D., L.P [phone]  Adilia Subramanian, Ph.D., L.P. [phone]  Kay Toro, Ph.D. [phone]  Erika Quintana, Ph.D., L.P. [phone]  Michael Chapman, Ph.D., A.B.P.P., L.P. [phone]         Leia Cooper, Ph.D., L.P. [phone]     [facility], 3rd Floor  [address] [place] [address]    The patient has been notified of following:   \"This video visit will be conducted via a call between you and your physician/provider. We have found   that certain health care needs can be provided without the need for an in-person physical exam. This   service lets us provide the care you need with a video conversation. If a prescription is necessary we can   send it directly to your pharmacy. If lab work is needed we can place an order for that and you can then   stop by our lab to have the test done at a later time. If during the course of the call the physician/provider feels a video visit is not appropriate, you will not be charged for this service.\" YES  Reason that telemedicine is appropriate: COVID-19 pandemic mitigation requiring social distancing.  Patient has given verbal consent for video visit: Yes  Mode of transmission: Secure real time interactive audio and visual telecommunication system via [employer]  Location of originating and distant sites:    Originating site (patient location): patient's home    Distant site (provider site): home office of provider      Health Psychology Follow-Up Note    SUBJECTIVE:  Debbie NAVA Carljuan luis was seen for individual psychotherapy. Reviewed updates to emotional and physical health and progress towards goals since our last session. Had COVID and lost weight because of reduced appetite.   Had first binge in about a month, couldn't sleep and was nervous about upcoming date. Had meal prepped and couldn't stop eating the rice. Feeling sluggish and \"gross\" following. " "Had some other nighttime eating coming back- when waking at night and cannot go back to sleep. Discussed binge and her realization that white rice is a \"tricky\" food that she wants to practice incorporating into her life more frequently. Started dating a man, has become exclusive with him, discussed how managing body image and eating within the context of this new relationship. She reported in general feeling very positive about the experience. Had been avoiding wearing shorts around him and tried and it went well, will continue to push self to wear clothes that show areas of her body that she typically would hide/cover.    Ongoing list of binge eating/LOC triggers  Boredom, fatigue, insomnia (eating helps fall asleep), discomfort, overwhelmed (doesn't like being needed/demanded upon/depleated), lonely   Being sick    Ongoing list of feared foods:  Cereal and cows milk (10) - not specific (sweet)  Pint of ice cream (ice cream sandwiches ok) (8-9)   Chinese food (7-8)  Cooked white rice (7)    OBJECTIVE:  Appearance/Behavior/Orientation: Phone session. Alert and oriented to person, place, time, and situation. No evidence of psychomotor agitation.     Cooperation/Reliability: Patient was open and cooperative throughout the session.    Speech/Language: Speech was clear, coherent, and of normal rate, rhythm and volume.   Thought Form: Overall logical and organized.   Mood/Affect: Mood euthymic; appropriate range of affect.    Insight/Motivation: Good, good    ASSESSMENT:  Was engaged and interactive throughout the session. She is likely to benefit from CBT and mindfulness based approach to binge eating disorder.  1/10/22  Objective Binge episodes: 2-3  Subjective binge episodes: 7-10  2/2/22  Objective Binge episodes: 3-4  5/9/22  OBEs-2-3   SBE - 3-5    TRAVIS-7 SCORE 5/9/2022 6/6/2022 6/20/2022   Total Score 0 (minimal anxiety) 0 (minimal anxiety) 0 (minimal anxiety)   Total Score 0 0 0       PHQ 6/18/2021 9/28/2021 " 5/3/2022   PHQ-9 Total Score 6 4 4   Q9: Thoughts of better off dead/self-harm past 2 weeks Not at all Not at all Not at all   F/U: Thoughts of suicide or self-harm - - -   F/U: Safety concerns - - -     DIAGNOSIS:  Binge eating disorder [F50.81]  Depression, unspecified depression type [F32.A]    PLAN:  RTC for continued psychotherapy.     8/22 4:00 (she is aware I will be out for close to three weeks in August)  Extended session due to length of interval    Working Goals:   -Continue 60 second timer before deciding to eat (and then being okay with eating if does), felt 90 seconds was too much.  -Continue with journaling (perhaps during high emotion, not just after)  -Continue list of positive outcomes from in control eating (weight loss, having foods around that previously were trigger foods)  -Start tracking period  -Decrease body checking  -Practice/exposure with rice   -Continue to wear clothes that expose areas of her body that she is self-conscious (shorts, tank tops)  -revisit sleep hygiene     Start: 10:00  Stop:  10:54  Extended session due to length of interval    Annabel Gerardo, PhD,   Health Psychology  Clinical Psychologist    Extended session due to onset of treatment, length of interval    Tx plan completed: 1/10/2022   Tx plan due:  1/10/2023      OUTPATIENT TREATMENT PLAN SUMMARY    Date of Treatment Plan: 1/10/2022  90-Day Review Date: N/A  Date of Initial Service: 1/10/2022      1. DSM-V Diagnosis (include numeric code)  Binge eating disorder [F50.81]  Depression, unspecified depression type [F32.A]  2. Current symptoms and circumstances that substantiate the diagnosis:  See intake 9/28/21    3. How symptoms and/or behaviors are affecting level of function:   See intake 9/28/21 and note 1/10/22    4. Risk Assessment:  Suicide:  Assessed Level of Immediate Risk: None  Ideation: No   Plan:  No   Means: No   Intent: No     Assessed Level of Immediate Risk: None  Ideation: No   Plan:  No   Means: No    Intent: No    If on a medication, please include name and dosage:  See chart    Symptom/Problem Measurable Goals Interventions Gains Made   1.BED 1. Decrease binge eating/loss of control frequency 1.CBT for BED 1. N/A   2.Depression 2. Maintain minimal/mild PHQ-9 scores 2. CBT for depression 2. N/A     5. Frequency of Sessions: 1-2x/month    6. Discharge and Aftercare Goals: TBD    7. Expected duration of treatment:  TBD    8. Participants in therapy plan (family, friends, support network): TBD    Treatment plan available to patient via mychart/medical records.    Regulatory Guidelines for Updating Treatment Plan  [place] Medical Assistance: Reviewed & signed at least every 90days  Medicare:  Update per policy

## 2022-08-08 DIAGNOSIS — Z91.018 FOOD ALLERGY: ICD-10-CM

## 2022-08-08 RX ORDER — PREDNISONE 50 MG/1
TABLET ORAL
Qty: 2 TABLET | Refills: 3 | OUTPATIENT
Start: 2022-08-08

## 2022-08-08 NOTE — TELEPHONE ENCOUNTER
Debbie,    You will need to have an appointment if you would like Dr Garcia to continue prescribing your medciation. It has been over a year since your last visit. Call [phone] for scheduling.    Thanks,  Sagar

## 2022-08-10 ENCOUNTER — VIRTUAL VISIT (OUTPATIENT)
Dept: FAMILY MEDICINE | Facility: CLINIC | Age: 32
End: 2022-08-10

## 2022-08-10 ENCOUNTER — LAB (OUTPATIENT)
Dept: FAMILY MEDICINE | Facility: CLINIC | Age: 32
End: 2022-08-10
Attending: FAMILY MEDICINE
Payer: COMMERCIAL

## 2022-08-10 DIAGNOSIS — R05.9 COUGH: ICD-10-CM

## 2022-08-10 DIAGNOSIS — Z86.16 HISTORY OF COVID-19: ICD-10-CM

## 2022-08-10 DIAGNOSIS — R53.83 FATIGUE, UNSPECIFIED TYPE: ICD-10-CM

## 2022-08-10 DIAGNOSIS — J02.9 ACUTE PHARYNGITIS, UNSPECIFIED ETIOLOGY: Primary | ICD-10-CM

## 2022-08-10 DIAGNOSIS — J02.9 ACUTE PHARYNGITIS, UNSPECIFIED ETIOLOGY: ICD-10-CM

## 2022-08-10 DIAGNOSIS — R59.9 SWOLLEN LYMPH NODES: ICD-10-CM

## 2022-08-10 LAB
BASOPHILS # BLD AUTO: 0 10E3/UL (ref 0–0.2)
BASOPHILS NFR BLD AUTO: 1 %
DEPRECATED S PYO AG THROAT QL EIA: NEGATIVE
EOSINOPHIL # BLD AUTO: 0.1 10E3/UL (ref 0–0.7)
EOSINOPHIL NFR BLD AUTO: 2 %
ERYTHROCYTE [DISTWIDTH] IN BLOOD BY AUTOMATED COUNT: 13.5 % (ref 10–15)
GROUP A STREP BY PCR: NOT DETECTED
HCT VFR BLD AUTO: 35.6 % (ref 35–47)
HGB BLD-MCNC: 11.6 G/DL (ref 11.7–15.7)
IMM GRANULOCYTES # BLD: 0 10E3/UL
IMM GRANULOCYTES NFR BLD: 1 %
LYMPHOCYTES # BLD AUTO: 1.5 10E3/UL (ref 0.8–5.3)
LYMPHOCYTES NFR BLD AUTO: 31 %
MCH RBC QN AUTO: 29.2 PG (ref 26.5–33)
MCHC RBC AUTO-ENTMCNC: 32.6 G/DL (ref 31.5–36.5)
MCV RBC AUTO: 90 FL (ref 78–100)
MONOCYTES # BLD AUTO: 0.6 10E3/UL (ref 0–1.3)
MONOCYTES NFR BLD AUTO: 12 %
MONOCYTES NFR BLD AUTO: NEGATIVE %
NEUTROPHILS # BLD AUTO: 2.6 10E3/UL (ref 1.6–8.3)
NEUTROPHILS NFR BLD AUTO: 54 %
PLATELET # BLD AUTO: 338 10E3/UL (ref 150–450)
RBC # BLD AUTO: 3.97 10E6/UL (ref 3.8–5.2)
WBC # BLD AUTO: 4.8 10E3/UL (ref 4–11)

## 2022-08-10 PROCEDURE — 99213 OFFICE O/P EST LOW 20 MIN: CPT | Mod: 95 | Performed by: FAMILY MEDICINE

## 2022-08-10 PROCEDURE — 87651 STREP A DNA AMP PROBE: CPT

## 2022-08-10 PROCEDURE — 86308 HETEROPHILE ANTIBODY SCREEN: CPT

## 2022-08-10 PROCEDURE — 36415 COLL VENOUS BLD VENIPUNCTURE: CPT

## 2022-08-10 PROCEDURE — 85025 COMPLETE CBC W/AUTO DIFF WBC: CPT

## 2022-08-10 RX ORDER — BENZONATATE 200 MG/1
200 CAPSULE ORAL EVERY 8 HOURS PRN
Qty: 21 CAPSULE | Refills: 0 | Status: SHIPPED | OUTPATIENT
Start: 2022-08-10 | End: 2022-08-29

## 2022-08-10 RX ORDER — PREDNISONE 20 MG/1
40 TABLET ORAL DAILY
Qty: 10 TABLET | Refills: 0 | Status: SHIPPED | OUTPATIENT
Start: 2022-08-10 | End: 2022-08-15

## 2022-08-10 NOTE — PROGRESS NOTES
Debbie is a 31 year old who is being evaluated via a billable video visit.      How would you like to obtain your AVS? MyChart  If the video visit is dropped, the invitation should be resent by: Send to e-mail at: [email or web address]  Will anyone else be joining your video visit? No        Sent email invite at 8:02 am   Assessment & Plan     Acute pharyngitis, unspecified etiology  Suspect infectious mononucleosis but strep throat still not ruled out. Less likely due to recent covid-19.  Advised testing as below - patient concurred to schedule lab appt at her primary clinic.  Advised symptomatic treatment with otc analgesics and throat lozengs.  Patient requested trial of prednisone for the persistent mdoerate pharyngitis - reasonable for a short course of prednisone.  Return precautions discussed and given to patient.   - CBC with Platelets & Differential  - Mononucleosis screen  - Streptococcus A Rapid Scr w Reflx to PCR - Lab Collect  - predniSONE (DELTASONE) 20 MG tablet  Dispense: 10 tablet; Refill: 0    Fatigue, unspecified type  See above;  - CBC with Platelets & Differential  - Mononucleosis screen    Swollen lymph nodes  See above.  - CBC with Platelets & Differential  - Mononucleosis screen    History of COVID-19  This has been few weeks ago. Doubt current symptoms are due to that.  Return precautions discussed and given to patient.     Cough  Patient requested Rx for cough relief.   Okay to use benzonatate as needed.  - benzonatate (TESSALON) 200 MG capsule  Dispense: 21 capsule; Refill: 0    There are no Patient Instructions on file for this visit.    No follow-ups on file.    Cliff Montes MD  [facility]    Priya [place] is a 31 year old presenting for the following health issues:  No chief complaint on file.      HPI     Acute Illness  Acute illness concerns: sore throat  Onset/Duration: 2 weeks  Symptoms:  Fever: No   Chills/Sweats: YES  Headache (location?):  No  Sinus Pressure: No  Conjunctivitis:  No  Ear Pain: YES: both  Rhinorrhea: No  Congestion: No  Sore Throat: YES - moderate; white spots on tonsils last week but not now.  Cough: YES-non-productive  Wheeze: No  Decreased Appetite: No  Nausea: No  Vomiting: YES- with cough  Diarrhea: No  Dysuria/Freq.: No  Dysuria or Hematuria: No  Fatigue/Achiness: YES  Sick/Strep Exposure: No  Therapies tried and outcome: tylenol, ibuprofen  Abd pain: no  No sick contacts similar to her symptoms/      Review of Systems   Constitutional, HEENT, cardiovascular, pulmonary, GI, , musculoskeletal, neuro, skin, endocrine and psych systems are negative, except as otherwise noted.      Objective           Vitals:  No vitals were obtained today due to virtual visit.    Physical Exam   GENERAL: well-nourished, alert and no distress  EYES: Eyes grossly normal to inspection.  No discharge or erythema, or obvious scleral/conjunctival abnormalities.  RESP: No audible wheeze, cough, or visible cyanosis.  No visible retractions or increased work of breathing.    SKIN: Visible skin clear. No significant rash, abnormal pigmentation or lesions.  NEURO: Cranial nerves grossly intact.  Mentation and speech appropriate for age.  PSYCH: Mentation appears normal, affect normal/bright, judgement and insight intact, normal speech and appearance well-groomed.    Results for orders placed or performed in visit on 08/10/22   Mononucleosis screen     Status: Normal   Result Value Ref Range    Mononucleosis Screen Negative Negative   CBC with platelets and differential     Status: Abnormal   Result Value Ref Range    WBC Count 4.8 4.0 - 11.0 10e3/uL    RBC Count 3.97 3.80 - 5.20 10e6/uL    Hemoglobin 11.6 (L) 11.7 - 15.7 g/dL    Hematocrit 35.6 35.0 - 47.0 %    MCV 90 78 - 100 fL    MCH 29.2 26.5 - 33.0 pg    MCHC 32.6 31.5 - 36.5 g/dL    RDW 13.5 10.0 - 15.0 %    Platelet Count 338 150 - 450 10e3/uL    % Neutrophils 54 %    % Lymphocytes 31 %    % Monocytes  12 %    % Eosinophils 2 %    % Basophils 1 %    % Immature Granulocytes 1 %    Absolute Neutrophils 2.6 1.6 - 8.3 10e3/uL    Absolute Lymphocytes 1.5 0.8 - 5.3 10e3/uL    Absolute Monocytes 0.6 0.0 - 1.3 10e3/uL    Absolute Eosinophils 0.1 0.0 - 0.7 10e3/uL    Absolute Basophils 0.0 0.0 - 0.2 10e3/uL    Absolute Immature Granulocytes 0.0 <=0.4 10e3/uL   Streptococcus A Rapid Scr w Reflx to PCR - Lab Collect     Status: Normal    Specimen: Throat; Swab   Result Value Ref Range    Group A Strep antigen Negative Negative   CBC with Platelets & Differential     Status: Abnormal    Narrative    The following orders were created for panel order CBC with Platelets & Differential.  Procedure                               Abnormality         Status                     ---------                               -----------         ------                     CBC with platelets and d...[[id number]]  Abnormal            Final result                 Please view results for these tests on the individual orders.           Video-Visit Details    Video Start Time: 8:54 AM    Type of service:  Video Visit    Video End Time:9:05 AM    Originating Location (pt. Location): Home    Distant Location (provider location):  [facility]     Platform used for Video Visit: [employer]    .  ..

## 2022-08-12 ENCOUNTER — TELEPHONE (OUTPATIENT)
Dept: ALLERGY | Facility: CLINIC | Age: 32
End: 2022-08-12

## 2022-08-12 DIAGNOSIS — Z91.018 FOOD ALLERGY: ICD-10-CM

## 2022-08-12 RX ORDER — PREDNISONE 50 MG/1
TABLET ORAL
Qty: 2 TABLET | Refills: 3 | Status: CANCELLED | OUTPATIENT
Start: 2022-08-12

## 2022-08-14 DIAGNOSIS — Z91.018 FOOD ALLERGY: ICD-10-CM

## 2022-08-14 RX ORDER — PREDNISONE 50 MG/1
TABLET ORAL
Qty: 2 TABLET | Refills: 3 | Status: SHIPPED | OUTPATIENT
Start: 2022-08-14

## 2022-08-19 ENCOUNTER — E-VISIT (OUTPATIENT)
Dept: FAMILY MEDICINE | Facility: CLINIC | Age: 32
End: 2022-08-19
Payer: COMMERCIAL

## 2022-08-19 DIAGNOSIS — N39.0 ACUTE UTI (URINARY TRACT INFECTION): Primary | ICD-10-CM

## 2022-08-19 PROCEDURE — 99421 OL DIG E/M SVC 5-10 MIN: CPT | Performed by: PHYSICIAN ASSISTANT

## 2022-08-19 RX ORDER — NITROFURANTOIN 25; 75 MG/1; MG/1
100 CAPSULE ORAL 2 TIMES DAILY
Qty: 10 CAPSULE | Refills: 0 | Status: SHIPPED | OUTPATIENT
Start: 2022-08-19 | End: 2022-08-24

## 2022-08-19 NOTE — PATIENT INSTRUCTIONS
Dear Debbie Bah    After reviewing your responses, I've been able to diagnose you with a urinary tract infection, which is a common infection of the bladder with bacteria.  This is not a sexually transmitted infection, though urinating immediately after intercourse can help prevent infections.  Drinking lots of fluids is also helpful to clear your current infection and prevent the next one.      I have sent a prescription for antibiotics to your pharmacy to treat this infection.    It is important that you take all of your prescribed medication even if your symptoms are improving after a few doses.  Taking all of your medicine helps prevent the symptoms from returning.     If your symptoms worsen, you develop pain in your back or stomach, develop fevers, or are not improving in 5 days, please contact your primary care provider for an appointment or visit any of our convenient Walk-in or Urgent Care Centers to be seen, which can be found on our website here.    Thanks again for choosing us as your health care partner,    Deshawn Padron PA-C    Urinary Tract Infections in Women  Urinary tract infections (UTIs) are most often caused by bacteria. These bacteria enter the urinary tract. The bacteria may come from inside the body. Or they may travel from the skin outside the rectum or vagina into the urethra. Female anatomy makes it easy for bacteria from the bowel to enter a woman s urinary tract, which is the most common source of UTI. This means women develop UTIs more often than men. Pain in or around the urinary tract is a common UTI symptom. But the only way to know for sure if you have a UTI for the healthcare provider to test your urine. The two tests that may be done are the urinalysis and urine culture.     Types of UTIs    Cystitis. A bladder infection (cystitis) is the most common UTI in women. You may have urgent or frequent need to pee. You may also have pain, burning when you pee, and bloody  urine.    Urethritis. This is an inflamed urethra, which is the tube that carries urine from the bladder to outside the body. You may have lower stomach or back pain. You may also have urgent or frequent need to pee.    Pyelonephritis. This is a kidney infection. If not treated, it can be serious and damage your kidneys. In severe cases, you may need to stay in the hospital. You may have a fever and lower back pain.    Medicines to treat a UTI  Most UTIs are treated with antibiotics. These kill the bacteria. The length of time you need to take them depends on the type of infection. It may be as short as 3 days. If you have repeated UTIs, you may need a low-dose antibiotic for several months. Take antibiotics exactly as directed. Don t stop taking them until all of the medicine is gone. If you stop taking the antibiotic too soon, the infection may not go away. You may also develop a resistance to the antibiotic. This can make it much harder to treat.   Lifestyle changes to treat and prevent UTIs   The lifestyle changes below will help get rid of your UTI. They may also help prevent future UTIs.     Drink plenty of fluids. This includes water, juice, or other caffeine-free drinks. Fluids help flush bacteria out of your body.    Empty your bladder. Always empty your bladder when you feel the urge to pee. And always pee before going to sleep. Urine that stays in your bladder can lead to infection. Try to pee before and after sex as well.    Practice good personal hygiene. Wipe yourself from front to back after using the toilet. This helps keep bacteria from getting into the urethra.    Use condoms during sex. These help prevent UTIs caused by sexually transmitted bacteria. Also don't use spermicides during sex. These can increase the risk for UTIs. Choose other forms of birth control instead. For women who tend to get UTIs after sex, a low-dose of a preventive antibiotic may be used. Be sure to discuss this option with  your healthcare provider.    Follow up with your healthcare provider as directed. He or she may test to make sure the infection has cleared. If needed, more treatment may be started.  [employer] last reviewed this educational content on 7/1/2019 2000-2021 [employer]. All rights reserved. This information is not intended as a substitute for professional medical care. Always follow your healthcare professional's instructions.

## 2022-08-22 ENCOUNTER — VIRTUAL VISIT (OUTPATIENT)
Dept: PSYCHOLOGY | Facility: CLINIC | Age: 32
End: 2022-08-22
Payer: COMMERCIAL

## 2022-08-22 DIAGNOSIS — F50.819 BINGE EATING DISORDER: Primary | ICD-10-CM

## 2022-08-22 DIAGNOSIS — F32.A DEPRESSION, UNSPECIFIED DEPRESSION TYPE: ICD-10-CM

## 2022-08-22 PROCEDURE — 90837 PSYTX W PT 60 MINUTES: CPT | Mod: 95 | Performed by: PSYCHOLOGIST

## 2022-08-22 NOTE — PROGRESS NOTES
"  Health Psychology                      Annabel Gerardo, Ph.D., L.P [phone]  Adilia Subramanian, Ph.D., L.P. [phone]  Kay Toro, Ph.D. [phone]  Erika Quintana, Ph.D., L.P. [phone]  Michael Chapman, Ph.D., A.B.P.P., L.P. [phone]         Leia Cooper, Ph.D., L.P. [phone]     [facility], 3rd Floor  [address] [place] [address]    The patient has been notified of following:   \"This video visit will be conducted via a call between you and your physician/provider. We have found   that certain health care needs can be provided without the need for an in-person physical exam. This   service lets us provide the care you need with a video conversation. If a prescription is necessary we can   send it directly to your pharmacy. If lab work is needed we can place an order for that and you can then   stop by our lab to have the test done at a later time. If during the course of the call the physician/provider feels a video visit is not appropriate, you will not be charged for this service.\" YES  Reason that telemedicine is appropriate: COVID-19 pandemic mitigation requiring social distancing.  Patient has given verbal consent for video visit: Yes  Mode of transmission: Secure real time interactive audio and visual telecommunication system via [employer]  Location of originating and distant sites:    Originating site (patient location): patient's home    Distant site (provider site): home office of provider      Health Psychology Follow-Up Note    SUBJECTIVE:  Debbie NAVA Carljuan luis was seen for individual psychotherapy. Reviewed updates to emotional and physical health and progress towards goals since our last session. Continues to be happy in new relationship. Discussed thoughts, emotions, and behaviors related to disordered eating and body image, how she is more easily able to challenge thoughts related to eating but not body image. Encouraged her " that these can translate to body image related thoughts as well, and reflected that she is allowing herself to speak to herself in a way that she would not allow others.     Ongoing list of binge eating/LOC triggers  Boredom, fatigue, insomnia (eating helps fall asleep), discomfort, overwhelmed (doesn't like being needed/demanded upon/depleated), lonely   Being sick    Ongoing list of feared foods:  Cereal and cows milk (10) - not specific (sweet)  Pint of ice cream (ice cream sandwiches ok) (8-9)   Chinese food (7-8)  Cooked white rice (7)    OBJECTIVE:  Appearance/Behavior/Orientation: Phone session. Alert and oriented to person, place, time, and situation. No evidence of psychomotor agitation.     Cooperation/Reliability: Patient was open and cooperative throughout the session.    Speech/Language: Speech was clear, coherent, and of normal rate, rhythm and volume.   Thought Form: Overall logical and organized.   Mood/Affect: Mood euthymic; appropriate range of affect.    Insight/Motivation: Good, good    ASSESSMENT:  Was engaged and interactive throughout the session. She is likely to benefit from CBT and mindfulness based approach to binge eating disorder.  1/10/22  Objective Binge episodes: 2-3  Subjective binge episodes: 7-10  2/2/22  Objective Binge episodes: 3-4  5/9/22  OBEs-2-3   SBE - 3-5    TRAVIS-7 SCORE 5/9/2022 6/6/2022 6/20/2022   Total Score 0 (minimal anxiety) 0 (minimal anxiety) 0 (minimal anxiety)   Total Score 0 0 0       PHQ 6/18/2021 9/28/2021 5/3/2022   PHQ-9 Total Score 6 4 4   Q9: Thoughts of better off dead/self-harm past 2 weeks Not at all Not at all Not at all   F/U: Thoughts of suicide or self-harm - - -   F/U: Safety concerns - - -     DIAGNOSIS:  Binge eating disorder [F50.81]  Depression, unspecified depression type [F32.A]    PLAN:  RTC for continued psychotherapy.     9/23 10:00  Extended session due to length of interval    Working Goals:   -Continue 60 second timer before deciding to  eat (and then being okay with eating if does), felt 90 seconds was too much.  -Continue with journaling (perhaps during high emotion, not just after)  -Continue list of positive outcomes from in control eating (weight loss, having foods around that previously were trigger foods)  -Start tracking period  -Decrease body checking  -Practice/exposure with rice   -Continue to wear clothes that expose areas of her body that she is self-conscious (shorts, tank tops)  -revisit sleep hygiene   -social media, work to increase awareness of body-image triggers/thoughts.    Start: 4:01  Stop:  4:54  Extended session due to length of interval    Annabel Gerardo, PhD,   Health Psychology  Clinical Psychologist    Extended session due to onset of treatment, length of interval    Tx plan completed: 1/10/2022   Tx plan due:  1/10/2023      OUTPATIENT TREATMENT PLAN SUMMARY    Date of Treatment Plan: 1/10/2022  90-Day Review Date: N/A  Date of Initial Service: 1/10/2022      1. DSM-V Diagnosis (include numeric code)  Binge eating disorder [F50.81]  Depression, unspecified depression type [F32.A]  2. Current symptoms and circumstances that substantiate the diagnosis:  See intake 9/28/21    3. How symptoms and/or behaviors are affecting level of function:   See intake 9/28/21 and note 1/10/22    4. Risk Assessment:  Suicide:  Assessed Level of Immediate Risk: None  Ideation: No   Plan:  No   Means: No   Intent: No     Assessed Level of Immediate Risk: None  Ideation: No   Plan:  No   Means: No   Intent: No    If on a medication, please include name and dosage:  See chart    Symptom/Problem Measurable Goals Interventions Gains Made   1.BED 1. Decrease binge eating/loss of control frequency 1.CBT for BED 1. N/A   2.Depression 2. Maintain minimal/mild PHQ-9 scores 2. CBT for depression 2. N/A     5. Frequency of Sessions: 1-2x/month    6. Discharge and Aftercare Goals: TBD    7. Expected duration of treatment:  TBD    8. Participants in  therapy plan (family, friends, support network): TBD    Treatment plan available to patient via mychart/medical records.    Regulatory Guidelines for Updating Treatment Plan  [place] Medical Assistance: Reviewed & signed at least every 90days  Medicare:  Update per policy

## 2022-08-27 ENCOUNTER — E-VISIT (OUTPATIENT)
Dept: URGENT CARE | Facility: CLINIC | Age: 32
End: 2022-08-27
Payer: COMMERCIAL

## 2022-08-27 DIAGNOSIS — R30.0 DIFFICULT OR PAINFUL URINATION: Primary | ICD-10-CM

## 2022-08-27 PROCEDURE — 99207 PR NON-BILLABLE SERV PER CHARTING: CPT | Performed by: FAMILY MEDICINE

## 2022-08-27 NOTE — PATIENT INSTRUCTIONS
Dear Debbie Bah,    We are sorry you are not feeling well. Based on the responses you provided, it is recommended that you be seen in-person in urgent care so we can better evaluate your symptoms. Please click here to find the nearest urgent care location to you.   You will not be charged for this Visit. Thank you for trusting us with your care.    Gifty Marion MD

## 2022-08-29 ENCOUNTER — LAB (OUTPATIENT)
Dept: LAB | Facility: CLINIC | Age: 32
End: 2022-08-29
Payer: COMMERCIAL

## 2022-08-29 ENCOUNTER — VIRTUAL VISIT (OUTPATIENT)
Dept: FAMILY MEDICINE | Facility: CLINIC | Age: 32
End: 2022-08-29

## 2022-08-29 DIAGNOSIS — R30.0 DYSURIA: Primary | ICD-10-CM

## 2022-08-29 DIAGNOSIS — R35.0 URINARY FREQUENCY: ICD-10-CM

## 2022-08-29 DIAGNOSIS — Z11.3 SCREEN FOR STD (SEXUALLY TRANSMITTED DISEASE): ICD-10-CM

## 2022-08-29 DIAGNOSIS — N30.00 ACUTE CYSTITIS WITHOUT HEMATURIA: ICD-10-CM

## 2022-08-29 DIAGNOSIS — R30.0 DYSURIA: ICD-10-CM

## 2022-08-29 LAB
ALBUMIN UR-MCNC: 30 MG/DL
APPEARANCE UR: CLEAR
BACTERIA #/AREA URNS HPF: ABNORMAL /HPF
BILIRUB UR QL STRIP: NEGATIVE
COLOR UR AUTO: YELLOW
GLUCOSE UR STRIP-MCNC: NEGATIVE MG/DL
HGB UR QL STRIP: ABNORMAL
KETONES UR STRIP-MCNC: NEGATIVE MG/DL
LEUKOCYTE ESTERASE UR QL STRIP: ABNORMAL
NITRATE UR QL: NEGATIVE
PH UR STRIP: 7.5 [PH] (ref 5–7)
RBC #/AREA URNS AUTO: ABNORMAL /HPF
SP GR UR STRIP: 1.02 (ref 1–1.03)
SQUAMOUS #/AREA URNS AUTO: ABNORMAL /LPF
UROBILINOGEN UR STRIP-ACNC: 0.2 E.U./DL
WBC #/AREA URNS AUTO: ABNORMAL /HPF

## 2022-08-29 PROCEDURE — 87591 N.GONORRHOEAE DNA AMP PROB: CPT

## 2022-08-29 PROCEDURE — 87086 URINE CULTURE/COLONY COUNT: CPT

## 2022-08-29 PROCEDURE — 99213 OFFICE O/P EST LOW 20 MIN: CPT | Mod: 95 | Performed by: PHYSICIAN ASSISTANT

## 2022-08-29 PROCEDURE — 81001 URINALYSIS AUTO W/SCOPE: CPT

## 2022-08-29 PROCEDURE — 87491 CHLMYD TRACH DNA AMP PROBE: CPT

## 2022-08-29 RX ORDER — AZELAIC ACID 0.15 G/G
GEL TOPICAL
COMMUNITY
Start: 2022-07-31 | End: 2023-03-14

## 2022-08-29 RX ORDER — SULFAMETHOXAZOLE/TRIMETHOPRIM 800-160 MG
1 TABLET ORAL 2 TIMES DAILY
Qty: 6 TABLET | Refills: 0 | Status: SHIPPED | OUTPATIENT
Start: 2022-08-29 | End: 2022-09-01

## 2022-08-29 RX ORDER — KETOCONAZOLE 20 MG/ML
SHAMPOO TOPICAL
COMMUNITY
Start: 2022-07-14 | End: 2023-03-14

## 2022-08-29 RX ORDER — TAZAROTENE 0.1 MG/G
CREAM CUTANEOUS
COMMUNITY
Start: 2022-06-30 | End: 2023-03-14

## 2022-08-29 RX ORDER — SULFACETAMIDE SODIUM, SULFUR 100; 50 MG/G; MG/G
EMULSION TOPICAL
COMMUNITY
Start: 2022-07-14 | End: 2023-01-20

## 2022-08-29 NOTE — PROGRESS NOTES
Debbie is a 31 year old who is being evaluated via a billable video visit.      How would you like to obtain your AVS? [employer]  If the video visit is dropped, the invitation should be resent by: Text to cell phone: [phone]  Will anyone else be joining your video visit? No    Assessment & Plan     Debbie was seen today for uti.    Diagnoses and all orders for this visit:    Dysuria  -     UA Macro with Reflex to Micro and Culture - lab collect; Future  -     Chlamydia trachomatis PCR; Future  -     Neisseria gonorrhoeae PCR; Future    Urinary frequency  -     UA Macro with Reflex to Micro and Culture - lab collect; Future  -     Neisseria gonorrhoeae PCR; Future    Screen for STD (sexually transmitted disease)  -     Chlamydia trachomatis PCR; Future  -     Neisseria gonorrhoeae PCR; Future    Tx'd empirically for UTI with improvement after macrobid 8/19, but now with recurrence in past 3-4 days. Advised formal UA and GC screening in light of new male sexual partner. Pt amenable to lab appt. Notify results/further treatment pending lab completion. Patient in agreement with plan.     Return today (on 8/29/2022) for Lab Work.    Corine Cespedes PA-C  [facility] [place]    ADDENDUM  UA suggestive of possible UTI. Will tx with bactrim. Pt notified via [employer]. Will await culture.  Electronically Signed By: Corine Cespedes PA-C    Results for orders placed or performed in visit on 08/29/22   UA Macro with Reflex to Micro and Culture - lab collect     Status: Abnormal    Specimen: Urine, Clean Catch   Result Value Ref Range    Color Urine Yellow Colorless, Straw, Light Yellow, Yellow    Appearance Urine Clear Clear    Glucose Urine Negative Negative mg/dL    Bilirubin Urine Negative Negative    Ketones Urine Negative Negative mg/dL    Specific Gravity Urine 1.020 1.003 - 1.035    Blood Urine Trace (A) Negative    pH Urine 7.5 (H) 5.0 - 7.0    Protein Albumin Urine 30  (A) Negative  mg/dL    Urobilinogen Urine 0.2 0.2, 1.0 E.U./dL    Nitrite Urine Negative Negative    Leukocyte Esterase Urine Large (A) Negative   Urine Microscopic Exam     Status: Abnormal   Result Value Ref Range    Bacteria Urine Moderate (A) None Seen /HPF    RBC Urine 0-2 0-2 /HPF /HPF    WBC Urine 10-25 (A) 0-5 /HPF /HPF    Squamous Epithelials Urine Few (A) None Seen /LPF       Priya [place] is a 31 year old, presenting for the following health issues:  UTI    UTI    History of Present Illness       Reason for visit:  Recurrence of UTI symptoms after recent treatment    She eats 4 or more servings of fruits and vegetables daily.She consumes 0 sweetened beverage(s) daily.She exercises with enough effort to increase her heart rate 30 to 60 minutes per day.  She exercises with enough effort to increase her heart rate 4 days per week.   She is taking medications regularly.     Genitourinary - Female  Onset/Duration: x 2 weeks ago  Completed E-visit and tx'd with macrobid for possible UTI on 8/19.  Did help sx/resolved, but then over past 3-4 days sx recurred.    Description:   Painful urination (Dysuria): YES- at the end of the stream           Frequency: YES  Blood in urine (Hematuria): No  Delay in urine (Hesitency): No  Intensity: moderate, 5/10  Progression of Symptoms:  same and intermittent  Accompanying Signs & Symptoms:  Fever/chills: No  Flank pain: No  Nausea and vomiting: No  Vaginal symptoms: none  Abdominal/Pelvic Pain: No  History:   History of frequent UTI s: No excluding this most recent one  History of kidney stones: No  Sexually Active: YES - new male partner and hasn't had STD screening since being with this person.  Possibility of pregnancy: No  Precipitating or alleviating factors: Recent use of Antibiotics  Therapies tried and outcome:  Azo and Ibuprofen      Current Outpatient Medications   Medication     azelaic acid (FINACIA) 15 % external gel     buPROPion (WELLBUTRIN XL) 300 MG 24 hr tablet      drospirenone-ethinyl estradiol (ADALID) 3-0.02 MG tablet     ketoconazole (NIZORAL) 2 % external shampoo     phentermine (ADIPEX-P) 37.5 MG tablet     predniSONE (DELTASONE) 50 MG tablet     Sulfacetamide Sodium-Sulfur 10-5 % LIQD     TAZORAC 0.1 % external cream     Vitamin D3 (CHOLECALCIFEROL) 125 MCG (5000 UT) tablet     No current facility-administered medications for this visit.        Allergies   Allergen Reactions     Doxycycline      esophagitis     Mushroom Cramps, Diarrhea and GI Disturbance     Review of Systems   Constitutional, HEENT, cardiovascular, pulmonary, gi and gu systems are negative, except as otherwise noted.      Objective       Vitals:  No vitals were obtained today due to virtual visit.    Physical Exam   GENERAL: Healthy, alert and no distress  EYES: Eyes grossly normal to inspection.  No discharge or erythema, or obvious scleral/conjunctival abnormalities.  RESP: No audible wheeze, cough, or visible cyanosis.  No visible retractions or increased work of breathing.    SKIN: Visible skin clear. No significant rash, abnormal pigmentation or lesions.  NEURO: Cranial nerves grossly intact.  Mentation and speech appropriate for age.  PSYCH: Mentation appears normal, affect normal/bright, judgement and insight intact, normal speech and appearance well-groomed.                Video-Visit Details    Video Start Time: 8:36a    Type of service:  Video Visit    Video End Time:8:45 AM    Originating Location (pt. Location): Home    Distant Location (provider location):  [facility]     Platform used for Video Visit: [employer]    .  ..

## 2022-08-30 LAB
C TRACH DNA SPEC QL NAA+PROBE: NEGATIVE
N GONORRHOEA DNA SPEC QL NAA+PROBE: NEGATIVE

## 2022-08-31 LAB — BACTERIA UR CULT: NORMAL

## 2022-08-31 NOTE — RESULT ENCOUNTER NOTE
Dear Debbie,      Your recent test results are noted below:    -Urine culture is abnormal but it looks like a contaminated, non clean-catch sample.  There is no need for further antibiotics at this point.  If new, worsening, or persistent symptoms occur then you should call or return for a recheck.    -Chlamydia and gonnohrea tests are normal.    For additional lab test information, labtestsonline.org is an excellent reference. Please contact the clinic at [phone] with any further questions or concerns.    Sincerely,      Corine Cespedes PA-C  [facility]

## 2022-09-11 ENCOUNTER — HEALTH MAINTENANCE LETTER (OUTPATIENT)
Age: 32
End: 2022-09-11

## 2022-09-15 ENCOUNTER — MYC MEDICAL ADVICE (OUTPATIENT)
Dept: FAMILY MEDICINE | Facility: CLINIC | Age: 32
End: 2022-09-15

## 2022-09-16 ENCOUNTER — VIRTUAL VISIT (OUTPATIENT)
Dept: FAMILY MEDICINE | Facility: CLINIC | Age: 32
End: 2022-09-16
Payer: COMMERCIAL

## 2022-09-16 DIAGNOSIS — Z86.2 HISTORY OF ANEMIA: ICD-10-CM

## 2022-09-16 DIAGNOSIS — R68.89 FREQUENTLY SICK: Primary | ICD-10-CM

## 2022-09-16 DIAGNOSIS — R55 VASOVAGAL SYNCOPE: ICD-10-CM

## 2022-09-16 PROCEDURE — 99214 OFFICE O/P EST MOD 30 MIN: CPT | Mod: 95 | Performed by: NURSE PRACTITIONER

## 2022-09-16 NOTE — PROGRESS NOTES
"Debbie is a 32 year old who is being evaluated via a billable video visit.      How would you like to obtain your AVS? MyChart  If the video visit is dropped, the invitation should be resent by: Send to e-mail at: [email or web address] [phone]  Will anyone else be joining your video visit? No      Assessment & Plan     Frequently sick  Suspect that covid infection made patient vulnerable to infections and this will eventually resolve.  - CBC with platelets and differential; Future  - Ferritin; Future  - TSH with free T4 reflex; Future  - Basic metabolic panel  (Ca, Cl, CO2, Creat, Gluc, K, Na, BUN); Future  - Anti Nuclear Cheryl IgG by IFA with Reflex; Future    Vasovagal syncope  Dissimilar from previous episodes in that there was no provocation   - CBC with platelets and differential; Future  - Ferritin; Future  - TSH with free T4 reflex; Future  - Basic metabolic panel  (Ca, Cl, CO2, Creat, Gluc, K, Na, BUN); Future    History of anemia  Should not be anemic with continuous DENITA and no periods. Recheck.  - CBC with platelets and differential; Future  - Ferritin; Future  - **Iron and iron binding capacity FUTURE 2mo; Future    Ordering of each unique test  36 minutes spent on the date of the encounter doing chart review, history and exam, documentation and further activities per the note       BMI:   Estimated body mass index is 31.28 kg/m  as calculated from the following:    Height as of 7/15/22: 1.651 m (5' 5\").    Weight as of 7/15/22: 85.3 kg (188 lb).   Weight management plan: Discussed healthy diet and exercise guidelines      Return in about 4 months (around 1/10/2023) for Physical Exam.    HARJINDER Vasquez CNP  [facility]    Subjective   Debbie is a 32 year old accompanied by her self, presenting for the following health issues:  Illness      HPI   Life is good. Was just in [place] for mom's birthday. Graduate from [employer] school and looking for jobs. Has a new boyfriend (!). " "    Mental health has been really good. Working with psychotherapy through weight management team. Exercising and unchanged diet.    Sick all summer -   Covid infection - end of June. Had fever, significant sore throat, cough, significant fatigue. Lungs feel \"not as strong.\" Is able to exercise without SOB.  Mono - end of July. Sore throat and enlarged lymph nodes in posterior and anterior cervical chain and occipital. Had malaise. No fever or supraclavicular adenopathy.  UTI  - end of August, dysuria and frequency. No fever, no CVAT. Treated with macrobid which did not cover and resolved with sulfa.  Sinus infection - around same time as UTI, felt like a bad head cold - sinus pressure, rhinorrhea, headache.    Had one week of feeling normal between covid and mono. Since mid-August has felt largely normal even despite and UTI and sinus infection.    Two episodes of passing out at dinner. First episode was able to stay conscious. Remembers waking up out of second episode, but it was not long (friends estimated 10-15 seconds). History of vasovagal with LOC due to  provocation such as lab draw or dehydration. Over time, those associations have lessened. No clear provocation with these episodes.     Debbie wondering about autoimmune and friends and family are concerned. Had blood draw with mono and was slightly anemic. Takes birth control continuously.    Review of Systems   Constitutional, HEENT, cardiovascular, pulmonary, gi and gu systems are negative, except as otherwise noted.      Objective         Vitals:  No vitals were obtained today due to virtual visit.    Physical Exam   GENERAL: Healthy, alert and no distress  EYES: Eyes grossly normal to inspection.  No discharge or erythema, or obvious scleral/conjunctival abnormalities.  RESP: No audible wheeze, cough, or visible cyanosis.  No visible retractions or increased work of breathing.    SKIN: Visible skin clear. No significant rash, abnormal pigmentation or " lesions.  NEURO: Cranial nerves grossly intact.  Mentation and speech appropriate for age.  PSYCH: Mentation appears normal, affect normal/bright, judgement and insight intact, normal speech and appearance well-groomed.    Labs pending          Video-Visit Details    Video Start Time: 11:56 AM    Type of service:  Video Visit    Video End Time:12:29 PM    Originating Location (pt. Location): Home    Distant Location (provider location):  [facility]     Platform used for Video Visit: bluebird bio

## 2022-09-19 RX ORDER — CARBOXYMETHYLCELLULOSE/CITRIC 0.75 G
CAPSULE ORAL
Status: CANCELLED | OUTPATIENT
Start: 2022-09-19

## 2022-09-20 DIAGNOSIS — E66.01 MORBID OBESITY (H): Primary | ICD-10-CM

## 2022-09-20 RX ORDER — CARBOXYMETHYLCELLULOSE/CITRIC 0.75 G
3 CAPSULE ORAL 2 TIMES DAILY
Qty: 168 CAPSULE | Refills: 11 | Status: SHIPPED | OUTPATIENT
Start: 2022-09-20 | End: 2023-01-20

## 2022-09-23 ENCOUNTER — VIRTUAL VISIT (OUTPATIENT)
Dept: PSYCHOLOGY | Facility: CLINIC | Age: 32
End: 2022-09-23
Payer: COMMERCIAL

## 2022-09-23 DIAGNOSIS — F32.A DEPRESSION, UNSPECIFIED DEPRESSION TYPE: ICD-10-CM

## 2022-09-23 DIAGNOSIS — F50.819 BINGE EATING DISORDER: Primary | ICD-10-CM

## 2022-09-23 PROCEDURE — 90837 PSYTX W PT 60 MINUTES: CPT | Mod: 95 | Performed by: PSYCHOLOGIST

## 2022-09-23 ASSESSMENT — ANXIETY QUESTIONNAIRES
3. WORRYING TOO MUCH ABOUT DIFFERENT THINGS: NOT AT ALL
2. NOT BEING ABLE TO STOP OR CONTROL WORRYING: NOT AT ALL
8. IF YOU CHECKED OFF ANY PROBLEMS, HOW DIFFICULT HAVE THESE MADE IT FOR YOU TO DO YOUR WORK, TAKE CARE OF THINGS AT HOME, OR GET ALONG WITH OTHER PEOPLE?: NOT DIFFICULT AT ALL
IF YOU CHECKED OFF ANY PROBLEMS ON THIS QUESTIONNAIRE, HOW DIFFICULT HAVE THESE PROBLEMS MADE IT FOR YOU TO DO YOUR WORK, TAKE CARE OF THINGS AT HOME, OR GET ALONG WITH OTHER PEOPLE: NOT DIFFICULT AT ALL
7. FEELING AFRAID AS IF SOMETHING AWFUL MIGHT HAPPEN: NOT AT ALL
7. FEELING AFRAID AS IF SOMETHING AWFUL MIGHT HAPPEN: NOT AT ALL
5. BEING SO RESTLESS THAT IT IS HARD TO SIT STILL: NOT AT ALL
6. BECOMING EASILY ANNOYED OR IRRITABLE: NOT AT ALL
4. TROUBLE RELAXING: NOT AT ALL
GAD7 TOTAL SCORE: 0
GAD7 TOTAL SCORE: 0
1. FEELING NERVOUS, ANXIOUS, OR ON EDGE: NOT AT ALL

## 2022-09-23 NOTE — PROGRESS NOTES
"  Health Psychology                      Annabel Gerardo, Ph.D., L.P [phone]  Adilia Subramanian, Ph.D., L.P. [phone]  Kay Toro, Ph.D. [phone]  Erika Quintana, Ph.D., L.P. [phone]  Michael Chapman, Ph.D., A.B.P.P., L.P. [phone]         Leia Cooper, Ph.D., L.P. [phone]     [facility], 3rd Floor  [address] [place] [address]    The patient has been notified of following:   \"This video visit will be conducted via a call between you and your physician/provider. We have found   that certain health care needs can be provided without the need for an in-person physical exam. This   service lets us provide the care you need with a video conversation. If a prescription is necessary we can   send it directly to your pharmacy. If lab work is needed we can place an order for that and you can then   stop by our lab to have the test done at a later time. If during the course of the call the physician/provider feels a video visit is not appropriate, you will not be charged for this service.\" YES  Reason that telemedicine is appropriate: COVID-19 pandemic mitigation requiring social distancing.  Patient has given verbal consent for video visit: Yes  Mode of transmission: Secure real time interactive audio and visual telecommunication system via [employer]  Location of originating and distant sites:    Originating site (patient location): patient's home    Distant site (provider site): home office of provider    Start: 10:00  Stop:  10:59  Extended session due to length of interval    Health Psychology Follow-Up Note    SUBJECTIVE:  Debbie Bah was seen for individual psychotherapy. Reviewed updates to emotional and physical health and progress towards goals since our last session. Struggling with increased eating and believes may have gained weight. Doesn't feel that is binge eating more, has been going out to eat more with boyfriend and " drinking more. Discussed intuitive eating as an important component to shifting to eating differently and also other behaviors that will help her to feel better (e.g., exercise). Problem solved how to navigate caring for herself (e.g., prioritizing healthy food, exercise sleep) within this new relationship. 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     Ongoing list of binge eating/LOC triggers  Boredom, fatigue, insomnia (eating helps fall asleep), discomfort, overwhelmed (doesn't like being needed/demanded upon/depleated), lonely   Being sick    Ongoing list of feared foods:  Cereal and cows milk (10) - not specific (sweet)  Pint of ice cream (ice cream sandwiches ok) (8-9)   Chinese food (7-8)  Cooked white rice (7)    OBJECTIVE:  Appearance/Behavior/Orientation:  Appearance within normal limits.  Alert and oriented to person, place, time, and situation. No evidence of psychomotor agitation.     Cooperation/Reliability: Patient was open and cooperative throughout the session.    Speech/Language: Speech was clear, coherent, and of normal rate, rhythm and volume.   Thought Form: Overall logical and organized.   Mood/Affect: Mood euthymic; appropriate range of affect.    Insight/Motivation: Good, good    ASSESSMENT:  Was engaged and interactive throughout the session. She is likely to benefit from CBT and mindfulness based approach to binge eating disorder.  1/10/22  Objective Binge episodes: 2-3  Subjective binge episodes: 7-10  2/2/22  Objective Binge episodes: 3-4  5/9/22  OBEs-2-3   SBE - 3-5    TRAVIS-7 SCORE 6/6/2022 6/20/2022 9/23/2022   Total Score 0 (minimal anxiety) 0 (minimal anxiety) 0 (minimal anxiety)   Total Score 0 0 0       PHQ 6/18/2021 9/28/2021 5/3/2022   PHQ-9 Total Score 6 4 4   Q9: Thoughts of better off dead/self-harm past 2 weeks Not at all Not at all Not at all   F/U:  Thoughts of suicide or self-harm - - -   F/U: Safety concerns - - -     DIAGNOSIS:  Binge eating disorder [F50.81]  Depression, unspecified depression type [F32.A]    PLAN:  RTC for continued psychotherapy.  10/4 9:15    Working Goals:   -Continue 60 second timer before deciding to eat (and then being okay with eating if does), felt 90 seconds was too much.  -Continue with journaling (perhaps during high emotion, not just after)  -Continue list of positive outcomes from in control eating (weight loss, having foods around that previously were trigger foods)  -Start tracking period  -Decrease body checking  -Practice/exposure with rice   -Continue to wear clothes that expose areas of her body that she is self-conscious (shorts, tank tops)  -revisit sleep hygiene   -social media, work to increase awareness of body-image triggers/thoughts.    Annabel Gerardo, PhD,   Health Psychology  Clinical Psychologist    Extended session due to onset of treatment, length of interval    Tx plan completed: 1/10/2022   Tx plan due:  1/10/2023      OUTPATIENT TREATMENT PLAN SUMMARY    Date of Treatment Plan: 1/10/2022  90-Day Review Date: N/A  Date of Initial Service: 1/10/2022      1. DSM-V Diagnosis (include numeric code)  Binge eating disorder [F50.81]  Depression, unspecified depression type [F32.A]  2. Current symptoms and circumstances that substantiate the diagnosis:  See intake 9/28/21    3. How symptoms and/or behaviors are affecting level of function:   See intake 9/28/21 and note 1/10/22    4. Risk Assessment:  Suicide:  Assessed Level of Immediate Risk: None  Ideation: No   Plan:  No   Means: No   Intent: No     Assessed Level of Immediate Risk: None  Ideation: No   Plan:  No   Means: No   Intent: No    If on a medication, please include name and dosage:  See chart    Symptom/Problem Measurable Goals Interventions Gains Made   1.BED 1. Decrease binge eating/loss of control frequency 1.CBT for BED 1. N/A   2.Depression 2.  Maintain minimal/mild PHQ-9 scores 2. CBT for depression 2. N/A     5. Frequency of Sessions: 1-2x/month    6. Discharge and Aftercare Goals: TBD    7. Expected duration of treatment:  TBD    8. Participants in therapy plan (family, friends, support network): TBD    Treatment plan available to patient via mychart/medical records.    Regulatory Guidelines for Updating Treatment Plan  [place] Medical Assistance: Reviewed & signed at least every 90days  Medicare:  Update per policy

## 2022-09-28 ENCOUNTER — ALLIED HEALTH/NURSE VISIT (OUTPATIENT)
Dept: FAMILY MEDICINE | Facility: CLINIC | Age: 32
End: 2022-09-28

## 2022-09-28 ENCOUNTER — LAB (OUTPATIENT)
Dept: LAB | Facility: CLINIC | Age: 32
End: 2022-09-28
Payer: COMMERCIAL

## 2022-09-28 DIAGNOSIS — Z86.2 HISTORY OF ANEMIA: ICD-10-CM

## 2022-09-28 DIAGNOSIS — Z23 NEED FOR VACCINATION: Primary | ICD-10-CM

## 2022-09-28 DIAGNOSIS — R55 VASOVAGAL SYNCOPE: ICD-10-CM

## 2022-09-28 DIAGNOSIS — R68.89 FREQUENTLY SICK: ICD-10-CM

## 2022-09-28 LAB
ANION GAP SERPL CALCULATED.3IONS-SCNC: 7 MMOL/L (ref 3–14)
BASOPHILS # BLD AUTO: 0 10E3/UL (ref 0–0.2)
BASOPHILS NFR BLD AUTO: 1 %
BUN SERPL-MCNC: 14 MG/DL (ref 7–30)
CALCIUM SERPL-MCNC: 8.1 MG/DL (ref 8.5–10.1)
CHLORIDE BLD-SCNC: 113 MMOL/L (ref 94–109)
CO2 SERPL-SCNC: 22 MMOL/L (ref 20–32)
CREAT SERPL-MCNC: 0.71 MG/DL (ref 0.52–1.04)
EOSINOPHIL # BLD AUTO: 0.1 10E3/UL (ref 0–0.7)
EOSINOPHIL NFR BLD AUTO: 1 %
ERYTHROCYTE [DISTWIDTH] IN BLOOD BY AUTOMATED COUNT: 13.1 % (ref 10–15)
FERRITIN SERPL-MCNC: 29 NG/ML (ref 12–150)
GFR SERPL CREATININE-BSD FRML MDRD: >90 ML/MIN/1.73M2
GLUCOSE BLD-MCNC: 83 MG/DL (ref 70–99)
HCT VFR BLD AUTO: 36.6 % (ref 35–47)
HGB BLD-MCNC: 12 G/DL (ref 11.7–15.7)
IRON SATN MFR SERPL: 14 % (ref 15–46)
IRON SERPL-MCNC: 47 UG/DL (ref 35–180)
LYMPHOCYTES # BLD AUTO: 1.4 10E3/UL (ref 0.8–5.3)
LYMPHOCYTES NFR BLD AUTO: 32 %
MCH RBC QN AUTO: 28.7 PG (ref 26.5–33)
MCHC RBC AUTO-ENTMCNC: 32.8 G/DL (ref 31.5–36.5)
MCV RBC AUTO: 88 FL (ref 78–100)
MONOCYTES # BLD AUTO: 0.6 10E3/UL (ref 0–1.3)
MONOCYTES NFR BLD AUTO: 13 %
NEUTROPHILS # BLD AUTO: 2.3 10E3/UL (ref 1.6–8.3)
NEUTROPHILS NFR BLD AUTO: 53 %
PLATELET # BLD AUTO: 312 10E3/UL (ref 150–450)
POTASSIUM BLD-SCNC: 4.2 MMOL/L (ref 3.4–5.3)
RBC # BLD AUTO: 4.18 10E6/UL (ref 3.8–5.2)
SODIUM SERPL-SCNC: 142 MMOL/L (ref 133–144)
TIBC SERPL-MCNC: 332 UG/DL (ref 240–430)
TSH SERPL DL<=0.005 MIU/L-ACNC: 1.3 MU/L (ref 0.4–4)
WBC # BLD AUTO: 4.3 10E3/UL (ref 4–11)

## 2022-09-28 PROCEDURE — 85025 COMPLETE CBC W/AUTO DIFF WBC: CPT

## 2022-09-28 PROCEDURE — 82728 ASSAY OF FERRITIN: CPT

## 2022-09-28 PROCEDURE — 86038 ANTINUCLEAR ANTIBODIES: CPT

## 2022-09-28 PROCEDURE — 36415 COLL VENOUS BLD VENIPUNCTURE: CPT

## 2022-09-28 PROCEDURE — 84443 ASSAY THYROID STIM HORMONE: CPT

## 2022-09-28 PROCEDURE — 0124A COVID-19,PF,PFIZER BOOSTER BIVALENT: CPT

## 2022-09-28 PROCEDURE — 99207 PR NO CHARGE NURSE ONLY: CPT

## 2022-09-28 PROCEDURE — 91312 COVID-19,PF,PFIZER BOOSTER BIVALENT: CPT

## 2022-09-28 PROCEDURE — 80048 BASIC METABOLIC PNL TOTAL CA: CPT

## 2022-09-28 PROCEDURE — 83550 IRON BINDING TEST: CPT

## 2022-09-28 NOTE — PROGRESS NOTES
Prior to immunization administration, verified patients identity using patient s name and date of birth. Please see Immunization Activity for additional information.     Screening Questionnaire for Adult Immunization    Are you sick today?   No   Do you have allergies to medications, food, a vaccine component or latex?   No   Have you ever had a serious reaction after receiving a vaccination?   No   Do you have a long-term health problem with heart, lung, kidney, or metabolic disease (e.g., diabetes), asthma, a blood disorder, no spleen, complement component deficiency, a cochlear implant, or a spinal fluid leak?  Are you on long-term aspirin therapy?   No   Do you have cancer, leukemia, HIV/AIDS, or any other immune system problem?   No   Do you have a parent, brother, or sister with an immune system problem?   No   In the past 3 months, have you taken medications that affect  your immune system, such as prednisone, other steroids, or anticancer drugs; drugs for the treatment of rheumatoid arthritis, Crohn s disease, or psoriasis; or have you had radiation treatments?   No   Have you had a seizure, or a brain or other nervous system problem?   No   During the past year, have you received a transfusion of blood or blood    products, or been given immune (gamma) globulin or antiviral drug?   No   For women: Are you pregnant or is there a chance you could become       pregnant during the next month?   No   Have you received any vaccinations in the past 4 weeks?   No     Immunization questionnaire answers were all negative.        Per orders of Dr. Mccann, injection of Bivalent Pfizer given by Nereyda Holland CMA. Patient instructed to remain in clinic for 15 minutes afterwards, and to report any adverse reaction to me immediately.       Screening performed by Nereyda Holland CMA on 9/28/2022 at 10:49 AM.

## 2022-09-29 LAB — ANA SER QL IF: NEGATIVE

## 2022-10-04 ENCOUNTER — VIRTUAL VISIT (OUTPATIENT)
Dept: PSYCHOLOGY | Facility: CLINIC | Age: 32
End: 2022-10-04
Payer: COMMERCIAL

## 2022-10-04 DIAGNOSIS — F50.819 BINGE EATING DISORDER: Primary | ICD-10-CM

## 2022-10-04 DIAGNOSIS — F32.A DEPRESSION, UNSPECIFIED DEPRESSION TYPE: ICD-10-CM

## 2022-10-04 PROCEDURE — 90837 PSYTX W PT 60 MINUTES: CPT | Mod: 95 | Performed by: PSYCHOLOGIST

## 2022-10-04 NOTE — PROGRESS NOTES
"  Health Psychology                      Annabel Gerardo, Ph.D., L.P [phone]  Adilia Subramanian, Ph.D., L.P. [phone]  Kay Toro, Ph.D. [phone]  Erika Quintana, Ph.D., L.P. [phone]  Michael Chapman, Ph.D., A.B.P.P., L.P. [phone]         Leia Cooper, Ph.D., L.P. [phone]     [facility], 3rd Floor  [address] [place] [address]    The patient has been notified of following:   \"This video visit will be conducted via a call between you and your physician/provider. We have found   that certain health care needs can be provided without the need for an in-person physical exam. This   service lets us provide the care you need with a video conversation. If a prescription is necessary we can   send it directly to your pharmacy. If lab work is needed we can place an order for that and you can then   stop by our lab to have the test done at a later time. If during the course of the call the physician/provider feels a video visit is not appropriate, you will not be charged for this service.\" YES  Reason that telemedicine is appropriate: COVID-19 pandemic mitigation requiring social distancing.  Patient has given verbal consent for video visit: Yes  Mode of transmission: Secure real time interactive audio and visual telecommunication system via [employer]  Location of originating and distant sites:    Originating site (patient location): patient's home    Distant site (provider site): home office of provider    Start: 9:14  Stop:  10:07  Extended session due to length of interval    Health Psychology Follow-Up Note    SUBJECTIVE:  Debbie Bah was seen for individual psychotherapy. Reviewed updates to emotional and physical health and progress towards goals since our last session. Processed role weight has played in disordered eating, and options for weighing self. Currently weighing self most days. Made progress towards decreasing eating " out/drinking and packing enjoyable foods for work versus more rigidly healthy food and then ending up buying foods. Is considering joining ED group/reaching out to friends from previous treatment as she found recently reading book about someone's disordered eating helpful. Processed negative automatic thoughts and how the goal may not be to be free of them but continue to reduce the frequency/amount she believes them.     Ongoing list of binge eating/LOC triggers  Boredom, fatigue, insomnia (eating helps fall asleep), discomfort, overwhelmed (doesn't like being needed/demanded upon/depleated), lonely   Being sick  Hungry, angry    Ongoing list of feared foods:  Cereal and cows milk (10) - not specific (sweet)  Pint of ice cream (ice cream sandwiches ok) (8-9)   Chinese food (7-8)  Cooked white rice (7)    OBJECTIVE:  Appearance/Behavior/Orientation:  Appearance within normal limits.  Alert and oriented to person, place, time, and situation. No evidence of psychomotor agitation.     Cooperation/Reliability: Patient was open and cooperative throughout the session.    Speech/Language: Speech was clear, coherent, and of normal rate, rhythm and volume.   Thought Form: Overall logical and organized.   Mood/Affect: Mood euthymic; appropriate range of affect, tearful at times  Insight/Motivation: Good, good    ASSESSMENT:  Was engaged and interactive throughout the session. She is likely to benefit from CBT and mindfulness based approach to binge eating disorder. Preferred weight is around 185.   1/10/22  Objective Binge episodes: 2-3  Subjective binge episodes: 7-10  2/2/22  Objective Binge episodes: 3-4  5/9/22  OBEs-2-3   SBE - 3-5    TRAVIS-7 SCORE 6/6/2022 6/20/2022 9/23/2022   Total Score 0 (minimal anxiety) 0 (minimal anxiety) 0 (minimal anxiety)   Total Score 0 0 0       PHQ 6/18/2021 9/28/2021 5/3/2022   PHQ-9 Total Score 6 4 4   Q9: Thoughts of better off dead/self-harm past 2 weeks Not at all Not at all Not at all    F/U: Thoughts of suicide or self-harm - - -   F/U: Safety concerns - - -     Current Outpatient Medications   Medication     azelaic acid (FINACIA) 15 % external gel     buPROPion (WELLBUTRIN XL) 300 MG 24 hr tablet     Carboxymeth-Cellulose-CitricAc (PLENITY WELCOME KIT) CAPS     drospirenone-ethinyl estradiol (ADALID) 3-0.02 MG tablet     ketoconazole (NIZORAL) 2 % external shampoo     phentermine (ADIPEX-P) 37.5 MG tablet     predniSONE (DELTASONE) 50 MG tablet     Sulfacetamide Sodium-Sulfur 10-5 % LIQD     TAZORAC 0.1 % external cream     Vitamin D3 (CHOLECALCIFEROL) 125 MCG (5000 UT) tablet     No current facility-administered medications for this visit.     DIAGNOSIS:  Binge eating disorder [F50.81]  Depression, unspecified depression type [F32.A]    PLAN:  RTC for continued psychotherapy.  10/24    9:15    Working Goals:   -Continue 60 second timer before deciding to eat (and then being okay with eating if does), felt 90 seconds was too much.  -Continue with journaling (perhaps during high emotion, not just after)  -Continue list of positive outcomes from in control eating (weight loss, having foods around that previously were trigger foods)  -Start tracking period  -Decrease body checking  -Practice/exposure with rice   -Continue to wear clothes that expose areas of her body that she is self-conscious (shorts, tank tops)  -revisit sleep hygiene   -social media, work to increase awareness of body-image triggers/thoughts.  -Not weigh self until next appointment    Annabel Gerardo, PhD,   Health Psychology  Clinical Psychologist    Tx plan completed: 1/10/2022   Tx plan due:  1/10/2023      OUTPATIENT TREATMENT PLAN SUMMARY    Date of Treatment Plan: 1/10/2022  90-Day Review Date: N/A  Date of Initial Service: 1/10/2022      1. DSM-V Diagnosis (include numeric code)  Binge eating disorder [F50.81]  Depression, unspecified depression type [F32.A]  2. Current symptoms and circumstances that substantiate the  diagnosis:  See intake 9/28/21    3. How symptoms and/or behaviors are affecting level of function:   See intake 9/28/21 and note 1/10/22    4. Risk Assessment:  Suicide:  Assessed Level of Immediate Risk: None  Ideation: No   Plan:  No   Means: No   Intent: No     Assessed Level of Immediate Risk: None  Ideation: No   Plan:  No   Means: No   Intent: No    If on a medication, please include name and dosage:  See chart    Symptom/Problem Measurable Goals Interventions Gains Made   1.BED 1. Decrease binge eating/loss of control frequency 1.CBT for BED 1. N/A   2.Depression 2. Maintain minimal/mild PHQ-9 scores 2. CBT for depression 2. N/A     5. Frequency of Sessions: 1-2x/month    6. Discharge and Aftercare Goals: TBD    7. Expected duration of treatment:  TBD    8. Participants in therapy plan (family, friends, support network): TBD    Treatment plan available to patient via mychart/medical records.    Regulatory Guidelines for Updating Treatment Plan  [place] Medical Assistance: Reviewed & signed at least every 90days  Medicare:  Update per policy

## 2022-10-10 NOTE — RESULT ENCOUNTER NOTE
Debbie,    Your labs were all normal. Technically, your ferritin is low, as is your iron saturation. You might feel better with some small amount of iron supplementation.  I was curious what your thought process was with your results!  If you have any questions, please feel free to contact the clinic.    MARCOS Stark

## 2022-10-14 ENCOUNTER — MYC MEDICAL ADVICE (OUTPATIENT)
Dept: FAMILY MEDICINE | Facility: CLINIC | Age: 32
End: 2022-10-14

## 2022-10-24 ENCOUNTER — VIRTUAL VISIT (OUTPATIENT)
Dept: PSYCHOLOGY | Facility: CLINIC | Age: 32
End: 2022-10-24
Payer: COMMERCIAL

## 2022-10-24 DIAGNOSIS — F50.819 BINGE EATING DISORDER: Primary | ICD-10-CM

## 2022-10-24 DIAGNOSIS — F32.A DEPRESSION, UNSPECIFIED DEPRESSION TYPE: ICD-10-CM

## 2022-10-24 PROCEDURE — 90837 PSYTX W PT 60 MINUTES: CPT | Mod: 95 | Performed by: PSYCHOLOGIST

## 2022-10-24 NOTE — PROGRESS NOTES
"  Health Psychology                      Annabel Gerardo, Ph.D., L.P [phone]  Adilia Subramanian, Ph.D., L.P. [phone]  Kay Toro, Ph.D. [phone]  Erika Quintana, Ph.D., L.P. [phone]  Michael Chapman, Ph.D., A.B.P.P., L.P. [phone]         Leia Cooper, Ph.D., L.P. [phone]     [facility], 3rd Floor  [address] [place] [address]    The patient has been notified of following:   \"This video visit will be conducted via a call between you and your physician/provider. We have found   that certain health care needs can be provided without the need for an in-person physical exam. This   service lets us provide the care you need with a video conversation. If a prescription is necessary we can   send it directly to your pharmacy. If lab work is needed we can place an order for that and you can then   stop by our lab to have the test done at a later time. If during the course of the call the physician/provider feels a video visit is not appropriate, you will not be charged for this service.\" YES  Reason that telemedicine is appropriate: COVID-19 pandemic mitigation requiring social distancing.  Patient has given verbal consent for video visit: Yes  Mode of transmission: Secure real time interactive audio and visual telecommunication system via [employer]  Location of originating and distant sites:    Originating site (patient location): patient's home    Distant site (provider site): home office of provider    Start: 9:15  Stop:  10:08  Extended session due to length of interval    Health Psychology Follow-Up Note    SUBJECTIVE:  Debbie Bah was seen for individual psychotherapy. Reviewed updates to emotional and physical health and progress towards goals since our last session. Weighed self 2x since our previous session. Discussed related emotions. Has been discussing increasing health-related focus with her partner. Went for a run and " enjoyed it. Traveled out of state for first time with her new partner, discussed how this impacted her eating/emotions. Accepted new job and is starting in a few weeks. Recent increase in nighttime eating (middle of the night) when waking, about 4 nights/week, usually eating once on these nights and one night might have been a few. Problem solved and create related goals.     Ongoing list of binge eating/LOC triggers  Boredom, fatigue, fear about falling asleep/insomnia (eating helps fall asleep), discomfort, overwhelmed (doesn't like being needed/demanded upon/depleated), lonely   Being sick  Hungry, angry  Frustration/annoyed after difficult shift  Shame when weighing self and it is up    Ongoing list of feared foods:  Cereal and cows milk (10) - not specific (sweet)  Pint of ice cream (ice cream sandwiches ok) (8-9)   Chinese food (7-8)  Cooked white rice (7)    OBJECTIVE:  Appearance/Behavior/Orientation:  Appearance within normal limits.  Alert and oriented to person, place, time, and situation. No evidence of psychomotor agitation.     Cooperation/Reliability: Patient was open and cooperative throughout the session.    Speech/Language: Speech was clear, coherent, and of normal rate, rhythm and volume.   Thought Form: Overall logical and organized.   Mood/Affect: Mood euthymic; appropriate range of affect, tearful at times  Insight/Motivation: Good, good    ASSESSMENT:  Was engaged and interactive throughout the session. She is likely to benefit from CBT and mindfulness based approach to binge eating disorder. Preferred weight is around 185.   1/10/22  Objective Binge episodes: 2-3  Subjective binge episodes: 7-10  2/2/22  Objective Binge episodes: 3-4  5/9/22  OBEs-2-3   SBE - 3-5    TRAVIS-7 SCORE 6/6/2022 6/20/2022 9/23/2022   Total Score 0 (minimal anxiety) 0 (minimal anxiety) 0 (minimal anxiety)   Total Score 0 0 0     PHQ 6/18/2021 9/28/2021 5/3/2022   PHQ-9 Total Score 6 4 4   Q9: Thoughts of better off  dead/self-harm past 2 weeks Not at all Not at all Not at all   F/U: Thoughts of suicide or self-harm - - -   F/U: Safety concerns - - -     Current Outpatient Medications   Medication     azelaic acid (FINACIA) 15 % external gel     buPROPion (WELLBUTRIN XL) 300 MG 24 hr tablet     Carboxymeth-Cellulose-CitricAc (PLENITY WELCOME KIT) CAPS     drospirenone-ethinyl estradiol (ADALID) 3-0.02 MG tablet     ketoconazole (NIZORAL) 2 % external shampoo     phentermine (ADIPEX-P) 37.5 MG tablet     predniSONE (DELTASONE) 50 MG tablet     Sulfacetamide Sodium-Sulfur 10-5 % LIQD     TAZORAC 0.1 % external cream     Vitamin D3 (CHOLECALCIFEROL) 125 MCG (5000 UT) tablet     No current facility-administered medications for this visit.     DIAGNOSIS:  Binge eating disorder [F50.81]  Depression, unspecified depression type [F32.A]    PLAN:  RTC for continued psychotherapy.  11/14 4:00, 12/6 4:00    Working Goals:   -Continue 60 second timer before deciding to eat (and then being okay with eating if does), felt 90 seconds was too much.  -Continue with journaling (perhaps during high emotion, not just after)  -Continue list of positive outcomes from in control eating (weight loss, having foods around that previously were trigger foods)  -Start tracking period  -Decrease body checking  -Continue to wear clothes that expose areas of her body that she is self-conscious (shorts, tank tops)  -social media, work to increase awareness of body-image triggers/thoughts.  -Limit weighing self to Mondays  -Practice exposure with cookies  -Decrease night eating    Annabel Gerardo, PhD,   Health Psychology  Clinical Psychologist    Tx plan completed: 1/10/2022   Tx plan due:  1/10/2023      OUTPATIENT TREATMENT PLAN SUMMARY    Date of Treatment Plan: 1/10/2022  90-Day Review Date: N/A  Date of Initial Service: 1/10/2022      1. DSM-V Diagnosis (include numeric code)  Binge eating disorder [F50.81]  Depression, unspecified depression type  [F32.A]  2. Current symptoms and circumstances that substantiate the diagnosis:  See intake 9/28/21    3. How symptoms and/or behaviors are affecting level of function:   See intake 9/28/21 and note 1/10/22    4. Risk Assessment:  Suicide:  Assessed Level of Immediate Risk: None  Ideation: No   Plan:  No   Means: No   Intent: No     Assessed Level of Immediate Risk: None  Ideation: No   Plan:  No   Means: No   Intent: No    If on a medication, please include name and dosage:  See chart    Symptom/Problem Measurable Goals Interventions Gains Made   1.BED 1. Decrease binge eating/loss of control frequency 1.CBT for BED 1. N/A   2.Depression 2. Maintain minimal/mild PHQ-9 scores 2. CBT for depression 2. N/A     5. Frequency of Sessions: 1-2x/month    6. Discharge and Aftercare Goals: TBD    7. Expected duration of treatment:  TBD    8. Participants in therapy plan (family, friends, support network): TBD    Treatment plan available to patient via mychart/medical records.    Regulatory Guidelines for Updating Treatment Plan  [place] Medical Assistance: Reviewed & signed at least every 90days  Medicare:  Update per policy

## 2022-12-13 DIAGNOSIS — E66.01 MORBID OBESITY (H): ICD-10-CM

## 2022-12-13 RX ORDER — PHENTERMINE HYDROCHLORIDE 37.5 MG/1
TABLET ORAL
Qty: 90 TABLET | Refills: 0 | Status: SHIPPED | OUTPATIENT
Start: 2022-12-13 | End: 2023-01-20

## 2023-01-20 ENCOUNTER — VIRTUAL VISIT (OUTPATIENT)
Dept: ENDOCRINOLOGY | Facility: CLINIC | Age: 33
End: 2023-01-20
Payer: COMMERCIAL

## 2023-01-20 VITALS — BODY MASS INDEX: 36.19 KG/M2 | WEIGHT: 212 LBS | HEIGHT: 64 IN

## 2023-01-20 DIAGNOSIS — E66.812 CLASS 2 OBESITY DUE TO EXCESS CALORIES WITHOUT SERIOUS COMORBIDITY IN ADULT, UNSPECIFIED BMI: Primary | ICD-10-CM

## 2023-01-20 DIAGNOSIS — E66.09 CLASS 2 OBESITY DUE TO EXCESS CALORIES WITHOUT SERIOUS COMORBIDITY IN ADULT, UNSPECIFIED BMI: Primary | ICD-10-CM

## 2023-01-20 PROCEDURE — 99213 OFFICE O/P EST LOW 20 MIN: CPT | Mod: 95 | Performed by: INTERNAL MEDICINE

## 2023-01-20 RX ORDER — SEMAGLUTIDE 0.25 MG/.5ML
0.25 INJECTION, SOLUTION SUBCUTANEOUS WEEKLY
Qty: 2 ML | Refills: 5 | Status: SHIPPED | OUTPATIENT
Start: 2023-01-20 | End: 2023-02-16 | Stop reason: DRUGHIGH

## 2023-01-20 RX ORDER — PHENTERMINE HYDROCHLORIDE 37.5 MG/1
TABLET ORAL
Qty: 90 TABLET | Refills: 0 | Status: SHIPPED | OUTPATIENT
Start: 2023-01-20 | End: 2023-06-30 | Stop reason: ALTCHOICE

## 2023-01-20 ASSESSMENT — PAIN SCALES - GENERAL: PAINLEVEL: NO PAIN (0)

## 2023-01-20 NOTE — NURSING NOTE
"Chief Complaint   Patient presents with     ELIS Lewis, is participating in a follow up [facility].       Vitals:    01/20/23 1203   Weight: 96.2 kg (212 lb)   Height: 1.626 m (5' 4\")       Body mass index is 36.39 kg/m .      Carolyn Choi LPN    "

## 2023-01-20 NOTE — LETTER
"2023       RE: Debbie Bah  [address]  [place] [address]     Dear Colleague,    Thank you for referring your patient, Debbie Bah, to the [facility] [place] [facility]. Please see a copy of my visit note below.        Return Medical Weight Management Note     Debbie Bah  MRN:  [id number]  :  1990  LARRY:  2023    Dear Sarahi Santos, HARJINDER MCCORMCIK,    I had the pleasure of seeing your patient Debbie Bah. She is a 32 year old female who I am continuing to see for treatment of obesity related to:       2020   I have the following health issues associated with obesity: None of the above   I have the following symptoms associated with obesity: Depression       INTERVAL HISTORY:      Last visit about 6 mo ago, reviewed and relevant history is as follows--  ------------------------------  \"had COVID 3 weeks which affected her diet. Feels as though she had \"reset\" after her illness. She eats when she's hungry and stops when she's full. 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   Prior to this her triggers for eating were fatigue, stress, boredom. Feels her binge eating has been well-controlled over the past month. Does experience occasional episodes of over-eating. Diet mainly consists of dairy, eggs, breads. She is still taking phentermine but is concerned about difficulty achieving an orgasm than previously, her libido has remained stable. This has been ongoing for 6 weeks. She would like to try decreasing the dose. \"  ------------------------------     LAST WEIGHT:   188 lbs 0 oz  Body mass index is 31.28 kg/m .     In the interim--  --new job in Nov, more overeating/stress eating/night eating/binging about 1-2 times per wk  --wt creeping up  --work ins claming down now and she will be able to " "go to the gym and later outside  --tried Plenity for a couple of weeks and was gassy and uncomfortable so stopped  --tried naltrexone, and was also on Vyvanse  --for allergy has prednisone prescription (not taking)  --phentermine restart in the fall and then full dose in Dec--some improvement but not getting the good initial effect  --topiraamte led to parathesias    CURRENT WEIGHT:   212 lbs 0 oz   Body mass index is 36.39 kg/m .    Initial Weight (lbs): 257.8 lbs     Cumulative weight loss (lbs): 45.8  Weight Loss Percentage: 17.77%    Changes and Difficulties 1/19/2023   I have made the following changes to my diet since my last visit: -   With regards to my diet, I am still struggling with: night eating, increased binging   I have made the following changes to my activity/exercise since my last visit: far less active due to work schedule change   With regards to my activity/exercise, I am still struggling with: getting outside during the winter       VITALS:  Ht 1.626 m (5' 4\")   Wt 96.2 kg (212 lb)   BMI 36.39 kg/m      MEDICATIONS:   Current Outpatient Medications   Medication Sig Dispense Refill     azelaic acid (FINACIA) 15 % external gel        buPROPion (WELLBUTRIN XL) 300 MG 24 hr tablet Take 300 mg by mouth daily       drospirenone-ethinyl estradiol (ADALID) 3-0.02 MG tablet Take 1 tablet by mouth daily       ketoconazole (NIZORAL) 2 % external shampoo        phentermine (ADIPEX-P) 37.5 MG tablet Take 1 tablet in the morning 90 tablet 0     predniSONE (DELTASONE) 50 MG tablet Emergency set: if severe allergic reaction take immediately 100mg Prednisone (2x50mg) and 2 Tabl Cetirizine 10mg and then write precise 12h retrospective diary.If less severe reaction take only the 2 Tabl Cetirizine 2 tablet 3     TAZORAC 0.1 % external cream APPLY A PEA SIZED AMOUNT TO ENTIRE FACE EVERY OTHER NIGHT, INCREASE TO NIGHTLY AS TOLERATED.       Vitamin D3 (CHOLECALCIFEROL) 125 MCG (5000 UT) tablet Take 1 tablet by mouth " daily       Carboxymeth-Cellulose-CitricAc (PLENITY WELCOME KIT) CAPS Take 3 capsules by mouth 2 times daily 168 capsule 11     Sulfacetamide Sodium-Sulfur 10-5 % LIQD          Weight Loss Medication History Reviewed With Patient 1/19/2023   Which weight loss medications are you currently taking on a regular basis?  Phentermine   Are you having any side effects from the weight loss medication that we have prescribed you? No   If you are having side effects please describe: -         ASSESSMENT:   Debbie is a 31 yo F who is following up for obesity and is inquiring about decreasing her dose of phentermine due to difficulty reaching orgasm. She is otherwise doing well. 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 She currently eats when hungry and stops when full. She has not had any binge eating episodes over the past 1 month.     PLAN:   --wegovy (in the future try titrating down on the phentermine); currently continue same phtnermine  --med mgmt mayorga in 4-6 wks; me in 2-3 mo       Time: 25 min spent on evaluation, management, counseling, education, & motivational interviewing (video) comined with previsit prep and post visit follow up care same day    Sincerely,    Natalie Carbajal MD

## 2023-01-20 NOTE — PROGRESS NOTES
"Debbie is a 32 year old who is being evaluated via a billable video visit.      How would you like to obtain your AVS? MyChart  If the video visit is dropped, the invitation should be resent by: Text to cell phone: [phone]  Will anyone else be joining your video visit? No     Video-Visit Details    Type of service:  Video Visit  Video visit start--approx 12:36, end 12:55    Originating Location (pt. Location): Home   Distant Location (provider location):  Off-site  Platform used for Video Visit: [employer] Medical Weight Management Note     Debbie Bah  MRN:  [id number]  :  1990  LARRY:  2023    Dear HARJINDER Vasquez CNP,    I had the pleasure of seeing your patient Debbie Bah. She is a 32 year old female who I am continuing to see for treatment of obesity related to:       2020   I have the following health issues associated with obesity: None of the above   I have the following symptoms associated with obesity: Depression       INTERVAL HISTORY:      Last visit about 6 mo ago, reviewed and relevant history is as follows--  ------------------------------  \"had COVID 3 weeks which affected her diet. Feels as though she had \"reset\" after her illness. She eats when she's hungry and stops when she's full. 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   Prior to this her triggers for eating were fatigue, stress, boredom. Feels her binge eating has been well-controlled over the past month. Does experience occasional episodes of over-eating. Diet mainly consists of dairy, eggs, breads. She is still taking phentermine but is concerned about difficulty achieving an orgasm than previously, her libido has remained stable. This has been ongoing for 6 weeks. She would like to try decreasing the dose. \"  ------------------------------     LAST WEIGHT:   188 lbs 0 oz  Body mass index is 31.28 kg/m .     In the " "interim--  --new job in Nov, more overeating/stress eating/night eating/binging about 1-2 times per wk  --wt creeping up  --work ins claming down now and she will be able to go to the gym and later outside  --tried Plenity for a couple of weeks and was gassy and uncomfortable so stopped  --tried naltrexone, and was also on Vyvanse  --for allergy has prednisone prescription (not taking)  --phentermine restart in the fall and then full dose in Dec--some improvement but not getting the good initial effect  --topiraamte led to parathesias    CURRENT WEIGHT:   212 lbs 0 oz   Body mass index is 36.39 kg/m .    Initial Weight (lbs): 257.8 lbs     Cumulative weight loss (lbs): 45.8  Weight Loss Percentage: 17.77%    Changes and Difficulties 1/19/2023   I have made the following changes to my diet since my last visit: -   With regards to my diet, I am still struggling with: night eating, increased binging   I have made the following changes to my activity/exercise since my last visit: far less active due to work schedule change   With regards to my activity/exercise, I am still struggling with: getting outside during the winter       VITALS:  Ht 1.626 m (5' 4\")   Wt 96.2 kg (212 lb)   BMI 36.39 kg/m      MEDICATIONS:   Current Outpatient Medications   Medication Sig Dispense Refill     azelaic acid (FINACIA) 15 % external gel        buPROPion (WELLBUTRIN XL) 300 MG 24 hr tablet Take 300 mg by mouth daily       drospirenone-ethinyl estradiol (ADALID) 3-0.02 MG tablet Take 1 tablet by mouth daily       ketoconazole (NIZORAL) 2 % external shampoo        phentermine (ADIPEX-P) 37.5 MG tablet Take 1 tablet in the morning 90 tablet 0     predniSONE (DELTASONE) 50 MG tablet Emergency set: if severe allergic reaction take immediately 100mg Prednisone (2x50mg) and 2 Tabl Cetirizine 10mg and then write precise 12h retrospective diary.If less severe reaction take only the 2 Tabl Cetirizine 2 tablet 3     TAZORAC 0.1 % external cream " APPLY A PEA SIZED AMOUNT TO ENTIRE FACE EVERY OTHER NIGHT, INCREASE TO NIGHTLY AS TOLERATED.       Vitamin D3 (CHOLECALCIFEROL) 125 MCG (5000 UT) tablet Take 1 tablet by mouth daily       Carboxymeth-Cellulose-CitricAc (PLENITY WELCOME KIT) CAPS Take 3 capsules by mouth 2 times daily 168 capsule 11     Sulfacetamide Sodium-Sulfur 10-5 % LIQD          Weight Loss Medication History Reviewed With Patient 1/19/2023   Which weight loss medications are you currently taking on a regular basis?  Phentermine   Are you having any side effects from the weight loss medication that we have prescribed you? No   If you are having side effects please describe: -         ASSESSMENT:   Debbie is a 33 yo F who is following up for obesity and is inquiring about decreasing her dose of phentermine due to difficulty reaching orgasm. She is otherwise doing well. 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 She currently eats when hungry and stops when full. She has not had any binge eating episodes over the past 1 month.     PLAN:   --wegovy (in the future try titrating down on the phentermine); currently continue same phtnermine  --med mgmt [facility]a in 4-6 wks; me in 2-3 mo       Time: 25 min spent on evaluation, management, counseling, education, & motivational interviewing (video) comined with previsit prep and post visit follow up care same day    Sincerely,    Natalie Carbajal MD

## 2023-01-21 NOTE — PATIENT INSTRUCTIONS
Thank you for allowing us the privilege of caring for you. We hope we provided you with the excellent service you deserve.    Please let us know if there is anything else we can do for you so that we can be sure you are completely satisfied with your care experience.    Your visit was with Dr. Carbajal today.    Instructions per today's visit:  In support of [facility] wt loss goals--  --wegovy start (see below) (in the future try titrating down on the phentermine); currently continue same phtnermine  --follow ups--med mgmt phamacist in 4-6 wks; Dr. Carbajal in 2-3 mo    Please call our contact center at [phone] to schedule your next appointments.    Meal Replacement Products:    Here is the link to our new e-store where you can purchase our meal replacement products    [email or web address]    The one week starter kit is a great way to sample a variety of products and see what works for you.    If you want more information about the product go to: [employer]    Free Shipping for orders over $75    Benefits of meal replacements products:    Portion and calorie control  Improved nutrition  Structured eating  Simplified food choices  Avoid contact with trigger foods    Interested in working with a health ?  Health coaches work with you to improve your overall health and wellbeing.  They look at the whole person, and may involve discussion of different areas of life, including, but not limited to the four pillars of health (sleep, exercise, nutrition, and stress management). Discuss with your care team if you would like to start working a health .    Health Coaching-3 Pack: Schedule by calling [phone]    $99 for three health coaching visits    Visits may be done in person or via phone    Coaching is a partnership between the  and the client; Coaches do not prescribe or diagnose    Coaching helps inspire the client to reach his/her personal  goals    Bluetooth Scale:    We hope to provide you with high quality telephone and virtual healthcare visits while social distancing for COVID-19 is necessary, as well as in the future when virtual visits may be more convenient for you.    Our technology team made it possible for Bluetooth scales to send weight measurements to our electronic medical record. This allows weights from you weighing at home to securely flow into the medical record, which will improve telephone and virtual visits.  Additionally, studies have shown that adults actually lose more weight when their weights are automatically sent to someone else, and also that this process is not stressful for those adults.    Below is a link for purchasing the scale, with a discount code for our patients. You may call your insurance company to see if they will reimburse you for the cost of the scale, as a piece of durable medical equipment. The scales only go up to a weight of 400 pounds. This is an issue and we are working with the developer on increasing this. We found no scales that go over 400lb that have blue-tooth for connecting to TouristWay.    Scale to purchase: the Namely  Body  Scale: https://[email or web address].StepUp/us/en/body/shop?[email or web address]=aw.ds    Discount Code: We have a discount code for our patients to bring the cost down to $50, the code is:  withmed     Steps to link the scale to TouristWay via an Android Phone (you can always disconnect at any point in the future):  1. The order must be placed first before the patient can access Track My Health within TouristWay.  2. Download Google Fit samantha from the Google Play Store  a. Log in or register using your Google account  3. Download the TouristWay samantha from Google Play Store  a. Select add organization  b. Search for [employer] and select it  c. Log into TouristWay  d. Select Track My Health  e. Select the green connect my account button  f. When prompted  log into your Google account  g. Select okay to confirm the account  4. Download the [employer] Health Mate jhonatan from Google Play Store  a. [place] [employer]  b. Go to profile  c. Tap google fit under the Apps section  d. Select the option to activate Google Fit integration  e. Select the same Google account  f. Select okay to confirm the account  g.  Steps to link the scale to RentHop via an iPhone (you can always disconnect at any point in the future):  **Note VividWorks is not available for download on an iPad**  1. The order must be placed first before the patient can access Track My Health within RentHop.  2. Locate the Health jhonatan on your iPhone.  a. Set up your Apple Health account as prompted  b. The Sources page will show Apps that communicate with your Health jhonatan. Once all steps are completed, you should see [employer] and [employer]hart listed under the Apps section and your iPhone under the devices section.  i. Select Health Mate  1. Under 'ALLOW  HEALTH AppGeek  TO WRITE DATA ensure the toggle is on for Weight.  2. This will allow the scale to add your weight to the Apple Health  ii. Select [employer].hart  1. Under 'ALLOW  [employer]  TO READ DATA ensure the toggle is on for Weight.  2. This allows RentHop to grab the weight from VividWorks so your provider can see your weights.  3. Download the Andelat jhonatan from the Jhonatan Store  a. Select add organization                                                  b. Search for [employer] and select it  c. Log into RentHop  d. Select Track My Health  e. Select the green connect my account button  f. Follow prompts to link your device to RentHop.  4. Download the Withings health mate jhonatan in the Jhonatan Store  a. [place] [employer]  b. Go to profile  c. Tap Health under the Apps section  d. If prompted to allow access with the Health Jhonatan, toggle weight on for read and write access.      For any questions/concerns contact Sophia Li LPN at [phone]    To schedule appointments  with our team, please call [phone]    Please call during clinic hours Monday through Friday 8:00a - 4:00p if you have questions or you can contact us via [employer] at anytime.      Lab results will be communicated through My Chart or letter (if My Chart not used). Please call the clinic if you have not received communication after 1 week or if you have any questions.    Clinic Fax: [phone]    Thank you,  Medical Weight Management Team

## 2023-01-24 ENCOUNTER — TELEPHONE (OUTPATIENT)
Dept: ENDOCRINOLOGY | Facility: CLINIC | Age: 33
End: 2023-01-24
Payer: COMMERCIAL

## 2023-01-24 NOTE — TELEPHONE ENCOUNTER
Prior Authorization Retail Medication Request    Medication/Dose: Wegovy  ICD code (if different than what is on RX):  Class 2 obesity due to excess calories without serious comorbidity in adult, unspecified BMI [E66.09]  - Primary     Previously Tried and Failed:  Plenity, Naltrexone, Vyvanse, Topiramate, history of diet and exercise  Rationale:  I had the pleasure of seeing your patient Debbie Bah. She is a 32 year old female who I am continuing to see for treatment of obesity.     --tried Plenity for a couple of weeks and was gassy and uncomfortable so stopped  --tried naltrexone, and was also on Vyvanse  --for allergy has prednisone prescription (not taking)  --phentermine restart in the fall and then full dose in Dec--some improvement but not getting the good initial effect  --topiraamte led to parathesias    CURRENT WEIGHT:   212 lbs 0 oz   Body mass index is 36.39 kg/m .     Initial Weight (lbs): 257.8 lbs  Cumulative weight loss (lbs): 45.8  Weight Loss Percentage: 17.77%    Insurance Name:    Insurance ID:        Pharmacy Information (if different than what is on RX)  Name:  [facility] -- [place], [place] - [address]   Phone:  [phone]

## 2023-01-24 NOTE — TELEPHONE ENCOUNTER
When running a test claim for Wegovy insurance says it was just filled 1- no PA needed closing encounter

## 2023-02-03 ENCOUNTER — MYC REFILL (OUTPATIENT)
Dept: FAMILY MEDICINE | Facility: CLINIC | Age: 33
End: 2023-02-03
Payer: COMMERCIAL

## 2023-02-03 DIAGNOSIS — R53.83 FATIGUE, UNSPECIFIED TYPE: Primary | ICD-10-CM

## 2023-02-03 RX ORDER — BUPROPION HYDROCHLORIDE 300 MG/1
300 TABLET ORAL DAILY
Qty: 90 TABLET | Refills: 0 | Status: SHIPPED | OUTPATIENT
Start: 2023-02-03 | End: 2023-05-01

## 2023-02-03 NOTE — TELEPHONE ENCOUNTER
"Requested Prescriptions   Pending Prescriptions Disp Refills     buPROPion (WELLBUTRIN XL) 300 MG 24 hr tablet       Sig: Take 1 tablet (300 mg) by mouth daily       SSRIs Protocol Passed - 2/3/2023  6:10 AM        Passed - Recent (12 mo) or future (30 days) visit within the authorizing provider's specialty     Patient has had an office visit with the authorizing provider or a provider within the authorizing providers department within the previous 12 mos or has a future within next 30 days. See \"Patient Info\" tab in inbasket, or \"Choose Columns\" in Meds & Orders section of the refill encounter.              Passed - Medication is Bupropion     If the medication is Bupropion (Wellbutrin), and the patient is taking for smoking cessation; OK to refill.          Passed - Medication is active on med list        Passed - Patient is age 18 or older        Passed - No active pregnancy on record        Passed - No positive pregnancy test in last 12 months           Prescription approved per [facility] Refill Protocol.    Nadeen Hercules RN  [place]   "

## 2023-02-16 DIAGNOSIS — E66.812 CLASS 2 OBESITY DUE TO EXCESS CALORIES WITHOUT SERIOUS COMORBIDITY IN ADULT, UNSPECIFIED BMI: Primary | ICD-10-CM

## 2023-02-16 DIAGNOSIS — E66.09 CLASS 2 OBESITY DUE TO EXCESS CALORIES WITHOUT SERIOUS COMORBIDITY IN ADULT, UNSPECIFIED BMI: Primary | ICD-10-CM

## 2023-02-16 RX ORDER — SEMAGLUTIDE 0.5 MG/.5ML
0.5 INJECTION, SOLUTION SUBCUTANEOUS WEEKLY
Qty: 2 ML | Refills: 5 | Status: SHIPPED | OUTPATIENT
Start: 2023-02-16 | End: 2023-03-14 | Stop reason: DRUGHIGH

## 2023-03-14 ENCOUNTER — VIRTUAL VISIT (OUTPATIENT)
Dept: PHARMACY | Facility: CLINIC | Age: 33
End: 2023-03-14
Attending: INTERNAL MEDICINE
Payer: COMMERCIAL

## 2023-03-14 DIAGNOSIS — Z78.9 USES BIRTH CONTROL: ICD-10-CM

## 2023-03-14 DIAGNOSIS — F32.A DEPRESSION, UNSPECIFIED DEPRESSION TYPE: ICD-10-CM

## 2023-03-14 DIAGNOSIS — E66.01 MORBID OBESITY (H): Primary | ICD-10-CM

## 2023-03-14 DIAGNOSIS — Z91.018 FOOD ALLERGY: ICD-10-CM

## 2023-03-14 DIAGNOSIS — E55.9 VITAMIN D DEFICIENCY: ICD-10-CM

## 2023-03-14 PROCEDURE — 99607 MTMS BY PHARM ADDL 15 MIN: CPT | Mod: VID | Performed by: PHARMACIST

## 2023-03-14 PROCEDURE — 99605 MTMS BY PHARM NP 15 MIN: CPT | Mod: VID | Performed by: PHARMACIST

## 2023-03-14 NOTE — PROGRESS NOTES
Medication Therapy Management (MTM) Encounter    ASSESSMENT:                            Medication Adherence/Access: No issues identified    Weight Management: Would benefit from holding phentermine and increasing Wegovy.  Question current efficacy of phentermine and impeding progress regarding Wegovy titration.  Therefore would benefit from holding phentermine.  Approved by prescribing provider.  Discussed potential dietary and eating behavior considerations to assist with improving health goal progress.    Hx Vitamin D Deficiency: Stable.    Allergy Mushrooms: Stable.    Birth Control: Stable.    Depression: Stable.    PLAN:                            1.  Stop phentermine.    2.  Increase Wegovy to 1 mg once weekly.    3.  Consider switching to 4-6 small meals per day and see if this helps with reduce snacking later in the night.  Ensure focusing on protein and fiber at those meals and snacks.    Follow-up: Dr. Carbajal in 6 weeks.  As needed with Lauren Bloch [facility] pharmacist. Call [phone] to schedule.     SUBJECTIVE/OBJECTIVE:                          Debbie Bah is a 32 year old female contacted via secure video for an initial visit. She was referred to me from Dr. Carbajal.      Reason for visit: Wegovy start follow up.    Allergies/ADRs: Reviewed in chart  Past Medical History: Reviewed in chart  Tobacco: She reports that she has never smoked. She has never used smokeless tobacco.  Alcohol: not currently using  Occupation: Nurse Practitioner    Medication Adherence/Access: no issues reported    Weight Management:   Wegovy 0.5 mg once weekly, completed 4th injection  Phentermine 37.5 mg tablet: 1/2 tablet once daily in AM     Followed by Dr. Natalie Carbajal, seen 1/20/2023 for Return Medical Weight Management. Patient is experiencing the follow side effects: constipation, using senna-docusate as needed. She is noticing smaller portions. She loses interest in wanting to continue eating.  Because she gets so  "full so quickly, hard to get in as much fiber. She does feel that weight loss has stalled in the last couple weeks.   Diet/Eating Habits: Patient reports getting in 3 meals per day. Today -  Breakfast: eggs + toast, lunch: chicken salad; dinner: cod, tomato sauce, bread. Has been doing more evening/night snacking with the increased dose of Wegovy whereas with lower dose.   Exercise/Activity: Patient reports currently more sedentary. Used to walk every day, but with new job has not been able to do that as doesn't have the time. She looks forward to having the time to do this hopefully in the future as it gets nicer out.     Wt Readings from Last 4 Encounters:   01/20/23 212 lb (96.2 kg)   07/15/22 188 lb (85.3 kg)   05/13/22 197 lb 8 oz (89.6 kg)   05/03/22 197 lb 9.6 oz (89.6 kg)     Estimated body mass index is 36.39 kg/m  as calculated from the following:    Height as of 1/20/23: 5' 4\" (1.626 m).    Weight as of 1/20/23: 212 lb (96.2 kg).    Hx Vitamin D Deficiency:  Vitamin D 5000 international unit(s) daily     Vitamin D Deficiency Screening Results:  Lab Results   Component Value Date    VITDT 53 11/28/2018    VITDT 54 12/05/2017    VITDT 29 08/03/2016    VITDT 17 (L) 03/08/2016     Allergy Mushrooms:   Prednisone Emergency Set    Will use as needed if eating something with mushrooms in it.     Birth Control:   Wendy 1 tablet daily    No concerns    Depression:   Bupropion XL 300mg once daily.     Reports effective and stable. Historically worked with psychiatry. No medication side effects reported.   PHQ-9 SCORE 6/18/2021 9/28/2021 5/3/2022   PHQ-9 Total Score MyChart - 4 (Minimal depression) -   PHQ-9 Total Score 6 4 4   ----------------      I spent 20 minutes with this patient today. All changes were made via collaborative practice agreement with Dr. Carbajal. A copy of the visit note was provided to the patient's provider(s).    A summary of these recommendations was sent via [employer].    Lauren Bloch, PharmD, " BCACP   Medication Therapy Management Pharmacist   [facility]    Telemedicine Visit Details  Type of service:  Video Conference via AmWell  Start Time: 11:10 AM  End Time: 11:30 AM     Medication Therapy Recommendations  Obesity (BMI 30.0-34.9)    Current Medication: phentermine (ADIPEX-P) 37.5 MG tablet   Rationale: More effective medication available - Ineffective medication - Effectiveness   Recommendation: Change Medication - Wegovy 1 MG/0.5ML Soaj   Status: Accepted per Provider

## 2023-03-15 NOTE — PATIENT INSTRUCTIONS
"Recommendations from today's MTM visit:                                                       1.  Stop phentermine.    2.  Increase Wegovy to 1 mg once weekly.    3.  Consider switching to 4-6 small meals per day and see if this helps with reduce snacking later in the night.  Ensure focusing on protein and fiber at those meals and snacks.    Follow-up: Dr. Carbajal in 6 weeks.  As needed with Lauren Bloch [facility] pharmacist. Call [phone] to schedule.     It was great speaking with you today.  I value your experience and would be very thankful for your time in providing feedback in our clinic survey. In the next few days, you may receive an email or text message from [employer] with a link to a survey related to your  clinical pharmacist.\"     To schedule another appointment with your MT pharmacist, please call [facility] Weight Management Scheduling at [phone].   2  My Clinical Pharmacist's contact information:                                                      Please feel free to contact me with any questions or concerns you have.      Lauren Bloch, PharmD  Medication Therapy Management Pharmacist   [facility]           2  "

## 2023-03-28 ENCOUNTER — OFFICE VISIT (OUTPATIENT)
Dept: OPTOMETRY | Facility: CLINIC | Age: 33
End: 2023-03-28
Payer: COMMERCIAL

## 2023-03-28 DIAGNOSIS — H52.13 MYOPIA OF BOTH EYES: Primary | ICD-10-CM

## 2023-03-28 ASSESSMENT — EXTERNAL EXAM - LEFT EYE: OS_EXAM: NORMAL

## 2023-03-28 ASSESSMENT — REFRACTION_CURRENTRX
OD_DIAMETER: 14.0
OD_BASECURVE: 8.4
OS_BRAND: ACUVUE OASYS
OD_BASECURVE: 8.4
OD_DIAMETER: 14.0
OD_BRAND: ACUVUE OASYS
OD_BRAND: ACUVUE OASYS
OD_SPHERE: -1.25
OS_SPHERE: -1.00
OS_SPHERE: -1.25
OS_DIAMETER: 14.0
OD_SPHERE: -1.00
OS_DIAMETER: 14.0
OS_BASECURVE: 8.4
OS_BRAND: ACUVUE OASYS
OS_BASECURVE: 8.4

## 2023-03-28 ASSESSMENT — VISUAL ACUITY
OS_CC: 20/20
CORRECTION_TYPE: GLASSES
METHOD: SNELLEN - LINEAR
OD_CC: 20/25

## 2023-03-28 ASSESSMENT — REFRACTION_WEARINGRX
OS_SPHERE: -1.00
SPECS_TYPE: SVL
OD_CYLINDER: SPHERE
OS_CYLINDER: SPHERE
OD_SPHERE: -1.00

## 2023-03-28 ASSESSMENT — CONF VISUAL FIELD
OD_SUPERIOR_TEMPORAL_RESTRICTION: 0
OD_SUPERIOR_NASAL_RESTRICTION: 0
OD_NORMAL: 1
OD_INFERIOR_NASAL_RESTRICTION: 0
OD_INFERIOR_TEMPORAL_RESTRICTION: 0
OS_INFERIOR_TEMPORAL_RESTRICTION: 0
OS_SUPERIOR_TEMPORAL_RESTRICTION: 0
OS_NORMAL: 1
OS_INFERIOR_NASAL_RESTRICTION: 0
OS_SUPERIOR_NASAL_RESTRICTION: 0

## 2023-03-28 ASSESSMENT — EXTERNAL EXAM - RIGHT EYE: OD_EXAM: NORMAL

## 2023-03-28 ASSESSMENT — SLIT LAMP EXAM - LIDS
COMMENTS: NORMAL
COMMENTS: NORMAL

## 2023-03-28 ASSESSMENT — TONOMETRY
IOP_METHOD: ICARE
OD_IOP_MMHG: 19
OS_IOP_MMHG: 15

## 2023-03-28 ASSESSMENT — REFRACTION_MANIFEST
OD_CYLINDER: SPHERE
OS_CYLINDER: SPHERE
OD_SPHERE: -1.25
OS_SPHERE: -1.25

## 2023-03-28 ASSESSMENT — CUP TO DISC RATIO
OS_RATIO: 0.30
OD_RATIO: 0.30

## 2023-03-28 NOTE — PROGRESS NOTES
A/P  1.) Myopia each eye  -Overall doing well in glasses/CL's. Needs slightly more minus to improve distance vision  -AT prn with CL use to help clear blurring  -Undilated ocular health unremarkable - reviewed flashes/floaters and need for stat eye eval should they occur    Monitor 1-2 years comprehensive, sooner prn

## 2023-04-07 ENCOUNTER — VIRTUAL VISIT (OUTPATIENT)
Dept: ENDOCRINOLOGY | Facility: CLINIC | Age: 33
End: 2023-04-07
Payer: COMMERCIAL

## 2023-04-07 VITALS — BODY MASS INDEX: 33.14 KG/M2 | WEIGHT: 198.9 LBS | HEIGHT: 65 IN

## 2023-04-07 DIAGNOSIS — E66.01 MORBID OBESITY (H): ICD-10-CM

## 2023-04-07 PROCEDURE — 99213 OFFICE O/P EST LOW 20 MIN: CPT | Mod: VID | Performed by: INTERNAL MEDICINE

## 2023-04-07 ASSESSMENT — PAIN SCALES - GENERAL: PAINLEVEL: NO PAIN (0)

## 2023-04-07 NOTE — LETTER
"2023       RE: Debbie Bah  [address]  [place] [address]     Dear Colleague,    Thank you for referring your patient, Debbie Bah, to the [facility] WE[facility] MANAGEMENT CLINIC [place] at [facility]. Please see a copy of my visit note below.      Return Medical Weight Management Note     Debbie Bah  MRN:  [id number]  :  1990  LARRY:  2023    Dear Sarahi Santos, HARJINDER CNP,    I had the pleasure of seeing your patient Debbie Bah. She is a 32 year old female who I am continuing to see for treatment of obesity related to:        2020     9:38 PM   --   I have the following health issues associated with obesity None of the above   I have the following symptoms associated with obesity Depression       INTERVAL HISTORY:    Last visit about 3 mo ago, reviewed and relevant history is as follows--  ------------------------------  \"had COVID 3 weeks which affected her diet. Feels as though she had \"reset\" after her illness. She eats when she's hungry and stops when she's full. 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   Prior to this her triggers for eating were fatigue, stress, boredom. Feels her binge eating has been well-controlled over the past month. Does experience occasional episodes of over-eating. Diet mainly consists of dairy, eggs, breads. She is still taking phentermine but is concerned about difficulty achieving an orgasm than previously, her libido has remained stable. This has been ongoing for 6 weeks. She would like to try decreasing the dose. \"  ------------------------------        LAST WEIGHT:   212 lbs 0 oz   Body mass index is 36.39 kg/m .     Since last seen of note is the following--    --Started Wegovy and has escalated (slightly sick at times initially but doing well now).  Happy that she is able to stop " "eating, sense hunger    --worked up to the 1 mg weekly dose of Wegovy    --has not titrated off of the phentermine over the last month (was not hungry at all on both of them)          CURRENT WEIGHT:   198 lbs 14.4 oz   Body mass index is 33.1 kg/m .    Initial Weight (lbs): 257.8 lbs  Last Visits Weight: 96.2 kg (212 lb)  Cumulative weight loss (lbs): 58.9  Weight Loss Percentage: 22.85%        4/3/2023     6:12 PM   Changes and Difficulties   I have made the following changes to my diet since my last visit: smaller, less frequent meals       VITALS:  Ht 1.651 m (5' 5\")   Wt 90.2 kg (198 lb 14.4 oz)   BMI 33.10 kg/m      MEDICATIONS:   Current Outpatient Medications   Medication Sig Dispense Refill    buPROPion (WELLBUTRIN XL) 300 MG 24 hr tablet Take 1 tablet (300 mg) by mouth daily 90 tablet 0    drospirenone-ethinyl estradiol (ADALID) 3-0.02 MG tablet Take 1 tablet by mouth daily      phentermine (ADIPEX-P) 37.5 MG tablet Take 1 tablet in the morning 90 tablet 0    predniSONE (DELTASONE) 50 MG tablet Emergency set: if severe allergic reaction take immediately 100mg Prednisone (2x50mg) and 2 Tabl Cetirizine 10mg and then write precise 12h retrospective diary.If less severe reaction take only the 2 Tabl Cetirizine 2 tablet 3    Semaglutide-Weight Management (WEGOVY) 1 MG/0.5ML pen Inject 1 mg Subcutaneous once a week 2 mL 0    Vitamin D3 (CHOLECALCIFEROL) 125 MCG (5000 UT) tablet Take 1 tablet by mouth daily             4/3/2023     6:12 PM   Weight Loss Medication History Reviewed With Patient   Which weight loss medications are you currently taking on a regular basis? Ozempic    Phentermine   If you are having side effects please describe: constipation            ASSESSMENT:   Debbie is a 31 yo F who is following up for obesity    PLAN:   --wegovy @ 1 mg/wk  --need to stay on the 1 mg for now (marked satiety already and she is wanting to avoid side effects with escalation)  --planning RTC 2-3 mo  --she will " stay off of the phentermine        Time: 22 min spent on evaluation, management, counseling, education, & motivational interviewing (video) comined with previsit prep and post visit follow up care same day  Pt located in [place] and agrees to telehealth visit  Video start approx 9:25  Video stop approx 9:42       Sincerely,    Natalie Carbajal MD

## 2023-04-07 NOTE — PROGRESS NOTES
"Debbie Bah is a 32 year old who is being evaluated via a billable video visit.    [id number]}  During this virtual visit the patient is located in [place], patient verifies this as the location during the entirety of this visit.      Video-Visit Details  Type of service:  Video Visit  Originating Location (pt. Location): Home  Distant Location (provider location):  Off-site  Platform used for Video Visit: Sqrl    [facility] Medical Weight Management Note     Debbie Bah  MRN:  [id number]  :  1990  LARRY:  2023    Dear Sarahi Santos, HARJINDER CNP,    I had the pleasure of seeing your patient Debbie Bah. She is a 32 year old female who I am continuing to see for treatment of obesity related to:        2020     9:38 PM   --   I have the following health issues associated with obesity None of the above   I have the following symptoms associated with obesity Depression       INTERVAL HISTORY:    Last visit about 3 mo ago, reviewed and relevant history is as follows--  ------------------------------  \"had COVID 3 weeks which affected her diet. Feels as though she had \"reset\" after her illness. She eats when she's hungry and stops when she's full. 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   Prior to this her triggers for eating were fatigue, stress, boredom. Feels her binge eating has been well-controlled over the past month. Does experience occasional episodes of over-eating. Diet mainly consists of dairy, eggs, breads. She is still taking phentermine but is concerned about difficulty achieving an orgasm than previously, her libido has remained stable. This has been ongoing for 6 weeks. She would like to try decreasing the dose. \"  ------------------------------        LAST WEIGHT:   212 lbs 0 oz   Body mass index is 36.39 kg/m .     Since last seen of note is the following--    --Started Wegovy and has escalated (slightly sick " "at times initially but doing well now).  Happy that she is able to stop eating, sense hunger    --worked up to the 1 mg weekly dose of Wegovy    --has not titrated off of the phentermine over the last month (was not hungry at all on both of them)          CURRENT WEIGHT:   198 lbs 14.4 oz   Body mass index is 33.1 kg/m .    Initial Weight (lbs): 257.8 lbs  Last Visits Weight: 96.2 kg (212 lb)  Cumulative weight loss (lbs): 58.9  Weight Loss Percentage: 22.85%        4/3/2023     6:12 PM   Changes and Difficulties   I have made the following changes to my diet since my last visit: smaller, less frequent meals       VITALS:  Ht 1.651 m (5' 5\")   Wt 90.2 kg (198 lb 14.4 oz)   BMI 33.10 kg/m      MEDICATIONS:   Current Outpatient Medications   Medication Sig Dispense Refill     buPROPion (WELLBUTRIN XL) 300 MG 24 hr tablet Take 1 tablet (300 mg) by mouth daily 90 tablet 0     drospirenone-ethinyl estradiol (ADALID) 3-0.02 MG tablet Take 1 tablet by mouth daily       phentermine (ADIPEX-P) 37.5 MG tablet Take 1 tablet in the morning 90 tablet 0     predniSONE (DELTASONE) 50 MG tablet Emergency set: if severe allergic reaction take immediately 100mg Prednisone (2x50mg) and 2 Tabl Cetirizine 10mg and then write precise 12h retrospective diary.If less severe reaction take only the 2 Tabl Cetirizine 2 tablet 3     Semaglutide-Weight Management (WEGOVY) 1 MG/0.5ML pen Inject 1 mg Subcutaneous once a week 2 mL 0     Vitamin D3 (CHOLECALCIFEROL) 125 MCG (5000 UT) tablet Take 1 tablet by mouth daily             4/3/2023     6:12 PM   Weight Loss Medication History Reviewed With Patient   Which weight loss medications are you currently taking on a regular basis? Ozempic    Phentermine   If you are having side effects please describe: constipation            ASSESSMENT:   Debbie is a 33 yo F who is following up for obesity    PLAN:   --wegovy @ 1 mg/wk  --need to stay on the 1 mg for now (marked satiety already and she is wanting " to avoid side effects with escalation)  --planning RTC 2-3 mo  --she will stay off of the phentermine        Time: 22 min spent on evaluation, management, counseling, education, & motivational interviewing (video) comined with previsit prep and post visit follow up care same day  Pt located in [place] and agrees to telehealth visit  Video start approx 9:25  Video stop approx 9:42       Sincerely,    Natalie Carbajal MD

## 2023-04-07 NOTE — PATIENT INSTRUCTIONS
Thank you for allowing us the privilege of caring for you. We hope we provided you with the excellent service you deserve.    Please let us know if there is anything else we can do for you so that we can be sure you are completely satisfied with your care experience.    Your visit was with Dr. Carbajal today.    Instructions per today's visit:  --we are hoping to continue with the 1 mg weekly dose of Wegovy for another moth if possible given the current effectiveness and that we want to avoid side effects  --we can plan on return again in about 2-3 mo    Please call our contact center at [phone] to schedule your next appointments.    Meal Replacement Products:    Here is the link to our new e-store where you can purchase our meal replacement products    [email or web address]/store    The one week starter kit is a great way to sample a variety of products and see what works for you.    If you want more information about the product go to: [employer]    Free Shipping for orders over $75    Benefits of meal replacements products:    Portion and calorie control  Improved nutrition  Structured eating  Simplified food choices  Avoid contact with trigger foods    Interested in working with a health ?  Health coaches work with you to improve your overall health and wellbeing.  They look at the whole person, and may involve discussion of different areas of life, including, but not limited to the four pillars of health (sleep, exercise, nutrition, and stress management). Discuss with your care team if you would like to start working a health .    Health Coaching-3 Pack: Schedule by calling [phone]    $99 for three health coaching visits    Visits may be done in person or via phone    Coaching is a partnership between the  and the client; Coaches do not prescribe or diagnose    Coaching helps inspire the client to reach his/her personal goals    XMPieoth  Scale:    We hope to provide you with high quality telephone and virtual healthcare visits while social distancing for COVID-19 is necessary, as well as in the future when virtual visits may be more convenient for you.    Our technology team made it possible for Bluetooth scales to send weight measurements to our electronic medical record. This allows weights from you weighing at home to securely flow into the medical record, which will improve telephone and virtual visits.  Additionally, studies have shown that adults actually lose more weight when their weights are automatically sent to someone else, and also that this process is not stressful for those adults.    Below is a link for purchasing the scale, with a discount code for our patients. You may call your insurance company to see if they will reimburse you for the cost of the scale, as a piece of durable medical equipment. The scales only go up to a weight of 400 pounds. This is an issue and we are working with the developer on increasing this. We found no scales that go over 400lb that have blue-tooth for connecting to DIY.    Scale to purchase: the Camera Service & Integration  Body  Scale: [email or web address]/en/body/[email or web address]=aw.ds    Discount Code: We have a discount code for our patients to bring the cost down to $50, the code is:  withmed     Steps to link the scale to DIY via an Android Phone (you can always disconnect at any point in the future):  1. The order must be placed first before the patient can access Track My Health within DIY.  2. Download Google Fit samantha from the Google Play Store  a. Log in or register using your Google account  3. Download the DIY samantha from Google Play Store  a. Select add organization  b. Search for [employer] and select it  c. Log into DIY  d. Select Track My Health  e. Select the green connect my account button  f. When prompted log into your [employer]  account  g. Select okay to confirm the account  4. Download the Withings Health Mate jhonatan from Google Play Store  a. [employer]  b. Go to profile  c. Tap google fit under the Apps section  d. Select the option to activate Google Fit integration  e. Select the same Google account  f. Select okay to confirm the account  g.  Steps to link the scale to Watchup via an iPhone (you can always disconnect at any point in the future):  **Note Labtrip is not available for download on an iPad**  1. The order must be placed first before the patient can access Track My Health within Watchup.  2. Locate the Health jhonatan on your iPhone.  a. Set up your Apple Health account as prompted  b. The Sources page will show Apps that communicate with your Health jhonatan. Once all steps are completed, you should see [employer] and [employer] listed under the Apps section and your iPhone under the devices section.  i. Select Health Mate  1. Under 'ALLOW  HEALTH "[employer]"  TO WRITE DATA ensure the toggle is on for Weight.  2. This will allow the scale to add your weight to the Apple Health  ii. Select [employer]  1. Under 'ALLOW  [employer]  TO READ DATA ensure the toggle is on for Weight.  2. This allows Watchup to grab the weight from Labtrip so your provider can see your weights.  3. Download the Watchup jhonatan from the Jhonatan Store  a. Select add organization                                                  b. Search for [employer] and select it  c. Log into Watchup  d. Select Track My Health  e. Select the green connect my account button  f. Follow prompts to link your device to Watchup.  4. Download the Withings health mate jhonatan in the Jhonatan Store  a. [employer]  b. Go to profile  c. Tap Health under the Apps section  d. If prompted to allow access with the Health Jhonatan, toggle weight on for read and write access.      For any questions/concerns contact Sophia Li LPN at [phone]    To schedule appointments with our team, please  call [phone]    Please call during clinic hours Monday through Friday 8:00a - 4:00p if you have questions or you can contact us via [employer] at anytime.      Lab results will be communicated through My Chart or letter (if My Chart not used). Please call the clinic if you have not received communication after 1 week or if you have any questions.    Clinic Fax: [phone]    Thank you,  Medical Weight Management Team

## 2023-04-12 ENCOUNTER — TRANSFERRED RECORDS (OUTPATIENT)
Dept: HEALTH INFORMATION MANAGEMENT | Facility: CLINIC | Age: 33
End: 2023-04-12
Payer: COMMERCIAL

## 2023-05-01 ENCOUNTER — MYC REFILL (OUTPATIENT)
Dept: FAMILY MEDICINE | Facility: CLINIC | Age: 33
End: 2023-05-01
Payer: COMMERCIAL

## 2023-05-01 DIAGNOSIS — R53.83 FATIGUE, UNSPECIFIED TYPE: ICD-10-CM

## 2023-05-01 RX ORDER — BUPROPION HYDROCHLORIDE 300 MG/1
300 TABLET ORAL DAILY
Qty: 90 TABLET | Refills: 0 | Status: SHIPPED | OUTPATIENT
Start: 2023-05-01 | End: 2023-07-21

## 2023-05-01 NOTE — TELEPHONE ENCOUNTER
"Requested Prescriptions   Pending Prescriptions Disp Refills     buPROPion (WELLBUTRIN XL) 300 MG 24 hr tablet 90 tablet 0     Sig: Take 1 tablet (300 mg) by mouth daily       SSRIs Protocol Passed - 5/1/2023 11:52 AM        Passed - Recent (12 mo) or future (30 days) visit within the authorizing provider's specialty     Patient has had an office visit with the authorizing provider or a provider within the authorizing providers department within the previous 12 mos or has a future within next 30 days. See \"Patient Info\" tab in inbasket, or \"Choose Columns\" in Meds & Orders section of the refill encounter.              Passed - Medication is Bupropion     If the medication is Bupropion (Wellbutrin), and the patient is taking for smoking cessation; OK to refill.          Passed - Medication is active on med list        Passed - Patient is age 18 or older        Passed - No active pregnancy on record        Passed - No positive pregnancy test in last 12 months           Prescription approved per [facility] Refill Protocol.    Pt has a appointment on 09/12/23    Nadeen Hercules RN  [facility]   "

## 2023-05-10 ENCOUNTER — MYC MEDICAL ADVICE (OUTPATIENT)
Dept: ENDOCRINOLOGY | Facility: CLINIC | Age: 33
End: 2023-05-10
Payer: COMMERCIAL

## 2023-05-11 NOTE — TELEPHONE ENCOUNTER
Can you please verify that this patient needs a Prior Authorization for Wegovy 1 mg weekly?     Lauren Bloch, PharmD, BCACP   Medication Therapy Management Pharmacist   [facility] [place]

## 2023-05-12 ENCOUNTER — TELEPHONE (OUTPATIENT)
Dept: ENDOCRINOLOGY | Facility: CLINIC | Age: 33
End: 2023-05-12
Payer: COMMERCIAL

## 2023-05-12 NOTE — TELEPHONE ENCOUNTER
PA Initiation    Medication: Wegovy - PA Pending   Insurance Company: [employer] - Phone [phone] Fax [phone]  Pharmacy Filling the Rx:    Filling Pharmacy Phone:    Filling Pharmacy Fax:    Start Date: 5/12/2023      CHART NOTES ADDED TO PA

## 2023-05-15 NOTE — TELEPHONE ENCOUNTER
Prior Authorization Approval    Authorization Effective Date: 5/12/2023  Authorization Expiration Date: 12/31/2023  Medication: Wegovy - PA Approved  Approved Dose/Quantity: 1 month  Reference #: CMM [id number]   Insurance Company: [employer] - Phone [phone] Fax [phone]  Expected CoPay:       CoPay Card Available:      Foundation Assistance Needed:    Which Pharmacy is filling the prescription (Not needed for infusion/clinic administered):    Pharmacy Notified:    Patient Notified:

## 2023-05-18 ENCOUNTER — TRANSFERRED RECORDS (OUTPATIENT)
Dept: HEALTH INFORMATION MANAGEMENT | Facility: CLINIC | Age: 33
End: 2023-05-18
Payer: COMMERCIAL

## 2023-06-01 ENCOUNTER — MYC MEDICAL ADVICE (OUTPATIENT)
Dept: FAMILY MEDICINE | Facility: CLINIC | Age: 33
End: 2023-06-01
Payer: COMMERCIAL

## 2023-06-01 DIAGNOSIS — Z30.41 ENCOUNTER FOR SURVEILLANCE OF CONTRACEPTIVE PILLS: Primary | ICD-10-CM

## 2023-06-01 NOTE — TELEPHONE ENCOUNTER
Please see pt's My chart message. Order has been pended. Pt was also sent a message to schedule a annual. Thanks    Nadeen Hercules RN  [facility]

## 2023-06-02 RX ORDER — DROSPIRENONE AND ETHINYL ESTRADIOL 0.02-3(28)
1 KIT ORAL DAILY
Qty: 30 TABLET | Refills: 3 | Status: SHIPPED | OUTPATIENT
Start: 2023-06-02 | End: 2023-06-05

## 2023-06-05 RX ORDER — DROSPIRENONE AND ETHINYL ESTRADIOL 0.03MG-3MG
1 KIT ORAL DAILY
Qty: 90 TABLET | Refills: 4 | Status: SHIPPED | OUTPATIENT
Start: 2023-06-05 | End: 2023-10-25

## 2023-06-30 ENCOUNTER — VIRTUAL VISIT (OUTPATIENT)
Dept: ENDOCRINOLOGY | Facility: CLINIC | Age: 33
End: 2023-06-30
Payer: COMMERCIAL

## 2023-06-30 VITALS — BODY MASS INDEX: 31.82 KG/M2 | HEIGHT: 65 IN | WEIGHT: 191 LBS

## 2023-06-30 DIAGNOSIS — E66.811 CLASS 1 OBESITY DUE TO EXCESS CALORIES WITHOUT SERIOUS COMORBIDITY IN ADULT, UNSPECIFIED BMI: Primary | ICD-10-CM

## 2023-06-30 DIAGNOSIS — E66.01 MORBID OBESITY (H): ICD-10-CM

## 2023-06-30 DIAGNOSIS — E66.09 CLASS 1 OBESITY DUE TO EXCESS CALORIES WITHOUT SERIOUS COMORBIDITY IN ADULT, UNSPECIFIED BMI: Primary | ICD-10-CM

## 2023-06-30 PROCEDURE — 99213 OFFICE O/P EST LOW 20 MIN: CPT | Mod: VID | Performed by: INTERNAL MEDICINE

## 2023-06-30 ASSESSMENT — PAIN SCALES - GENERAL: PAINLEVEL: NO PAIN (0)

## 2023-06-30 NOTE — NURSING NOTE
Is the patient currently in the state of [place]? YES    Visit mode:VIDEO    If the visit is dropped, the patient can be reconnected by: VIDEO VISIT: Text to cell phone: [phone]    Will anyone else be joining the visit? NO      How would you like to obtain your AVS? MyChart    Are changes needed to the allergy or medication list? NO    Reason for visit: Follow Up

## 2023-06-30 NOTE — LETTER
"2023       RE: Debbie Bah  [address]  [place] [address]     Dear Colleague,    Thank you for referring your patient, Debbie Bah, to the [facility] [place] [facility]. Please see a copy of my visit note below.      Return Medical Weight Management Note     Debbie Bah  MRN:  [id number]  :  1990  LARRY:  2023    Dear Sarahi Santos, HARJINDER CNP,    I had the pleasure of seeing your patient Debbie Bah. She is a 32 year old female who I am continuing to see for treatment of obesity related to:        2020     9:38 PM   --   I have the following health issues associated with obesity None of the above   I have the following symptoms associated with obesity Depression       INTERVAL HISTORY:    Last visit about 4 mo ago, reviewed and relevant history is as follows--  ------------------------------  \"had COVID 3 weeks which affected her diet. Feels as though she had \"reset\" after her illness. She eats when she's hungry and stops when she's full. 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   Prior to this her triggers for eating were fatigue, stress, boredom. Feels her binge eating has been well-controlled over the past month. Does experience occasional episodes of over-eating. Diet mainly consists of dairy, eggs, breads. She is still taking phentermine but is concerned about difficulty achieving an orgasm than previously, her libido has remained stable. This has been ongoing for 6 weeks. She would like to try decreasing the dose. \"    \"Started Wegovy and has escalated (slightly sick at times initially but doing well now).  Happy that she is able to stop eating, sense hunger  --has not titrated off of the phentermine over the last month (was not hungry at all on both of them)\"   " "------------------------------           LAST WEIGHT:   198 lbs 14.4 oz   Body mass index is 33.1 kg/m .     Initial Weight (lbs): 257.8 lbs    Since last seen of note is the following--  --felt really good the first 2 wks of phentermine because was not thinking about food (it later lost effectiveness); now on GLP1 agonism is thinking about food but less desire for it. Wt mgmt is so much easier than it had been prior to these meds.    --issue with not having med for a wk hunger/food thoughts increased (while waiting on preauth); now n 1 mg and without any significant side effects but would like more pharm support given the recurrence of some hunger/food thoughts, interested in going to the 1.7 mg wegovy    --exercising more, doing well with eating goals    --congratulated on her excellent wt loss success    CURRENT WEIGHT:   191 lbs 0 oz   Body mass index is 31.78 kg/m .    Initial Weight (lbs): 257.22 lbs  Last Visits Weight: 90.2 kg (198 lb 14.4 oz)  Cumulative weight loss (lbs): 66.22  Weight Loss Percentage: 25.74%        6/28/2023    12:57 PM   Changes and Difficulties   With regards to my diet, I am still struggling with: night eating   I have made the following changes to my activity/exercise since my last visit: walking more often   With regards to my activity/exercise, I am still struggling with: motivation       VITALS:  Ht 1.651 m (5' 5\")   Wt 86.6 kg (191 lb)   BMI 31.78 kg/m      MEDICATIONS:   Current Outpatient Medications   Medication Sig Dispense Refill    buPROPion (WELLBUTRIN XL) 300 MG 24 hr tablet Take 1 tablet (300 mg) by mouth daily 90 tablet 0    drospirenone-ethinyl estradiol (ALISON) 3-0.03 MG tablet Take 1 tablet by mouth daily 90 tablet 4    predniSONE (DELTASONE) 50 MG tablet Emergency set: if severe allergic reaction take immediately 100mg Prednisone (2x50mg) and 2 Tabl Cetirizine 10mg and then write precise 12h retrospective diary.If less severe reaction take only the 2 Tabl " Cetirizine 2 tablet 3    Semaglutide-Weight Management (WEGOVY) 1 MG/0.5ML pen Inject 1 mg Subcutaneous once a week 2 mL 2    Vitamin D3 (CHOLECALCIFEROL) 125 MCG (5000 UT) tablet Take 1 tablet by mouth daily      phentermine (ADIPEX-P) 37.5 MG tablet Take 1 tablet in the morning (Patient not taking: Reported on 6/30/2023) 90 tablet 0           6/28/2023    12:57 PM   Weight Loss Medication History Reviewed With Patient   Which weight loss medications are you currently taking on a regular basis? Wegovy   Are you having any side effects from the weight loss medication that we have prescribed you? No         ASSESSMENT:   Debbie is a 31 yo F who is following up for obesity    PLAN:   --wegovy @ 1 mg/wk; increase to 1.7 mg weekly  --planning RTC 2-3 mo  --she will stay off of the phentermine        Time: 25 min spent on evaluation, management, counseling, education, & motivational interviewing (video) comined with previsit prep and post visit follow up care same day  Pt located in [place] and agrees to telehealth visit  Sincerely,    Natalie Carbajal MD

## 2023-06-30 NOTE — PROGRESS NOTES
"Virtual Visit Details    Type of service:  Video Visit     Originating Location (pt. Location): Home  Distant Location (provider location):  Off-site  Platform used for Video Visit: Estelle   Joined the call at 2023, 12:38:15 pm.  Left the call at 2023, 12:50:19 pm.  on the call for 12 minutes 4 seconds .    Return Medical Weight Management Note     Debbie Bah  MRN:  [id number]  :  1990  LARRY:  2023    Dear Sarahi Santos, HARJINDER MCCORMICK,    I had the pleasure of seeing your patient Debbie Bah. She is a 32 year old female who I am continuing to see for treatment of obesity related to:        2020     9:38 PM   --   I have the following health issues associated with obesity None of the above   I have the following symptoms associated with obesity Depression       INTERVAL HISTORY:    Last visit about 4 mo ago, reviewed and relevant history is as follows--  ------------------------------  \"had COVID 3 weeks which affected her diet. Feels as though she had \"reset\" after her illness. She eats when she's hungry and stops when she's full. 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   Prior to this her triggers for eating were fatigue, stress, boredom. Feels her binge eating has been well-controlled over the past month. Does experience occasional episodes of over-eating. Diet mainly consists of dairy, eggs, breads. She is still taking phentermine but is concerned about difficulty achieving an orgasm than previously, her libido has remained stable. This has been ongoing for 6 weeks. She would like to try decreasing the dose. \"    \"Started Wegovy and has escalated (slightly sick at times initially but doing well now).  Happy that she is able to stop eating, sense hunger  --has not titrated off of the phentermine over the last month (was not hungry at all on both of them)\"   ------------------------------           LAST " "WEIGHT:   198 lbs 14.4 oz   Body mass index is 33.1 kg/m .     Initial Weight (lbs): 257.8 lbs    Since last seen of note is the following--  --felt really good the first 2 wks of phentermine because was not thinking about food (it later lost effectiveness); now on GLP1 agonism is thinking about food but less desire for it. Wt mgmt is so much easier than it had been prior to these meds.    --issue with not having med for a wk hunger/food thoughts increased (while waiting on preauth); now n 1 mg and without any significant side effects but would like more pharm support given the recurrence of some hunger/food thoughts, interested in going to the 1.7 mg wegovy    --exercising more, doing well with eating goals    --congratulated on her excellent wt loss success    CURRENT WEIGHT:   191 lbs 0 oz   Body mass index is 31.78 kg/m .    Initial Weight (lbs): 257.22 lbs  Last Visits Weight: 90.2 kg (198 lb 14.4 oz)  Cumulative weight loss (lbs): 66.22  Weight Loss Percentage: 25.74%        6/28/2023    12:57 PM   Changes and Difficulties   With regards to my diet, I am still struggling with: night eating   I have made the following changes to my activity/exercise since my last visit: walking more often   With regards to my activity/exercise, I am still struggling with: motivation       VITALS:  Ht 1.651 m (5' 5\")   Wt 86.6 kg (191 lb)   BMI 31.78 kg/m      MEDICATIONS:   Current Outpatient Medications   Medication Sig Dispense Refill     buPROPion (WELLBUTRIN XL) 300 MG 24 hr tablet Take 1 tablet (300 mg) by mouth daily 90 tablet 0     drospirenone-ethinyl estradiol (ALISON) 3-0.03 MG tablet Take 1 tablet by mouth daily 90 tablet 4     predniSONE (DELTASONE) 50 MG tablet Emergency set: if severe allergic reaction take immediately 100mg Prednisone (2x50mg) and 2 Tabl Cetirizine 10mg and then write precise 12h retrospective diary.If less severe reaction take only the 2 Tabl Cetirizine 2 tablet 3     Semaglutide-Weight " Management (WEGOVY) 1 MG/0.5ML pen Inject 1 mg Subcutaneous once a week 2 mL 2     Vitamin D3 (CHOLECALCIFEROL) 125 MCG (5000 UT) tablet Take 1 tablet by mouth daily       phentermine (ADIPEX-P) 37.5 MG tablet Take 1 tablet in the morning (Patient not taking: Reported on 6/30/2023) 90 tablet 0           6/28/2023    12:57 PM   Weight Loss Medication History Reviewed With Patient   Which weight loss medications are you currently taking on a regular basis? Wegovy   Are you having any side effects from the weight loss medication that we have prescribed you? No         ASSESSMENT:   Debbie is a 31 yo F who is following up for obesity    PLAN:   --wegovy @ 1 mg/wk; increase to 1.7 mg weekly  --planning RTC 2-3 mo  --she will stay off of the phentermine        Time: 25 min spent on evaluation, management, counseling, education, & motivational interviewing (video) comined with previsit prep and post visit follow up care same day  Pt located in [place] and agrees to telehealth visit  Sincerely,    Natalie Carbajal MD

## 2023-07-01 NOTE — PATIENT INSTRUCTIONS
Thank you for allowing us the privilege of caring for you. We hope we provided you with the excellent service you deserve.    Please let us know if there is anything else we can do for you so that we can be sure you are completely satisfied with your care experience.    Your visit was with Dr. Carbajal today.    Instructions per today's visit:  --we will increase the Wegovy to 1.7 mg in support of your healthy food/activity goals  --we can plan on return with Dr. Carbajal about 3 mo    Please call our contact center at [phone] to schedule your next appointments.    Meal Replacement Products:    Here is the link to our new e-store where you can purchase our meal replacement products    [employer] [email or web address]/store    The one week starter kit is a great way to sample a variety of products and see what works for you.    If you want more information about the product go to: [place]bee    Free Shipping for orders over $75    Benefits of meal replacements products:    Portion and calorie control  Improved nutrition  Structured eating  Simplified food choices  Avoid contact with trigger foods    Interested in working with a health ?  Health coaches work with you to improve your overall health and wellbeing.  They look at the whole person, and may involve discussion of different areas of life, including, but not limited to the four pillars of health (sleep, exercise, nutrition, and stress management). Discuss with your care team if you would like to start working a health .    Health Coaching-3 Pack: Schedule by calling [phone]    $99 for three health coaching visits    Visits may be done in person or via phone    Coaching is a partnership between the  and the client; Coaches do not prescribe or diagnose    Coaching helps inspire the client to reach his/her personal goals    Bluetooth Scale:    We hope to provide you with high quality telephone and virtual  healthcare visits while social distancing for COVID-19 is necessary, as well as in the future when virtual visits may be more convenient for you.    Our technology team made it possible for Bluetooth scales to send weight measurements to our electronic medical record. This allows weights from you weighing at home to securely flow into the medical record, which will improve telephone and virtual visits.  Additionally, studies have shown that adults actually lose more weight when their weights are automatically sent to someone else, and also that this process is not stressful for those adults.    Below is a link for purchasing the scale, with a discount code for our patients. You may call your insurance company to see if they will reimburse you for the cost of the scale, as a piece of durable medical equipment. The scales only go up to a weight of 400 pounds. This is an issue and we are working with the developer on increasing this. We found no scales that go over 400lb that have blue-tooth for connecting to Binpress.    Scale to purchase: the Kumo  Body  Scale: [email or web address]/en/body/[email or web address]=aw.ds    Discount Code: We have a discount code for our patients to bring the cost down to $50, the code is:  withmed     Steps to link the scale to Binpress via an Android Phone (you can always disconnect at any point in the future):  1. The order must be placed first before the patient can access Track My Health within Binpress.  2. Download Google Fit samantha from the Google Play Store  a. Log in or register using your Google account  3. Download the Binpress samantha from Google Play Store  a. Select add organization  b. Search for MoneyExpert and select it  c. Log into Binpress  d. Select Track My Health  e. Select the green connect my account button  f. When prompted log into your Google account  g. Select okay to confirm the account  4. Download the Kumo  Health Mate jhonatan from Google Play Store  a. Murphy for [email or web address]  b. Go to profile  c. Tap google fit under the Apps section  d. Select the option to activate Google Fit integration  e. Select the same Google account  f. Select okay to confirm the account  g.  Steps to link the scale to Ayudarum via an iPhone (you can always disconnect at any point in the future):  **Note FromUs is not available for download on an iPad**  1. The order must be placed first before the patient can access Track My Health within Ayudarum.  2. Locate the Health jhonatan on your iPhone.  a. Set up your Apple Health account as prompted  b. The Sources page will show Apps that communicate with your Health jhonatan. Once all steps are completed, you should see Health Facishare and vidIQt listed under the Apps section and your iPhone under the devices section.  i. Select Health Mate  1. Under 'ALLOW  HEALTH MATE  TO WRITE DATA ensure the toggle is on for Weight.  2. This will allow the scale to add your weight to the Apple Health  ii. Select vidIQt  1. Under 'ALLOW  M87  TO READ DATA ensure the toggle is on for Weight.  2. This allows Ayudarum to grab the weight from FromUs so your provider can see your weights.  3. Download the Ayudarum jhonatan from the Jhonatan Store  a. Select add organization                                                  b. Search for Power Efficiency and select it  c. Log into Ayudarum  d. Select Track My Health  e. Select the green connect my account button  f. Follow prompts to link your device to Ayudarum.  4. Download the Moni health mate jhonatan in the Jhonatan Store  a. Murphy for [email or web address]  b. Go to profile  c. [employer] Health under the Apps section  d. If prompted to allow access with the Health Jhonatan, toggle weight on for read and write access.      For any questions/concerns contact Sophia Li LPN at [phone]    To schedule appointments with our team, please call [phone]    Please call during clinic hours Monday through Friday  8:00a - 4:00p if you have questions or you can contact us via [employer] at anytime.      Lab results will be communicated through My Chart or letter (if My Chart not used). Please call the clinic if you have not received communication after 1 week or if you have any questions.    Clinic Fax: [phone]    Thank you,  Medical Weight Management Team

## 2023-07-05 RX ORDER — SEMAGLUTIDE 1 MG/.5ML
INJECTION, SOLUTION SUBCUTANEOUS
Qty: 2 ML | OUTPATIENT
Start: 2023-07-05

## 2023-07-05 NOTE — TELEPHONE ENCOUNTER
Wegovy 1 mg/0.5 mL subcutaneous pen injector      Last Written Prescription Date:  6/30/23  Last Fill Quantity: 3,   # refills: 3  Last Office Visit : 6/30/23  Future Office visit:  none    Routing refill request to provider for review/approval because:  Dose requested doesn't match current order

## 2023-07-11 ENCOUNTER — TELEPHONE (OUTPATIENT)
Dept: ENDOCRINOLOGY | Facility: CLINIC | Age: 33
End: 2023-07-11
Payer: COMMERCIAL

## 2023-07-12 ENCOUNTER — TELEPHONE (OUTPATIENT)
Dept: ENDOCRINOLOGY | Facility: CLINIC | Age: 33
End: 2023-07-12
Payer: COMMERCIAL

## 2023-07-12 NOTE — TELEPHONE ENCOUNTER
Pt was told to f/u with Dr. Carbajal in 8 weeks (end of of August) but there are no available openings with Dr. Carbajal until 11/17. Pt declined seeing another provider. Wondering if she can be seen again over her lunch which she has done in the past. Requested [place] message back.

## 2023-07-15 ENCOUNTER — NURSE TRIAGE (OUTPATIENT)
Dept: NURSING | Facility: CLINIC | Age: 33
End: 2023-07-15
Payer: COMMERCIAL

## 2023-07-15 NOTE — TELEPHONE ENCOUNTER
Mixed vinegar and bleach together about a half hour ago.  She began to cough. That has resolved.  She disposed of the mixture.  Stated she is breathing fine.  She can still feel/smell this in the back of her throat.  Per the protocol, I advised she speak to someone in Poison Control. I gave her number and transferred her to them.      Reason for Disposition   Chemical fume inhalation from mixing bleach with ammonia or vinegar    Additional Information   Negative: [1] Breathing stopped AND [2] hasn't returned   Negative: SEVERE difficulty breathing (e.g., struggling for each breath, speaks in single words)   Negative: SEVERE weakness (i.e., unable to walk or barely able to walk, requires support)   Negative: Difficult to awaken or acting confused (e.g., disoriented, slurred speech)   Negative: Bluish (or gray) lips or face now   Negative: Chest pain, tightness, or heaviness (present now)   Negative: Seizure   Negative: Coughing up dark sputum (e.g., soot)   Negative: Stridor or hoarseness (change in voice)   Negative: Facial burns, mouth burns, or singed nasal hairs   Negative: Patient attempted suicide (e.g., sitting in garage with car running)   Negative: [1] Bioterrorist event, known or suspected AND [2] exposure to biological, chemical or radioactive substance   Negative: Sounds like a life-threatening emergency to the triager   Negative: Carbon monoxide exposure, known or suspected   Negative: [1] Soot in nose or mouth AND [2] no breathing difficulty   Negative: Trapped in smoke-filled room (or other enclosed space) > 10 minutes (Exception: harmless smoke from tobacco or a wood-burning fireplace)   Negative: [1] Breathing difficulty persists > 10 minutes AND [2] after moving to fresh air   Negative: [1] Wheezing persists > 10 minutes AND [2] after moving to fresh air   Negative: [1] Coughing persists > 10 minutes AND [2] after moving to fresh air   Negative: Smoke inhalation (Exception: harmless smoke from  tobacco, a campfire, or a woodburning fireplace)   Negative: Chemical fume inhalation (Exception: harmless strong-smelling fumes like perfume or household )    Protocols used: Smoke and Fume Inhalation-DARI TAYLOR RN [facility]

## 2023-07-21 ENCOUNTER — MYC REFILL (OUTPATIENT)
Dept: FAMILY MEDICINE | Facility: CLINIC | Age: 33
End: 2023-07-21
Payer: COMMERCIAL

## 2023-07-21 DIAGNOSIS — R53.83 FATIGUE, UNSPECIFIED TYPE: ICD-10-CM

## 2023-07-21 RX ORDER — BUPROPION HYDROCHLORIDE 300 MG/1
300 TABLET ORAL DAILY
Qty: 90 TABLET | Refills: 0 | Status: SHIPPED | OUTPATIENT
Start: 2023-07-21 | End: 2023-10-25

## 2023-07-21 NOTE — TELEPHONE ENCOUNTER
"Requested Prescriptions   Pending Prescriptions Disp Refills     buPROPion (WELLBUTRIN XL) 300 MG 24 hr tablet 90 tablet 0     Sig: Take 1 tablet (300 mg) by mouth daily       SSRIs Protocol Passed - 7/21/2023  6:09 AM        Passed - Recent (12 mo) or future (30 days) visit within the authorizing provider's specialty     Patient has had an office visit with the authorizing provider or a provider within the authorizing providers department within the previous 12 mos or has a future within next 30 days. See \"Patient Info\" tab in inbasket, or \"Choose Columns\" in Meds & Orders section of the refill encounter.              Passed - Medication is Bupropion     If the medication is Bupropion (Wellbutrin), and the patient is taking for smoking cessation; OK to refill.          Passed - Medication is active on med list        Passed - Patient is age 18 or older        Passed - No active pregnancy on record        Passed - No positive pregnancy test in last 12 months           Prescription approved per [facility] Refill Protocol.    Pt has a appointment on 09/12/23    Nadeen Hercules RN  [facility]   "

## 2023-07-24 ENCOUNTER — TELEPHONE (OUTPATIENT)
Dept: ENDOCRINOLOGY | Facility: CLINIC | Age: 33
End: 2023-07-24
Payer: COMMERCIAL

## 2023-07-24 NOTE — TELEPHONE ENCOUNTER
[facility] Prior Authorization Team Request    Medication:  WEGOVY 1.7MG/0.75ML SOAJ  Dosing:   NDC (required for Medicaid members): [address]-92  Insurance Company: [employer]REFERR[employer] ONE  BIN: [id number]  PCN: [id number]  Grp:   ID: [id number]  CoverMyMeds Key (if applicable):   Additional documentation:   Pharmacy Filling the Rx:  [facility]  Filling Pharmacy Phone:  [phone]  Contact:  [facility] ([id number]) please send all responses to this pool.  Pharmacy NPI (required for Medicaid members):

## 2023-07-26 NOTE — TELEPHONE ENCOUNTER
Central Prior Authorization Team   Phone: [phone]    PA Initiation    Medication: WEGOVY 1.7 MG/0.75ML SC SOAJ  Insurance Company: Preferred One - Phone [phone] Fax [phone]  Pharmacy Filling the Rx: [facility] [place], [place] - [address] [place] [address] 9-411  Filling Pharmacy Phone: [phone]  Filling Pharmacy Fax:    Start Date: 7/26/2023

## 2023-08-28 ENCOUNTER — TELEPHONE (OUTPATIENT)
Dept: ENDOCRINOLOGY | Facility: CLINIC | Age: 33
End: 2023-08-28
Payer: COMMERCIAL

## 2023-08-28 NOTE — TELEPHONE ENCOUNTER
Prior Authorization Retail Medication Request    Medication/Dose: Wegovy 1.7 mg/0.7ml qty limit 6 ml  ICD code (if different than what is on RX):    Previously Tried and Failed:    Rationale:  Second step in treatment. Insurance will only cover 6 ml per 365 days    Insurance Name:  Preferred One  Insurance ID:  [id number]      Pharmacy Information (if different than what is on RX)  Name:  [facility]  Phone:  [phone]    Marleni Albarran CPhT  [facility]  [phone]

## 2023-08-28 NOTE — TELEPHONE ENCOUNTER
Medication Appeal Request    Please initiate an appeal for the requested medication: WEGOVY 1.7 MG/0.75ML SC SOAJ    Has a letter of medical necessity been completed in EPIC?   Yes    Any additional lab values/information to include?       Would you like to include any research articles?                 If yes please include the hyperlink(s) below or fax to    [phone] for Specialty/Retail               [phone] for Infusion/Clinic Administered.                Include the patients name and MRN on the fax cover sheet.      Lauren Bloch, PharmD, BCACP   Medication Therapy Management Pharmacist   [facility] [place]

## 2023-08-28 NOTE — TELEPHONE ENCOUNTER
PA Initiation    Medication: WEGOVY 1.7 MG/0.75ML SC SOAJ  Insurance Company: Preferred One - Phone [phone] Fax [phone]  Pharmacy Filling the Rx: [facility] [place], [place] - [address] [place] [address] 1-023  Filling Pharmacy Phone: [phone]  Filling Pharmacy Fax: [phone]  Start Date: 8/28/2023

## 2023-08-28 NOTE — LETTER
"2023    To:   [employer]erred[employer]    RE: Debbie Bah  [address]  [place] [address]  : 1990  MRN: [id number]  Policy #: [id number]  Case/Reference: [id number]    To Whom It May Concern,    I am writing on behalf of my patient, Debbie Bah  to document the medical necessity of Wegovy for the treatment of obesity (BMI at least 30 kg/m2). This letter provides information about the patient's medical history and diagnosis and a statement summarizing my treatment rationale.     Summary of Patient History and Diagnosis  Debbie Bah is a 32 year old female with a diagnosis of obesity (BMI at least 30 kg/m2). Debbie was started on Wegovy in 2023 at an initial consult weight of 212 lb. Patient is currently on Wegovy 1.7 mg weekly.     Estimated body mass index is 31.78 kg/m  as calculated from the following:    Height as of 23: 5' 5\" (1.651 m).    Weight as of 23: 191 lb (86.6 kg).    Wt Readings from Last 4 Encounters:   23 191 lb (86.6 kg)   23 198 lb 14.4 oz (90.2 kg)   23 212 lb (96.2 kg)   07/15/22 188 lb (85.3 kg)       Treatment Rationale  Despite lifestyle/health improvements with nutrition, exercise, and behavioral changes for at least 6 months, Debbie has been unable to achieve significant and sustainable weight loss.     Previous attempts with the below medications were unsuccessful due to intolerable side effects and/or are contraindicated:   Plenity, topiramate, naltrexone: unmanageable gastrointestinal side effects  Phentermine restart in the fall and then full dose in Dec--some improvement but did not get the good initial effect    Wegovy (semaglutide) is an FDA-approved medication for chronic weight management in adults with a BMI of 30 or higher or a BMI of 27 or higher with weight-related comorbidity. Debbie's BMI qualifies them for Wegovy, which has shown remarkable efficacy and a favorable safety " profile. Patient has no history of pancreatitis. Debbie has no personal or family history of medullary thyroid carcinoma or MEN2.     Debbie's unsuccessful weight management attempts and previous medication failures highlight the need for Wegovy as a medically necessary treatment option. Denying coverage would hinder Debbie's progress and increase the risk of obesity-related health conditions.    Wegovy is FDA approved at 1.7 mg weekly and 2.4 mg weekly as a maintenance dose. There should be no requirement to further increase to the 2.4 mg weekly if not indicated. Therefore, requesting continued coverage of Wegovy at 1.7 mg weekly. Debbie has lost 21 lb, or 10% of her body weight from baseline with use of Wegovy.    I kindly request that you review Debbie's case and reconsider coverage for Wegovy as an integral part of their obesity treatment plan.     Duration  12 months     Summary  In summary, Wegovy is medically necessary for this patient s medical condition. Please call my office at [phone] if I can provide you with any additional information to approve my request. I look forward to receiving your timely response and approval of this request.     Sincerely,    Natalie Carbajal MD

## 2023-08-28 NOTE — TELEPHONE ENCOUNTER
Weight loss medications are not considered Urgent. This will be handled in the order it was received.

## 2023-08-28 NOTE — TELEPHONE ENCOUNTER
PA was denied for Wegovy 1.7 mg as insurance wants patient to increase to Wegovy 2.4mg. would you like to change to the higher strength or Appeal and let insurance know as to why pt can not increase dose?

## 2023-08-28 NOTE — TELEPHONE ENCOUNTER
PRIOR AUTHORIZATION DENIED    Medication: WEGOVY 1.7 MG/0.75ML SC SOAJ    Insurance Company: Preferred One - Phone [phone] Fax [phone]    Denial Date: 8/28/2023    Denial Rational: Plan would want patient to titrate up to the 2.4mg dose.     Appeal Information:

## 2023-08-29 NOTE — TELEPHONE ENCOUNTER
PA Initiation    Medication: WEGOVY 1.7 MG/0.75ML SC SOAJ  Insurance Company: Preferred One - Phone [phone] Fax [phone]  Pharmacy Filling the Rx: [facility] [place], [place] - [address] [place] [address] 1-033  Filling Pharmacy Phone: [phone]  Filling Pharmacy Fax: [phone]  Start Date: 8/28/2023

## 2023-09-01 NOTE — TELEPHONE ENCOUNTER
Prior Authorization Approval    Medication: WEGOVY 1.7 MG/0.75ML SC SOAJ  Authorization Effective Date: 8/29/2023  Authorization Expiration Date: 8/29/2024  Approved Dose/Quantity: 1 month  Reference #: [id number]   Insurance Company: Preferred One - Phone [phone] Fax [phone]  Expected CoPay:       CoPay Card Available:      Financial Assistance Needed: N/A  Which Pharmacy is filling the prescription: [facility] [place], [place] - [address] [place] [address] 0-364  Pharmacy Notified: Yes  Patient Notified: Yes

## 2023-09-22 ENCOUNTER — VIRTUAL VISIT (OUTPATIENT)
Dept: ENDOCRINOLOGY | Facility: CLINIC | Age: 33
End: 2023-09-22
Payer: COMMERCIAL

## 2023-09-22 VITALS — HEIGHT: 65 IN | BODY MASS INDEX: 30.49 KG/M2 | WEIGHT: 183 LBS

## 2023-09-22 DIAGNOSIS — E66.811 CLASS 1 OBESITY DUE TO EXCESS CALORIES WITHOUT SERIOUS COMORBIDITY IN ADULT, UNSPECIFIED BMI: Primary | ICD-10-CM

## 2023-09-22 DIAGNOSIS — E66.09 CLASS 1 OBESITY DUE TO EXCESS CALORIES WITHOUT SERIOUS COMORBIDITY IN ADULT, UNSPECIFIED BMI: Primary | ICD-10-CM

## 2023-09-22 PROCEDURE — 99213 OFFICE O/P EST LOW 20 MIN: CPT | Mod: 95 | Performed by: INTERNAL MEDICINE

## 2023-09-22 ASSESSMENT — PAIN SCALES - GENERAL: PAINLEVEL: NO PAIN (0)

## 2023-09-22 NOTE — PROGRESS NOTES
"Virtual Visit Details    Type of service:  Video Visit     Originating Location (pt. Location): Home    Distant Location (provider location):  On-site  Platform used for Video Visit: Estelle      Joined the call at 2023, 12:26:15 pm.  Left the call at 2023, 12:37:22 pm.  You were on the call for 11 minutes 6 seconds .    Return Medical Weight Management Note     Debbie Bah  MRN:  [id number]  :  1990  LARRY:  2023    Dear Sarahi Santos, HARJINDER MCCORMICK,    I had the pleasure of seeing your patient Debbie Bah. She is a 33 year old female who I am continuing to see for treatment of obesity related to:        2020     9:38 PM   --   I have the following health issues associated with obesity None of the above   I have the following symptoms associated with obesity Depression       INTERVAL HISTORY:    Last visit about 3 mo ago, reviewed and relevant history is as follows--  ------------------------------  \"had COVID 3 weeks which affected her diet. Feels as though she had \"reset\" after her illness. She eats when she's hungry and stops when she's full. 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   Prior to this her triggers for eating were fatigue, stress, boredom. Feels her binge eating has been well-controlled over the past month. Does experience occasional episodes of over-eating. Diet mainly consists of dairy, eggs, breads. She is still taking phentermine but is concerned about difficulty achieving an orgasm than previously, her libido has remained stable. This has been ongoing for 6 weeks. She would like to try decreasing the dose. \"  ------------------------------                LAST WEIGHT:   198 lbs 14.4 oz   Body mass index is 33.1 kg/m .     Initial Weight (lbs): 257.8 lbs    Since the last visit of note is the following  --got really ill over last month/appetite was down with that  --wt stable for most of " "the interim besides losing some wt around the time of the illness  --will focus on meals and cut back on snacks (planning something like Hello Fresh); less cooking over last year with busy job. Also less active and tired when getting home from sedentary work during the day, will start up walking again  --has titrated up to 1.7 mg weekly Wegovy and is tolerating this without any issues    CURRENT WEIGHT:   183 lbs 0 oz  Body mass index is 30.45 kg/m .     Initial Weight (lbs): 257.8 lbs  Last Visits Weight: 86.6 kg (191 lb)  Cumulative weight loss (lbs): 74.8  Weight Loss Percentage: 29.01%        9/17/2023     2:18 PM   Changes and Difficulties   I have made the following changes to my diet since my last visit: Smaller meals, protein shakes   With regards to my diet, I am still struggling with: Not wanting to cook and just eating more snack-type foods   I have made the following changes to my activity/exercise since my last visit: Frank   With regards to my activity/exercise, I am still struggling with: Energy       VITALS:  Ht 1.651 m (5' 5\")   Wt 83 kg (183 lb)   BMI 30.45 kg/m      MEDICATIONS:   Current Outpatient Medications   Medication Sig Dispense Refill    buPROPion (WELLBUTRIN XL) 300 MG 24 hr tablet Take 1 tablet (300 mg) by mouth daily 90 tablet 0    drospirenone-ethinyl estradiol (ALISON) 3-0.03 MG tablet Take 1 tablet by mouth daily 90 tablet 4    predniSONE (DELTASONE) 50 MG tablet Emergency set: if severe allergic reaction take immediately 100mg Prednisone (2x50mg) and 2 Tabl Cetirizine 10mg and then write precise 12h retrospective diary.If less severe reaction take only the 2 Tabl Cetirizine 2 tablet 3    Semaglutide-Weight Management (WEGOVY) 1.7 MG/0.75ML pen Inject 1.7 mg Subcutaneous once a week 3 mL 3    Vitamin D3 (CHOLECALCIFEROL) 125 MCG (5000 UT) tablet Take 1 tablet by mouth daily             9/17/2023     2:18 PM   Weight Loss Medication History Reviewed With Patient   Which weight loss " medications are you currently taking on a regular basis? Wegovy    Bupropion   Are you having any side effects from the weight loss medication that we have prescribed you? Yes   If you are having side effects please describe: Contstpation            ASSESSMENT:   Debbie is a 31 yo F who is following up for obesity    PLAN:   --wegovy to 2.4 mg weekly  --planning RTC 4-6 mo  --additional plans as above        Time: 22 min spent on evaluation, management, counseling, education, & motivational interviewing (video) comined with previsit prep and post visit follow up care same day  Pt located in [place] and agrees to telehealth visit      Sincerely,    Natalie Carbajal MD

## 2023-09-22 NOTE — NURSING NOTE
Is the patient currently in the state of [place]? YES    Visit mode:VIDEO    If the visit is dropped, the patient can be reconnected by: VIDEO VISIT: Send to e-mail at: [email or web address]    Will anyone else be joining the visit? NO  (If patient encounters technical issues they should call [phone]    How would you like to obtain your AVS? MyChart    Are changes needed to the allergy or medication list? Pt stated no changes to allergies and Pt stated no med changes    Reason for visit: Follow Up    Pratima EMANUEL

## 2023-09-22 NOTE — LETTER
"2023       RE: Debbie Bah  [address]  [place] [address]     Dear Colleague,    Thank you for referring your patient, Debbie Bah, to the [facility] [place] at [facility]. Please see a copy of my visit note below.        Return Medical Weight Management Note     Debbie Bah  MRN:  [id number]  :  1990  LARRY:  2023    Dear Sarahi Santos, HARJINDER CNP,    I had the pleasure of seeing your patient Debbie Bah. She is a 33 year old female who I am continuing to see for treatment of obesity related to:        2020     9:38 PM   --   I have the following health issues associated with obesity None of the above   I have the following symptoms associated with obesity Depression       INTERVAL HISTORY:    Last visit about 3 mo ago, reviewed and relevant history is as follows--  ------------------------------  \"had COVID 3 weeks which affected her diet. Feels as though she had \"reset\" after her illness. She eats when she's hungry and stops when she's full. 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   Prior to this her triggers for eating were fatigue, stress, boredom. Feels her binge eating has been well-controlled over the past month. Does experience occasional episodes of over-eating. Diet mainly consists of dairy, eggs, breads. She is still taking phentermine but is concerned about difficulty achieving an orgasm than previously, her libido has remained stable. This has been ongoing for 6 weeks. She would like to try decreasing the dose. \"  ------------------------------                LAST WEIGHT:   198 lbs 14.4 oz   Body mass index is 33.1 kg/m .     Initial Weight (lbs): 257.8 lbs    Since the last visit of note is the following  --got really ill over last month/appetite was down with that  --wt " "stable for most of the interim besides losing some wt around the time of the illness  --will focus on meals and cut back on snacks (planning something like Hello [employer]); less cooking over last year with busy job. Also less active and tired when getting home from sedentary work during the day, will start up walking again  --has titrated up to 1.7 mg weekly Wegovy and is tolerating this without any issues    CURRENT WEIGHT:   183 lbs 0 oz  Body mass index is 30.45 kg/m .     Initial Weight (lbs): 257.8 lbs  Last Visits Weight: 86.6 kg (191 lb)  Cumulative weight loss (lbs): 74.8  Weight Loss Percentage: 29.01%        9/17/2023     2:18 PM   Changes and Difficulties   I have made the following changes to my diet since my last visit: Smaller meals, protein shakes   With regards to my diet, I am still struggling with: Not wanting to cook and just eating more snack-type foods   I have made the following changes to my activity/exercise since my last visit: Frank   With regards to my activity/exercise, I am still struggling with: Energy       VITALS:  Ht 1.651 m (5' 5\")   Wt 83 kg (183 lb)   BMI 30.45 kg/m      MEDICATIONS:   Current Outpatient Medications   Medication Sig Dispense Refill    buPROPion (WELLBUTRIN XL) 300 MG 24 hr tablet Take 1 tablet (300 mg) by mouth daily 90 tablet 0    drospirenone-ethinyl estradiol (ALISON) 3-0.03 MG tablet Take 1 tablet by mouth daily 90 tablet 4    predniSONE (DELTASONE) 50 MG tablet Emergency set: if severe allergic reaction take immediately 100mg Prednisone (2x50mg) and 2 Tabl Cetirizine 10mg and then write precise 12h retrospective diary.If less severe reaction take only the 2 Tabl Cetirizine 2 tablet 3    Semaglutide-Weight Management (WEGOVY) 1.7 MG/0.75ML pen Inject 1.7 mg Subcutaneous once a week 3 mL 3    Vitamin D3 (CHOLECALCIFEROL) 125 MCG (5000 UT) tablet Take 1 tablet by mouth daily             9/17/2023     2:18 PM   Weight Loss Medication History Reviewed With Patient "   Which weight loss medications are you currently taking on a regular basis? Wegovy    Bupropion   Are you having any side effects from the weight loss medication that we have prescribed you? Yes   If you are having side effects please describe: Contstpation            ASSESSMENT:   Debbie is a 31 yo F who is following up for obesity    PLAN:   --wegovy to 2.4 mg weekly  --planning RTC 4-6 mo  --additional plans as above        Time: 22 min spent on evaluation, management, counseling, education, & motivational interviewing (video) comined with previsit prep and post visit follow up care same day  Pt located in [place] and agrees to telehealth visit      Sincerely,    Natalie Carbajal MD

## 2023-09-22 NOTE — PATIENT INSTRUCTIONS
"Welcome to our weight management program!   We are excited to join you on your weight loss journey    Thank you for allowing us the privilege of caring for you. We hope we provided you with the excellent service you deserve.   Please let us know if there is anything else we can do for you so that we can be sure you are leaving completely satisfied with your care experience.    To ensure the quality of our services you may be receiving a patient satisfaction survey from an independent patient satisfaction monitoring company.    The greatest compliment you can give is a \"Likely to Recommend\"    You saw Dr. Carbajal today.    Instructions per today's visit:   --will increase the Wegovy to 2.4 mg weekly  --we are planning return visit in about 4-6 mo  --use this med change in support of healthy wt loss goals      Interested in working with a health ?  Health coaches work with you to improve your overall health and wellbeing.  They look at the whole person, and may involve discussion of different areas of life, including, but not limited to the four pillars of health (sleep, exercise, nutrition, and stress management). Discuss with your care team if you would like to start working a health .  Health Coaching-3 Pack:    $99 for three health coaching visits    Visits may be done in person or via phone    Coaching is a partnership between the  and the client; Coaches do not prescribe or diagnose    Coaching helps inspire the client to reach his/her personal goals     For any questions/concerns contact Sophia Fletcher LPN at [phone]     To schedule appointments with our team, please call [phone]     Please call during clinic hours Monday through Friday 8:00a - 4:00p if you have questions or you can contact us via [employer] at anytime. ?    Lab results will be communicated through My Chart or letter (if My Chart not used). Please call the clinic if you have not received communication after 1 week or if you " have any questions.?      Fax: [phone]    Thank you,  Medical Weight Management Team

## 2023-09-29 ENCOUNTER — TELEPHONE (OUTPATIENT)
Dept: ENDOCRINOLOGY | Facility: CLINIC | Age: 33
End: 2023-09-29
Payer: COMMERCIAL

## 2023-09-29 NOTE — TELEPHONE ENCOUNTER
[facility] the [facility]     Return in about 4 months (around 2/5/2024) with Dr. Carbajal

## 2023-09-30 ENCOUNTER — HOSPITAL ENCOUNTER (EMERGENCY)
Facility: CLINIC | Age: 33
Discharge: HOME | End: 2023-09-30
Attending: EMERGENCY MEDICINE | Admitting: EMERGENCY MEDICINE
Payer: COMMERCIAL

## 2023-09-30 VITALS
WEIGHT: 180 LBS | OXYGEN SATURATION: 98 % | SYSTOLIC BLOOD PRESSURE: 114 MMHG | HEART RATE: 85 BPM | HEIGHT: 65 IN | BODY MASS INDEX: 29.99 KG/M2 | DIASTOLIC BLOOD PRESSURE: 84 MMHG | RESPIRATION RATE: 18 BRPM | TEMPERATURE: 97.6 F

## 2023-09-30 DIAGNOSIS — R55 PRE-SYNCOPE: ICD-10-CM

## 2023-09-30 LAB
ALBUMIN SERPL BCG-MCNC: 4.4 G/DL (ref 3.5–5.2)
ALP SERPL-CCNC: 62 U/L (ref 35–104)
ALT SERPL W P-5'-P-CCNC: 15 U/L (ref 0–50)
ANION GAP SERPL CALCULATED.3IONS-SCNC: 13 MMOL/L (ref 7–15)
AST SERPL W P-5'-P-CCNC: 14 U/L (ref 0–45)
BASOPHILS # BLD AUTO: 0 10E3/UL (ref 0–0.2)
BASOPHILS NFR BLD AUTO: 0 %
BILIRUB SERPL-MCNC: 0.2 MG/DL
BUN SERPL-MCNC: 8.9 MG/DL (ref 6–20)
CALCIUM SERPL-MCNC: 8.5 MG/DL (ref 8.6–10)
CHLORIDE SERPL-SCNC: 108 MMOL/L (ref 98–107)
CREAT SERPL-MCNC: 0.75 MG/DL (ref 0.51–0.95)
DEPRECATED HCO3 PLAS-SCNC: 21 MMOL/L (ref 22–29)
EGFRCR SERPLBLD CKD-EPI 2021: >90 ML/MIN/1.73M2
EOSINOPHIL # BLD AUTO: 0 10E3/UL (ref 0–0.7)
EOSINOPHIL NFR BLD AUTO: 0 %
ERYTHROCYTE [DISTWIDTH] IN BLOOD BY AUTOMATED COUNT: 12.8 % (ref 10–15)
GLUCOSE SERPL-MCNC: 93 MG/DL (ref 70–99)
HCT VFR BLD AUTO: 39.9 % (ref 35–47)
HGB BLD-MCNC: 13.2 G/DL (ref 11.7–15.7)
HOLD SPECIMEN: NORMAL
HOLD SPECIMEN: NORMAL
IMM GRANULOCYTES # BLD: 0 10E3/UL
IMM GRANULOCYTES NFR BLD: 0 %
LYMPHOCYTES # BLD AUTO: 1.3 10E3/UL (ref 0.8–5.3)
LYMPHOCYTES NFR BLD AUTO: 13 %
MAGNESIUM SERPL-MCNC: 1.9 MG/DL (ref 1.7–2.3)
MCH RBC QN AUTO: 29.7 PG (ref 26.5–33)
MCHC RBC AUTO-ENTMCNC: 33.1 G/DL (ref 31.5–36.5)
MCV RBC AUTO: 90 FL (ref 78–100)
MONOCYTES # BLD AUTO: 0.6 10E3/UL (ref 0–1.3)
MONOCYTES NFR BLD AUTO: 7 %
NEUTROPHILS # BLD AUTO: 7.7 10E3/UL (ref 1.6–8.3)
NEUTROPHILS NFR BLD AUTO: 80 %
NRBC # BLD AUTO: 0 10E3/UL
NRBC BLD AUTO-RTO: 0 /100
PLATELET # BLD AUTO: 412 10E3/UL (ref 150–450)
POTASSIUM SERPL-SCNC: 3.7 MMOL/L (ref 3.4–5.3)
PROT SERPL-MCNC: 7.3 G/DL (ref 6.4–8.3)
RBC # BLD AUTO: 4.44 10E6/UL (ref 3.8–5.2)
SODIUM SERPL-SCNC: 142 MMOL/L (ref 135–145)
TROPONIN T SERPL HS-MCNC: <6 NG/L
TSH SERPL DL<=0.005 MIU/L-ACNC: 1.21 UIU/ML (ref 0.3–4.2)
WBC # BLD AUTO: 9.7 10E3/UL (ref 4–11)

## 2023-09-30 PROCEDURE — 80053 COMPREHEN METABOLIC PANEL: CPT | Performed by: EMERGENCY MEDICINE

## 2023-09-30 PROCEDURE — 99283 EMERGENCY DEPT VISIT LOW MDM: CPT | Mod: 25 | Performed by: EMERGENCY MEDICINE

## 2023-09-30 PROCEDURE — 83735 ASSAY OF MAGNESIUM: CPT | Performed by: EMERGENCY MEDICINE

## 2023-09-30 PROCEDURE — 84443 ASSAY THYROID STIM HORMONE: CPT | Performed by: EMERGENCY MEDICINE

## 2023-09-30 PROCEDURE — 85025 COMPLETE CBC W/AUTO DIFF WBC: CPT | Performed by: EMERGENCY MEDICINE

## 2023-09-30 PROCEDURE — 93010 ELECTROCARDIOGRAM REPORT: CPT | Performed by: EMERGENCY MEDICINE

## 2023-09-30 PROCEDURE — 36415 COLL VENOUS BLD VENIPUNCTURE: CPT | Performed by: EMERGENCY MEDICINE

## 2023-09-30 PROCEDURE — 93005 ELECTROCARDIOGRAM TRACING: CPT | Performed by: EMERGENCY MEDICINE

## 2023-09-30 PROCEDURE — 99284 EMERGENCY DEPT VISIT MOD MDM: CPT | Performed by: EMERGENCY MEDICINE

## 2023-09-30 PROCEDURE — 84484 ASSAY OF TROPONIN QUANT: CPT | Performed by: EMERGENCY MEDICINE

## 2023-09-30 ASSESSMENT — ACTIVITIES OF DAILY LIVING (ADL): ADLS_ACUITY_SCORE: 35

## 2023-09-30 NOTE — ED TRIAGE NOTES
"Triage Assessment & Note:    /79   Pulse 98   Temp 97.6  F (36.4  C) (Oral)   Resp 16   Ht 1.651 m (5' 5\")   Wt 81.6 kg (180 lb)   SpO2 98%   BMI 29.95 kg/m      Patient presents with: PT BIB[facility]. PT c/o near syncope. PT reports feeling like she would almost pass out but did not today. PT has had syncopal episodes in the past.     Home Treatments/Remedies: EMS  IV fluids 500 cc    Febrile / Afebrile? Afebrile     Duration of C/o:  1 day    Gabo Temple RN  September 30, 2023       Triage Assessment       Row Name 09/30/23 1637       Triage Assessment (Adult)    Airway WDL WDL       Respiratory WDL    Respiratory WDL WDL       Cardiac WDL    Cardiac WDL WDL                    "

## 2023-09-30 NOTE — ED PROVIDER NOTES
"ED Provider Note  [facility]      History     Chief Complaint   Patient presents with    Syncope     HPI  Debbie Bah is a 33 year old female who presents to the ER for evaluation of syncope.    Patient states she had an episode of syncope earlier today.  She has a long history of syncope in the past.  She states today she was walking with a friend, got lightheaded.  She drank water, went home.  The lightheaded feeling persisted throughout the day.  She took her blood pressure, came back about 160/100s which is high for her.  She is normally at 110/80.  She reports feeling faint, like she was going to pass out continually throughout the day.  She reports tingling in her hands and feet and sweatiness. No abdominal pain reported.  No nausea or vomiting.  No fever.  No pain. EKG and vitals in the ambulance were fine.  At present, feels improved but still \"slightly woozy\".  No recent illnesses.  She is having a back herniation was being worked up, not sure if this is contributing to her anxiety and thus making her lightheadedness worse.  Her PCP has done labs for this issue before but has found no determined cause.  Patient states she was previously not worried, as these episodes would not last this long.      No other symptoms noted.      Past Medical History  Past Medical History:   Diagnosis Date    Binge eating disorder     Depression 2015     Past Surgical History:   Procedure Laterality Date    NO HISTORY OF SURGERY       buPROPion (WELLBUTRIN XL) 300 MG 24 hr tablet  drospirenone-ethinyl estradiol (ALISON) 3-0.03 MG tablet  predniSONE (DELTASONE) 50 MG tablet  Semaglutide-Weight Management (WEGOVY) 2.4 MG/0.75ML pen  Vitamin D3 (CHOLECALCIFEROL) 125 MCG (5000 UT) tablet      Allergies   Allergen Reactions    Doxycycline      esophagitis    Mushroom Cramps, Diarrhea and GI Disturbance     Family History  Family History   Problem Relation Age of Onset    Asthma Mother     " "Hypertension Mother     Insomnia Mother     Hyperlipidemia Father     Parkinsonism Maternal Grandmother     Coronary Artery Disease Maternal Grandfather 65    Arthritis Paternal Grandmother     Cerebrovascular Disease Paternal Grandmother 75    Cerebrovascular Disease Paternal Grandfather 82    Insomnia Brother     No Known Problems Brother     Depression Half-Sister     Anxiety Disorder Half-Sister     Diabetes No family hx of     Breast Cancer No family hx of     Glaucoma No family hx of     Macular Degeneration No family hx of      Social History   Social History     Tobacco Use    Smoking status: Never    Smokeless tobacco: Never   Vaping Use    Vaping Use: Never used   Substance Use Topics    Alcohol use: Yes     Alcohol/week: 0.0 standard drinks of alcohol     Comment: 0-1 per week    Drug use: No         A complete review of systems was performed with pertinent positives and negatives noted in the HPI, and all other systems negative.    Physical Exam   BP: 127/79  Pulse: 98  Temp: 97.6  F (36.4  C)  Resp: 16  Height: 165.1 cm (5' 5\")  Weight: 81.6 kg (180 lb)  SpO2: 98 %  Physical Exam  Vitals and nursing note reviewed.   Constitutional:       General: She is not in acute distress.     Appearance: She is well-developed. She is not diaphoretic.   HENT:      Head: Normocephalic and atraumatic.      Mouth/Throat:      Pharynx: No oropharyngeal exudate.   Eyes:      General: No scleral icterus.        Right eye: No discharge.         Left eye: No discharge.      Pupils: Pupils are equal, round, and reactive to light.   Cardiovascular:      Rate and Rhythm: Normal rate and regular rhythm.      Heart sounds: Normal heart sounds. No murmur heard.     No friction rub. No gallop.   Pulmonary:      Effort: Pulmonary effort is normal. No respiratory distress.      Breath sounds: Normal breath sounds. No wheezing.   Chest:      Chest wall: No tenderness.   Abdominal:      General: Bowel sounds are normal. There is no " distension.      Palpations: Abdomen is soft.      Tenderness: There is no abdominal tenderness.   Musculoskeletal:         General: No tenderness or deformity. Normal range of motion.      Cervical back: Normal range of motion and neck supple.   Skin:     General: Skin is warm and dry.      Coloration: Skin is not pale.      Findings: No erythema or rash.   Neurological:      Mental Status: She is alert and oriented to person, place, and time.      Cranial Nerves: No cranial nerve deficit.           ED Course, Procedures, & Data      Procedures            EKG Interpretation:      Interpreted by Momo Cruz DO  Time reviewed: 1912  Symptoms at time of EKG: lightheadedness   Rhythm: normal sinus   Rate: 95  Axis: Right Axis Deviation  Ectopy: none  Conduction: normal  ST Segments/ T Waves: No ST-T wave changes  Q Waves: none  Comparison to prior: No old EKG available    Clinical Impression: no acute abnormalities                       No results found for any visits on 09/30/23.  Medications - No data to display  Labs Ordered and Resulted from Time of ED Arrival to Time of ED Departure - No data to display  No orders to display              Assessment & Plan    This is a 33-year-old female who presents with presyncope.  Patient has had history of this but today symptoms were longer lasting than usual.  No known precipitating factors.  No other associated symptoms with this.  Exam demonstrates no acute abnormalities.  ECG shows no acute abnormalities.  Lab work including troponin and TSH show no acute abnormalities.  CO2 is slightly low at 21 and calcium is slightly low at 8.5.  Patient was given IV fluids.  I discussed all results with patient.  At this time we do not know the cause of patient's symptoms and she will need further work-up for this.  She is comfortable getting this done as an outpatient and does have a primary care physician.  We will discharge with return precautions.    I have reviewed the  nursing notes. I have reviewed the findings, diagnosis, plan and need for follow up with the patient.    New Prescriptions    No medications on file       Final diagnoses:   None     I, Gabbi Encarnacion, am serving as a trained medical scribe to document services personally performed by Buddy Francisco DO, based on the provider's statements to me.     IBuddy DO, was physically present and have reviewed and verified the accuracy of this note documented by Gabbi Encarnacion.    BUDDY FRANCISCO DO   [facility] EMERGENCY DEPARTMENT  9/30/2023     Buddy Francisco DO  09/30/23 0875

## 2023-10-01 LAB
ATRIAL RATE - MUSE: 95 BPM
DIASTOLIC BLOOD PRESSURE - MUSE: NORMAL MMHG
INTERPRETATION ECG - MUSE: NORMAL
P AXIS - MUSE: 52 DEGREES
PR INTERVAL - MUSE: 142 MS
QRS DURATION - MUSE: 78 MS
QT - MUSE: 358 MS
QTC - MUSE: 449 MS
R AXIS - MUSE: 101 DEGREES
SYSTOLIC BLOOD PRESSURE - MUSE: NORMAL MMHG
T AXIS - MUSE: 45 DEGREES
VENTRICULAR RATE- MUSE: 95 BPM

## 2023-10-02 ENCOUNTER — MYC MEDICAL ADVICE (OUTPATIENT)
Dept: FAMILY MEDICINE | Facility: CLINIC | Age: 33
End: 2023-10-02
Payer: COMMERCIAL

## 2023-10-05 ENCOUNTER — VIRTUAL VISIT (OUTPATIENT)
Dept: FAMILY MEDICINE | Facility: CLINIC | Age: 33
End: 2023-10-05
Payer: COMMERCIAL

## 2023-10-05 DIAGNOSIS — F41.8 SITUATIONAL ANXIETY: ICD-10-CM

## 2023-10-05 DIAGNOSIS — R55 PRE-SYNCOPE: Primary | ICD-10-CM

## 2023-10-05 DIAGNOSIS — R53.83 FATIGUE, UNSPECIFIED TYPE: ICD-10-CM

## 2023-10-05 PROCEDURE — 99215 OFFICE O/P EST HI 40 MIN: CPT | Mod: 95 | Performed by: NURSE PRACTITIONER

## 2023-10-05 RX ORDER — BUPROPION HYDROCHLORIDE 150 MG/1
150 TABLET ORAL EVERY MORNING
Qty: 30 TABLET | Refills: 0 | Status: SHIPPED | OUTPATIENT
Start: 2023-10-05 | End: 2023-10-25

## 2023-10-05 RX ORDER — PROPRANOLOL HYDROCHLORIDE 20 MG/1
20 TABLET ORAL 3 TIMES DAILY
Qty: 30 TABLET | Refills: 0 | Status: SHIPPED | OUTPATIENT
Start: 2023-10-05 | End: 2024-05-07

## 2023-10-05 ASSESSMENT — PATIENT HEALTH QUESTIONNAIRE - PHQ9
SUM OF ALL RESPONSES TO PHQ QUESTIONS 1-9: 1
SUM OF ALL RESPONSES TO PHQ QUESTIONS 1-9: 1
10. IF YOU CHECKED OFF ANY PROBLEMS, HOW DIFFICULT HAVE THESE PROBLEMS MADE IT FOR YOU TO DO YOUR WORK, TAKE CARE OF THINGS AT HOME, OR GET ALONG WITH OTHER PEOPLE: NOT DIFFICULT AT ALL

## 2023-10-05 NOTE — PATIENT INSTRUCTIONS
Decrease bupropion  mg for the next two weeks and we can check in how that is going    Schedule ECHO either on MyChart or calling the [place] number    Schedule with cardiology for subspecialty in POTS/autonomic nervous system - Dr. Reid?    Ok to have propranolol on hand and even take with anxiety related to whatever provoking factor. If it is a pre-syncope episode provoking anxiety, it has potential to make you feel worse.    Think about trying an electrolyte packet mix with pre-syncope symptoms    Vagal tone strengthening - regular exercise (doesn't have to be super robust!), yoga, Berto Chi, strength training  In the moment - cold water therapy, humming, chest tapping

## 2023-10-05 NOTE — PROGRESS NOTES
Debbie is a 33 year old who is being evaluated via a billable video visit.      How would you like to obtain your AVS? [employer]  If the video visit is dropped, the invitation should be resent by: Text to cell phone: [phone]  Will anyone else be joining your video visit? No      Assessment & Plan     Pre-syncope  Lifelong symptoms with recent more significant episode. Wasn't accompanied by elevated HR or hypotension and symptoms weren't remediated by fluids so not entirely suggestive of POTS, but given presentation of long term symptoms, reasonable to have cardiology eval for autonomic nervous system regulation condition.  - Echocardiogram Complete; Future  - Adult Cardiology Eval  Referral; Future    Fatigue, unspecified type  With increased anxiety we will try decreasing bupropion dose some. Depression has been doing very well. The bupropion can cause symptoms of elevated BP, dizziness, etc. Debbie has an appt in two weeks and we'll follow-up then.   - buPROPion (WELLBUTRIN XL) 150 MG 24 hr tablet; Take 1 tablet (150 mg) by mouth every morning    Situational anxiety  Ok to have on hand for any situational anxiety circumstance. If it is anxiety from pre-syncope, it could make symptoms feel worse.   - propranolol (INDERAL) 20 MG tablet; Take 1 tablet (20 mg) by mouth 3 times daily    Ordering of each unique test  Prescription drug management  I spent a total of 53 minutes on the day of the visit.   Time spent by me doing chart review, history and exam, documentation and further activities per the note      Patient Instructions   Decrease bupropion  mg for the next two weeks and we can check in how that is going    Schedule ECHO either on [place] or calling the [place] number    Schedule with cardiology for subspecialty in POTS/autonomic nervous system - Dr. Reid?    Ok to have propranolol on hand and even take with anxiety related to whatever provoking factor. If it is a pre-syncope episode  provoking anxiety, it has potential to make you feel worse.    Think about trying an electrolyte packet mix with pre-syncope symptoms    Vagal tone strengthening - regular exercise (doesn't have to be super robust!), yoga, Berto Chi, strength training  In the moment - cold water therapy, humming, chest tapping    Sarahi Santos, HARJINDER CNP  [facility]AL[facility] Lewis is a 33 year old, presenting for the following health issues:   Follow Up and Heart Problem      HPI     History of syncope. We've done labs previously last year when episodes became more random and found no clear etiology. Went to ED last weekend with episode of faintness during a walk. Took BP and it was elevated, felt shaky. Pulse was in 90s.  No history of extertional symptoms of chest pain, SOB, dizziness.    In ED, had fluids, labs, EKG. Suggested possibly follow-up with ECHO and/or tilt-table testing. Since Saturday has had a couple occurrences of feeling faint, but none as severe as over the weekend.    Being worked up for back pain - wondering if this could be related.    Depression - going well, feels in a great spot. Would never had felt she was an anxious person previously. Has been on bupropion  mg dose for several years. Wondering if propranolol could be helpful.    Weight management - currently taking semaglutide 1.7 mg weekly    Not getting much exercise with work schedule. Doing physical therapy for back pain.      Review of Systems   Constitutional, HEENT, cardiovascular, pulmonary, gi and gu systems are negative, except as otherwise noted.      Objective           Vitals:  No vitals were obtained today due to virtual visit.    Physical Exam   GENERAL: Healthy, alert and no distress  EYES: Eyes grossly normal to inspection.  No discharge or erythema, or obvious scleral/conjunctival abnormalities.  RESP: No audible wheeze, cough, or visible cyanosis.  No visible retractions or increased work of  breathing.    SKIN: Visible skin clear. No significant rash, abnormal pigmentation or lesions.  NEURO: Cranial nerves grossly intact.  Mentation and speech appropriate for age.  PSYCH: Mentation appears normal, affect normal/bright, judgement and insight intact, normal speech and appearance well-groomed.          Video-Visit Details    Type of service:  Video Visit     Originating Location (pt. Location): Home    Distant Location (provider location):  Off-site  Platform used for Video Visit: Estelle

## 2023-10-07 ENCOUNTER — HEALTH MAINTENANCE LETTER (OUTPATIENT)
Age: 33
End: 2023-10-07

## 2023-10-10 DIAGNOSIS — E66.811 CLASS 1 OBESITY DUE TO EXCESS CALORIES WITHOUT SERIOUS COMORBIDITY IN ADULT, UNSPECIFIED BMI: Primary | ICD-10-CM

## 2023-10-10 DIAGNOSIS — E66.09 CLASS 1 OBESITY DUE TO EXCESS CALORIES WITHOUT SERIOUS COMORBIDITY IN ADULT, UNSPECIFIED BMI: Primary | ICD-10-CM

## 2023-10-10 NOTE — TELEPHONE ENCOUNTER
Patient was recently seen by Dr. Carbajal. Plan was to increase dose to 2.4mg. Patient sent [employer] message requesting to continue at 1.7mg dose, as she is still losing weight. Routed to RN/ADAM for sign off.

## 2023-10-11 NOTE — TELEPHONE ENCOUNTER
RECORDS RECEIVED FROM:    DATE RECEIVED:    NOTES STATUS DETAILS   OFFICE NOTE from referring provider  Internal DILAN Santos 10-5-23   OFFICE NOTE from other cardiologists  N/A    RECORDS from hospital/ED N/A    MEDICATION LIST Internal    GENERAL CARDIO RECORDS   (ALL APPOINTMENT TYPES)     LABS (CBC,BMP,CMP, TSH) Internal    EKG (STRIPS & REPORTS) Internal 9-30-23   MONITORS (STRIPS & REPORTS) N/A    ECHOS (IMAGES AND REPORTS) Internal 10-19-23 Scheduled   STRESS TESTS (IMAGES AND REPORTS) N/A    cMRI (IMAGES AND REPORTS) N/A    CT/CTA (IMAGES AND REPORTS) N/A    NEW POTS     NEUROLOGY OFFICE NOTES N/A    ICD/PACEMAKER IMPLANT No    CARDIOVERSION N/A    TILT TABLE STUDIES N/A

## 2023-10-18 ASSESSMENT — ENCOUNTER SYMPTOMS
BREAST MASS: 0
NERVOUS/ANXIOUS: 1

## 2023-10-19 ENCOUNTER — ANCILLARY PROCEDURE (OUTPATIENT)
Dept: CARDIOLOGY | Facility: CLINIC | Age: 33
End: 2023-10-19
Attending: NURSE PRACTITIONER
Payer: COMMERCIAL

## 2023-10-19 ENCOUNTER — MYC MEDICAL ADVICE (OUTPATIENT)
Dept: FAMILY MEDICINE | Facility: CLINIC | Age: 33
End: 2023-10-19

## 2023-10-19 DIAGNOSIS — F41.8 SITUATIONAL ANXIETY: Primary | ICD-10-CM

## 2023-10-19 DIAGNOSIS — R55 PRE-SYNCOPE: ICD-10-CM

## 2023-10-19 LAB — LVEF ECHO: NORMAL

## 2023-10-23 RX ORDER — HYDROXYZINE HYDROCHLORIDE 25 MG/1
25 TABLET, FILM COATED ORAL 3 TIMES DAILY PRN
Qty: 30 TABLET | Refills: 0 | Status: SHIPPED | OUTPATIENT
Start: 2023-10-23

## 2023-10-24 ASSESSMENT — PATIENT HEALTH QUESTIONNAIRE - PHQ9
SUM OF ALL RESPONSES TO PHQ QUESTIONS 1-9: 2
10. IF YOU CHECKED OFF ANY PROBLEMS, HOW DIFFICULT HAVE THESE PROBLEMS MADE IT FOR YOU TO DO YOUR WORK, TAKE CARE OF THINGS AT HOME, OR GET ALONG WITH OTHER PEOPLE: NOT DIFFICULT AT ALL
SUM OF ALL RESPONSES TO PHQ QUESTIONS 1-9: 2

## 2023-10-24 NOTE — RESULT ENCOUNTER NOTE
Debbie,    Your ECHO was normal.  If you have any questions, please feel free to contact the clinic.    MARCOS Stark

## 2023-10-25 ENCOUNTER — OFFICE VISIT (OUTPATIENT)
Dept: FAMILY MEDICINE | Facility: CLINIC | Age: 33
End: 2023-10-25
Payer: COMMERCIAL

## 2023-10-25 VITALS
HEART RATE: 88 BPM | TEMPERATURE: 98 F | WEIGHT: 182 LBS | RESPIRATION RATE: 14 BRPM | DIASTOLIC BLOOD PRESSURE: 72 MMHG | BODY MASS INDEX: 30.29 KG/M2 | OXYGEN SATURATION: 98 % | SYSTOLIC BLOOD PRESSURE: 112 MMHG

## 2023-10-25 DIAGNOSIS — F41.8 SITUATIONAL ANXIETY: ICD-10-CM

## 2023-10-25 DIAGNOSIS — R53.83 FATIGUE, UNSPECIFIED TYPE: ICD-10-CM

## 2023-10-25 DIAGNOSIS — Z30.41 ENCOUNTER FOR SURVEILLANCE OF CONTRACEPTIVE PILLS: ICD-10-CM

## 2023-10-25 DIAGNOSIS — Z00.00 ROUTINE GENERAL MEDICAL EXAMINATION AT A HEALTH CARE FACILITY: Primary | ICD-10-CM

## 2023-10-25 PROCEDURE — 99395 PREV VISIT EST AGE 18-39: CPT | Performed by: NURSE PRACTITIONER

## 2023-10-25 PROCEDURE — 99213 OFFICE O/P EST LOW 20 MIN: CPT | Mod: 25 | Performed by: NURSE PRACTITIONER

## 2023-10-25 RX ORDER — BUPROPION HYDROCHLORIDE 150 MG/1
150 TABLET ORAL EVERY MORNING
Qty: 90 TABLET | Refills: 3 | Status: SHIPPED | OUTPATIENT
Start: 2023-10-25 | End: 2023-10-25

## 2023-10-25 RX ORDER — DROSPIRENONE AND ETHINYL ESTRADIOL 0.03MG-3MG
1 KIT ORAL DAILY
Qty: 90 TABLET | Refills: 4 | Status: SHIPPED | OUTPATIENT
Start: 2023-10-25 | End: 2023-10-25

## 2023-10-25 RX ORDER — BUPROPION HYDROCHLORIDE 150 MG/1
150 TABLET ORAL EVERY MORNING
Qty: 90 TABLET | Refills: 3 | Status: SHIPPED | OUTPATIENT
Start: 2023-10-25 | End: 2024-09-17

## 2023-10-25 RX ORDER — DROSPIRENONE AND ETHINYL ESTRADIOL 0.03MG-3MG
1 KIT ORAL DAILY
Qty: 90 TABLET | Refills: 4 | Status: SHIPPED | OUTPATIENT
Start: 2023-10-25 | End: 2024-05-07

## 2023-10-25 ASSESSMENT — ENCOUNTER SYMPTOMS
NERVOUS/ANXIOUS: 1
BREAST MASS: 0

## 2023-10-25 ASSESSMENT — PAIN SCALES - GENERAL: PAINLEVEL: NO PAIN (0)

## 2023-10-25 NOTE — PROGRESS NOTES
SUBJECTIVE:   CC: Debbie is an 33 year old who presents for preventive health visit.       10/25/2023     8:10 AM   Additional Questions   Roomed by Jose Luis ONTIVEROS       Healthy Habits:     Getting at least 3 servings of Calcium per day:  Yes    Bi-annual eye exam:  Yes    Dental care twice a year:  Yes    Sleep apnea or symptoms of sleep apnea:  None    Diet:  Regular (no restrictions)    Frequency of exercise:  None    Taking medications regularly:  Yes    Additional concerns today:  No    Feeling better in past couple weeks. ECHO was normal.   Hasn't started hydroxyzine yet. Unsure if will take consistently.  Will schedule flu and covid for the next month  Decreased bupropion XL from 300 mg to 150 mg a couple weeks ago. No worsening of mood symptoms.   Taking Zenia continuously. Has insurance issues with getting covered.  Decreased to semaglutide from 1.7 mg to 1 mg because fullness was triggering symptoms.    Today's PHQ-9 Score:       10/24/2023     8:36 PM   PHQ-9 SCORE   PHQ-9 Total Score MyChart 2 (Minimal depression)   PHQ-9 Total Score 2       Social History     Tobacco Use    Smoking status: Never    Smokeless tobacco: Never   Substance Use Topics    Alcohol use: Yes     Alcohol/week: 0.0 standard drinks of alcohol     Comment: 0-1 per week         10/18/2023     4:31 PM   Alcohol Use   Prescreen: >3 drinks/day or >7 drinks/week? No     Reviewed orders with patient.  Reviewed health maintenance and updated orders accordingly - Yes  Lab work is in process  Labs reviewed in EPIC    Breast Cancer Screenin/2/2022    10:36 AM   Breast CA Risk Assessment (FHS-7)   Do you have a family history of breast, colon, or ovarian cancer? No / Unknown       click delete button to remove this line now  Patient under 40 years of age: Routine Mammogram Screening not recommended.   Pertinent mammograms are reviewed under the imaging tab.    History of abnormal Pap smear: NO - age 30-65 PAP every 5 years with  negative HPV co-testing recommended      6/21/2021     9:41 AM 4/18/2018     2:26 PM   PAP / HPV   PAP (Historical) NIL  NIL      Reviewed and updated as needed this visit by clinical staff   Tobacco   Meds              Reviewed and updated as needed this visit by Provider                   Review of Systems   Breasts:  Negative for tenderness, breast mass and discharge.   Genitourinary:  Negative for pelvic pain, vaginal bleeding and vaginal discharge.   Psychiatric/Behavioral:  The patient is nervous/anxious.         OBJECTIVE:   /72 (BP Location: Left arm, Patient Position: Sitting, Cuff Size: Adult Regular)   Pulse 88   Temp 98  F (36.7  C) (Temporal)   Resp 14   Wt 82.6 kg (182 lb)   LMP 09/24/2023   SpO2 98%   BMI 30.29 kg/m    Physical Exam  GENERAL: healthy, alert and no distress  EYES: Eyes grossly normal to inspection, PERRL and conjunctivae and sclerae normal  HENT: ear canals and TM's normal, nose and mouth without ulcers or lesions  NECK: no adenopathy, no asymmetry, masses, or scars and thyroid normal to palpation  RESP: lungs clear to auscultation - no rales, rhonchi or wheezes  BREAST: normal without masses, tenderness or nipple discharge and no palpable axillary masses or adenopathy  CV: regular rate and rhythm, normal S1 S2, no S3 or S4, no murmur, click or rub, no peripheral edema and peripheral pulses strong  ABDOMEN: soft, nontender, no hepatosplenomegaly, no masses and bowel sounds normal  MS: no gross musculoskeletal defects noted, no edema  SKIN: no suspicious lesions or rashes  NEURO: Normal strength and tone, mentation intact and speech normal  PSYCH: mentation appears normal, affect normal/bright      ASSESSMENT/PLAN:   (Z00.00) Routine general medical examination at a health care facility  (primary encounter diagnosis)  Comment:   Plan:     (R53.83) Fatigue, unspecified type  Comment:   Plan: buPROPion (WELLBUTRIN XL) 150 MG 24 hr tablet,   Suspicion that bupropion   "mg was contributing to anxiety/vasovagal episodes. Reduced to 150 mg. Doing well on reduced bupropion thus far.    (Z30.41) Encounter for surveillance of contraceptive pills  Comment:   Plan: drospirenone-ethinyl estradiol (ZENIA) 3-0.03         MG tablet  No plans for conception at this time. Doing well on Zenia and would like to continue. Has had pharmacy dispensation issues previously, but ok with current supply. Order is written for proper daily dosing so suspect quantity limitation from payer.     (F41.8) Situational anxiety  Comment:   Plan: Has hydroxyzine, but hasn't tried yet. Not sure if she will. Overall, symptoms are slowly improving and feels comforted by learning about other experiences where symptoms entirely resolved.     Patient has been advised of split billing requirements and indicates understanding: Yes      COUNSELING:  Reviewed preventive health counseling, as reflected in patient instructions      BMI:   Estimated body mass index is 30.29 kg/m  as calculated from the following:    Height as of 9/30/23: 1.651 m (5' 5\").    Weight as of this encounter: 82.6 kg (182 lb).   Weight management plan: endocrinology      She reports that she has never smoked. She has never used smokeless tobacco.          HARJINDER Vasquez CNP  [facility]  "

## 2023-12-19 DIAGNOSIS — E66.811 CLASS 1 OBESITY DUE TO EXCESS CALORIES WITHOUT SERIOUS COMORBIDITY IN ADULT, UNSPECIFIED BMI: Primary | ICD-10-CM

## 2023-12-19 DIAGNOSIS — E66.09 CLASS 1 OBESITY DUE TO EXCESS CALORIES WITHOUT SERIOUS COMORBIDITY IN ADULT, UNSPECIFIED BMI: Primary | ICD-10-CM

## 2023-12-19 RX ORDER — PHENTERMINE HYDROCHLORIDE 37.5 MG/1
TABLET ORAL
Qty: 15 TABLET | Refills: 3 | Status: SHIPPED | OUTPATIENT
Start: 2023-12-19 | End: 2024-01-27

## 2024-01-11 ENCOUNTER — PRE VISIT (OUTPATIENT)
Dept: CARDIOLOGY | Facility: CLINIC | Age: 34
End: 2024-01-11
Payer: COMMERCIAL

## 2024-01-11 ENCOUNTER — VIRTUAL VISIT (OUTPATIENT)
Dept: CARDIOLOGY | Facility: CLINIC | Age: 34
End: 2024-01-11
Attending: INTERNAL MEDICINE
Payer: COMMERCIAL

## 2024-01-11 ENCOUNTER — TELEPHONE (OUTPATIENT)
Dept: CARDIOLOGY | Facility: CLINIC | Age: 34
End: 2024-01-11

## 2024-01-11 VITALS — WEIGHT: 185 LBS | BODY MASS INDEX: 30.82 KG/M2 | HEIGHT: 65 IN

## 2024-01-11 DIAGNOSIS — R55 PRE-SYNCOPE: ICD-10-CM

## 2024-01-11 PROCEDURE — 99205 OFFICE O/P NEW HI 60 MIN: CPT | Mod: 95 | Performed by: INTERNAL MEDICINE

## 2024-01-11 ASSESSMENT — PAIN SCALES - GENERAL: PAINLEVEL: NO PAIN (0)

## 2024-01-11 NOTE — TELEPHONE ENCOUNTER
Left Voicemail (1st Attempt) and Sent Mychart (1st Attempt) for the patient to call back and schedule the following:    Appointment type: Return EP  Provider: Sheryl Maxwell, Suzie Smith, or Maritza Moreno   Return date: Around 4/11/2024  Specialty phone number: [phone] option 1  Additional appointment(s) needed: N/A  Additional Notes: N/A    Dimas Venegas on 1/11/2024 at 2:41 PM

## 2024-01-11 NOTE — PATIENT INSTRUCTIONS
You were seen in the Electrophysiology Clinic today by: Dr Reid    Plan:     Follow up Visit:  3 months       If you have further questions, please utilize [address] to contact us.     Your Care Team:    EP Cardiology   Telephone Number     Nurse Line  Renee Paulino, RN   Jillian Tejeda, RAMA Hughes, RAMA   [phone]     For scheduling appointments:   Bonnie   [phone]   For procedure scheduling:    Nisha Lopez     [phone]   For the Device Clinic (Pacemakers, ICDs, Loop Recorders)    During business hours: [phone]  After business hours:   [phone]- select option 4 and ask for job code [id number].       On-call cardiologist for after hours or on weekends:   [phone], option #4, and ask to speak to the on-call cardiologist.     Cardiovascular Clinic:   [address] [place] [address]. [place] [address]      As always, Thank you for trusting us with your health care needs!

## 2024-01-11 NOTE — NURSING NOTE
No other vitals to report today      Is the patient currently in the state of [place]? YES    Visit mode:VIDEO    If the visit is dropped, the patient can be reconnected by: VIDEO VISIT: Text to cell phone:   Telephone Information:   Mobile [phone]       Will anyone else be joining the visit? NO  (If patient encounters technical issues they should call [phone] :[id number])    How would you like to obtain your AVS? MyChart    Are changes needed to the allergy or medication list? No    Patient declined individual allergy and medication review by support staff because patient denies any changes since echeck-in completion and states all information entered during echeck-in remains accurate.    Reason for visit: Consult    JEMIMA GarciaF

## 2024-01-11 NOTE — LETTER
1/11/2024      RE: Debbie Bah  [address]  [place] [address]       Dear Colleague,    Thank you for the opportunity to participate in the care of your patient, Debbie Bah, at the [facility] [place] [facility]. Please see a copy of my visit note below.    Virtual Visit Details    Type of service:  Video Visit     HPI:   Debbie is a very pleasant 33-year-old woman who works at [employer].  She has a long history since childhood of apparent transient loss of consciousness spells-assumed faints-usually associated with identifiable triggers such as blood draws or painful stimuli but not so much with emotional upset.  As a rule her episodes were occurring several times per year and were not associated with any substantial physical injury.    In September 2023 she did contract COVID and that seemed to aggravate her syncope and near syncope symptoms.  She was seen in the ED in September of that year with an episode of prolonged lightheadedness which started during a walk with a friend.  Her blood pressure at that time she measured at home somewhat later by cuff was 160/100 but in general her blood pressures tend to be on the low side and more normal pressures for her 110/80.  This particular episode was not associated with any concomitant illness and vital signs obtained in ambulance transport were reported to be normal.  Laboratory studies from the ED>>>Normal.    Since the COVID episode her tendency to lightheadedness got much worse in the ED visit described above was a post COVID event.  Patient notes that her symptoms have substantially improved over the last 3 to 4 months and well not perfect she is closer to her baseline.    Her usual symptoms are associated with typical vasovagal triggers but not with abrupt postural change.  She does have some symptoms suggestive of immediate orthostatic hypotension  "(\"seeing stars\") but that is not typically associated with her faints.    There is no family history of other features.  Patient's medications do not seem to be playing a role.  I note that she was given propranolol for anxiety but has not really been using it.  She does have prednisone for allergies (mainly mushrooms) but not for daily use.  She does not report any other substantial health issues although there was some concern regarding possible herniated disc but this does not seem to be a daily issue.    Characteristics of the syncopal episodes tend to be association with trigger events as noted above (although that may not be the case for the post COVID lightheadedness symptoms), reported pallor which is a very brief, general tendency to be fatigued after the event.  All of these characteristics along with her relatively low resting blood pressure tend to favor vasovagal events.    Given Loree's general improvement we agreed that we would defer any diagnostic or autonomic testing since the diagnosis itself seems relatively clear.  I did discuss the risk of falls and injury and she was knowledgeable about that concern.  We agreed that it would advise increasing her dietary salt and she would focus on the use of low calorie electrolyte drinks during her workday (20 to 30 ounces).  We would then revisit via video visit in 3 months or so and consider further evaluation if her symptoms are not further improving.  Patient voiced understanding and agreement with this approach.    PAST MEDICAL HISTORY:  Past Medical History:   Diagnosis Date    Binge eating disorder     Depression 2015       CURRENT MEDICATIONS:  Current Outpatient Medications   Medication Sig Dispense Refill    buPROPion (WELLBUTRIN XL) 150 MG 24 hr tablet Take 1 tablet (150 mg) by mouth every morning 90 tablet 3    drospirenone-ethinyl estradiol (ALISON) 3-0.03 MG tablet Take 1 tablet by mouth daily 90 tablet 4    hydrOXYzine (ATARAX) 25 MG tablet " Take 1 tablet (25 mg) by mouth 3 times daily as needed for itching 30 tablet 0    phentermine (ADIPEX-P) 37.5 MG tablet 1/2 tablet each morning 15 tablet 3    predniSONE (DELTASONE) 50 MG tablet Emergency set: if severe allergic reaction take immediately 100mg Prednisone (2x50mg) and 2 Tabl Cetirizine 10mg and then write precise 12h retrospective diary.If less severe reaction take only the 2 Tabl Cetirizine 2 tablet 3    propranolol (INDERAL) 20 MG tablet Take 1 tablet (20 mg) by mouth 3 times daily 30 tablet 0    Vitamin D3 (CHOLECALCIFEROL) 125 MCG (5000 UT) tablet Take 1 tablet by mouth daily         PAST SURGICAL HISTORY:  Past Surgical History:   Procedure Laterality Date    NO HISTORY OF SURGERY         ALLERGIES:     Allergies   Allergen Reactions    Doxycycline      esophagitis    Mushroom Cramps, Diarrhea and GI Disturbance       FAMILY HISTORY:  Family History   Problem Relation Age of Onset    Asthma Mother     Hypertension Mother     Insomnia Mother     Hyperlipidemia Father     Parkinsonism Maternal Grandmother     Coronary Artery Disease Maternal Grandfather 65    Arthritis Paternal Grandmother     Cerebrovascular Disease Paternal Grandmother 75    Cerebrovascular Disease Paternal Grandfather 82    Insomnia Brother     No Known Problems Brother     Depression Half-Sister     Anxiety Disorder Half-Sister     Diabetes No family hx of     Breast Cancer No family hx of     Glaucoma No family hx of     Macular Degeneration No family hx of         SOCIAL HISTORY:  Social History     Tobacco Use    Smoking status: Never    Smokeless tobacco: Never   Vaping Use    Vaping Use: Never used   Substance Use Topics    Alcohol use: Yes     Alcohol/week: 0.0 standard drinks of alcohol     Comment: 0-1 per week    Drug use: No       ROS:   Constitutional: No fever, chills, or sweats. Weight stable.   ENT: No visual disturbance other than noted in HPI t.   Cardiovascular: As per HPI.   Respiratory: No cough, hemoptysis.  "   GI: No nausea, vomiting,   : No hematuria.   Integument: Negative.   Psychiatric: Negative.   Hematologic:  no easy bleeding.  Neuro: Negative.  No localizing abnormality reported  Endocrinology: No significant heat or cold intolerance   Musculoskeletal: No myalgia.  Or other musculoskeletal pains reported today  Allergies: Mushrooms    Exam:  Ht 1.651 m (5' 5\")   Wt 83.9 kg (185 lb)   BMI 30.79 kg/m    GENERAL APPEARANCE: healthy, alert and no distress  HEENT: no icterus,no central cyanosis  NECK: , JVP not elevated  RESPIRATORY: no rales, rhonchi or wheezes, no use of accessory muscles, no retractions, respirations are unlabored, normal respiratory rate  NEURO: alert and oriented to person/place/time, normal speech, gait and affect.  SKIN: no ecchymoses, no rashes-reported    Labs:  CBC RESULTS:   Lab Results   Component Value Date    WBC 9.7 09/30/2023    WBC 7.2 06/18/2021    RBC 4.44 09/30/2023    RBC 4.30 06/18/2021    HGB 13.2 09/30/2023    HGB 12.9 06/18/2021    HCT 39.9 09/30/2023    HCT 39.5 06/18/2021    MCV 90 09/30/2023    MCV 92 06/18/2021    MCH 29.7 09/30/2023    MCH 30.0 06/18/2021    MCHC 33.1 09/30/2023    MCHC 32.7 06/18/2021    RDW 12.8 09/30/2023    RDW 13.0 06/18/2021     09/30/2023     06/18/2021       BMP RESULTS:  Lab Results   Component Value Date     09/30/2023     12/20/2018    POTASSIUM 3.7 09/30/2023    POTASSIUM 4.2 09/28/2022    POTASSIUM 3.8 12/20/2018    CHLORIDE 108 (H) 09/30/2023    CHLORIDE 113 (H) 09/28/2022    CHLORIDE 106 12/20/2018    CO2 21 (L) 09/30/2023    CO2 22 09/28/2022    CO2 23 12/20/2018    ANIONGAP 13 09/30/2023    ANIONGAP 7 09/28/2022    ANIONGAP 10 12/20/2018    GLC 93 09/30/2023    GLC 83 09/28/2022    GLC 87 06/18/2021    BUN 8.9 09/30/2023    BUN 14 09/28/2022    BUN 9 12/20/2018    CR 0.75 09/30/2023    CR 0.67 12/20/2018    GFRESTIMATED >90 09/30/2023    GFRESTIMATED >90 12/20/2018    GFRESTBLACK >90 12/20/2018    CRISTINE 8.5 " "(L) 09/30/2023    CRISTINE 8.7 12/20/2018        INR RESULTS:  No results found for: \"INR\"    Procedures:  PULMONARY FUNCTION TESTS:        No data to display                  ECHOCARDIOGRAM:   No results found for this or any previous visit (from the past 8760 hour(s)).      Assessment and Plan:   1.  Recurrent vasovagal faints associated with typical triggers and frequency several times per year since childhood  2.  Aggravation of #1 by COVID in September 2023-now improving  3.  No underlying structural heart disease based on prior studies by PCP and ED  4.  Borderline low pressure phenotype    Plan  1.  Advised to increase dietary salt and consume 20-30 ounces of low carbohydrate electrolyte drinks during the workday  2.  Follow-up video visit in 3 months or so to assess progress and determine whether further testing is warranted or adjustments in treatment plan is needed.    Total elapsed time today with chart review, clinic visit and documentation 60 minutes  Video on 9:55 AM: Off 10:40 AM  Platform [employer]  Patient at work; clinic [facility] heart    I very much appreciated the opportunity to see and assess Debbie Bah in the clinic today. Please do not hesitate to contact my office if you have any questions or concerns.      Zbigniew Reid MD  Cardiac Arrhythmia Service  [facility]  [phone]       CC  REX ROMANO      Please do not hesitate to contact me if you have any questions/concerns.     Sincerely,     Zbigniew Reid MD  "

## 2024-01-11 NOTE — PROGRESS NOTES
"Virtual Visit Details    Type of service:  Video Visit     HPI:   Debbie is a very pleasant 33-year-old woman who works at [employer].  She has a long history since childhood of apparent transient loss of consciousness spells-assumed faints-usually associated with identifiable triggers such as blood draws or painful stimuli but not so much with emotional upset.  As a rule her episodes were occurring several times per year and were not associated with any substantial physical injury.    In September 2023 she did contract COVID and that seemed to aggravate her syncope and near syncope symptoms.  She was seen in the ED in September of that year with an episode of prolonged lightheadedness which started during a walk with a friend.  Her blood pressure at that time she measured at home somewhat later by cuff was 160/100 but in general her blood pressures tend to be on the low side and more normal pressures for her 110/80.  This particular episode was not associated with any concomitant illness and vital signs obtained in ambulance transport were reported to be normal.  Laboratory studies from the ED>>>Normal.    Since the COVID episode her tendency to lightheadedness got much worse in the ED visit described above was a post COVID event.  Patient notes that her symptoms have substantially improved over the last 3 to 4 months and well not perfect she is closer to her baseline.    Her usual symptoms are associated with typical vasovagal triggers but not with abrupt postural change.  She does have some symptoms suggestive of immediate orthostatic hypotension (\"seeing stars\") but that is not typically associated with her faints.    There is no family history of other features.  Patient's medications do not seem to be playing a role.  I note that she was given propranolol for anxiety but has not really been using it.  She does have prednisone for allergies (mainly mushrooms) but not for daily use.  She does not " report any other substantial health issues although there was some concern regarding possible herniated disc but this does not seem to be a daily issue.    Characteristics of the syncopal episodes tend to be association with trigger events as noted above (although that may not be the case for the post COVID lightheadedness symptoms), reported pallor which is a very brief, general tendency to be fatigued after the event.  All of these characteristics along with her relatively low resting blood pressure tend to favor vasovagal events.    Given Loree's general improvement we agreed that we would defer any diagnostic or autonomic testing since the diagnosis itself seems relatively clear.  I did discuss the risk of falls and injury and she was knowledgeable about that concern.  We agreed that it would advise increasing her dietary salt and she would focus on the use of low calorie electrolyte drinks during her workday (20 to 30 ounces).  We would then revisit via video visit in 3 months or so and consider further evaluation if her symptoms are not further improving.  Patient voiced understanding and agreement with this approach.    PAST MEDICAL HISTORY:  Past Medical History:   Diagnosis Date    Binge eating disorder     Depression 2015       CURRENT MEDICATIONS:  Current Outpatient Medications   Medication Sig Dispense Refill    buPROPion (WELLBUTRIN XL) 150 MG 24 hr tablet Take 1 tablet (150 mg) by mouth every morning 90 tablet 3    drospirenone-ethinyl estradiol (ALISON) 3-0.03 MG tablet Take 1 tablet by mouth daily 90 tablet 4    hydrOXYzine (ATARAX) 25 MG tablet Take 1 tablet (25 mg) by mouth 3 times daily as needed for itching 30 tablet 0    phentermine (ADIPEX-P) 37.5 MG tablet 1/2 tablet each morning 15 tablet 3    predniSONE (DELTASONE) 50 MG tablet Emergency set: if severe allergic reaction take immediately 100mg Prednisone (2x50mg) and 2 Tabl Cetirizine 10mg and then write precise 12h retrospective diary.If  less severe reaction take only the 2 Tabl Cetirizine 2 tablet 3    propranolol (INDERAL) 20 MG tablet Take 1 tablet (20 mg) by mouth 3 times daily 30 tablet 0    Vitamin D3 (CHOLECALCIFEROL) 125 MCG (5000 UT) tablet Take 1 tablet by mouth daily         PAST SURGICAL HISTORY:  Past Surgical History:   Procedure Laterality Date    NO HISTORY OF SURGERY         ALLERGIES:     Allergies   Allergen Reactions    Doxycycline      esophagitis    Mushroom Cramps, Diarrhea and GI Disturbance       FAMILY HISTORY:  Family History   Problem Relation Age of Onset    Asthma Mother     Hypertension Mother     Insomnia Mother     Hyperlipidemia Father     Parkinsonism Maternal Grandmother     Coronary Artery Disease Maternal Grandfather 65    Arthritis Paternal Grandmother     Cerebrovascular Disease Paternal Grandmother 75    Cerebrovascular Disease Paternal Grandfather 82    Insomnia Brother     No Known Problems Brother     Depression Half-Sister     Anxiety Disorder Half-Sister     Diabetes No family hx of     Breast Cancer No family hx of     Glaucoma No family hx of     Macular Degeneration No family hx of         SOCIAL HISTORY:  Social History     Tobacco Use    Smoking status: Never    Smokeless tobacco: Never   Vaping Use    Vaping Use: Never used   Substance Use Topics    Alcohol use: Yes     Alcohol/week: 0.0 standard drinks of alcohol     Comment: 0-1 per week    Drug use: No       ROS:   Constitutional: No fever, chills, or sweats. Weight stable.   ENT: No visual disturbance other than noted in HPI t.   Cardiovascular: As per HPI.   Respiratory: No cough, hemoptysis.    GI: No nausea, vomiting,   : No hematuria.   Integument: Negative.   Psychiatric: Negative.   Hematologic:  no easy bleeding.  Neuro: Negative.  No localizing abnormality reported  Endocrinology: No significant heat or cold intolerance   Musculoskeletal: No myalgia.  Or other musculoskeletal pains reported today  Allergies: Mushrooms    Exam:  Ht  "1.651 m (5' 5\")   Wt 83.9 kg (185 lb)   BMI 30.79 kg/m    GENERAL APPEARANCE: healthy, alert and no distress  HEENT: no icterus,no central cyanosis  NECK: , JVP not elevated  RESPIRATORY: no rales, rhonchi or wheezes, no use of accessory muscles, no retractions, respirations are unlabored, normal respiratory rate  NEURO: alert and oriented to person/place/time, normal speech, gait and affect.  SKIN: no ecchymoses, no rashes-reported    Labs:  CBC RESULTS:   Lab Results   Component Value Date    WBC 9.7 09/30/2023    WBC 7.2 06/18/2021    RBC 4.44 09/30/2023    RBC 4.30 06/18/2021    HGB 13.2 09/30/2023    HGB 12.9 06/18/2021    HCT 39.9 09/30/2023    HCT 39.5 06/18/2021    MCV 90 09/30/2023    MCV 92 06/18/2021    MCH 29.7 09/30/2023    MCH 30.0 06/18/2021    MCHC 33.1 09/30/2023    MCHC 32.7 06/18/2021    RDW 12.8 09/30/2023    RDW 13.0 06/18/2021     09/30/2023     06/18/2021       BMP RESULTS:  Lab Results   Component Value Date     09/30/2023     12/20/2018    POTASSIUM 3.7 09/30/2023    POTASSIUM 4.2 09/28/2022    POTASSIUM 3.8 12/20/2018    CHLORIDE 108 (H) 09/30/2023    CHLORIDE 113 (H) 09/28/2022    CHLORIDE 106 12/20/2018    CO2 21 (L) 09/30/2023    CO2 22 09/28/2022    CO2 23 12/20/2018    ANIONGAP 13 09/30/2023    ANIONGAP 7 09/28/2022    ANIONGAP 10 12/20/2018    GLC 93 09/30/2023    GLC 83 09/28/2022    GLC 87 06/18/2021    BUN 8.9 09/30/2023    BUN 14 09/28/2022    BUN 9 12/20/2018    CR 0.75 09/30/2023    CR 0.67 12/20/2018    GFRESTIMATED >90 09/30/2023    GFRESTIMATED >90 12/20/2018    GFRESTBLACK >90 12/20/2018    CRISTINE 8.5 (L) 09/30/2023    CRISTINE 8.7 12/20/2018        INR RESULTS:  No results found for: \"INR\"    Procedures:  PULMONARY FUNCTION TESTS:        No data to display                  ECHOCARDIOGRAM:   No results found for this or any previous visit (from the past 8760 hour(s)).      Assessment and Plan:   1.  Recurrent vasovagal faints associated with typical " triggers and frequency several times per year since childhood  2.  Aggravation of #1 by COVID in September 2023-now improving  3.  No underlying structural heart disease based on prior studies by PCP and ED  4.  Borderline low pressure phenotype    Plan  1.  Advised to increase dietary salt and consume 20-30 ounces of low carbohydrate electrolyte drinks during the workday  2.  Follow-up video visit in 3 months or so to assess progress and determine whether further testing is warranted or adjustments in treatment plan is needed.    Total elapsed time today with chart review, clinic visit and documentation 60 minutes  Video on 9:55 AM: Off 10:40 AM  Platform D[employer]  Patient at work; clinic [facility] heart    I very much appreciated the opportunity to see and assess Debbie Bah in the clinic today. Please do not hesitate to contact my office if you have any questions or concerns.      Zbigniew Reid MD  Cardiac Arrhythmia Service  [facility]  [phone]       CC  REX ROMANO

## 2024-01-16 ENCOUNTER — TELEPHONE (OUTPATIENT)
Dept: CARDIOLOGY | Facility: CLINIC | Age: 34
End: 2024-01-16
Payer: COMMERCIAL

## 2024-01-16 NOTE — TELEPHONE ENCOUNTER
Spoke with pt and scheduled f/up with Suzie Smith ADAM EP appt Richy Perera on 1/16/2024 at 2:31 PM

## 2024-01-27 DIAGNOSIS — E66.09 CLASS 1 OBESITY DUE TO EXCESS CALORIES WITHOUT SERIOUS COMORBIDITY IN ADULT, UNSPECIFIED BMI: ICD-10-CM

## 2024-01-27 DIAGNOSIS — E66.811 CLASS 1 OBESITY DUE TO EXCESS CALORIES WITHOUT SERIOUS COMORBIDITY IN ADULT, UNSPECIFIED BMI: ICD-10-CM

## 2024-01-27 RX ORDER — PHENTERMINE HYDROCHLORIDE 37.5 MG/1
TABLET ORAL
Qty: 30 TABLET | Refills: 3 | Status: SHIPPED | OUTPATIENT
Start: 2024-01-27 | End: 2024-05-07

## 2024-01-30 ENCOUNTER — TELEPHONE (OUTPATIENT)
Dept: ENDOCRINOLOGY | Facility: CLINIC | Age: 34
End: 2024-01-30
Payer: COMMERCIAL

## 2024-01-30 NOTE — TELEPHONE ENCOUNTER
Called and spoke with [facility]  in regards to Phentermine PA. Clinic needs to fill out form. They are faxing to us. No further questions.

## 2024-01-30 NOTE — TELEPHONE ENCOUNTER
Medication Question or Refill        What medication are you calling about (include dose and sig)?:     phentermine (ADIPEX-P) 37.5 MG tablet     Preferred Pharmacy:  [facility] -- [place], [place] - [address]  [address]  [facility] [address]  Phone: [phone] Fax: [phone]          Controlled Substance Agreement on file:   CSA -- Patient Level:    CSA: None found at the patient level.       Who prescribed the medication?: Raven      Do you have any questions or concerns?  Yes:     [facility] [employer]... needs to discuss prior auth  [phone]

## 2024-02-01 ENCOUNTER — TELEPHONE (OUTPATIENT)
Dept: ENDOCRINOLOGY | Facility: CLINIC | Age: 34
End: 2024-02-01
Payer: COMMERCIAL

## 2024-02-01 NOTE — TELEPHONE ENCOUNTER
Retail Pharmacy Prior Authorization Team   Phone: [phone]    RECEIVED DENIAL LETTER FROM [facility] PROVIDING THIS DENIAL RATIONALE -     I HAVE RESUBMITTED REQUEST BECAUSE PATIENT DOES MEET ALL THOSE REQUIREMENTS.       PA Initiation    Medication: PHENTERMINE HCL 37.5 MG PO TABS  Insurance Company: [employer] [place] - Phone [phone] Fax [phone]  Pharmacy Filling the Rx: [facility] -- [place], [place] - [address]  Filling Pharmacy Phone: [phone]  Filling Pharmacy Fax:    Start Date: 2/1/2024

## 2024-02-02 NOTE — TELEPHONE ENCOUNTER
PRIOR AUTHORIZATION DENIED    Medication: PHENTERMINE HCL 37.5 MG PO TABS  Insurance Company: [employer] [place] - Phone [phone] Fax [phone]  Denial Date: 2/1/2024  Denial Reason(s):       Appeal Information: IF PROVIDER WOULD LIKE TO APPEAL THIS DECISION PLEASE PROVIDE THE PA TEAM WITH A LETTER OF MEDICAL NECESSITY       Patient Notified: No

## 2024-02-05 ENCOUNTER — VIRTUAL VISIT (OUTPATIENT)
Dept: FAMILY MEDICINE | Facility: CLINIC | Age: 34
End: 2024-02-05
Payer: COMMERCIAL

## 2024-02-05 ENCOUNTER — TRANSFERRED RECORDS (OUTPATIENT)
Dept: HEALTH INFORMATION MANAGEMENT | Facility: CLINIC | Age: 34
End: 2024-02-05

## 2024-02-05 DIAGNOSIS — R19.5 LOOSE STOOLS: Primary | ICD-10-CM

## 2024-02-05 PROCEDURE — 99214 OFFICE O/P EST MOD 30 MIN: CPT | Mod: 95 | Performed by: NURSE PRACTITIONER

## 2024-02-05 NOTE — PROGRESS NOTES
"Debbie is a 33 year old who is being evaluated via a billable video visit.      How would you like to obtain your AVS? MyChart  If the video visit is dropped, the invitation should be resent by: Text to cell phone: [phone]  Will anyone else be joining your video visit? No    Assessment & Plan     Loose stools  Intermittent loose stools without red flag signs for a couple months following semaglutide discontinuation. Start increased fiber supplementation plan. If not benefit in 1-2 months, plan to get labs as ordered below \"lab-only.\" If no clear etiology, move forward with colonoscopy. Follow-up sooner if red flag signs noted in patient ed  - CBC with platelets and differential; Future  - TSH with free T4 reflex; Future  - Comprehensive metabolic panel (BMP + Alb, Alk Phos, ALT, AST, Total. Bili, TP); Future  - Enteric Bacteria and Virus Panel by MARCELA Stool; Future    Ordering of each unique test  No LOS data to display   Time spent by me doing chart review, history and exam, documentation and further activities per the note        Patient Instructions   Add more fiber supplement  Try ground flax seed and start with 1 tbs a day for 1-2 weeks  Then increase to 2-3 Tbs per day (this can be divided)  Can add to drinks, smoothies, sprinkled on salads or soups    If you cannot tolerate the flax (and/or metamucil), do Miralax instead    If still having loose stool in 1-2 months, then do the labs  I ordered these today and you could schedule as \"lab only\" - please drop me a MyChart to let me know you are doing the labs  If there is a clear reason for loose stools on the labs, we will treat that  If not, we'd move on to a colonoscopy    If at any point you develop new symptoms like    - unintended weight loss    - fever or chills or night sweats    - abdominal pain or cramping    - blood in the stool (bright red or light pink - any color) or black stools    - general unwellness including joint pains or eye pain    - " get diagnosed with uveitis  Please schedule an appt asap      Priya Lewis is a 33 year old, presenting for the following health issues:  No chief complaint on file.    History of Present Illness       Reason for visit:  Bowel changes  Symptom onset:  More than a month  Symptoms include:  Diarrhea and irregular BMs  Symptom intensity:  Mild  Symptom progression:  Staying the same  Had these symptoms before:  No    She eats 2-3 servings of fruits and vegetables daily.She consumes 1 sweetened beverage(s) daily.She exercises with enough effort to increase her heart rate 20 to 29 minutes per day.  She exercises with enough effort to increase her heart rate 3 or less days per week.   She is taking medications regularly.     GI - Previously, regular daily bowel movements. Became constipated on Wegovy. Since stopping Wegovy in the fall, bowel movements have not been normal. Has bowel movements three times a day. No abdominal pain or cramping. No blood in the stool. Stools are tan, not really dark or light. Can be loose or watery or soft. Will have 2-3 days of no bowel movements followed by one day of 2-3 bowel movements. Currently taking phentermine which is not as helpful and has gained 10 lbs. Has added a chewy fiber supplement a couple months ago. Tried psyllium husk and could not tolerate the texture. No antibiotics    Cardiology - not POTS, but Debbie's norm since childhood that was aggravated by covid. Symptoms have been getting better with less frequency    Review of Systems  Constitutional, HEENT, cardiovascular, pulmonary, gi and gu systems are negative, except as otherwise noted.      Objective         Vitals:  No vitals were obtained today due to virtual visit.    Physical Exam   GENERAL: alert and no distress  EYES: Eyes grossly normal to inspection.  No discharge or erythema, or obvious scleral/conjunctival abnormalities.  RESP: No audible wheeze, cough, or visible cyanosis.    SKIN: Visible skin  clear. No significant rash, abnormal pigmentation or lesions.  NEURO: Cranial nerves grossly intact.  Mentation and speech appropriate for age.  PSYCH: Appropriate affect, tone, and pace of words        Video-Visit Details    Type of service:  Video Visit   Originating Location (pt. Location): Home    Distant Location (provider location):  On-site  Platform used for Video Visit: Estelle  Signed Electronically by: HARJINDER Vasquez CNP

## 2024-02-05 NOTE — PATIENT INSTRUCTIONS
"Add more fiber supplement  Try ground flax seed and start with 1 tbs a day for 1-2 weeks  Then increase to 2-3 Tbs per day (this can be divided)  Can add to drinks, smoothies, sprinkled on salads or soups    If you cannot tolerate the flax (and/or metamucil), do Miralax instead    If still having loose stool in 1-2 months, then do the labs  I ordered these today and you could schedule as \"lab only\" - please drop me a MyChart to let me know you are doing the labs  If there is a clear reason for loose stools on the labs, we will treat that  If not, we'd move on to a colonoscopy    If at any point you develop new symptoms like    - unintended weight loss    - fever or chills or night sweats    - abdominal pain or cramping    - blood in the stool (bright red or light pink - any color) or black stools    - general unwellness including joint pains or eye pain    - get diagnosed with uveitis  Please schedule an appt asap      "

## 2024-03-07 ENCOUNTER — MYC MEDICAL ADVICE (OUTPATIENT)
Dept: FAMILY MEDICINE | Facility: CLINIC | Age: 34
End: 2024-03-07
Payer: COMMERCIAL

## 2024-04-03 ENCOUNTER — MYC MEDICAL ADVICE (OUTPATIENT)
Dept: FAMILY MEDICINE | Facility: CLINIC | Age: 34
End: 2024-04-03
Payer: COMMERCIAL

## 2024-04-03 DIAGNOSIS — Z30.015 ENCOUNTER FOR INITIAL PRESCRIPTION OF VAGINAL RING HORMONAL CONTRACEPTIVE: Primary | ICD-10-CM

## 2024-04-04 RX ORDER — ETONOGESTREL AND ETHINYL ESTRADIOL VAGINAL RING .015; .12 MG/D; MG/D
RING VAGINAL
Qty: 4 EACH | Refills: 4 | Status: SHIPPED | OUTPATIENT
Start: 2024-04-04

## 2024-05-07 ENCOUNTER — VIRTUAL VISIT (OUTPATIENT)
Dept: CARDIOLOGY | Facility: CLINIC | Age: 34
End: 2024-05-07
Attending: INTERNAL MEDICINE
Payer: COMMERCIAL

## 2024-05-07 VITALS — WEIGHT: 185 LBS | BODY MASS INDEX: 30.82 KG/M2 | HEIGHT: 65 IN

## 2024-05-07 DIAGNOSIS — R55 PRE-SYNCOPE: ICD-10-CM

## 2024-05-07 PROCEDURE — 99214 OFFICE O/P EST MOD 30 MIN: CPT | Mod: 95

## 2024-05-07 ASSESSMENT — PAIN SCALES - GENERAL: PAINLEVEL: NO PAIN (0)

## 2024-05-07 NOTE — PROGRESS NOTES
"Virtual Visit Details    Type of service:  Video Visit     Originating Location (pt. Location): Home  Distant Location (provider location):  On-site  Platform used for Video Visit: Optimus3        ELECTROPHYSIOLOGY VIDEO VISIT    Assessment/Recommendations   Assessment/Plan:    Debbie Bah is a 33 year old female with past medical history significant for situational anxiety and syncope.     Recurrent vasovagal faints   Low Blood Pressure Phenotype   Continues to improve since she was last seen in January, a couple presyncopal episodes since then occurring with triggers.   - Use compression shorts (athletic compression shorts)  - Increase hydration with goal to take in 64 oz of water/electrolyte fluids per day.   - Liberalize salt intake    Follow up in 3-4 months     History of Present Illness/Subjective    MsBarry Bah is a 33 year old female who comes in today for EP follow-up of syncope.    Patient was recently evaluated by Dr Reid on 1/11/24, reported a long history of transient loss of consciousness episodes. Episodes usually associated with identifiable triggers such as blood draws, temperature changes or painful stimuli. Episodes were occurring several times a year.     In September 2023 she did contract COVID and that seemed to aggravate her syncope and near syncope symptoms. She was seen in the ED in September of that year with an episode of prolonged lightheadedness which started during a walk with a friend. Measured BP when she got home with reading of 160/100, in general BP tends to be low ~110/80. Labs/vitals in ER within normal.   Since covid, she continues to have an increase in symptoms. She was prescribed propanolol for anxiety but when last seen had not been taking this. During occurrences she reports pallor, \"seeing stars\", and feels fatigued after the event. Dr Reid felt all of these characteristics along with her relatively low resting blood pressure tend to favor vasovagal " Physical Therapy Visit    Visit Type: Daily Treatment Note  Visit: 3  Referring Provider: Raymond Palafox MD  Medical Diagnosis (from order): M54.2 - Neck pain, bilateral     SUBJECTIVE                                                                                                               Patient reports that he didn't notice much improvement in symptoms following needling treatment.  He had a general feeling of weakness.     Pain / Symptoms  - Pain rating (out of 10): Current: 4       OBJECTIVE                                                                                                                           Treatment     Therapeutic Exercise  TE/TA/Neuro:  Chin tucks - 2 sec x 10  Supine cervical rotations - 10x  Rows - 20x, green band  Shoulder ext - 20x, green band  (B) shoulder ER - 20x, yellow  (B) shoulder horizontal abd - 20x .yellow  Wall push up - 20x  Wall brandi - 15x      Plan for next session:  Continue manual    Manual Therapy   Gentle Soft tissue mobilization to cervical PVM Upper Trap/Levator Scap   Gentle right upglide mobilizations with rotation at C3-C5      Skilled input: verbal instruction/cues and tactile instruction/cues  education/instruction on: HEP    Writer verbally educated and received verbal consent for hand placement, positioning of patient, and techniques to be performed today from patient for clothing adjustments for techniques, therapist position for techniques and hand placement and palpation for techniques as described above and how they are pertinent to the patient's plan of care.      ASSESSMENT                                                                                                            Cervical manual therapy was focus of treatment today in order to address cervical tightness/soreness; followed up with postural stability exercises.         Therapy procedure time and total treatment time can be found documented on the Time Entry flowsheet     "events. Discussed that since her symptoms had been improving somewhat, defer any further testing or treatment for now.     She presents today for follow up. She has been doing well, has had a couple presyncopal episodes since she was last seen in January. She did not increase electrolyte/fluid intake since symptoms have not been very bothersome. Continues to note episodes occur with triggers such as prolonged exertion or heat. Otherwise no new concerns or symptoms today.     I have reviewed and updated the patient's Past Medical History, Social History, Family History and Medication List.     Cardiographics (Personally Reviewed) :   Echo: 10/19/2023   Interpretation Summary  Left ventricular size, wall motion and function are normal. The ejection  fraction is 60-65%.  Right ventricular function, chamber size, wall motion, and thickness are  normal.  Normal valve assessment.  The inferior vena cava is normal.  No pericardial effusion.  There is no prior study for direct comparison.       Physical Examination   Ht 1.651 m (5' 5\")   Wt 83.9 kg (185 lb)   BMI 30.79 kg/m    Wt Readings from Last 3 Encounters:   05/07/24 83.9 kg (185 lb)   01/11/24 83.9 kg (185 lb)   10/25/23 82.6 kg (182 lb)     General Appearance:   Alert, well-appearing and in no acute distress.   HEENT: Atraumatic, normocephalic.    Chest/Lungs:   Respirations unlabored.     Cardiovascular:   No edema of visualized extremities.     Psych: Mood and affect are appropriate.    Skin: No rashes visualized on face/neck/upper extremities   Neurologic:  Alert and oriented to person, place, time, and situation.          Medications  Allergies   Current Outpatient Medications   Medication Sig Dispense Refill    buPROPion (WELLBUTRIN XL) 150 MG 24 hr tablet Take 1 tablet (150 mg) by mouth every morning 90 tablet 3    etonogestrel-ethinyl estradiol (NUVARING) 0.12-0.015 MG/24HR vaginal ring Insert one (1) ring vaginally and leave in place for 3 consecutive " weeks (21 days), then remove for 1 week. 4 each 4    hydrOXYzine (ATARAX) 25 MG tablet Take 1 tablet (25 mg) by mouth 3 times daily as needed for itching 30 tablet 0    predniSONE (DELTASONE) 50 MG tablet Emergency set: if severe allergic reaction take immediately 100mg Prednisone (2x50mg) and 2 Tabl Cetirizine 10mg and then write precise 12h retrospective diary.If less severe reaction take only the 2 Tabl Cetirizine 2 tablet 3    Vitamin D3 (CHOLECALCIFEROL) 125 MCG (5000 UT) tablet Take 1 tablet by mouth daily       No current facility-administered medications for this visit.    Allergies   Allergen Reactions    Doxycycline      esophagitis    Mushroom Cramps, Diarrhea and GI Disturbance         Lab Results (Personally Reviewed)    Chemistry/lipid CBC Cardiac Enzymes/BNP/TSH/INR   Lab Results   Component Value Date    BUN 8.9 09/30/2023     09/30/2023    CO2 21 (L) 09/30/2023     Creatinine   Date Value Ref Range Status   09/30/2023 0.75 0.51 - 0.95 mg/dL Final   12/20/2018 0.67 0.52 - 1.04 mg/dL Final       Lab Results   Component Value Date    CHOL 205 (H) 06/18/2021    HDL 79 06/18/2021     (H) 06/18/2021      Lab Results   Component Value Date    WBC 9.7 09/30/2023    HGB 13.2 09/30/2023    HCT 39.9 09/30/2023    MCV 90 09/30/2023     09/30/2023    Lab Results   Component Value Date    TSH 1.21 09/30/2023        Video-Visit Details  Type of service:  Video Visit   Video Start Time: 4:05 PM  Video End Time:4:27 PM    I have reviewed the note as documented above.  This accurately captures the substance of my virtual visit with the patient. The patient states understanding and is agreeable with the plan.     Suzie Smith PA-C  [facility]  Electrophysiology Consult Service  Pager: [phone]    Total time spent on patient visit, reviewing notes, imaging, labs, orders, and completing necessary documentation: 35 minutes.

## 2024-05-07 NOTE — LETTER
5/7/2024      RE: Debbie Bah  [address]  [place] [address]       Dear Colleague,    Thank you for the opportunity to participate in the care of your patient, Debbie Bah, at the [facility] [place] at [facility]. Please see a copy of my visit note below.    Virtual Visit Details    Type of service:  Video Visit     Originating Location (pt. Location): Home  Distant Location (provider location):  On-site  Platform used for Video Visit: sevenload        ELECTROPHYSIOLOGY VIDEO VISIT    Assessment/Recommendations   Assessment/Plan:    Debbie Bah is a 33 year old female with past medical history significant for situational anxiety and syncope.     Recurrent vasovagal faints   Low Blood Pressure Phenotype   Continues to improve since she was last seen in January, a couple presyncopal episodes since then occurring with triggers.   - Use compression shorts (athletic compression shorts)  - Increase hydration with goal to take in 64 oz of water/electrolyte fluids per day.   - Liberalize salt intake    Follow up in 3-4 months     History of Present Illness/Subjective    Ms. Debbie Bah is a 33 year old female who comes in today for EP follow-up of syncope.    Patient was recently evaluated by Dr Reid on 1/11/24, reported a long history of transient loss of consciousness episodes. Episodes usually associated with identifiable triggers such as blood draws, temperature changes or painful stimuli. Episodes were occurring several times a year.     In September 2023 she did contract COVID and that seemed to aggravate her syncope and near syncope symptoms. She was seen in the ED in September of that year with an episode of prolonged lightheadedness which started during a walk with a friend. Measured BP when she got home with reading of 160/100, in general BP tends to be low ~110/80. Labs/vitals in ER within normal.  "  Since covid, she continues to have an increase in symptoms. She was prescribed propanolol for anxiety but when last seen had not been taking this. During occurrences she reports pallor, \"seeing stars\", and feels fatigued after the event. Dr Reid felt all of these characteristics along with her relatively low resting blood pressure tend to favor vasovagal events. Discussed that since her symptoms had been improving somewhat, defer any further testing or treatment for now.     She presents today for follow up. She has been doing well, has had a couple presyncopal episodes since she was last seen in January. She did not increase electrolyte/fluid intake since symptoms have not been very bothersome. Continues to note episodes occur with triggers such as prolonged exertion or heat. Otherwise no new concerns or symptoms today.     I have reviewed and updated the patient's Past Medical History, Social History, Family History and Medication List.     Cardiographics (Personally Reviewed) :   Echo: 10/19/2023   Interpretation Summary  Left ventricular size, wall motion and function are normal. The ejection  fraction is 60-65%.  Right ventricular function, chamber size, wall motion, and thickness are  normal.  Normal valve assessment.  The inferior vena cava is normal.  No pericardial effusion.  There is no prior study for direct comparison.       Physical Examination   Ht 1.651 m (5' 5\")   Wt 83.9 kg (185 lb)   BMI 30.79 kg/m    Wt Readings from Last 3 Encounters:   05/07/24 83.9 kg (185 lb)   01/11/24 83.9 kg (185 lb)   10/25/23 82.6 kg (182 lb)     General Appearance:   Alert, well-appearing and in no acute distress.   HEENT: Atraumatic, normocephalic.    Chest/Lungs:   Respirations unlabored.     Cardiovascular:   No edema of visualized extremities.     Psych: Mood and affect are appropriate.    Skin: No rashes visualized on face/neck/upper extremities   Neurologic:  Alert and oriented to person, place, time, and " situation.          Medications  Allergies   Current Outpatient Medications   Medication Sig Dispense Refill     buPROPion (WELLBUTRIN XL) 150 MG 24 hr tablet Take 1 tablet (150 mg) by mouth every morning 90 tablet 3     etonogestrel-ethinyl estradiol (NUVARING) 0.12-0.015 MG/24HR vaginal ring Insert one (1) ring vaginally and leave in place for 3 consecutive weeks (21 days), then remove for 1 week. 4 each 4     hydrOXYzine (ATARAX) 25 MG tablet Take 1 tablet (25 mg) by mouth 3 times daily as needed for itching 30 tablet 0     predniSONE (DELTASONE) 50 MG tablet Emergency set: if severe allergic reaction take immediately 100mg Prednisone (2x50mg) and 2 Tabl Cetirizine 10mg and then write precise 12h retrospective diary.If less severe reaction take only the 2 Tabl Cetirizine 2 tablet 3     Vitamin D3 (CHOLECALCIFEROL) 125 MCG (5000 UT) tablet Take 1 tablet by mouth daily       No current facility-administered medications for this visit.    Allergies   Allergen Reactions     Doxycycline      esophagitis     Mushroom Cramps, Diarrhea and GI Disturbance         Lab Results (Personally Reviewed)    Chemistry/lipid CBC Cardiac Enzymes/BNP/TSH/INR   Lab Results   Component Value Date    BUN 8.9 09/30/2023     09/30/2023    CO2 21 (L) 09/30/2023     Creatinine   Date Value Ref Range Status   09/30/2023 0.75 0.51 - 0.95 mg/dL Final   12/20/2018 0.67 0.52 - 1.04 mg/dL Final       Lab Results   Component Value Date    CHOL 205 (H) 06/18/2021    HDL 79 06/18/2021     (H) 06/18/2021      Lab Results   Component Value Date    WBC 9.7 09/30/2023    HGB 13.2 09/30/2023    HCT 39.9 09/30/2023    MCV 90 09/30/2023     09/30/2023    Lab Results   Component Value Date    TSH 1.21 09/30/2023        Video-Visit Details  Type of service:  Video Visit   Video Start Time: 4:05 PM  Video End Time:4:27 PM    I have reviewed the note as documented above.  This accurately captures the substance of my virtual visit with the  patient. The patient states understanding and is agreeable with the plan.     Suzie Smith PA-C  [facility]  Electrophysiology Consult Service  Pager: [phone]    Total time spent on patient visit, reviewing notes, imaging, labs, orders, and completing necessary documentation: 35 minutes.                       Please do not hesitate to contact me if you have any questions/concerns.     Sincerely,     Suzie Smith PA-C

## 2024-05-07 NOTE — NURSING NOTE
No other vitals to report today      Is the patient currently in the state of [place]? YES    Visit mode:VIDEO    If the visit is dropped, the patient can be reconnected by: VIDEO VISIT: Text to cell phone:   Telephone Information:   Mobile [phone]    and VIDEO VISIT: Send to e-mail at: [email or web address]    Will anyone else be joining the visit? NO  (If patient encounters technical issues they should call [phone])    How would you like to obtain your AVS? MyChart    Are changes needed to the allergy or medication list?  Pt flagged medications for removal that no longer taking     Patient denies any changes since echeck-in completion and states all information entered during echeck-in remains accurate.    Are refills needed on medications prescribed by this physician? NO    Reason for visit: RECHJEMIMA ZavalaF

## 2024-05-08 ENCOUNTER — TRANSFERRED RECORDS (OUTPATIENT)
Dept: HEALTH INFORMATION MANAGEMENT | Facility: CLINIC | Age: 34
End: 2024-05-08
Payer: COMMERCIAL

## 2024-05-16 ENCOUNTER — TELEPHONE (OUTPATIENT)
Dept: CARDIOLOGY | Facility: CLINIC | Age: 34
End: 2024-05-16
Payer: COMMERCIAL

## 2024-05-16 NOTE — TELEPHONE ENCOUNTER
Left Voicemail (1st Attempt) for the patient to call back and schedule the following:    Appointment type: rtn ep   Provider: aurelia   Return date: 08/31/24  Specialty phone number: [phone] opt 1   Additional appointment(s) needed: n/a   Additonal Notes: n/a

## 2024-05-20 NOTE — TELEPHONE ENCOUNTER
Patient confirmed scheduled appointment:  Date: 10/01/24  Time: 430pm   Visit type: rtn ep   Provider: aurelia   Location: [facility]   Testing/imaging: n/a   Additional notes: n/a

## 2024-06-04 ENCOUNTER — TRANSFERRED RECORDS (OUTPATIENT)
Dept: HEALTH INFORMATION MANAGEMENT | Facility: CLINIC | Age: 34
End: 2024-06-04
Payer: COMMERCIAL

## 2024-06-12 ENCOUNTER — LAB (OUTPATIENT)
Dept: LAB | Facility: CLINIC | Age: 34
End: 2024-06-12
Payer: COMMERCIAL

## 2024-06-12 DIAGNOSIS — E78.2 MIXED HYPERLIPIDEMIA: Primary | ICD-10-CM

## 2024-06-12 PROCEDURE — 80061 LIPID PANEL: CPT

## 2024-06-12 PROCEDURE — 36415 COLL VENOUS BLD VENIPUNCTURE: CPT

## 2024-06-13 LAB
CHOLEST SERPL-MCNC: 210 MG/DL
FASTING STATUS PATIENT QL REPORTED: YES
HDLC SERPL-MCNC: 78 MG/DL
LDLC SERPL CALC-MCNC: 116 MG/DL
NONHDLC SERPL-MCNC: 132 MG/DL
TRIGL SERPL-MCNC: 81 MG/DL

## 2024-07-09 ENCOUNTER — MYC MEDICAL ADVICE (OUTPATIENT)
Dept: FAMILY MEDICINE | Facility: CLINIC | Age: 34
End: 2024-07-09
Payer: COMMERCIAL

## 2024-07-12 ENCOUNTER — TRANSFERRED RECORDS (OUTPATIENT)
Dept: HEALTH INFORMATION MANAGEMENT | Facility: CLINIC | Age: 34
End: 2024-07-12
Payer: COMMERCIAL

## 2024-07-15 ASSESSMENT — PATIENT HEALTH QUESTIONNAIRE - PHQ9
SUM OF ALL RESPONSES TO PHQ QUESTIONS 1-9: 1
10. IF YOU CHECKED OFF ANY PROBLEMS, HOW DIFFICULT HAVE THESE PROBLEMS MADE IT FOR YOU TO DO YOUR WORK, TAKE CARE OF THINGS AT HOME, OR GET ALONG WITH OTHER PEOPLE: NOT DIFFICULT AT ALL
SUM OF ALL RESPONSES TO PHQ QUESTIONS 1-9: 1

## 2024-07-15 NOTE — TELEPHONE ENCOUNTER
Erika    See pt Mychart message    Lia Chadwick, RN   [facility]

## 2024-07-16 ENCOUNTER — VIRTUAL VISIT (OUTPATIENT)
Dept: FAMILY MEDICINE | Facility: CLINIC | Age: 34
End: 2024-07-16
Payer: COMMERCIAL

## 2024-07-16 ENCOUNTER — TELEPHONE (OUTPATIENT)
Dept: MIDWIFE SERVICES | Facility: CLINIC | Age: 34
End: 2024-07-16

## 2024-07-16 DIAGNOSIS — Z13.220 LIPID SCREENING: ICD-10-CM

## 2024-07-16 DIAGNOSIS — N94.10 DYSPAREUNIA IN FEMALE: Primary | ICD-10-CM

## 2024-07-16 DIAGNOSIS — N76.0 BV (BACTERIAL VAGINOSIS): ICD-10-CM

## 2024-07-16 DIAGNOSIS — B96.89 BV (BACTERIAL VAGINOSIS): ICD-10-CM

## 2024-07-16 PROCEDURE — 99214 OFFICE O/P EST MOD 30 MIN: CPT | Mod: 95 | Performed by: NURSE PRACTITIONER

## 2024-07-16 NOTE — PATIENT INSTRUCTIONS
Make a lab only appointment for wet prep  Can be any [place]  Take a trip to [place]    Lipids ordered for check in a few months while on NuvaRing instead of oral contraceptive

## 2024-07-16 NOTE — PROGRESS NOTES
"Debbie is a 33 year old who is being evaluated via a billable video visit.    How would you like to obtain your AVS? MyChart  If the video visit is dropped, the invitation should be resent by: Text to cell phone: [phone]  Will anyone else be joining your video visit? No      Assessment & Plan     Dyspareunia in female  pH suggested possible BV so will do wet prep. Otherwise, report of pain occurrence sounds like position of the cervix which may be anatomical and require more positional changes than a medical intervention.   - Wet prep - lab collect; Future    Lipid screening  - Lipid panel reflex to direct LDL Fasting; Future    BV (bacterial vaginosis)  Clue cells seen. Treat with metrogel  - metroNIDAZOLE (METROGEL) 0.75 % vaginal gel; Place 1 applicator (5 g) vaginally daily          BMI  Estimated body mass index is 30.79 kg/m  as calculated from the following:    Height as of 5/7/24: 1.651 m (5' 5\").    Weight as of 5/7/24: 83.9 kg (185 lb).     Ordering of each unique test  Prescription drug management  I spent a total of 34 minutes on the day of the visit.   Time spent by me doing chart review, history and exam, documentation and further activities per the note    Patient Instructions   Make a lab only appointment for wet prep  Can be any [place]  Take a trip to [place]    Lipids ordered for check in a few months while on NuvaRing instead of oral contraceptive    Subjective   Debbie is a 33 year old, presenting for the following health issues:  Pelvic Pain    History of Present Illness       Reason for visit:  Pain with sex  Symptom onset:  1-2 weeks ago  Symptom intensity:  Moderate  Symptom progression:  Staying the same  Had these symptoms before:  No    She eats 2-3 servings of fruits and vegetables daily.She consumes 0 sweetened beverage(s) daily.She exercises with enough effort to increase her heart rate 30 to 60 minutes per day.  She exercises with enough effort to increase her heart " rate 4 days per week.   She is taking medications regularly.     Wedding was July 5th. Honeymoon to [facility].     Had not had sex until [facility]. Experienced pelvic pain with sex. Certain positions don't hurt (missionary or horizontal) and others do (sitting up or from behind). Paps haven't bothered her and neither does finger penetration. It feels like she is getting jabbed in the cervix and is deep inside. Not at all vaginal. No post-coital bleeding. No discharge, itching or irritation. Has wondered about retroflexed uterus. Constipation that we talked about previous has resolved.     Uses NuvaRing for contraception. Cannot feel cervix when she inserts it. Tried pH strips and vaginal pH used to be neutral and now basic.     Cholesterol was very high at weight cholesterol  HDL dropped a lot (from approx 140) and LDL a little bit when she went off oral birth control      Review of Systems  Constitutional, HEENT, cardiovascular, pulmonary, gi and gu systems are negative, except as otherwise noted.      Objective           Vitals:  No vitals were obtained today due to virtual visit.    Physical Exam   GENERAL: alert and no distress  EYES: Eyes grossly normal to inspection.  No discharge or erythema, or obvious scleral/conjunctival abnormalities.  RESP: No audible wheeze, cough, or visible cyanosis.    SKIN: Visible skin clear. No significant rash, abnormal pigmentation or lesions.  NEURO: Cranial nerves grossly intact.  Mentation and speech appropriate for age.  PSYCH: Appropriate affect, tone, and pace of words          Video-Visit Details    Type of service:  Video Visit   Originating Location (pt. Location): Home    Distant Location (provider location):  On-site  Platform used for Video Visit: Estelle  Signed Electronically by: HARJINDER Vasquez CNP

## 2024-07-17 ENCOUNTER — LAB (OUTPATIENT)
Dept: LAB | Facility: CLINIC | Age: 34
End: 2024-07-17
Payer: COMMERCIAL

## 2024-07-17 DIAGNOSIS — N94.10 DYSPAREUNIA IN FEMALE: ICD-10-CM

## 2024-07-17 LAB
CLUE CELLS: PRESENT
TRICHOMONAS, WET PREP: ABNORMAL
WBC'S/HIGH POWER FIELD, WET PREP: ABNORMAL
YEAST, WET PREP: ABNORMAL

## 2024-07-17 PROCEDURE — 87210 SMEAR WET MOUNT SALINE/INK: CPT

## 2024-07-17 RX ORDER — METRONIDAZOLE 7.5 MG/G
1 GEL VAGINAL DAILY
Qty: 70 G | Refills: 0 | Status: SHIPPED | OUTPATIENT
Start: 2024-07-17 | End: 2024-10-01

## 2024-07-17 NOTE — RESULT ENCOUNTER NOTE
Debbie,    So it does look like BV on the wet prep. Let's see - maybe treatment will fix things. I put in a prescription for metronidazole vaginal gel to use nightly for 5 nights to the [facility]. The pH strip info was super helpful!  If you have any questions, please feel free to contact the clinic.    MARCOS Stark

## 2024-08-07 ENCOUNTER — TRANSFERRED RECORDS (OUTPATIENT)
Dept: HEALTH INFORMATION MANAGEMENT | Facility: CLINIC | Age: 34
End: 2024-08-07

## 2024-08-20 NOTE — PROGRESS NOTES
A/P  1.) Myopia OU  -Mild, using part-time only spec rx  -Reduced minus, okay to use for driving only  -Ocular health otherwise unremarkable    Monitor 1-2 years routine    I have confirmed the patient's CC, HPI and reviewed Past Medical History, Past Surgical History, Social History, Family History, Problem List, Medication List and agree with Tech note.     Yvonne Renee, OD FAAO     The patient location is: walking outside  The chief complaint leading to consultation is: Weight Loss Consult    Visit type: audiovisual    Face to Face time with patient: 25 minutes  30 minutes of total time spent on the encounter, which includes face to face time and non-face to face time preparing to see the patient (eg, review of tests), Obtaining and/or reviewing separately obtained history, Documenting clinical information in the electronic or other health record, Independently interpreting results (not separately reported) and communicating results to the patient/family/caregiver, or Care coordination (not separately reported).         Each patient to whom he or she provides medical services by telemedicine is:  (1) informed of the relationship between the physician and patient and the respective role of any other health care provider with respect to management of the patient; and (2) notified that he or she may decline to receive medical services by telemedicine and may withdraw from such care at any time.    Notes:    Subjective:      Marge Marrero is a 48 y.o. female with history of breast cancer who presents to discuss weight loss. Reports slow steady increase of weight over the last few years. She is very active with walking every day for the last 2+ years. Also starting to add weights at night. Avoids most meat, but occasionally will have turkey or chicken. She is having regular periods. She is not on contraception.  had vasectomy.  Bother  of fatal MI at age 50. Motivated her to improve over all health. No specific goal otherwise. Wants to stay health. She is climbing the [place] in October which has been on her bucket list. Would like to try to lose weight without a medication.     Routine Screening labs: 2024    Sleep: 5-7 hours nightly. Recently given trazodone to try but needs to reduce dose.      Stress: high- walking helps, regular exercise    Exercise: walking  "daily, weights at night     A typical day consists of:  Breakfast: protein shake made at home with greek yogurt  Lunch: veggie burger without bread and veggie mac and cheese  Dinner: mexican bowl- brown rice, cheese, "fake chicken"  Snack: Granola bar- mini ze bar x 2, non-fat yogurt, sugar free applesauce, popcorn  Beverages: water. Alcohol- 6 glasses of wine per week      No visits with results within 3 Month(s) from this visit.   Latest known visit with results is:   Clinical Support on 2024   Component Date Value Ref Range Status    POC Cholesterol, Total 2024 186  <240 MG/DL Final    POC Cholesterol, HDL 2024 49  MG/DL Final    POC Cholesterol, LDL 2024 111  <160 MG/DL Final    POC Triglycerides 2024 147  <160 MG/DL Final    POC Glucose 2024 89  60 - 140 MG/DL Final       Past Medical History:   Diagnosis Date    Breast cancer 2015    History of breast cancer     History of thyroid nodule 2022    Postoperative hypothyroidism      Past Surgical History:   Procedure Laterality Date    AUGMENTATION OF BREAST      BREAST BIOPSY      BREAST LUMPECTOMY      THYROIDECTOMY  2019     Social History     Tobacco Use    Smoking status: Never    Smokeless tobacco: Never   Substance Use Topics    Alcohol use: Yes     Comment: Social    Drug use: Never     Family History   Problem Relation Name Age of Onset    No Known Problems Paternal Grandfather      No Known Problems Paternal Grandmother      Lung cancer Maternal Grandmother Leanne     Breast cancer Maternal Grandmother Leanne     Carotid Artery Stenosis Maternal Grandfather      No Known Problems Father      Thyroid disease Mother Page     Breast cancer Mother Page 45    Brain aneurysm Mother Page     Lung cancer Mother Page     Heart attacks under age 50 Brother Pepe     Ovarian cancer Neg Hx      Uterine cancer Neg Hx      Cervical cancer Neg Hx      Colon cancer Neg Hx      Cancer Neg Hx       OB History    " "Para Term  AB Living   0 0 0 0 0 0   SAB IAB Ectopic Multiple Live Births   0 0 0 0 0       Current Outpatient Medications:     FLUoxetine 10 MG capsule, Take 1 capsule (10 mg total) by mouth once daily., Disp: 30 capsule, Rfl: 2    levothyroxine (SYNTHROID) 200 MCG tablet, Take 1 tablet (200 mcg total) by mouth before breakfast., Disp: 90 tablet, Rfl: 3    pantoprazole (PROTONIX) 40 MG tablet, Take 1 tablet (40 mg total) by mouth once daily., Disp: 30 tablet, Rfl: 4    predniSONE (DELTASONE) 20 MG tablet, Take 1 tablet (20 mg total) by mouth once daily., Disp: 5 tablet, Rfl: 0    triamcinolone acetonide 0.025% (KENALOG) 0.025 % cream, Apply topically 2 (two) times daily., Disp: 15 g, Rfl: 0    Review of Systems:  General: No fever, chills, or weight loss.  Chest: No chest pain, shortness of breath, or palpitations.  Breast: No pain, masses, or nipple discharge.  Vulva: No pain, lesions, or itching.  Vagina: No relaxation, itching, discharge, or lesions.  Abdomen: No pain, nausea, vomiting, diarrhea, or constipation.  Urinary: No incontinence, nocturia, frequency, or dysuria.  Extremities:  No leg cramps, edema, or calf pain.  Neurologic: No headaches, dizziness, or visual changes.    Objective:     Vitals:    24 0816   Weight: 73.5 kg (162 lb)   Height: 5' 4" (1.626 m)     Body mass index is 27.81 kg/m².    PHYSICAL EXAM:  APPEARANCE: Well nourished, well developed, in no acute distress.  AFFECT: WNL, alert and oriented x 3  SKIN: No acne or hirsutism  CHEST: Good respiratory effect    Assessment:    Perimenopause    Overweight    Personal history of breast cancer        BMR calculated at 1360 calories per day.  Plan:   Encouraged lean protein with every meal.  Goal of 90g of protein daily  Discussed to avoid soy protein isolate foods.  Wide variety of fruits and vegetables.   Log in hi with goal of 5823-0933 calories a day  Continue walking daily for exercise- great stress relief as well.  Reviewed " benefits of strength workouts with body composition shifts  Added weights to walk and doing arm weights in the evenings.  Declines medication at this time but will follow up if not seeing results to consider.  Follow up PRN    Instructed patient to call if she experiences any side effects or has any questions.    I spent a total of 30 minutes on the day of the visit.This includes face to face time and non-face to face time preparing to see the patient (eg, review of tests), obtaining and/or reviewing separately obtained history, documenting clinical information in the electronic or other health record, independently interpreting results and communicating results to the patient/family/caregiver, or care coordinator.

## 2024-08-23 ENCOUNTER — OFFICE VISIT (OUTPATIENT)
Dept: MIDWIFE SERVICES | Facility: CLINIC | Age: 34
End: 2024-08-23
Payer: COMMERCIAL

## 2024-08-23 VITALS
OXYGEN SATURATION: 100 % | HEART RATE: 86 BPM | WEIGHT: 175.8 LBS | BODY MASS INDEX: 29.25 KG/M2 | SYSTOLIC BLOOD PRESSURE: 112 MMHG | DIASTOLIC BLOOD PRESSURE: 83 MMHG

## 2024-08-23 DIAGNOSIS — B96.89 BV (BACTERIAL VAGINOSIS): ICD-10-CM

## 2024-08-23 DIAGNOSIS — R10.2 PELVIC PAIN IN FEMALE: Primary | ICD-10-CM

## 2024-08-23 DIAGNOSIS — Z12.4 SCREENING FOR CERVICAL CANCER: ICD-10-CM

## 2024-08-23 DIAGNOSIS — N76.0 BV (BACTERIAL VAGINOSIS): ICD-10-CM

## 2024-08-23 PROCEDURE — 87210 SMEAR WET MOUNT SALINE/INK: CPT | Performed by: ADVANCED PRACTICE MIDWIFE

## 2024-08-23 PROCEDURE — 87624 HPV HI-RISK TYP POOLED RSLT: CPT | Performed by: ADVANCED PRACTICE MIDWIFE

## 2024-08-23 PROCEDURE — G0145 SCR C/V CYTO,THINLAYER,RESCR: HCPCS | Performed by: ADVANCED PRACTICE MIDWIFE

## 2024-08-23 PROCEDURE — 99203 OFFICE O/P NEW LOW 30 MIN: CPT | Performed by: ADVANCED PRACTICE MIDWIFE

## 2024-08-23 RX ORDER — METRONIDAZOLE 500 MG/1
500 TABLET ORAL 2 TIMES DAILY
Qty: 14 TABLET | Refills: 0 | Status: SHIPPED | OUTPATIENT
Start: 2024-08-23 | End: 2024-08-30

## 2024-08-23 NOTE — PROGRESS NOTES
"Debbie Bah 33 year old No LMP recorded. (Menstrual status: Birth Control). Nullip     CC: Pap and consult regarding dyspareunia    HPI:  Pt was recently  and has recently initiated sexual intercourse.  Pt reports experiencing sudden, sharp stabbing pain in certain positions, states 'it feels like my cervix is getting hit\"     She had a wet prep 7/17/2024 that had BV.  She treated with metrogel and the symptoms seemed to get a little better for a few days and then the same.    Pt wondering if her pain is anatomical or if there is something that can help.    Pt using nuvaring continuously.      Denies pain at other times     EXAM:  /83 (BP Location: Left arm, Patient Position: Sitting, Cuff Size: Adult Regular)   Pulse 86   Wt 79.7 kg (175 lb 12.8 oz)   SpO2 100%   BMI 29.25 kg/m     PELVIC EXAM:  Vulva: BUS WNL, no lesions noted,   Vagina: Discharge normal and physiologic, no lesions, well rugated, good tone,   Cervix: Smooth, pink, no visible lesions, neg CMT, friable with pap sample  Uterus: Normal size and position, non-tender, mobile,   Ovaries: No masses palpable, non-tender, mobile, ovaries are clearly palpable low and anterior in the pelvis, during palpation pt noted slight tenderness  Rectal exam: deferred    A/P:  (R10.2) Pelvic pain in female  (primary encounter diagnosis)  Comment:   Plan: Wet prep - Clinic Collect        Will screen for BV again as this can exacerbate pelvic cramping, in her pelvic exam ovaries are easily palpated so feel her pain in certain positions may be an anatomical etiology and working around it may be most helpful.  Discussed next steps in working up pelvic pain would be a pelvic U/S, (uterus and ovaries palpate WNL) and or pelvic floor PT.  Given resources in AVS for sexual wellness coaching.      (Z12.4) Screening for cervical cancer  Comment:   Plan: HPV and Gynecologic Cytology Panel -         Recommended Age 30-65 Years      (N76.0,  B96.89) BV " (bacterial vaginosis)  Comment:   Plan: metroNIDAZOLE (FLAGYL) 500 MG tablet                      Told pt she could my chart me if she decides she would like to pursue a pelvic U/S or pelvic floor PT and I would place the ordered.    RTC for annual, sooner with concerns.    HARJINDER StuartM

## 2024-08-23 NOTE — PATIENT INSTRUCTIONS
Dear Debbie Quintero and You are Not Broken     Low sex drive (low libido)  Resources:  Jhonatan: [employer] for sexual wellness: Easy to navigate topics, has a variety of content, Low libido, menopause, LGBTQ+ topics, short erotic stories, various community forums, Cost approx. 9.99/month   Jhonatan: [employer]: Platform that provides research-based information and tools to help you explore female sexuality in a safe and respectful way.  Cost approx. $49.00/60 videos  Masterclass: Sex and Communication: Cost $10-$20/ month depending on your plan    The following is adapted from The Menopause Guidebook, 9th Addition, North American Menopause Society and from The New Rules of Menopause; A [facility] Guide to Perimenopause and Beyond by Azul Wise M.D., M.B.A.   Libido or sex drive   Changes in your sex drive are a normal part of every relationship and every stage of life.  There are two types of sexual desire, the first is spontaneous desire - the biological drive or craving for sex.  The other type is receptive desire - sex isn't on your mind but if your partner initiates or the right circumstances are in place, then you are willing.  There is no set frequency, or 'shoulds' in sex, the answer to the following question is more important. Is your level of desire for sex causing you or your partner distress?  Spontaneous desire relies on hormones, and it can fall for some women as hormone levels shift during perimenopause and menopause.  If your spontaneous desire falls you can make up for it by maximizing your receptive desire.  Your willingness to have sex is usually affected by the four following categories; biological issues, relationship issues, psychological issues, and life/socio-economic issues.    Biological Issues  Vaginal dryness  Sexual pain  Other chronic pain or arthritis  Incontinence  Sleep problems  Low energy or fatigue  Hot flashes  Weight gain  Medication side effects  Other  _____________________   Relationship Issues   Unresolved relationship conflicts  Inadequate sexual stimulation   Partner loss of interest in sex or sexual dysfunction  Lack of emotional closeness or connectedness  Poor communication  Lack of privacy  Opposing schedules  Other _______________________   Psychological Issues  Anxiety  Depression  Stress  History of unwanted sexual experiences   Low self esteem  Poor body image  Substance abuse  Other ________________________ Life and Socio-economic Issues  Stress from caregiving  Work deadlines  Busy schedules  Limited sex education  Conflict with person, family or Hindu values  Other _________________________   Create a plan to address your turn offs and take full advantage of your receptive desire.  When your spontaneous desire was in full force this intentional approach to sex wasn't necessary, but now it may be helpful to improve your sex life.  Strategies for more satisfying sex  Self-care measures   Avoid products that irritate your vagina (douches, bubble baths, dryer sheets, moistened wipes, perfumes, nylon underwear, lubricants with scent or taste or that heat)  Practice pelvic floor (Kegal) exercises  Exercise  Avoid smoking and alcohol  Adjust your attitude  Intimacy boosters  Make your partner a priority. Take time to enjoy each other and nurture your relationship, go for a walk, make a date night, go out to your favorite restaurant.  Enjoy a midday funny text, a hug or a kiss that isn't part of a sexual encounter.  Feeling more connected will make sex better.  Make sex a priority.  Plan to have sex more often, set aside a time when you both can enjoy sex, and it may not be as you're climbing into bed at night   Communicate your needs. Both med and women often have difficulty communicating sex.  Sometimes you could start with,  I like it when   ,  It feels good when  . ,  Could you do this instead?   Venture beyond your usual Try a new position, a  different room, the backseat of your car  Broaden your definition of sex. For many heterosexual couples sex means penile-vaginal penetration.  That's a very narrow definition.  A short list of things to try may include getting or giving a hickey, tickling, massage, dry humping, masturbating in front of your partner or mutual masturbation, oral sex, manual sex (with hands or fingers).  Share the work of having good sex Split the duties of buying lubricants or a vibrator or scheduling an appointment with a sex therapist.  Lubricants, moisturizers, and vibrators   Lubricants are a must for midlife sex Look for waterbased or silicone-based lubricants.  Waterbased can get a little tacky, silicone lubricants are more slippery, but if your partner has trouble with erectile dysfunction the extra slipperiness may be an issue.  Avoid oil based lubricants (such as petroleum jelly or baby oil) they can be irritating to the vagina and are not compatible with condoms  Vibrators You can buy a variety of vibrators at many locations, [employer], [employer]. The vagina and clitoris become less sensitive over time and more stimulation may be needed to achieve arousal and orgasm     Medications  Estrogen, Estrogen is at the heart of vaginal dryness related to menopause, then estrogen can help restore vaginal moisture and reduce pain with sex.  It is unclear if estrogen can boost libido and sex.  Local vaginal estrogen can be helpful if your main symptom is vaginal dryness or pain with sex.  Talk to your health care provide about the different products available.  DHEA, Ospemifene, this medication is approved to treat moderate to severe pain during sex that is caused by vaginal dryness.  It is an ovule that is inserted into the vagina.  Websites  http://www.[employer]/health-information  Click the link for 'Healthy Lifestyle', the 'Women's Sexual Health', then at the bottom of the page, 'Talking About Your Sexual  Needs'    www.NovariantsexMD.miacosa  Great website for sexual health during the middle years, very user friendly, good information    www.menopause.org  The North American Menopause Society.  A nice 'For Women' tab with many articles about sex during the aging process    www.isswsh.org  International Society for the Study of Women's Sexual Health.  This is their professional website.    Compiled by Verenice GRANDE CNM, MSCP (Menopause Society Certified Practitioner) 1/9/2024

## 2024-08-26 LAB
HPV HR 12 DNA CVX QL NAA+PROBE: NEGATIVE
HPV16 DNA CVX QL NAA+PROBE: NEGATIVE
HPV18 DNA CVX QL NAA+PROBE: NEGATIVE
HUMAN PAPILLOMA VIRUS FINAL DIAGNOSIS: NORMAL

## 2024-08-29 LAB
BKR AP ASSOCIATED HPV REPORT: NORMAL
BKR LAB AP GYN ADEQUACY: NORMAL
BKR LAB AP GYN INTERPRETATION: NORMAL
BKR LAB AP PREVIOUS ABNORMAL: NORMAL
PATH REPORT.COMMENTS IMP SPEC: NORMAL
PATH REPORT.COMMENTS IMP SPEC: NORMAL
PATH REPORT.RELEVANT HX SPEC: NORMAL

## 2024-09-01 NOTE — TELEPHONE ENCOUNTER
Prior Authorization Not Needed per Insurance        Medication: WEGOVY 1.7 MG/0.75ML SC SOAJ  Insurance Company: [employer] - Phone [phone] Fax [phone]  Expected CoPay: Zero    Pharmacy Filling the Rx: [place], [place] - [address] [id number]  Pharmacy Notified: Yes - rx is filled with a zero copay  Patient Notified: No     Discharged

## 2024-09-04 ENCOUNTER — TRANSFERRED RECORDS (OUTPATIENT)
Dept: HEALTH INFORMATION MANAGEMENT | Facility: CLINIC | Age: 34
End: 2024-09-04
Payer: COMMERCIAL

## 2024-09-11 ENCOUNTER — LAB (OUTPATIENT)
Dept: LAB | Facility: CLINIC | Age: 34
End: 2024-09-11
Payer: COMMERCIAL

## 2024-09-11 DIAGNOSIS — Z13.220 LIPID SCREENING: ICD-10-CM

## 2024-09-11 LAB
CHOLEST SERPL-MCNC: 231 MG/DL
FASTING STATUS PATIENT QL REPORTED: YES
HDLC SERPL-MCNC: 85 MG/DL
LDLC SERPL CALC-MCNC: 131 MG/DL
NONHDLC SERPL-MCNC: 146 MG/DL
TRIGL SERPL-MCNC: 77 MG/DL

## 2024-09-11 PROCEDURE — 80061 LIPID PANEL: CPT

## 2024-09-11 PROCEDURE — 36415 COLL VENOUS BLD VENIPUNCTURE: CPT

## 2024-09-12 NOTE — RESULT ENCOUNTER NOTE
Debbie,    LDL cholesterol looks a little higher. The HDL and triglycerides look great.   If you have any questions, please feel free to contact the clinic.    MARCOS Stark

## 2024-09-17 ENCOUNTER — MYC MEDICAL ADVICE (OUTPATIENT)
Dept: FAMILY MEDICINE | Facility: CLINIC | Age: 34
End: 2024-09-17
Payer: COMMERCIAL

## 2024-09-17 DIAGNOSIS — R53.83 FATIGUE, UNSPECIFIED TYPE: ICD-10-CM

## 2024-09-17 RX ORDER — BUPROPION HYDROCHLORIDE 150 MG/1
150 TABLET ORAL EVERY MORNING
Qty: 90 TABLET | Refills: 3 | Status: SHIPPED | OUTPATIENT
Start: 2024-09-17 | End: 2024-09-17

## 2024-09-17 RX ORDER — BUPROPION HYDROCHLORIDE 300 MG/1
300 TABLET ORAL EVERY MORNING
Qty: 90 TABLET | Refills: 1 | Status: SHIPPED | OUTPATIENT
Start: 2024-09-17

## 2024-09-17 NOTE — TELEPHONE ENCOUNTER
Pt requesting refill of bupropion as well as increase to 300mg as already taking this dose.    Thanks!  Momo TAYLOR RN   [facility]

## 2024-10-01 ENCOUNTER — VIRTUAL VISIT (OUTPATIENT)
Dept: CARDIOLOGY | Facility: CLINIC | Age: 34
End: 2024-10-01
Payer: COMMERCIAL

## 2024-10-01 VITALS — HEIGHT: 65 IN | WEIGHT: 167 LBS | BODY MASS INDEX: 27.82 KG/M2

## 2024-10-01 DIAGNOSIS — R55 PRE-SYNCOPE: ICD-10-CM

## 2024-10-01 PROCEDURE — 99214 OFFICE O/P EST MOD 30 MIN: CPT | Mod: 95

## 2024-10-01 ASSESSMENT — PAIN SCALES - GENERAL: PAINLEVEL: NO PAIN (0)

## 2024-10-01 NOTE — LETTER
"10/1/2024      RE: Debbie Bah  [address]  [place] [address]       Dear Colleague,    Thank you for the opportunity to participate in the care of your patient, Debbie Bah, at the [facility] [place] [facility]. Please see a copy of my visit note below.        ELECTROPHYSIOLOGY VIDEO VISIT    Assessment/Recommendations   Assessment/Plan:    Debbie Bah is a 34 year old female with past medical history significant for situational anxiety and syncope.     Recurrent vasovagal faints   Low Blood Pressure Phenotype   Stable, presyncope symptoms occurring once every several weeks, generally occurs with triggers, primarily anxiety.   - Use compression shorts (athletic compression shorts)  - Increase hydration with goal to take in 64 oz of water/electrolyte fluids per day.   - Liberalize salt intake     Follow up as needed      History of Present Illness/Subjective    MsBarry Bah is a 34 year old female who comes in today for EP follow-up of vasovagal syncope.    Patient was evaluated by Dr Reid on 1/11/24, reported a long history of transient loss of consciousness episodes. Episodes usually associated with identifiable triggers such as blood draws, temperature changes or painful stimuli. Episodes were occurring several times a year.     In September 2023 she did contract COVID and that seemed to aggravate her syncope and near syncope symptoms. She was seen in the ED in September of that year with an episode of prolonged lightheadedness which started during a walk with a friend. Measured BP when she got home with reading of 160/100, in general BP tends to be low ~110/80. Labs/vitals in ER within normal.   Since covid, she continues to have an increase in symptoms. She was prescribed propanolol for anxiety but when last seen had not been taking this. During occurrences she reports pallor, \"seeing " "stars\", and feels fatigued after the event. Dr Reid felt all of these characteristics along with her relatively low resting blood pressure tend to favor vasovagal events. Discussed that since her symptoms had been improving somewhat, defer any further testing or treatment for now.      EP Visit 5/7/24: She presents today for follow up. She has been doing well, has had a couple presyncopal episodes since she was last seen in January. She did not increase electrolyte/fluid intake since symptoms have not been very bothersome. Continues to note episodes occur with triggers such as prolonged exertion or heat. Otherwise no new concerns or symptoms today.    She presents today for follow up. She reports feeling well. She has an episode of presyncopal symptoms once every 3-4 weeks. She notes anxiety as a trigger for symptoms, she got  this summer and noted worsening of symptoms surrounding this. Electrolytes fluids tend to improve/resolve symptoms. 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     I have reviewed and updated the patient's Past Medical History, Social History, Family History and Medication List.     Cardiographics (Personally Reviewed) :   Echo: 10/19/2023   Interpretation Summary  Left ventricular size, wall motion and function are normal. The ejection  fraction is 60-65%.  Right ventricular function, chamber size, wall motion, and thickness are  normal.  Normal valve assessment.  The inferior vena cava is normal.  No pericardial effusion.  There is no prior study for direct comparison.       Physical Examination   Ht 1.651 m (5' 5\")   Wt 75.8 kg (167 lb)   BMI 27.79 kg/m    Wt Readings from Last 3 Encounters:   10/01/24 75.8 kg (167 lb)   08/23/24 79.7 kg (175 lb 12.8 oz)   05/07/24 83.9 kg (185 lb)     General Appearance:   Alert, well-appearing and in no acute distress.   HEENT: Atraumatic, normocephalic.    Chest/Lungs:   Respirations unlabored.   "   Cardiovascular:   No edema of visualized extremities.     Psych: Mood and affect are appropriate.    Skin: No rashes visualized on face/neck/upper extremities   Neurologic:  Alert and oriented to person, place, time, and situation.          Medications  Allergies   Current Outpatient Medications   Medication Sig Dispense Refill     buPROPion (WELLBUTRIN XL) 300 MG 24 hr tablet Take 1 tablet (300 mg) by mouth every morning. 90 tablet 1     etonogestrel-ethinyl estradiol (NUVARING) 0.12-0.015 MG/24HR vaginal ring Insert one (1) ring vaginally and leave in place for 3 consecutive weeks (21 days), then remove for 1 week. 4 each 4     hydrOXYzine (ATARAX) 25 MG tablet Take 1 tablet (25 mg) by mouth 3 times daily as needed for itching 30 tablet 0     predniSONE (DELTASONE) 50 MG tablet Emergency set: if severe allergic reaction take immediately 100mg Prednisone (2x50mg) and 2 Tabl Cetirizine 10mg and then write precise 12h retrospective diary.If less severe reaction take only the 2 Tabl Cetirizine 2 tablet 3     Vitamin D3 (CHOLECALCIFEROL) 125 MCG (5000 UT) tablet Take 1 tablet by mouth daily       metroNIDAZOLE (METROGEL) 0.75 % vaginal gel Place 1 applicator (5 g) vaginally daily 70 g 0     No current facility-administered medications for this visit.    Allergies   Allergen Reactions     Doxycycline      esophagitis     Mushroom Cramps, Diarrhea and GI Disturbance         Lab Results (Personally Reviewed)    Chemistry/lipid CBC Cardiac Enzymes/BNP/TSH/INR   Lab Results   Component Value Date    BUN 8.9 09/30/2023     09/30/2023    CO2 21 (L) 09/30/2023     Creatinine   Date Value Ref Range Status   09/30/2023 0.75 0.51 - 0.95 mg/dL Final   12/20/2018 0.67 0.52 - 1.04 mg/dL Final       Lab Results   Component Value Date    CHOL 231 (H) 09/11/2024    HDL 85 09/11/2024     (H) 09/11/2024      Lab Results   Component Value Date    WBC 9.7 09/30/2023    HGB 13.2 09/30/2023    HCT 39.9 09/30/2023    MCV 90  09/30/2023     09/30/2023    Lab Results   Component Value Date    TSH 1.21 09/30/2023        Video-Visit Details  Type of service:  Video Visit   Video Start Time: 4:26 PM   Video End Time:4:40 PM  Originating Location (pt. Location): Home  Distant Location (provider location):  On-site  Platform used for Video Visit: Estelle    I have reviewed the note as documented above.  This accurately captures the substance of my virtual visit with the patient. The patient states understanding and is agreeable with the plan.     Suzie Smith PA-C  [facility]  Electrophysiology Consult Service  Pager: [phone]    Total time spent on patient visit, reviewing notes, imaging, labs, orders, and completing necessary documentation: 35 minutes.                        Please do not hesitate to contact me if you have any questions/concerns.     Sincerely,     Suzie Smith PA-C

## 2024-10-01 NOTE — PROGRESS NOTES
"    ELECTROPHYSIOLOGY VIDEO VISIT    Assessment/Recommendations   Assessment/Plan:    Debbie Bah is a 34 year old female with past medical history significant for situational anxiety.     Recurrent vasovagal faints   Low Blood Pressure Phenotype   Stable, presyncope symptoms occurring once every several weeks, generally occurs with triggers, primarily anxiety.   - Use compression shorts (athletic compression shorts)  - Increase hydration with goal to take in 64 oz of water/electrolyte fluids per day.   - Liberalize salt intake     Follow up as needed      History of Present Illness/Subjective    Ms. Debbie Bah is a 34 year old female who comes in today for EP follow-up of vasovagal syncope.    Patient was evaluated by Dr Reid on 1/11/24, reported a long history of transient loss of consciousness episodes. Episodes usually associated with identifiable triggers such as blood draws, temperature changes or painful stimuli. Episodes were occurring several times a year.     In September 2023 she did contract COVID and that seemed to aggravate her near syncope symptoms. She was seen in the ED in September of that year with an episode of prolonged lightheadedness which started during a walk with a friend. Measured BP when she got home with reading of 160/100, in general BP tends to be low ~110/80. Labs/vitals in ER within normal.   Since covid, she continues to have an increase in symptoms. She was prescribed propanolol for anxiety but when last seen had not been taking this. During occurrences she reports pallor, \"seeing stars\", and feels fatigued after the event. Dr Reid felt all of these characteristics along with her relatively low resting blood pressure tend to favor vasovagal events. Discussed that since her symptoms had been improving somewhat, defer any further testing or treatment for now.      EP Visit 5/7/24: She presents today for follow up. She has been doing well, has had a couple " "presyncopal episodes since she was last seen in January. She did not increase electrolyte/fluid intake since symptoms have not been very bothersome. Continues to note episodes occur with triggers such as prolonged exertion or heat. Otherwise no new concerns or symptoms today.    She presents today for follow up. She reports feeling well. She has an episode of presyncopal symptoms once every 3-4 weeks. She notes anxiety as a trigger for symptoms, she got  this summer and noted worsening of symptoms surrounding this. Electrolytes fluids tend to improve/resolve symptoms. 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     I have reviewed and updated the patient's Past Medical History, Social History, Family History and Medication List.     Cardiographics (Personally Reviewed) :   Echo: 10/19/2023   Interpretation Summary  Left ventricular size, wall motion and function are normal. The ejection  fraction is 60-65%.  Right ventricular function, chamber size, wall motion, and thickness are  normal.  Normal valve assessment.  The inferior vena cava is normal.  No pericardial effusion.  There is no prior study for direct comparison.       Physical Examination   Ht 1.651 m (5' 5\")   Wt 75.8 kg (167 lb)   BMI 27.79 kg/m    Wt Readings from Last 3 Encounters:   10/01/24 75.8 kg (167 lb)   08/23/24 79.7 kg (175 lb 12.8 oz)   05/07/24 83.9 kg (185 lb)     General Appearance:   Alert, well-appearing and in no acute distress.   HEENT: Atraumatic, normocephalic.    Chest/Lungs:   Respirations unlabored.     Cardiovascular:   No edema of visualized extremities.     Psych: Mood and affect are appropriate.    Skin: No rashes visualized on face/neck/upper extremities   Neurologic:  Alert and oriented to person, place, time, and situation.          Medications  Allergies   Current Outpatient Medications   Medication Sig Dispense Refill    buPROPion (WELLBUTRIN XL) 300 MG 24 hr tablet " Take 1 tablet (300 mg) by mouth every morning. 90 tablet 1    etonogestrel-ethinyl estradiol (NUVARING) 0.12-0.015 MG/24HR vaginal ring Insert one (1) ring vaginally and leave in place for 3 consecutive weeks (21 days), then remove for 1 week. 4 each 4    hydrOXYzine (ATARAX) 25 MG tablet Take 1 tablet (25 mg) by mouth 3 times daily as needed for itching 30 tablet 0    predniSONE (DELTASONE) 50 MG tablet Emergency set: if severe allergic reaction take immediately 100mg Prednisone (2x50mg) and 2 Tabl Cetirizine 10mg and then write precise 12h retrospective diary.If less severe reaction take only the 2 Tabl Cetirizine 2 tablet 3    Vitamin D3 (CHOLECALCIFEROL) 125 MCG (5000 UT) tablet Take 1 tablet by mouth daily      metroNIDAZOLE (METROGEL) 0.75 % vaginal gel Place 1 applicator (5 g) vaginally daily 70 g 0     No current facility-administered medications for this visit.    Allergies   Allergen Reactions    Doxycycline      esophagitis    Mushroom Cramps, Diarrhea and GI Disturbance         Lab Results (Personally Reviewed)    Chemistry/lipid CBC Cardiac Enzymes/BNP/TSH/INR   Lab Results   Component Value Date    BUN 8.9 09/30/2023     09/30/2023    CO2 21 (L) 09/30/2023     Creatinine   Date Value Ref Range Status   09/30/2023 0.75 0.51 - 0.95 mg/dL Final   12/20/2018 0.67 0.52 - 1.04 mg/dL Final       Lab Results   Component Value Date    CHOL 231 (H) 09/11/2024    HDL 85 09/11/2024     (H) 09/11/2024      Lab Results   Component Value Date    WBC 9.7 09/30/2023    HGB 13.2 09/30/2023    HCT 39.9 09/30/2023    MCV 90 09/30/2023     09/30/2023    Lab Results   Component Value Date    TSH 1.21 09/30/2023        Video-Visit Details  Type of service:  Video Visit   Video Start Time: 4:26 PM   Video End Time:4:40 PM  Originating Location (pt. Location): Home  Distant Location (provider location):  On-site  Platform used for Video Visit: Estelle MELTON have reviewed the note as documented above.  This  accurately captures the substance of my virtual visit with the patient. The patient states understanding and is agreeable with the plan.     Suzie Smith PA-C  [facility]  Electrophysiology Consult Service  Pager: [phone]    Total time spent on patient visit, reviewing notes, imaging, labs, orders, and completing necessary documentation: 35 minutes.

## 2024-10-01 NOTE — NURSING NOTE
Current patient location: [address] [place] [address] [address]  [facility] [address]    Is the patient currently in the state of [place]? YES    Visit mode:VIDEO    If the visit is dropped, the patient can be reconnected by: VIDEO VISIT: Text to cell phone:   Telephone Information:   Mobile [phone]       Will anyone else be joining the visit? NO  (If patient encounters technical issues they should call [phone]    Are changes needed to the allergy or medication list? No    Are refills needed on medications prescribed by this physician? NO    Rooming Documentation:  Questionnaire(s) completed    Reason for visit: ELIS EMANUEL

## 2024-10-07 ENCOUNTER — VIRTUAL VISIT (OUTPATIENT)
Dept: FAMILY MEDICINE | Facility: CLINIC | Age: 34
End: 2024-10-07
Payer: COMMERCIAL

## 2024-10-07 DIAGNOSIS — N94.10 DYSPAREUNIA IN FEMALE: ICD-10-CM

## 2024-10-07 DIAGNOSIS — F41.8 SITUATIONAL ANXIETY: ICD-10-CM

## 2024-10-07 DIAGNOSIS — J34.89 SINUS PRESSURE: Primary | ICD-10-CM

## 2024-10-07 PROCEDURE — 99214 OFFICE O/P EST MOD 30 MIN: CPT | Mod: 95 | Performed by: NURSE PRACTITIONER

## 2024-10-07 PROCEDURE — G2211 COMPLEX E/M VISIT ADD ON: HCPCS | Mod: 95 | Performed by: NURSE PRACTITIONER

## 2024-10-07 NOTE — PROGRESS NOTES
Debbie is a 34 year old who is being evaluated via a billable video visit.    How would you like to obtain your AVS? MyChart  If the video visit is dropped, the invitation should be resent by: Text to cell phone: [phone]  Will anyone else be joining your video visit? No      Assessment & Plan     Sinus pressure  No focal neurologic symptoms and some resolution in pressure with cetirizine. I would think either allergic rhinitis even without other symptom or possible side effect of nuvaring. Ok to stop cetirizine and see if symptoms return which would be diagnostic in itself. I'd favor fluticasone nasal spray over cetirizine. For neck and head - continue physical therapy and plans to get massage    Situational anxiety  Agree with reduction in bupropion with the increase in anxiety.     Dyspareunia in female  Has clear guidance from OB-GYN about anatomy and is working around this.      Patient Instructions   Massage therapist  Carolyn Murrieta - [facility]  [address]   [place], [place]  Call [phone]  [email or web address]  Carolyn does massage and craniosacral for all ages with an emphasis on education      The longitudinal plan of care for the diagnosis(es)/condition(s) as documented were addressed during this visit. Due to the added complexity in care, I will continue to support Debbie in the subsequent management and with ongoing continuity of care.  I spent a total of 30 minutes on the day of the visit.   Time spent by me doing chart review, history and exam, documentation and further activities per the note    Subjective   Debbie is a 34 year old, presenting for the following health issues:  Sinus Problem    HPI     Had facial pressure for a month. Not having other symptoms like fever, chills, runny nose, sneezing, sore throat or coughing. Notes amplified self voice. Does note headache and neck pain - temporal, suboccipital and front pain - so wondered  about radiated pain. Started cetirizine for four days. Seems to have improved in the past three days. Trying physical therapy and dry needling for neck and head. Has had concerns about a intracranial or sinus mass as has seen these in coworkers and patients.    Had been feeling more depressed and we increased bupropion from  mg to  mg. Experienced more anxiety and so has decreased back to 150 mg.      Review of Systems  Constitutional, HEENT, cardiovascular, pulmonary, gi and gu systems are negative, except as otherwise noted.      Objective         Vitals:  No vitals were obtained today due to virtual visit.    Physical Exam   GENERAL: alert and no distress  EYES: Eyes grossly normal to inspection.  No discharge or erythema, or obvious scleral/conjunctival abnormalities.  RESP: No audible wheeze, cough, or visible cyanosis.    SKIN: Visible skin clear. No significant rash, abnormal pigmentation or lesions.  NEURO: Cranial nerves grossly intact.  Mentation and speech appropriate for age.  PSYCH: Appropriate affect, tone, and pace of words        Video-Visit Details    Type of service:  Video Visit   Originating Location (pt. Location): Home    Distant Location (provider location):  On-site  Platform used for Video Visit: Estelle  Signed Electronically by: HARJINDER Vasquez CNP

## 2024-10-07 NOTE — PATIENT INSTRUCTIONS
Massage therapist  Carolyn Murrieta - [facility]  [address]   [place], [place]  Call [phone]  [email or web address]  Carolyn does massage and craniosacral for all ages with an emphasis on education

## 2024-10-08 ENCOUNTER — PATIENT OUTREACH (OUTPATIENT)
Dept: CARE COORDINATION | Facility: CLINIC | Age: 34
End: 2024-10-08
Payer: COMMERCIAL

## 2024-10-14 ENCOUNTER — MYC MEDICAL ADVICE (OUTPATIENT)
Dept: FAMILY MEDICINE | Facility: CLINIC | Age: 34
End: 2024-10-14
Payer: COMMERCIAL

## 2024-10-14 DIAGNOSIS — F41.8 SITUATIONAL ANXIETY: Primary | ICD-10-CM

## 2024-10-15 RX ORDER — BUPROPION HYDROCHLORIDE 150 MG/1
150 TABLET ORAL EVERY MORNING
Qty: 90 TABLET | Refills: 0 | Status: SHIPPED | OUTPATIENT
Start: 2024-10-15

## 2024-10-22 ENCOUNTER — PATIENT OUTREACH (OUTPATIENT)
Dept: CARE COORDINATION | Facility: CLINIC | Age: 34
End: 2024-10-22
Payer: COMMERCIAL

## 2024-12-15 SDOH — HEALTH STABILITY: PHYSICAL HEALTH: ON AVERAGE, HOW MANY DAYS PER WEEK DO YOU ENGAGE IN MODERATE TO STRENUOUS EXERCISE (LIKE A BRISK WALK)?: 3 DAYS

## 2024-12-15 SDOH — HEALTH STABILITY: PHYSICAL HEALTH: ON AVERAGE, HOW MANY MINUTES DO YOU ENGAGE IN EXERCISE AT THIS LEVEL?: 20 MIN

## 2024-12-15 ASSESSMENT — SOCIAL DETERMINANTS OF HEALTH (SDOH): HOW OFTEN DO YOU GET TOGETHER WITH FRIENDS OR RELATIVES?: THREE TIMES A WEEK

## 2024-12-16 PROBLEM — Z00.00 ROUTINE GENERAL MEDICAL EXAMINATION AT A HEALTH CARE FACILITY: Status: ACTIVE | Noted: 2024-12-16

## 2024-12-17 ENCOUNTER — OFFICE VISIT (OUTPATIENT)
Dept: FAMILY MEDICINE | Facility: CLINIC | Age: 34
End: 2024-12-17
Payer: COMMERCIAL

## 2024-12-17 VITALS
DIASTOLIC BLOOD PRESSURE: 74 MMHG | BODY MASS INDEX: 27.74 KG/M2 | SYSTOLIC BLOOD PRESSURE: 113 MMHG | OXYGEN SATURATION: 99 % | RESPIRATION RATE: 14 BRPM | HEIGHT: 65 IN | HEART RATE: 84 BPM | WEIGHT: 166.5 LBS | TEMPERATURE: 99 F

## 2024-12-17 DIAGNOSIS — Z13.220 LIPID SCREENING: ICD-10-CM

## 2024-12-17 DIAGNOSIS — Z30.015 ENCOUNTER FOR INITIAL PRESCRIPTION OF VAGINAL RING HORMONAL CONTRACEPTIVE: ICD-10-CM

## 2024-12-17 DIAGNOSIS — Z13.1 SCREENING FOR DIABETES MELLITUS: ICD-10-CM

## 2024-12-17 DIAGNOSIS — F41.8 SITUATIONAL ANXIETY: ICD-10-CM

## 2024-12-17 DIAGNOSIS — Z86.16 HISTORY OF COVID-19: ICD-10-CM

## 2024-12-17 DIAGNOSIS — Z00.00 ROUTINE GENERAL MEDICAL EXAMINATION AT A HEALTH CARE FACILITY: Primary | ICD-10-CM

## 2024-12-17 PROCEDURE — 36415 COLL VENOUS BLD VENIPUNCTURE: CPT | Performed by: NURSE PRACTITIONER

## 2024-12-17 PROCEDURE — 99213 OFFICE O/P EST LOW 20 MIN: CPT | Mod: 25 | Performed by: NURSE PRACTITIONER

## 2024-12-17 PROCEDURE — 99395 PREV VISIT EST AGE 18-39: CPT | Mod: 25 | Performed by: NURSE PRACTITIONER

## 2024-12-17 PROCEDURE — 90471 IMMUNIZATION ADMIN: CPT | Performed by: NURSE PRACTITIONER

## 2024-12-17 PROCEDURE — 82947 ASSAY GLUCOSE BLOOD QUANT: CPT | Performed by: NURSE PRACTITIONER

## 2024-12-17 PROCEDURE — 80061 LIPID PANEL: CPT | Performed by: NURSE PRACTITIONER

## 2024-12-17 PROCEDURE — 90715 TDAP VACCINE 7 YRS/> IM: CPT | Performed by: NURSE PRACTITIONER

## 2024-12-17 RX ORDER — ETONOGESTREL AND ETHINYL ESTRADIOL VAGINAL RING .015; .12 MG/D; MG/D
RING VAGINAL
Qty: 4 EACH | Refills: 4 | Status: SHIPPED | OUTPATIENT
Start: 2024-12-17

## 2024-12-17 RX ORDER — BUPROPION HYDROCHLORIDE 150 MG/1
150 TABLET ORAL EVERY MORNING
Qty: 90 TABLET | Refills: 3 | Status: SHIPPED | OUTPATIENT
Start: 2024-12-17

## 2024-12-17 ASSESSMENT — PAIN SCALES - GENERAL: PAINLEVEL_OUTOF10: NO PAIN (0)

## 2024-12-17 NOTE — PATIENT INSTRUCTIONS
"Advanced Care Directive resources  Consider making an advanced care directive - this is a good site for assistance and resources   [email or web address]    Post-covid clinic referral for autonomic nervous system dysfunction - [facility] Protocol    Add in strength training - Sunday Funday 10:30 am at [facility] on [address]  Continue the cardio    The big take-aways include: (with more in-depth explanation below, if interested)   1.  Increase vegetables to 1/2 the bulk of each meal with a goal of 5-7 servings a day   2.  Make almost all of your carbohydrates high quality whole grains   3.  Use legumes as triple duty for plant matter, protein and carbohydrates!   4.  Add more fish and/or fish oil   5.  Reduce sweets, sugar, junk food to rare intake     If there are changes you can make and you want to, we can recheck again in 3-6 months.       Nutrition is a field that is rife with controversy and a range of recommendations, some which are contradictory.  It's likely that there is no one diet that works for every body or for every goal.  That said, there is agreement on plant matter (vegetables).  I often recommend for everyone to have 5-7 servings of vegetables a day and/or that vegetables/plant matter make up 1/2 of the bulk of each meal.  This is higher than the typical recommendation.  The extra vegetables take the place of some carbohydrates and protein.       Make almost all of your carbohydrates high quality whole grains.  Whole grains are not necessarily called \"whole wheat bread\" or \"whole wheat pasta\" because these can be just white grain with coloring. Think about looking for a diverse set of grains to include in your diet like brown rice, quinoa, farrow, barley, etc. And make them 1/4-1/3 of the bulk of your meals.  Legumes are a power-house food providing fiber, high quality carbohydrates and protein.  Look for ways to include more legumes like lentils and beans.     With proteins, I still " unconvinced about one route for all people.  The mediterranean diet has good data showing improved cardiovascular outcomes and emphasizes low meat and saturated fat content.  However, some people seem to have better satiety with some amount of saturated fat.  So I would recommend inclusion of a small amount of fat whether through meat or butter or milk, but largely focus on lean meats and fish.   Speaking of fish - omega 3 fatty acids largely help triglycerides (which you don't need help with - yours are great), but may also help lower LDL.  The data is not clear on this.     There is really no place at all for sugar or simple carbs, except that we are humans who like to enjoy life from time-to-time.  If you already enjoy these rarely, no need to make a big change.  If these foods are an achilles heel for you, consider a 2-6 week period where you have none to reset your baseline.     If you have any questions, please let me know.  We can also always have you see nutrition if you are interested.

## 2024-12-17 NOTE — PROGRESS NOTES
Preventive Care Visit  [facility]  Sarahi Santos, HARJINDER CNP, Nurse Practitioner - Family  Dec 17, 2024  {Provider  Link to SmartSet :964955}    {PROVIDER CHARTING PREFERENCE:954468}    Priya Lewis is a 34 year old, presenting for the following:  Physical        12/17/2024     7:54 AM   Additional Questions   Roomed by Geronimo HOPPER  HM -    Cancer screenings - utd   Chronic conditions screenings - lipids   Immunizations - UTS, TDAP 5 years old  Habits  -   Exercise - 30-60 minutes walking 3-5 days per week      Nutrition - ***     Mental health/mindfulness - ***     Alcohol/cigarettes - ***   Sleep - going pretty good  Sexual health - uncertain conception plans at this time, but leaning towards pregnancy. Sexually active with , monogamous, sexual comfort has improved. Using Nuvaring continuously for contraception. Has occasional spotting.   Goals - exercise, possibly cholesterol    Had covid 9/2023 - continuing symptoms of autonomic nervous system dysfunction    Mood - continues to see therapist once a month at this time. Suki Mendoza at [facility]. Taking wellbutrin  mg. Has been overall good to reduce from 300 mg XL.     Followed by Bernadine LYNN. For binge eating - had been on semaglutide and getting constipation and nausea 2/2 covid. Switched to tirzepatide and currently taking 5 mg weekly. Starting weight 215 lb and   Wt Readings from Last 5 Encounters:   12/17/24 75.5 kg (166 lb 8 oz)   10/01/24 75.8 kg (167 lb)   08/23/24 79.7 kg (175 lb 12.8 oz)   05/07/24 83.9 kg (185 lb)   01/11/24 83.9 kg (185 lb)       Health Care Directive  Patient does not have a Health Care Directive: {ADVANCE_DIRECTIVE_STATUS:379957}      12/15/2024   General Health   How would you rate your overall physical health? Good   Feel stress (tense, anxious, or unable to sleep) Not at all            12/15/2024   Nutrition   Three or more servings of calcium  each day? (!) I DON'T KNOW   Diet: Regular (no restrictions)   How many servings of fruit and vegetables per day? (!) 2-3   How many sweetened beverages each day? 0-1            12/15/2024   Exercise   Days per week of moderate/strenous exercise 3 days   Average minutes spent exercising at this level 20 min            12/15/2024   Social Factors   Frequency of gathering with friends or relatives Three times a week   Worry food won't last until get money to buy more No   Food not last or not have enough money for food? No   Do you have housing? (Housing is defined as stable permanent housing and does not include staying ouside in a car, in a tent, in an abandoned building, in an overnight shelter, or couch-surfing.) Yes   Are you worried about losing your housing? No   Lack of transportation? No   Unable to get utilities (heat,electricity)? No            12/15/2024   Dental   Dentist two times every year? Yes             {Rooming Staff Patient needs a PHQ as part of the AWV.  Use this link to complete and then refresh the note to pull results Link to PHQ2 Assessment :695933}  {USE TO PULL IN PHQ RESULTS FOR TODAY:189881}      12/15/2024   Substance Use   Alcohol more than 3/day or more than 7/wk No   Do you use any other substances recreationally? No        Social History     Tobacco Use    Smoking status: Never     Passive exposure: Never    Smokeless tobacco: Never   Vaping Use    Vaping status: Never Used   Substance Use Topics    Alcohol use: Yes     Alcohol/week: 0.0 standard drinks of alcohol     Comment: 0-1 per week    Drug use: No     {Provider  If there are gaps in the social history shown above, please follow the link to update and then refresh the note Link to Social and Substance History :483025}     {Mammogram Decision Support (Optional):822955}        12/15/2024   STI Screening   New sexual partner(s) since last STI/HIV test? No        History of abnormal Pap smear: { :882721}        Latest Ref Rng &  "Units 8/23/2024     9:45 AM 6/21/2021     9:41 AM 4/18/2018     2:26 PM   PAP / HPV   PAP  Negative for Intraepithelial Lesion or Malignancy (NILM)      PAP (Historical)   NIL  NIL    HPV 16 DNA Negative Negative      HPV 18 DNA Negative Negative      Other HR HPV Negative Negative              12/15/2024   Contraception/Family Planning   Questions about contraception or family planning No        {Provider  REQUIRED FOR AWV Use the storyboard to review patient history, after sections have been marked as reviewed, refresh note to capture documentation:043699}   Reviewed and updated as needed this visit by Provider                    {HISTORY OPTIONS (Optional):045980}    {ROS Picklists (Optional):612478}     Objective    Exam  /74 (BP Location: Left arm, Patient Position: Sitting, Cuff Size: Adult Regular)   Pulse 84   Temp 99  F (37.2  C) (Temporal)   Resp 14   Ht 1.655 m (5' 5.16\")   Wt 75.5 kg (166 lb 8 oz)   LMP  (LMP Unknown)   SpO2 99%   BMI 27.57 kg/m     Estimated body mass index is 27.57 kg/m  as calculated from the following:    Height as of this encounter: 1.655 m (5' 5.16\").    Weight as of this encounter: 75.5 kg (166 lb 8 oz).    Physical Exam  {Exam Choices (Optional):658841}        Signed Electronically by: HARJINDER Vasquez CNP  {Email feedback regarding this note to [email or web address]   :519861}  " currently taking 5 mg weekly. Starting weight 215 lb and   Wt Readings from Last 5 Encounters:   12/17/24 75.5 kg (166 lb 8 oz)   10/01/24 75.8 kg (167 lb)   08/23/24 79.7 kg (175 lb 12.8 oz)   05/07/24 83.9 kg (185 lb)   01/11/24 83.9 kg (185 lb)       Health Care Directive  Patient does not have a Health Care Directive: Discussed advance care planning with patient; information given to patient to review.      12/15/2024   General Health   How would you rate your overall physical health? Good   Feel stress (tense, anxious, or unable to sleep) Not at all            12/15/2024   Nutrition   Three or more servings of calcium each day? (!) I DON'T KNOW   Diet: Regular (no restrictions)   How many servings of fruit and vegetables per day? (!) 2-3   How many sweetened beverages each day? 0-1            12/15/2024   Exercise   Days per week of moderate/strenous exercise 3 days   Average minutes spent exercising at this level 20 min            12/15/2024   Social Factors   Frequency of gathering with friends or relatives Three times a week   Worry food won't last until get money to buy more No   Food not last or not have enough money for food? No   Do you have housing? (Housing is defined as stable permanent housing and does not include staying ouside in a car, in a tent, in an abandoned building, in an overnight shelter, or couch-surfing.) Yes   Are you worried about losing your housing? No   Lack of transportation? No   Unable to get utilities (heat,electricity)? No            12/15/2024   Dental   Dentist two times every year? Yes                 Today's PHQ-2 Score:       10/1/2024     3:54 PM   PHQ-2 ( 1999 [employer])   Q1: Little interest or pleasure in doing things 0   Q2: Feeling down, depressed or hopeless 0   PHQ-2 Score 0         12/15/2024   Substance Use   Alcohol more than 3/day or more than 7/wk No   Do you use any other substances recreationally? No        Social History     Tobacco Use    Smoking status:  "Never     Passive exposure: Never    Smokeless tobacco: Never   Vaping Use    Vaping status: Never Used   Substance Use Topics    Alcohol use: Yes     Alcohol/week: 0.0 standard drinks of alcohol     Comment: 0-1 per week    Drug use: No          Mammogram Screening - Patient under 40 years of age: Routine Mammogram Screening not recommended.         12/15/2024   STI Screening   New sexual partner(s) since last STI/HIV test? No        History of abnormal Pap smear: No - age 30- 64 PAP with HPV every 5 years recommended        Latest Ref Rng & Units 8/23/2024     9:45 AM 6/21/2021     9:41 AM 4/18/2018     2:26 PM   PAP / HPV   PAP  Negative for Intraepithelial Lesion or Malignancy (NILM)      PAP (Historical)   NIL  NIL    HPV 16 DNA Negative Negative      HPV 18 DNA Negative Negative      Other HR HPV Negative Negative              12/15/2024   Contraception/Family Planning   Questions about contraception or family planning No           Reviewed and updated as needed this visit by Provider                    Past Medical History:   Diagnosis Date    Binge eating disorder     Depression 2015     Past Surgical History:   Procedure Laterality Date    MAMMOPLASTY REDUCTION  2022         Review of Systems  Constitutional, neuro, ENT, endocrine, pulmonary, cardiac, gastrointestinal, genitourinary, musculoskeletal, integument and psychiatric systems are negative, except as otherwise noted.     Objective    Exam  /74 (BP Location: Left arm, Patient Position: Sitting, Cuff Size: Adult Regular)   Pulse 84   Temp 99  F (37.2  C) (Temporal)   Resp 14   Ht 1.655 m (5' 5.16\")   Wt 75.5 kg (166 lb 8 oz)   LMP  (LMP Unknown)   SpO2 99%   BMI 27.57 kg/m     Estimated body mass index is 27.57 kg/m  as calculated from the following:    Height as of this encounter: 1.655 m (5' 5.16\").    Weight as of this encounter: 75.5 kg (166 lb 8 oz).    Physical Exam  GENERAL: alert and no distress  EYES: Eyes grossly normal to " inspection, PERRL and conjunctivae and sclerae normal  HENT: ear canals and TM's normal, nose and mouth without ulcers or lesions  NECK: no adenopathy, no asymmetry, masses, or scars  RESP: lungs clear to auscultation - no rales, rhonchi or wheezes  CV: regular rate and rhythm, normal S1 S2, no S3 or S4, no murmur, click or rub, no peripheral edema  ABDOMEN: soft, nontender, no hepatosplenomegaly, no masses and bowel sounds normal  MS: no gross musculoskeletal defects noted, no edema  SKIN: no suspicious lesions or rashes  NEURO: Normal strength and tone, mentation intact and speech normal  PSYCH: mentation appears normal, affect normal/bright        Signed Electronically by: HARJINDER Vasquez CNP

## 2024-12-18 LAB
CHOLEST SERPL-MCNC: 219 MG/DL
FASTING STATUS PATIENT QL REPORTED: YES
FASTING STATUS PATIENT QL REPORTED: YES
GLUCOSE SERPL-MCNC: 79 MG/DL (ref 70–99)
HDLC SERPL-MCNC: 83 MG/DL
LDLC SERPL CALC-MCNC: 122 MG/DL
NONHDLC SERPL-MCNC: 136 MG/DL
TRIGL SERPL-MCNC: 71 MG/DL

## 2024-12-18 NOTE — RESULT ENCOUNTER NOTE
Debbie,    It was so so great to see you yesterday. Your cholesterol did come down and it's nice that you kept your HDL high with the LDL reduction. And glucose is solidly normal.  If you have any questions, please feel free to contact the clinic.    MARCOS Stark

## 2025-01-23 SDOH — SOCIAL STABILITY: SOCIAL NETWORK: PROMIS ABILITY TO PARTICIPATE IN SOCIAL ROLES & ACTIVITIES T-SCORE: 56

## 2025-01-23 SDOH — SOCIAL STABILITY: SOCIAL NETWORK

## 2025-01-23 SDOH — SOCIAL STABILITY: SOCIAL NETWORK: I HAVE TROUBLE DOING ALL OF MY USUAL WORK (INCLUDE WORK AT HOME): RARELY

## 2025-01-23 SDOH — SOCIAL STABILITY: SOCIAL NETWORK: I HAVE TROUBLE DOING ALL OF THE ACTIVITIES WITH FRIENDS THAT I WANT TO DO: RARELY

## 2025-01-23 SDOH — SOCIAL STABILITY: SOCIAL NETWORK: I HAVE TROUBLE DOING ALL OF MY REGULAR LEISURE ACTIVITIES WITH OTHERS: NEVER

## 2025-01-23 SDOH — SOCIAL STABILITY: SOCIAL NETWORK: I HAVE TROUBLE DOING ALL OF THE FAMILY ACTIVITIES THAT I WANT TO DO: NEVER

## 2025-01-23 ASSESSMENT — ANXIETY QUESTIONNAIRES
8. IF YOU CHECKED OFF ANY PROBLEMS, HOW DIFFICULT HAVE THESE MADE IT FOR YOU TO DO YOUR WORK, TAKE CARE OF THINGS AT HOME, OR GET ALONG WITH OTHER PEOPLE?: NOT DIFFICULT AT ALL
IF YOU CHECKED OFF ANY PROBLEMS ON THIS QUESTIONNAIRE, HOW DIFFICULT HAVE THESE PROBLEMS MADE IT FOR YOU TO DO YOUR WORK, TAKE CARE OF THINGS AT HOME, OR GET ALONG WITH OTHER PEOPLE: NOT DIFFICULT AT ALL
2. NOT BEING ABLE TO STOP OR CONTROL WORRYING: NOT AT ALL
7. FEELING AFRAID AS IF SOMETHING AWFUL MIGHT HAPPEN: NOT AT ALL
GAD7 TOTAL SCORE: 0
1. FEELING NERVOUS, ANXIOUS, OR ON EDGE: NOT AT ALL
7. FEELING AFRAID AS IF SOMETHING AWFUL MIGHT HAPPEN: NOT AT ALL
5. BEING SO RESTLESS THAT IT IS HARD TO SIT STILL: NOT AT ALL
GAD7 TOTAL SCORE: 0
6. BECOMING EASILY ANNOYED OR IRRITABLE: NOT AT ALL
3. WORRYING TOO MUCH ABOUT DIFFERENT THINGS: NOT AT ALL
4. TROUBLE RELAXING: NOT AT ALL
GAD7 TOTAL SCORE: 0

## 2025-01-23 ASSESSMENT — PATIENT HEALTH QUESTIONNAIRE - PHQ9
10. IF YOU CHECKED OFF ANY PROBLEMS, HOW DIFFICULT HAVE THESE PROBLEMS MADE IT FOR YOU TO DO YOUR WORK, TAKE CARE OF THINGS AT HOME, OR GET ALONG WITH OTHER PEOPLE: NOT DIFFICULT AT ALL
SUM OF ALL RESPONSES TO PHQ QUESTIONS 1-9: 2
SUM OF ALL RESPONSES TO PHQ QUESTIONS 1-9: 2

## 2025-01-28 ENCOUNTER — VIRTUAL VISIT (OUTPATIENT)
Dept: PHYSICAL MEDICINE AND REHAB | Facility: CLINIC | Age: 35
End: 2025-01-28
Attending: NURSE PRACTITIONER
Payer: COMMERCIAL

## 2025-01-28 DIAGNOSIS — R55 PRE-SYNCOPE: Primary | ICD-10-CM

## 2025-01-28 DIAGNOSIS — Z86.16 HISTORY OF COVID-19: ICD-10-CM

## 2025-01-28 DIAGNOSIS — F41.9 ANXIETY: ICD-10-CM

## 2025-01-28 PROCEDURE — 98001 SYNCH AUDIO-VIDEO NEW LOW 30: CPT | Performed by: PHYSICIAN ASSISTANT

## 2025-01-28 NOTE — PROGRESS NOTES
Debbie Bah is a 34 year old female who presents to be evaluated for a billable video visit.    Video-Visit Details    Video visit Start time:3:21 PM    Type of service:  Video Visit    Video End Time: 3:40 PM    Originating Location (pt. Location): Home    Distant Location (provider location):  Off- Site    Platform used for Video Visit: [employer]    Assessment/ Impression:   1. Pre-syncope (Primary)/Anxiety  Patient with lingering presyncope symptoms but much improved. Dicussed to continue working with cardiology and replacing electrolytes.  Discussed also checking for underlying causes and checking thyroid B12 as well as iron/ferritin.  Patient's ferritin was low normal prior to COVID and unclear if patient is slightly anemic and this is contributing to symptoms.  Encouraged to continue working with mental health and PCP for anxiety that is situational.  Discussed mitigating techniques and encouraged patient to continue practicing techniques taught by therapist  - TSH with free T4 reflex; Future  - Vitamin B12; Future  - Ferritin; Future  - Iron; Future    2. History of COVID-19  Discussed COVID and Post COVID with patient.  Educational materials provided and all questions answered.  Patient has anxiety regarding dalia COVID again.  Encouraged patient to wear mask in office during COVID surges to help prevent infection.  Patient is up-to-date on vaccinations.  - Adult Post Covid Clinic  Referral      Plan:  I reviewed present knowledge on long-Covid.  Education was provided and question were answered.  Orders/Referrals as above  Will advised patient on test results  I will follow up with Debbie aBh  as needed. I will review progress and consider need for any other therapeutic interventions. If there are any questions and/or concerns she will call the clinic.      On day of encounter time spent in chart review and with patient in consultation, exam, education, coordination of care,  review of outside charts/data and documentation:  30 minutes     I have attempted to proof read for major spelling errors and apologize for any minor errors I may have missed.      This note was dictated using voice recognition software. Any grammatical or context distortions are unintentional and inherent to the software.    _____________  OMAR Villela [facility] PH[facility]ICAL [facility]ICINE [facility]HABIL[facility]ATION CLIN[facility] [place]    Subjective   This 34 year old female presents to the [facility] Medicine Post-COVID clinic as a new consult to evaluate continuing symptoms after COVID infection initially diagnosed 9/2023.  Debbie Bah had cold like symptoms for her COVID infection. Treatment was symptomatic.  Debbie Bah experienced complications of pre-syncope and presented the ER on 9/30/23 were an EKG was preformed which showed a pulse of 95 but normal sinus Rhythmin.  Continuing symptoms include dizziness and heart racing.  Patient had history of syncope prior to COVID and now has presyncope symptoms.  She still takes liquid IV daily.  Patient has this feeling once a month where it was daily prior.   This is causing more anxiety.  She endorses working with therapist and PCP with her anxiety and feels its more manageable but does notice more anxiety now. Past medical history is noncontributory. The patient was vaccinated against COVID x5, Last vaccine 11/2/24 .  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      History of COVID-19 infection: Beginning of September 2023  Date of first symptoms: September 2023  Diagnosis: antigen  Hospitalization: No  Treatment: symptomatic  Current Symptoms: See subjective  Goals of Care: decrease anxiety and improve quality of life          1/23/2025    10:22 AM   PHQ Assesment Total Score(s)   PHQ-9 Score 2        Patient-reported           1/23/2025    10:23 AM   TRAVIS-7 Results   TRAVIS 7 TOTAL SCORE 0 (minimal anxiety)   TRAVIS-7  Total Score 0        Patient-reported         1/23/2025    10:23 AM   PTSD Screen Score   Have you ever experienced this kind of event? No         1/23/2025    10:24 AM   PROMIS-29   PROMIS Physical Function T-Score 57 (within normal limits)   PROMIS Anxiety T-Score 40 (within normal limits)   PROMIS Depression T-Score 41 (within normal limits)   PROMIS Fatigue T-Score 46 (within normal limits)   PROMIS Sleep Disturbance T-Score 46 (within normal limits)   PROMIS Ability to Participate in Social Roles & Activities T-Score 56 (within normal limits)   PROMIS Pain Interference T-Score 42 (within normal limits)   PROMIS Pain Intensity 1       Past Medical History:   Diagnosis Date    Binge eating disorder     Depression 2015       Past Surgical History:   Procedure Laterality Date    MAMMOPLASTY REDUCTION  2022       Family History   Problem Relation Age of Onset    Asthma Mother     Hypertension Mother     Insomnia Mother     Hyperlipidemia Father     Insomnia Brother     No Known Problems Brother     Parkinsonism Maternal Grandmother     Coronary Artery Disease Maternal Grandfather 65    Arthritis Paternal Grandmother     Cerebrovascular Disease Paternal Grandmother 75    Cerebrovascular Disease Paternal Grandfather 82    Depression Half-Sister     Anxiety Disorder Half-Sister     Diabetes No family hx of     Breast Cancer No family hx of     Glaucoma No family hx of     Macular Degeneration No family hx of     Colon Cancer No family hx of        Social History     Tobacco Use    Smoking status: Never     Passive exposure: Never    Smokeless tobacco: Never   Vaping Use    Vaping status: Never Used   Substance Use Topics    Alcohol use: Yes     Alcohol/week: 0.0 standard drinks of alcohol     Comment: 0-1 per week    Drug use: No         Current Outpatient Medications:     buPROPion (WELLBUTRIN XL) 150 MG 24 hr tablet, Take 1 tablet (150 mg) by mouth every morning., Disp: 90 tablet, Rfl: 3    etonogestrel-ethinyl  estradiol (NUVARING) 0.12-0.015 MG/24HR vaginal ring, Insert one (1) ring vaginally and leave in place for 3 consecutive weeks (21 days), then remove for 1 week., Disp: 4 each, Rfl: 4    hydrOXYzine (ATARAX) 25 MG tablet, Take 1 tablet (25 mg) by mouth 3 times daily as needed for itching, Disp: 30 tablet, Rfl: 0    Tirzepatide 5 MG/0.5ML SOAJ, Inject 0.5 mLs (5 mg) subcutaneously every 7 days., Disp: , Rfl:     Vitamin D3 (CHOLECALCIFEROL) 125 MCG (5000 UT) tablet, Take 1 tablet by mouth daily, Disp: , Rfl:     Review of Systems   Constitutional, HEENT, cardiovascular, pulmonary, GI, , musculoskeletal, neuro, skin, endocrine and psych systems are negative, except as otherwise noted.      Objective    Vitals:  No vitals were obtained today due to virtual visit.    Physical Exam   EYES: Eyes grossly normal to inspection.  No discharge or erythema, or obvious scleral/conjunctival abnormalities.  SKIN: Visible skin clear. No significant rash, abnormal pigmentation or lesions.  NEURO: Cranial nerves grossly intact.  Mentation and speech appropriate for age.  GENERAL: Healthy, alert and no distress  RESP: No audible wheeze, cough, or visible cyanosis.  No visible retractions or increased work of breathing.    PSYCH: Mentation appears normal, affect normal/bright, judgement and insight intact, normal speech and appearance well-groomed.    Labs:   Office Visit on 12/17/2024   Component Date Value Ref Range Status    Cholesterol 12/17/2024 219 (H)  <200 mg/dL Final    Triglycerides 12/17/2024 71  <150 mg/dL Final    Direct Measure HDL 12/17/2024 83  >=50 mg/dL Final    LDL Cholesterol Calculated 12/17/2024 122 (H)  <100 mg/dL Final    Non HDL Cholesterol 12/17/2024 136 (H)  <130 mg/dL Final    Patient Fasting > 8hrs? 12/17/2024 Yes   Final    Glucose 12/17/2024 79  70 - 99 mg/dL Final    Patient Fasting > 8hrs? 12/17/2024 Yes   Final   Lab on 09/11/2024   Component Date Value Ref Range Status    Cholesterol 09/11/2024 231  (H)  <200 mg/dL Final    Triglycerides 09/11/2024 77  <150 mg/dL Final    Direct Measure HDL 09/11/2024 85  >=50 mg/dL Final    LDL Cholesterol Calculated 09/11/2024 131 (H)  <100 mg/dL Final    Non HDL Cholesterol 09/11/2024 146 (H)  <130 mg/dL Final    Patient Fasting > 8hrs? 09/11/2024 Yes   Final   Office Visit on 08/23/2024   Component Date Value Ref Range Status    Human Papilloma Virus 16 DNA 08/23/2024 Negative  Negative Final    Human Papilloma Virus 18 DNA 08/23/2024 Negative  Negative Final    Human Papilloma Virus Other 08/23/2024 Negative  Negative Final    FINAL DIAGNOSIS 08/23/2024    Final                    Value:This patient's sample is negative for high risk HPV DNA.                                                                                                                                  METHODOLOGY: The [employer] [facility] system uses automated extraction, simultaneous                           amplification of HPV (E6/E7 oncogenes) and beta-globin, followed by real                           time detection of fluorescent labeled HPV and beta globin using specific                           oligonucleotide probes. The test specifically identifies types HPV 16 DNA                           and HPV 18 DNA while concurrently detecting the rest of the high risk                           types (31, 33, 35, 39, 45, 51, 52, 56, 58, 59, 66 or 68).                                                     COMMENTS: This test is not intended for use as a screening device for                           woman under age 30 with normal cervical cytology. Results should be                           correlated with cytologic and histologic findings. Close clinical follow                           up is recommended.                                                    Please see the separate Gynecologic Cytology (Pap) report from the same                           collection date.    Trichomonas 08/23/2024 Absent  Absent Final     Yeast 08/23/2024 Absent  Absent Final    Clue Cells 08/23/2024 Present (A)  Absent Final    WBCs/high power field 08/23/2024 3+ (A)  None Final    Interpretation 08/23/2024 Negative for Intraepithelial Lesion or Malignancy (NILM)    Final    Comment 08/23/2024    Final                    Value:                          Papanicolaou Test Limitations:  Cervical cytology is a screening test with                           limited sensitivity, and regular screening is critical for cancer                           prevention.  Pap tests are primarily effective for the                           diagnosis/prevention of squamous cell carcinoma, not adenocarcinoma or                           other cancers.                              Specimen Adequacy 08/23/2024 Satisfactory for evaluation, endocervical/transformation zone component present   Final    Clinical Information 08/23/2024    Final                    Value:none    Previous Abnormal? 08/23/2024    Final                    Value:No    Performing Labs 08/23/2024    Final                    Value:The technical component of this testing was completed at [facility].                                                    Stain controls for all stains resulted within this report have been                           reviewed and show appropriate reactivity.    Associated HPV Report 08/23/2024    Final                    Value:Please see the associated HPV High Risk Types DNA Cervical report for                           Specimen [id number] from the same collection date.         Imaging:    I personally reviewed the following imaging results today and those on care everywhere, if indicated     [facility] Complete  10/19/23  Interpretation Summary  Left ventricular size, wall motion and function are normal. The ejection  fraction is 60-65%.  Right ventricular function, chamber size, wall motion,  and thickness are  normal.  Normal valve assessment.  The inferior vena cava is normal.  No pericardial effusion.  There is no prior study for direct comparison.    Reviewed imaging from [facility] sites     Medical Records Reviewed:    Reviewed consults/documents from [facility] including  Endocrinology, Family practice and Cardiology

## 2025-01-28 NOTE — LETTER
1/28/2025       RE: Debbie Bah  [address]  [facility] [address]     Dear Colleague,    Thank you for referring your patient, Debbie Bah, to the [facility] PHYSICAL MEDICINE AND REHABILITATION CLINIC [place] at [facility]. Please see a copy of my visit note below.    Debbie Bah is a 34 year old female who presents to be evaluated for a billable video visit.    Video-Visit Details    Video visit Start time:3:21 PM    Type of service:  Video Visit    Video End Time: 3:40 PM    Originating Location (pt. Location): Home    Distant Location (provider location):  Off- Site    Platform used for Video Visit: mytrax    Assessment/ Impression:   1. Pre-syncope (Primary)/Anxiety  Patient with lingering presyncope symptoms but much improved. Dicussed to continue working with cardiology and replacing electrolytes.  Discussed also checking for underlying causes and checking thyroid B12 as well as iron/ferritin.  Patient's ferritin was low normal prior to COVID and unclear if patient is slightly anemic and this is contributing to symptoms.  Encouraged to continue working with mental health and PCP for anxiety that is situational.  Discussed mitigating techniques and encouraged patient to continue practicing techniques taught by therapist  - TSH with free T4 reflex; Future  - Vitamin B12; Future  - Ferritin; Future  - Iron; Future    2. History of COVID-19  Discussed COVID and Post COVID with patient.  Educational materials provided and all questions answered.  Patient has anxiety regarding dalia COVID again.  Encouraged patient to wear mask in office during COVID surges to help prevent infection.  Patient is up-to-date on vaccinations.  - Adult Post Covid Clinic  Referral      Plan:  I reviewed present knowledge on long-Covid.  Education was provided and question were answered.  Orders/Referrals as above  Will  advised patient on test results  I will follow up with Debbie Bah  as needed. I will review progress and consider need for any other therapeutic interventions. If there are any questions and/or concerns she will call the clinic.      On day of encounter time spent in chart review and with patient in consultation, exam, education, coordination of care, review of outside charts/data and documentation:  30 minutes     I have attempted to proof read for major spelling errors and apologize for any minor errors I may have missed.      This note was dictated using voice recognition software. Any grammatical or context distortions are unintentional and inherent to the software.    _____________  Denisse Burk PA-C  [facility] PHYSICAL MEDICINE AND REHABILITATION CLINIC [facility]   This 34 year old female presents to the [facility] Rehabilitation Medicine Post-COVID clinic as a new consult to evaluate continuing symptoms after COVID infection initially diagnosed 9/2023.  Debbie Bah had cold like symptoms for her COVID infection. Treatment was symptomatic.  Debbie Bah experienced complications of pre-syncope and presented the ER on 9/30/23 were an EKG was preformed which showed a pulse of 95 but normal sinus Rhythmin.  Continuing symptoms include dizziness and heart racing.  Patient had history of syncope prior to COVID and now has presyncope symptoms.  She still takes liquid IV daily.  Patient has this feeling once a month where it was daily prior.   This is causing more anxiety.  She endorses working with therapist and PCP with her anxiety and feels its more manageable but does notice more anxiety now. Past medical history is noncontributory. The patient was vaccinated against COVID x5, Last vaccine 11/2/24 .  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      History of COVID-19 infection: Beginning of September 2023  Date of first symptoms: September  2023  Diagnosis: antigen  Hospitalization: No  Treatment: symptomatic  Current Symptoms: See subjective  Goals of Care: decrease anxiety and improve quality of life          1/23/2025    10:22 AM   PHQ Assesment Total Score(s)   PHQ-9 Score 2        Patient-reported           1/23/2025    10:23 AM   TRAVIS-7 Results   TRAVIS 7 TOTAL SCORE 0 (minimal anxiety)   TRAVIS-7 Total Score 0        Patient-reported         1/23/2025    10:23 AM   PTSD Screen Score   Have you ever experienced this kind of event? No         1/23/2025    10:24 AM   PROMIS-29   PROMIS Physical Function T-Score 57 (within normal limits)   PROMIS Anxiety T-Score 40 (within normal limits)   PROMIS Depression T-Score 41 (within normal limits)   PROMIS Fatigue T-Score 46 (within normal limits)   PROMIS Sleep Disturbance T-Score 46 (within normal limits)   PROMIS Ability to Participate in Social Roles & Activities T-Score 56 (within normal limits)   PROMIS Pain Interference T-Score 42 (within normal limits)   PROMIS Pain Intensity 1       Past Medical History:   Diagnosis Date     Binge eating disorder      Depression 2015       Past Surgical History:   Procedure Laterality Date     MAMMOPLASTY REDUCTION  2022       Family History   Problem Relation Age of Onset     Asthma Mother      Hypertension Mother      Insomnia Mother      Hyperlipidemia Father      Insomnia Brother      No Known Problems Brother      Parkinsonism Maternal Grandmother      Coronary Artery Disease Maternal Grandfather 65     Arthritis Paternal Grandmother      Cerebrovascular Disease Paternal Grandmother 75     Cerebrovascular Disease Paternal Grandfather 82     Depression Half-Sister      Anxiety Disorder Half-Sister      Diabetes No family hx of      Breast Cancer No family hx of      Glaucoma No family hx of      Macular Degeneration No family hx of      Colon Cancer No family hx of        Social History     Tobacco Use     Smoking status: Never     Passive exposure: Never      Smokeless tobacco: Never   Vaping Use     Vaping status: Never Used   Substance Use Topics     Alcohol use: Yes     Alcohol/week: 0.0 standard drinks of alcohol     Comment: 0-1 per week     Drug use: No         Current Outpatient Medications:      buPROPion (WELLBUTRIN XL) 150 MG 24 hr tablet, Take 1 tablet (150 mg) by mouth every morning., Disp: 90 tablet, Rfl: 3     etonogestrel-ethinyl estradiol (NUVARING) 0.12-0.015 MG/24HR vaginal ring, Insert one (1) ring vaginally and leave in place for 3 consecutive weeks (21 days), then remove for 1 week., Disp: 4 each, Rfl: 4     hydrOXYzine (ATARAX) 25 MG tablet, Take 1 tablet (25 mg) by mouth 3 times daily as needed for itching, Disp: 30 tablet, Rfl: 0     Tirzepatide 5 MG/0.5ML SOAJ, Inject 0.5 mLs (5 mg) subcutaneously every 7 days., Disp: , Rfl:      Vitamin D3 (CHOLECALCIFEROL) 125 MCG (5000 UT) tablet, Take 1 tablet by mouth daily, Disp: , Rfl:     Review of Systems   Constitutional, HEENT, cardiovascular, pulmonary, GI, , musculoskeletal, neuro, skin, endocrine and psych systems are negative, except as otherwise noted.      Objective    Vitals:  No vitals were obtained today due to virtual visit.    Physical Exam   EYES: Eyes grossly normal to inspection.  No discharge or erythema, or obvious scleral/conjunctival abnormalities.  SKIN: Visible skin clear. No significant rash, abnormal pigmentation or lesions.  NEURO: Cranial nerves grossly intact.  Mentation and speech appropriate for age.  GENERAL: Healthy, alert and no distress  RESP: No audible wheeze, cough, or visible cyanosis.  No visible retractions or increased work of breathing.    PSYCH: Mentation appears normal, affect normal/bright, judgement and insight intact, normal speech and appearance well-groomed.    Labs:   Office Visit on 12/17/2024   Component Date Value Ref Range Status     Cholesterol 12/17/2024 219 (H)  <200 mg/dL Final     Triglycerides 12/17/2024 71  <150 mg/dL Final     Direct Measure  HDL 12/17/2024 83  >=50 mg/dL Final     LDL Cholesterol Calculated 12/17/2024 122 (H)  <100 mg/dL Final     Non HDL Cholesterol 12/17/2024 136 (H)  <130 mg/dL Final     Patient Fasting > 8hrs? 12/17/2024 Yes   Final     Glucose 12/17/2024 79  70 - 99 mg/dL Final     Patient Fasting > 8hrs? 12/17/2024 Yes   Final   Lab on 09/11/2024   Component Date Value Ref Range Status     Cholesterol 09/11/2024 231 (H)  <200 mg/dL Final     Triglycerides 09/11/2024 77  <150 mg/dL Final     Direct Measure HDL 09/11/2024 85  >=50 mg/dL Final     LDL Cholesterol Calculated 09/11/2024 131 (H)  <100 mg/dL Final     Non HDL Cholesterol 09/11/2024 146 (H)  <130 mg/dL Final     Patient Fasting > 8hrs? 09/11/2024 Yes   Final   Office Visit on 08/23/2024   Component Date Value Ref Range Status     Human Papilloma Virus 16 DNA 08/23/2024 Negative  Negative Final     Human Papilloma Virus 18 DNA 08/23/2024 Negative  Negative Final     Human Papilloma Virus Other 08/23/2024 Negative  Negative Final     FINAL DIAGNOSIS 08/23/2024    Final                    Value:This patient's sample is negative for high risk HPV DNA.                                                                                                                                  METHODOLOGY: The [employer] system uses automated extraction, simultaneous                           amplification of HPV (E6/E7 oncogenes) and beta-globin, followed by real                           time detection of fluorescent labeled HPV and beta globin using specific                           oligonucleotide probes. The test specifically identifies types HPV 16 DNA                           and HPV 18 DNA while concurrently detecting the rest of the high risk                           types (31, 33, 35, 39, 45, 51, 52, 56, 58, 59, 66 or 68).                                                     COMMENTS: This test is not intended for use as a screening device for                           woman under  age 30 with normal cervical cytology. Results should be                           correlated with cytologic and histologic findings. Close clinical follow                           up is recommended.                                                    Please see the separate Gynecologic Cytology (Pap) report from the same                           collection date.     Trichomonas 08/23/2024 Absent  Absent Final     Yeast 08/23/2024 Absent  Absent Final     Clue Cells 08/23/2024 Present (A)  Absent Final     WBCs/high power field 08/23/2024 3+ (A)  None Final     Interpretation 08/23/2024 Negative for Intraepithelial Lesion or Malignancy (NILM)    Final     Comment 08/23/2024    Final                    Value:                          Papanicolaou Test Limitations:  Cervical cytology is a screening test with                           limited sensitivity, and regular screening is critical for cancer                           prevention.  Pap tests are primarily effective for the                           diagnosis/prevention of squamous cell carcinoma, not adenocarcinoma or                           other cancers.                               Specimen Adequacy 08/23/2024 Satisfactory for evaluation, endocervical/transformation zone component present   Final     Clinical Information 08/23/2024    Final                    Value:none     Previous Abnormal? 08/23/2024    Final                    Value:No     Performing Labs 08/23/2024    Final                    Value:The technical component of this testing was completed at [facility].                                                    Stain controls for all stains resulted within this report have been                           reviewed and show appropriate reactivity.     Associated HPV Report 08/23/2024    Final                    Value:Please see the associated HPV High Risk Types  DNA Cervical report for                           Specimen [id number] from the same collection date.         Imaging:    I personally reviewed the following imaging results today and those on care everywhere, if indicated     ECHo Complete  10/19/23  Interpretation Summary  Left ventricular size, wall motion and function are normal. The ejection  fraction is 60-65%.  Right ventricular function, chamber size, wall motion, and thickness are  normal.  Normal valve assessment.  The inferior vena cava is normal.  No pericardial effusion.  There is no prior study for direct comparison.    Reviewed imaging from [facility] sites     Medical Records Reviewed:    Reviewed consults/documents from [facility] including  Endocrinology, Family practice and Cardiology       Again, thank you for allowing me to participate in the care of your patient.      Sincerely,    Denisse Burk PA-C

## 2025-01-28 NOTE — NURSING NOTE
Current patient location: [address] [place] [address] [address]  [facility] [address]    Is the patient currently in the state of [place]? YES    Visit mode: VIDEO    If the visit is dropped, the patient can be reconnected by:TELEPHONE VISIT: Phone number:   Telephone Information:   Mobile [phone]       Will anyone else be joining the visit? NO  (If patient encounters technical issues they should call [phone]    Are changes needed to the allergy or medication list? Pt stated no med changes    Are refills needed on medications prescribed by this physician? NO    Rooming Documentation:  Questionnaire(s) completed    Reason for visit: Video Visit (History of covid 19)    Kay WOLFF

## 2025-02-15 ASSESSMENT — SLEEP AND FATIGUE QUESTIONNAIRES
HOW LIKELY ARE YOU TO NOD OFF OR FALL ASLEEP WHEN YOU ARE A PASSENGER IN A CAR FOR AN HOUR WITHOUT A BREAK: SLIGHT CHANCE OF DOZING
HOW LIKELY ARE YOU TO NOD OFF OR FALL ASLEEP WHILE LYING DOWN TO REST IN THE AFTERNOON WHEN CIRCUMSTANCES PERMIT: MODERATE CHANCE OF DOZING
HOW LIKELY ARE YOU TO NOD OFF OR FALL ASLEEP WHILE SITTING AND TALKING TO SOMEONE: WOULD NEVER DOZE
HOW LIKELY ARE YOU TO NOD OFF OR FALL ASLEEP WHILE SITTING AND READING: SLIGHT CHANCE OF DOZING
HOW LIKELY ARE YOU TO NOD OFF OR FALL ASLEEP WHILE WATCHING TV: SLIGHT CHANCE OF DOZING
HOW LIKELY ARE YOU TO NOD OFF OR FALL ASLEEP WHILE SITTING INACTIVE IN A PUBLIC PLACE: SLIGHT CHANCE OF DOZING
HOW LIKELY ARE YOU TO NOD OFF OR FALL ASLEEP WHILE SITTING QUIETLY AFTER LUNCH WITHOUT ALCOHOL: SLIGHT CHANCE OF DOZING
HOW LIKELY ARE YOU TO NOD OFF OR FALL ASLEEP IN A CAR, WHILE STOPPED FOR A FEW MINUTES IN TRAFFIC: WOULD NEVER DOZE

## 2025-02-18 ENCOUNTER — VIRTUAL VISIT (OUTPATIENT)
Dept: SLEEP MEDICINE | Facility: CLINIC | Age: 35
End: 2025-02-18
Payer: COMMERCIAL

## 2025-02-18 VITALS — BODY MASS INDEX: 27.66 KG/M2 | WEIGHT: 166 LBS | HEIGHT: 65 IN

## 2025-02-18 DIAGNOSIS — G47.8 UNREFRESHED BY SLEEP: ICD-10-CM

## 2025-02-18 DIAGNOSIS — R40.0 DAYTIME SLEEPINESS: ICD-10-CM

## 2025-02-18 DIAGNOSIS — R06.83 SNORING: Primary | ICD-10-CM

## 2025-02-18 PROCEDURE — 98002 SYNCH AUDIO-VIDEO NEW MOD 45: CPT | Performed by: INTERNAL MEDICINE

## 2025-02-18 ASSESSMENT — PAIN SCALES - GENERAL: PAINLEVEL_OUTOF10: NO PAIN (0)

## 2025-02-18 NOTE — PROGRESS NOTES
Virtual Visit Details    Type of service:  Video Visit     Originating Location (pt. Location): Home    Distant Location (provider location):  Off-site  Platform used for Video Visit: Estelle    Chief complaint: Daytime fatigue, tiredness, would like to be reassessed    Comprehensive sleep medicine questionnaire reviewed  Last office visit 12/2/2020    History of Present Illness: 34-year-old female with history of daytime fatigue, unrefreshing sleep significant tiredness.  She has been evaluated in the past and was not found to have sleep apnea.  She had been doing shift work during her most recent evaluation.  However now she is working days regular hours.  When she gets home from work she tries hard to avoid taking a nap.  She does find it harder to do household activities and chores.  She is got very little motivation.  On the weekends she will let herself nap for 30 to 60 minutes each day.  She denies significant problems with insomnia.  Typical bedtime around 9-10 PM with sleep latency is less than 15 minutes.  On nonwork days she may stay up until 11 or 1130 PM.  She is usually up between 6 and 730 AM.  She is not taking any stimulants.  She is not using any caffeine.  She does take bupropion for stable depression.  Once she falls asleep she gets up anywhere from 0-2 times a night to go to the bathroom.  She denies significant difficulty returning to sleep she has been told about snoring by her bed partner.  No observed apnea.    Recent she has been also told about waking up from sleep crying or yelling.  She will have vivid dreams associated with this.  No dream enactment behavior.    She denies symptoms of restless legs.  No history of sleepwalking.  She does not frequently wake up with morning headaches.  She did occasion for a while have some head and neck pain and thought maybe she was clenching but that is improved.    No symptoms of cataplexy, hypnagogic or hypnopompic hallucinations or sleep  paralysis.    She does get regular exercise a couple of times a week tends to be more physically active in the summers compared to echavarria.    Her mother was recently diagnosed with obstructive sleep apnea.  Her mother and brother also suffer insomnia.  Patient has concerns about health risks.    [place] Sleepiness Scale   Sitting and reading: (Patient-Rptd) Slight chance of dozing   Watching TV: (Patient-Rptd) Slight chance of dozing   Sitting, inactive in a public place (e.g. a theatre or a meeting): (Patient-Rptd) Slight chance of dozing   As a passenger in a car for an hour without a break: (Patient-Rptd) Slight chance of dozing   Lying down to rest in the afternoon when circumstances permit: (Patient-Rptd) Moderate chance of dozing   Sitting and talking to someone: (Patient-Rptd) Would never doze   Sitting quietly after a lunch without alcohol: (Patient-Rptd) Slight chance of dozing   In a car, while stopped for a few minutes in traffic: (Patient-Rptd) Would never doze   Total score - [place]: (Patient-Rptd) 7  (Less than 10 normal)    Insomnia Severity Scale  ROBIN Total Score: (Patient-Rptd) 8  Total score categories:  0-7 = No clinically significant insomnia   8-14 = Subthreshold insomnia   15-21 = Clinical insomnia (moderate severity)  22-28 = Clinical insomnia (severe)    STOP-BANG  Loud Snore   1  Excessively Tired/Sleepy   1  Observed apnea   0  Hypertension   0  BMI> 35 kg/m2   0  Age >50   0  Neck >16 in/40cm   0  Male Gender   0  Total =   2  (0-2 low, 3-4 intermediate, 5-8 high risk of JHONY)      Past Medical History:   Diagnosis Date    Binge eating disorder     Depression 2015       Allergies   Allergen Reactions    Doxycycline      esophagitis    Mushroom Cramps, Diarrhea and GI Disturbance       Current Outpatient Medications   Medication Sig Dispense Refill    buPROPion (WELLBUTRIN XL) 150 MG 24 hr tablet Take 1 tablet (150 mg) by mouth every morning. 90 tablet 3    etonogestrel-ethinyl estradiol  (NUVARING) 0.12-0.015 MG/24HR vaginal ring Insert one (1) ring vaginally and leave in place for 3 consecutive weeks (21 days), then remove for 1 week. 4 each 4    hydrOXYzine (ATARAX) 25 MG tablet Take 1 tablet (25 mg) by mouth 3 times daily as needed for itching 30 tablet 0    Tirzepatide 5 MG/0.5ML SOAJ Inject 0.5 mLs (5 mg) subcutaneously every 7 days.      Vitamin D3 (CHOLECALCIFEROL) 125 MCG (5000 UT) tablet Take 1 tablet by mouth daily       No current facility-administered medications for this visit.       Social History     Socioeconomic History    Marital status: Single     Spouse name: Not on file    Number of children: Not on file    Years of education: Not on file    Highest education level: Not on file   Occupational History    Not on file   Tobacco Use    Smoking status: Never     Passive exposure: Never    Smokeless tobacco: Never   Vaping Use    Vaping status: Never Used   Substance and Sexual Activity    Alcohol use: Yes     Alcohol/week: 0.0 standard drinks of alcohol     Comment: 0-1 per week    Drug use: No    Sexual activity: Yes     Partners: Male     Birth control/protection: Inserts/Ring   Other Topics Concern    Parent/sibling w/ CABG, MI or angioplasty before 65F 55M? 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 , Finishing  school in 2022    Went to high school in [place]    Hobbies: reading, painting,walking, plays guitar, hiking, camping     Social Drivers of Health     Financial Resource Strain: Low Risk  (12/15/2024)    Financial Resource Strain     Within the past 12 months, have you or your family members you live with been unable to get utilities (heat, electricity) when it was really needed?: No   Food Insecurity: Low Risk  (12/15/2024)    Food Insecurity     Within the past 12 months, did you worry that your food would run out before you got money to buy more?: No     Within the past 12 months, did the food you bought just not last and you didn t have money to  get more?: No   Transportation Needs: Low Risk  (12/15/2024)    Transportation Needs     Within the past 12 months, has lack of transportation kept you from medical appointments, getting your medicines, non-medical meetings or appointments, work, or from getting things that you need?: No   Physical Activity: Insufficiently Active (12/15/2024)    Exercise Vital Sign     Days of Exercise per Week: 3 days     Minutes of Exercise per Session: 20 min   Stress: No Stress Concern Present (12/15/2024)    [employer] [place] [employer] - Occupational Stress Questionnaire     Feeling of Stress : Not at all   Social Connections: Unknown (12/15/2024)    Social Connection and Isolation Panel [NHANES]     Frequency of Communication with Friends and Family: Not on file     Frequency of Social Gatherings with Friends and Family: Three times a week     Attends [employer]ist Services: Not on file     Active Member of Clubs or Organizations: Not on file     Attends Club or Organization Meetings: Not on file     Marital Status: Not on file   Interpersonal Safety: Low Risk  (12/17/2024)    Interpersonal Safety     Do you feel physically and emotionally safe where you currently live?: Yes     Within the past 12 months, have you been hit, slapped, kicked or otherwise physically hurt by someone?: No     Within the past 12 months, have you been humiliated or emotionally abused in other ways by your partner or ex-partner?: No   Housing Stability: Low Risk  (12/15/2024)    Housing Stability     Do you have housing? : Yes     Are you worried about losing your housing?: No       Family History   Problem Relation Age of Onset    Asthma Mother     Hypertension Mother     Insomnia Mother     Hyperlipidemia Father     Insomnia Brother     No Known Problems Brother     Parkinsonism Maternal Grandmother     Coronary Artery Disease Maternal Grandfather 65    Arthritis Paternal Grandmother     Cerebrovascular Disease Paternal Grandmother 75     "Cerebrovascular Disease Paternal Grandfather 82    Depression Half-Sister     Anxiety Disorder Half-Sister     Diabetes No family hx of     Breast Cancer No family hx of     Glaucoma No family hx of     Macular Degeneration No family hx of     Colon Cancer No family hx of          EXAM:  Ht 1.655 m (5' 5.16\")   Wt 75.3 kg (166 lb)   BMI 27.49 kg/m    GENERAL: Alert and no distress  EYES: Eyes grossly normal to inspection.  No discharge or erythema, or obvious scleral/conjunctival abnormalities.  RESP: No audible wheeze, cough, or visible cyanosis.    SKIN: Visible skin clear. No significant rash, abnormal pigmentation or lesions.  NEURO: Cranial nerves grossly intact.  Mentation and speech appropriate for age.  PSYCH: Appropriate affect, tone, and pace of words       HSAT WatchPat  11/17/2020  Weight 225 lbs BMI 28.6  (3% hypopnea scoring rule) pAHI 4.7, pRDI 14.8, Lowest O2 Sat 93%     Overnight oximetry on room air 1/22/2020  Analysis time 7 hours and 20 minutes  Oxygen desaturation index 0.5 (4% drop definition)  Lowest oxygen saturation 92%    TSH   Date Value Ref Range Status   02/14/2025 1.60 0.30 - 4.20 uIU/mL Final   09/28/2022 1.30 0.40 - 4.00 mU/L Final   12/20/2018 1.05 0.40 - 4.00 mU/L Final     Ferritin   Date Value Ref Range Status   02/14/2025 42 6 - 175 ng/mL Final   06/18/2021 59 12 - 150 ng/mL Final     Lab Results   Component Value Date    A1C 5.2 06/18/2021    A1C 5.3 04/10/2019           ASSESSMENT:  34-year-old female with daytime fatigue and sleepiness.  She is struggles to avoid taking naps after work.  Mood is under disorders under good control.  She is no longer doing shift work.  She is getting adequate sleep opportunity.  Overall her risk for sleep apnea is low however given her symptoms would recommend reevaluation at least with home sleep apnea testing.    PLAN:  Reviewed the pathophysiology of obstructive sleep apnea, indications for treatment, testing options and treatment options.  " Recommended home sleep apnea testing.  In the meantime, continue efforts at getting regular exercise and healthy diet.  I will be contacting patient with results of the test via [employer] as soon as they become available.  See instructions for further details of counseling provided.        46 minutes spent by me on the date of the encounter doing chart review, history and exam, documentation and further activities per the note    Faye Berry M.D.  Pulmonary/Critical Care/Sleep Medicine    [facility]   Floor 1, [address]   [address] [place], [place] [address]. [place] [address]   Appointments: [phone]    The above note was dictated using voice recognition software and may include typographical errors. Please contact the author for any clarifications.

## 2025-02-18 NOTE — PATIENT INSTRUCTIONS
"          MY TREATMENT INFORMATION FOR SLEEP APNEA-  Debbie Bah    DOCTOR : Faye Berry MD    Am I having a home sleep study?  --->Watch the video for the device you are using:    -/drop off device-   https://www.[employer] [facility].com/watch?v=yGGFBdELGhk      Frequently asked questions:  1. What is Obstructive Sleep Apnea (JHONY)? JHONY is the most common type of sleep apnea. Apnea means, \"without breath.\"  Apnea is most often caused by narrowing or collapse of the upper airway as muscles relax during sleep.   Almost everyone has occasional apneas. Most people with sleep apnea have had brief interruptions at night frequently for many years.  The severity of sleep apnea is related to how frequent and severe the events are.   2. What are the consequences of JHONY? Symptoms include: feeling sleepy during the day, snoring loudly, gasping or stopping of breathing, trouble sleeping, and occasionally morning headaches or heartburn at night.  Sleepiness can be serious and even increase the risk of falling asleep while driving. Other health consequences may include development of high blood pressure and other cardiovascular disease in persons who are susceptible. Untreated JHONY  can contribute to heart disease, stroke and diabetes.   3. What are the treatment options? In most situations, sleep apnea is a lifelong disease that must be managed with daily therapy. Medications are not effective for sleep apnea and surgery is generally not considered until other therapies have been tried. Your treatment is your choice . Continuous Positive Airway (CPAP) works right away and is the therapy that is effective in nearly everyone. An oral device to hold your jaw forward is usually the next most reliable option. Other options include postioning devices (to keep you off your back), weight loss, and surgery including a tongue pacing device. There is more detail about some of these options below.  4. Are my sleep studies covered " by insurance? Although we will request verification of coverage, we advise you also check in advance of the study to ensure there is coverage.    Important tips for those choosing CPAP and similar devices  REMEMBER-IF YOU RECEIVE A CALL FROM  [phone]-->IT IS TO SETUP A DEVICE  For new devices, sign up for device SAMANTHA to monitor your device for your followup visits  We encourage you to utilize the [employer] samantha or website ( [email or web address] ) to monitor your therapy progress and share the data with your healthcare team when you discuss your sleep apnea.                                                    Know your equipment:  CPAP is continuous positive airway pressure that prevents obstructive sleep apnea by keeping the throat from collapsing while you are sleeping. In most cases, the device is  smart  and can slowly self-adjusts if your throat collapses and keeps a record every day of how well you are treated-this information is available to you and your care team.  BPAP is bilevel positive airway pressure that keeps your throat open and also assists each breath with a pressure boost to maintain adequate breathing.  Special kinds of BPAP are used in patients who have inadequate breathing from lung or heart disease. In most cases, the device is  smart  and can slowly self-adjusts to assist breathing. Like CPAP, the device keeps a record of how well you are treated.  Your mask is your connection to the device. You get to choose what feels most comfortable and the staff will help to make sure if fits. Here: are some examples of the different masks that are available: Magnetic mask aids may assist with use but there are safety issues that should be addressed when considering with magnets* ( see end of discussion).       Key points to remember on your journey with sleep apnea:  Sleep study.  PAP devices often need to be adjusted during a sleep study to show that they are effective and adjusted  right.  Good tips to remember: Try wearing just the mask during a quiet time during the day so your body adapts to wearing it. A humidifier is recommended for comfort in most cases to prevent drying of your nose and throat. Allergy medication from your provider may help you if you are having nasal congestion.  Getting settled-in. It takes more than one night for most of us to get used to wearing a mask. Try wearing just the mask during a quiet time during the day so your body adapts to wearing it. A humidifier is recommended for comfort in most cases. Our team will work with you carefully on the first day and will be in contact within 4 days and again at 2 and 4 weeks for advice and remote device adjustments. Your therapy is evaluated by the device each day.   Use it every night. The more you are able to sleep naturally for 7-8 hours, the more likely you will have good sleep and to prevent health risks or symptoms from sleep apnea. Even if you use it 4 hours it helps. Occasionally all of us are unable to use a medical therapy, in sleep apnea, it is not dangerous to miss one night.   Communicate. Call our skilled team on the number provided on the first day if your visit for problems that make it difficult to wear the device. Over 2 out of 3 patients can learn to wear the device long-term with help from our team. Remember to call our team or your sleep providers if you are unable to wear the device as we may have other solutions for those who cannot adapt to mask CPAP therapy. It is recommended that you sleep your sleep provider within the first 3 months and yearly after that if you are not having problems.   Use it for your health. We encourage use of CPAP masks during daytime quiet periods to allow your face and brain to adapt to the sensation of CPAP so that it will be a more natural sensation to awaken to at night or during naps. This can be very useful during the first few weeks or months of adapting to CPAP  though it does not help medically to wear CPAP during wakefulness and  should not be used as a strategy just to meet guidelines.  Take care of your equipment. Make sure you clean your mask and tubing using directions every day and that your filter and mask are replaced as recommended or if they are not working.     *Masks with magnets:  Updated Contraindications  Masks with magnetic components are contraindicated for use by patients where they, or anyone in close physical contact while using the mask, have the following:   Active medical implants that interact with magnets (i.e., pacemakers, implantable cardioverter defibrillators (ICD), neurostimulators, cerebrospinal fluid (CSF) shunts, insulin/infusion pumps)   Metallic implants/objects containing ferromagnetic material (i.e., aneurysm clips/flow disruption devices, embolic coils, stents, valves, electrodes, implants to restore hearing or balance with implanted magnets, ocular implants, metallic splinters in the eye)  Updated Warning  Keep the mask magnets at a safe distance of at least 6 inches (150 mm) away from implants or medical devices that may be adversely affected by magnetic interference. This warning applies to you or anyone in close physical contact with your mask. The magnets are in the frame and lower headgear clips, with a magnetic field strength of up to 400mT. When worn, they connect to secure the mask but may inadvertently detach while asleep.  Implants/medical devices, including those listed within contraindications, may be adversely affected if they change function under external magnetic fields or contain ferromagnetic materials that attract/repel to magnetic fields (some metallic implants, e.g., contact lenses with metal, dental implants, metallic cranial plates, screws, odette hole covers, and bone substitute devices). Consult your physician and  of your implant / other medical device for information on the potential adverse  effects of magnetic fields.    BESIDES CPAP, WHAT OTHER THERAPIES ARE THERE?    Positioning Device  Positioning devices are generally used when sleep apnea is mild and only occurs on your back.This example shows a pillow that straps around the waist. It may be appropriate for those whose sleep study shows milder sleep apnea that occurs primarily when lying flat on one's back. Preliminary studies have shown benefit but effectiveness at home may need to be verified by a home sleep test. These devices are generally not covered by medical insurance.  Examples of devices that maintain sleeping on the back to prevent snoring and mild sleep apnea.    Belt type body positioner  http://[employer]/    Electronic reminder  http://nightshifttherapy.com/            Oral Appliance  What is oral appliance therapy?  An oral appliance device fits on your teeth at night like a retainer used after having braces. The device is made by a specialized dentist and requires several visits over 1-2 months before a manufactured device is made to fit your teeth and is adjusted to prevent your sleep apnea. Once an oral device is working properly, snoring should be improved. A home sleep test may be recommended at that time if to determine whether the sleep apnea is adequately treated.       Some things to remember:  -Oral devices are often, but not always, covered by your medical insurance. Be sure to check with your insurance provider.   -If you are referred for oral therapy, you will be given a list of specialized dentists to consider or you may choose to visit the Web site of the American Academy of Dental Sleep Medicine  -Oral devices are less likely to work if you have severe sleep apnea or are extremely overweight.     More detailed information  An oral appliance is a small acrylic device that fits over the upper and lower teeth  (similar to a retainer or a mouth guard). This device slightly moves jaw forward, which moves the base of  the tongue forward, opens the airway, improves breathing for effective treat snoring and obstructive sleep apnea in perhaps 7 out of 10 people .  The best working devices are custom-made by a dental device  after a mold is made of the teeth 1, 2, 3.  When is an oral appliance indicated?  Oral appliance therapy is recommended as a first-line treatment for patients with primary snoring, mild sleep apnea, and for patients with moderate sleep apnea who prefer appliance therapy to use of CPAP4, 5. Severity of sleep apnea is determined by sleep testing and is based on the number of respiratory events per hour of sleep.   How successful is oral appliance therapy?  The success rate of oral appliance therapy in patients with mild sleep apnea is 75-80% while in patients with moderate sleep apnea it is 50-70%. The chance of success in patients with severe sleep apnea is 40-50%. The research also shows that oral appliances have a beneficial effect on the cardiovascular health of JHONY patients at the same magnitude as CPAP therapy7.  Oral appliances should be a second-line treatment in cases of severe sleep apnea, but if not completely successful then a combination therapy utilizing CPAP plus oral appliance therapy may be effective. Oral appliances tend to be effective in a broad range of patients although studies show that the patients who have the highest success are females, younger patients, those with milder disease, and less severe obesity. 3, 6.   Finding a dentist that practices dental sleep medicine  Specific training is available through the American Academy of Dental Sleep Medicine for dentists interested in working in the field of sleep. To find a dentist who is educated in the field of sleep and the use of oral appliances, near you, visit the Web site of the American Academy of Dental Sleep Medicine.    References  1. Keely et al. Objectively measured vs self-reported compliance during oral  appliance therapy for sleep-disordered breathing. Chest 2013; 144(5): 4541-9530.  2. Joaquín, et al. Objective measurement of compliance during oral appliance therapy for sleep-disordered breathing. Thorax 2013; 68(1): 91-96.  3. Anna et al. Mandibular advancement devices in 620 men and women with JHONY and snoring: tolerability and predictors of treatment success. Chest 2004; 125: 9163-9941.  4. Shadi, et al. Oral appliances for snoring and JHONY: a review. Sleep 2006; 29: 244-262.  5. Miguel et al. Oral appliance treatment for JHONY: an update. J Clin Sleep Med 2014; 10(2): 215-227.  6. Teddy et al. Predictors of OSAH treatment outcome. J Dent Res 2007; 86: 8734-1061.      Weight Loss:   Your Body mass index is 27.49 kg/m .    Being overweight does not necessarily mean you will have health consequences.  Those who have BMI over 35 or over 27 with existing medical conditions carries greater risk.   Weight loss decreases severity of sleep apnea in most people with obesity. For those with mild obesity who have developed snoring with weight gain, even 15-30 pound weight loss can improve and occasionally milder eliminate sleep apnea.  Structured and life-long dietary and health habits are necessary to lose weight and keep healthier weight levels.     The Comprehensive Weight loss program offers all aspects of weight loss strategies including two Non-Surgical Weight Loss Programs: Medical Weight Management and our 24 Week Healthy Lifestyle Program:  Medical Weight Management: You will meet with a Medical Weight Management Provider, as well as a Registered Dietician. The program may include medication therapy, dietary education, recommended exercise and physical therapy programs, monthly support group meetings, and possible psychological counseling. Follow up visits with the provider or dietician are scheduled based on your progress and needs.  24 Week Healthy Lifestyle Program: This unique program is  designed to give you the support of weekly appointments and activities thru a 24-week period. It may include all of the components of the basic program (above), with the addition of 11 individual Health  Visits, 24-week access to the [employer] website for over 700 online classes, and monthly support group meetings. This program has an out-of-pocket expense of $499 to cover the items that can not be billed to insurance (health coaches and [employer] access), and is non-refundable/non-transferable (you may be able to use a Health Savings Account; ask your HSA provider). There may be an optional meal replacement plan prescribed as well.   Medication therapy has been approved for the treatment of sleep apnea: The FDA approved tirzepatide for moderate to severe sleep apnea(apnea-hypopnea index greater than or equal to 15 (in patients with BMI of greater than or equal to 30 greater than or equal to 27 was at least 1 weight-related condition such as hypertension or dyslipidemia.  Surgical management achieves meaningful long-term weight loss and improvement in health risks in most patients with more severe obesity.      Sleep Apnea Surgery:    Surgery for obstructive sleep apnea is considered generally only when other therapies fail to work. Surgery may be discussed with you if you are having a difficult time tolerating CPAP and or when there is an abnormal structure that requires surgical correction.  Nose and throat surgeries often enlarge the airway to prevent collapse.  Most of these surgeries create pain for 1-2 weeks and up to half of the most common surgeries are not effective throughout life.  You should carefully discuss the benefits and drawbacks to surgery with your sleep provider and surgeon to determine if it is the best solution for you.   More information  Surgery for JHONY is directed at areas that are responsible for narrowing or complete obstruction of the airway during sleep.  There are a wide range of  procedures available to enlarge and/or stabilize the airway to prevent blockage of breathing in the three major areas where it can occur: the palate, tongue, and nasal regions.  Successful surgical treatment depends on the accurate identification of the factors responsible for obstructive sleep apnea in each person.  A personalized approach is required because there is no single treatment that works well for everyone.  Because of anatomic variation, consultation with an examination by a sleep surgeon is a critical first step in determining what surgical options are best for each patient.  In some cases, examination during sedation may be recommended in order to guide the selection of procedures.  Patients will be counseled about risks and benefits as well as the typical recovery course after surgery. Surgery is typically not a cure for a person s JHONY.  However, surgery will often significantly improve one s JHONY severity (termed  success rate ).  Even in the absence of a cure, surgery will decrease the cardiovascular risk associated with OSA7; improve overall quality of life8 (sleepiness, functionality, sleep quality, etc).      Palate Procedures:  Patients with JHONY often have narrowing of their airway in the region of their tonsils and uvula.  The goals of palate procedures are to widen the airway in this region as well as to help the tissues resist collapse.  Modern palate procedure techniques focus on tissue conservation and soft tissue rearrangement, rather than tissue removal.  Often the uvula is preserved in this procedure. Residual sleep apnea is common in patient after pharyngoplasty with an average reduction in sleep apnea events of 33%2.      Tongue Procedures:  ExamWhile patients are awake, the muscles that surround the throat are active and keep this region open for breathing. These muscles relax during sleep, allowing the tongue and other structures to collapse and block breathing.  There are several  different tongue procedures available.  Selection of a tongue base procedure depends on characteristics seen on physical exam.  Generally, procedures are aimed at removing bulky tissues in this area or preventing the back of the tongue from falling back during sleep.  Success rates for tongue surgery range from 50-62%3.    Hypoglossal Nerve Stimulation:  Hypoglossal nerve stimulation has recently received approval from the [place] Food and Drug Administration for the treatment of obstructive sleep apnea.  This is based on research showing that the system was safe and effective in treating sleep apnea6.  Results showed that the median AHI score decreased 68%, from 29.3 to 9.0. This therapy uses an implant system that senses breathing patterns and delivers mild stimulation to airway muscles, which keeps the airway open during sleep.  The system consists of three fully implanted components: a small generator (similar in size to a pacemaker), a breathing sensor, and a stimulation lead.  Using a small handheld remote, a patient turns the therapy on before bed and off upon awakening.    Candidates for this device must be greater than 18 years of age, have moderate to severe obstructive sleep apnea with less than 25% central events  (AHI between 15-65), BMI less than 35, have tried CPAP/oral appliance for at least 8 weeks without success, and have appropriate upper airway anatomy (determined by a sleep endoscopy performed by Dr. Brian Agrawal or Dr. Pan Rome).    Nasal Procedures:  Nasal obstruction can interfere with nasal breathing during the day and night.  Studies have shown that relief of nasal obstruction can improve the ability of some patients to tolerate positive airway pressure therapy for obstructive sleep apnea1.  Treatment options include medications such as nasal saline, topical corticosteroid and antihistamine sprays, and oral medications such as antihistamines or decongestants. Non-surgical  treatments can include external nasal dilators for selected patients. If these are not successful by themselves, surgery can improve the nasal airway either alone or in combination with these other options.        Combination Procedures:  Combination of surgical procedures and other treatments may be recommended, particularly if patients have more than one area of narrowing or persistent positional disease.  The success rate of combination surgery ranges from 66-80%2,3.    References  Onelia OTERO. The Role of the Nose in Snoring and Obstructive Sleep Apnoea: An Update.  Eur Arch Otorhinolaryngol. 2011; 268: 1365-73.   Modesto SM; Elma JA; Vikram JR; Pallanch JF; James MB; Silva SG; Joe GALEANO. Surgical modifications of the upper airway for obstructive sleep apnea in adults: a systematic review and meta-analysis. SLEEP 2010;33(10):3001-8210. Nika MARTINES. Hypopharyngeal surgery in obstructive sleep apnea: an evidence-based medicine review.  Arch Otolaryngol Head Neck Surg. 2006 Feb;132(2):206-13.  Tima YH1, Nicolette Y, Adebayo LAURA. The efficacy of anatomically based multilevel surgery for obstructive sleep apnea. Otolaryngol Head Neck Surg. 2003 Oct;129(4):327-35.  Nika MARTINES, Goldberg A. Hypopharyngeal Surgery in Obstructive Sleep Apnea: An Evidence-Based Medicine Review. Arch Otolaryngol Head Neck Surg. 2006 Feb;132(2):206-13.  Strollo PJ et al. Upper-Airway Stimulation for Obstructive Sleep Apnea.  N Engl J Med. 2014 Jan 9;370(2):139-49.  Mustapha Y et al. Increased Incidence of Cardiovascular Disease in Middle-aged Men with Obstructive Sleep Apnea. Am J Respir Crit Care Med; 2002 166: 159-165  Hector EM et al. Studying Life Effects and Effectiveness of Palatopharyngoplasty (SLEEP) study: Subjective Outcomes of Isolated Uvulopalatopharyngoplasty. Otolaryngol Head Neck Surg. 2011; 144: 623-631.      WHAT IF I ONLY HAVE SNORING?  Mandibular advancement devices, lateral sleep positioning, long-term weight loss and treatment  of nasal allergies have been shown to improve snoring.  Exercising tongue muscles with a game (https://apps.[employer]/us/samantha/soundly-reduce-snoring/af3369512919) or stimulating the tongue during the day with a device (https://doi.org/10.3390/fbv30184681) have improved snoring in some individuals.  https://www.[employer]/  https://www.sleepfoundation.org/best-anti-snoring-mouthpieces-and-mouthguards    Remember to Drive Safe... Drive Alive     Sleep health profoundly affects your health, mood, and your safety.  Thirty three percent of the population (one in three of us) is not getting enough sleep and many have a sleep disorder. Not getting enough sleep or having an untreated / undertreated sleep condition may make us sleepy without even knowing it. In fact, our driving could be dramatically impaired due to our sleep health. As your provider, here are some things I would like you to know about driving:     Here are some warning signs for impairment and dangerous drowsy driving:              -Having been awake more than 16 hours               -Looking tired               -Eyelid drooping              -Head nodding (it could be too late at this point)              -Driving for more than 30 minutes     Some things you could do to make the driving safer if you are experiencing some drowsiness:              -Stop driving and rest              -Call for transportation              -Make sure your sleep disorder is adequately treated     Some things that have been shown NOT to work when experiencing drowsiness while driving:              -Turning on the radio              -Opening windows              -Eating any  distracting  /  entertaining  foods (e.g., sunflower seeds, candy, or any other)              -Talking on the phone      Your decision may not only impact your life, but also the life of others. Please, remember to drive safe for yourself and all of us.

## 2025-02-18 NOTE — NURSING NOTE
Current patient location: [address] [place] [address] 2  [facility] [address]    Is the patient currently in the state of [place]? YES    Visit mode: VIDEO    If the visit is dropped, the patient can be reconnected by:VIDEO VISIT: Text to cell phone:   Telephone Information:   Mobile [phone]       Will anyone else be joining the visit? NO  (If patient encounters technical issues they should call [phone]    Are changes needed to the allergy or medication list? No    Are refills needed on medications prescribed by this physician? NO    Rooming Documentation:  Not applicable    Reason for visit: Consult    Vazquez WOLFF

## 2025-04-02 ENCOUNTER — E-VISIT (OUTPATIENT)
Dept: URGENT CARE | Facility: CLINIC | Age: 35
End: 2025-04-02
Payer: COMMERCIAL

## 2025-04-02 DIAGNOSIS — R30.0 DYSURIA: Primary | ICD-10-CM

## 2025-04-02 NOTE — PATIENT INSTRUCTIONS
Dear Debbie Bah,     After reviewing your responses, I would like you to come in for a urine test to make sure we treat you correctly. This urine test is to evaluate you for a possible urinary tract infection, and should be scheduled for today or tomorrow. Schedule a Lab Only appointment here.     Lab appointments are not available at most locations on the weekends. If no Lab Only appointment is available, you should be seen in any of our convenient Walk-in or Urgent Care Centers, which can be found on our website here.     You will receive instructions with your results in [facility] once they are available.     If your symptoms worsen, you develop pain in your back or stomach, develop fevers, or are not improving in 5 days, please contact your primary care provider for an appointment or visit a Walk-in or Urgent Care Center to be seen.     Thanks again for choosing us as your health care partner,     HARJINDER Moreau CNP

## 2025-04-03 ENCOUNTER — LAB (OUTPATIENT)
Dept: LAB | Facility: CLINIC | Age: 35
End: 2025-04-03
Payer: COMMERCIAL

## 2025-04-03 DIAGNOSIS — R30.0 DYSURIA: ICD-10-CM

## 2025-04-03 LAB
ALBUMIN UR-MCNC: NEGATIVE MG/DL
APPEARANCE UR: CLEAR
BILIRUB UR QL STRIP: NEGATIVE
COLOR UR AUTO: NORMAL
GLUCOSE UR STRIP-MCNC: NEGATIVE MG/DL
HGB UR QL STRIP: NEGATIVE
KETONES UR STRIP-MCNC: NEGATIVE MG/DL
LEUKOCYTE ESTERASE UR QL STRIP: NEGATIVE
NITRATE UR QL: NEGATIVE
PH UR STRIP: 7 [PH] (ref 5–7)
SP GR UR STRIP: 1.01 (ref 1–1.03)
UROBILINOGEN UR STRIP-MCNC: NORMAL MG/DL

## 2025-04-24 ASSESSMENT — ANXIETY QUESTIONNAIRES
2. NOT BEING ABLE TO STOP OR CONTROL WORRYING: SEVERAL DAYS
7. FEELING AFRAID AS IF SOMETHING AWFUL MIGHT HAPPEN: SEVERAL DAYS
GAD7 TOTAL SCORE: 6
5. BEING SO RESTLESS THAT IT IS HARD TO SIT STILL: NOT AT ALL
IF YOU CHECKED OFF ANY PROBLEMS ON THIS QUESTIONNAIRE, HOW DIFFICULT HAVE THESE PROBLEMS MADE IT FOR YOU TO DO YOUR WORK, TAKE CARE OF THINGS AT HOME, OR GET ALONG WITH OTHER PEOPLE: NOT DIFFICULT AT ALL
GAD7 TOTAL SCORE: 6
3. WORRYING TOO MUCH ABOUT DIFFERENT THINGS: SEVERAL DAYS
7. FEELING AFRAID AS IF SOMETHING AWFUL MIGHT HAPPEN: SEVERAL DAYS
8. IF YOU CHECKED OFF ANY PROBLEMS, HOW DIFFICULT HAVE THESE MADE IT FOR YOU TO DO YOUR WORK, TAKE CARE OF THINGS AT HOME, OR GET ALONG WITH OTHER PEOPLE?: NOT DIFFICULT AT ALL
GAD7 TOTAL SCORE: 6
6. BECOMING EASILY ANNOYED OR IRRITABLE: SEVERAL DAYS
4. TROUBLE RELAXING: SEVERAL DAYS
1. FEELING NERVOUS, ANXIOUS, OR ON EDGE: SEVERAL DAYS

## 2025-04-28 ENCOUNTER — VIRTUAL VISIT (OUTPATIENT)
Dept: FAMILY MEDICINE | Facility: CLINIC | Age: 35
End: 2025-04-28
Payer: COMMERCIAL

## 2025-04-28 DIAGNOSIS — F41.8 SITUATIONAL ANXIETY: Primary | ICD-10-CM

## 2025-04-28 DIAGNOSIS — F41.1 GENERALIZED ANXIETY DISORDER: ICD-10-CM

## 2025-04-28 PROCEDURE — 98006 SYNCH AUDIO-VIDEO EST MOD 30: CPT | Performed by: NURSE PRACTITIONER

## 2025-04-28 NOTE — PATIENT INSTRUCTIONS
Increasing therapy visits to at least twice a month  Ask about ACT therapy and any books that might be helpful  Directly address fears that are triggered by considering pregnancy  Total Catastrophe Living by Francisco Javier Tran  Consider MBSR class    Start sertraline 25 mg (1/2 tab) for one week, then increase to 50 mg (1 full tab)  Magnesium glycinate 500-600 mg nightly

## 2025-04-28 NOTE — PROGRESS NOTES
Debbie is a 34 year old who is being evaluated via a billable video visit.    How would you like to obtain your AVS? MyChart  If the video visit is dropped, the invitation should be resent by: Text to cell phone: [phone]  Will anyone else be joining your video visit? No      Assessment & Plan     Situational anxiety  Start sertraline with expectation of stopping bupropion down the road. Follow-up two months. Increase therapy time. Look into MBSR. Start magnesium  - sertraline (ZOLOFT) 50 MG tablet; Take 1 tablet (50 mg) by mouth daily.    Generalized anxiety disorder  As above  - sertraline (ZOLOFT) 50 MG tablet; Take 1 tablet (50 mg) by mouth daily.      Follow-up   Return in about 9 weeks (around 6/30/2025) for mental health check.      The longitudinal plan of care for the diagnosis(es)/condition(s) as documented were addressed during this visit. Due to the added complexity in care, I will continue to support Debbie in the subsequent management and with ongoing continuity of care.Prescription drug management  I spent a total of 34 minutes on the day of the visit.   Time spent by me today doing chart review, history and exam, documentation and further activities per the note      Subjective   Debbie is a 34 year old, presenting for the following health issues:   Follow Up    History of Present Illness       Mental Health Follow-up:  Patient presents to follow-up on Anxiety.    Patient's anxiety since last visit has been:  Medium  The patient is not having other symptoms associated with anxiety.  Any significant life events: No  Patient is feeling anxious or having panic attacks.  Patient has no concerns about alcohol or drug use.   She is taking medications regularly.      Has been having more anxiety - taking the form of catastrophizing, even related to small things. Will perseverate on planning. This anxiety is not accompanied by physical symptoms. Medical anxiety improved, but still present, and  includes physical symptoms of rapid heart rate and flushing. Intrusive thoughts - has been thinking about having children and finds herself worrying about dying.  has noticed more and has increased concern. Seeing therapist once a month, using breathing techniques. No trauma history. Sleeping ok. Exercise - strength-based yoga 3x week and walking. Taking bupropion  mg for the past 10 years.     Previous med trials  Prozac (fluoxetine) YES - [id number]  Zoloft (sertraline) YES- treated for globus sensation attributed to anxiety, but was ultimately gerd. Recalls possible side effects.  Celexa (citalopram) no  Lexapro (escitalopram) no  Paxil (paroxetine) no  Wellbutrin (bupropion) YES - current for past decade  Effexor (venlafaxine) no  Abilify (aripiprazole) no  Lamictal (lamotrigine) no  Buspar (buspirone) no  Others:  no    PRNS  Vistaril/Atarax (hydroxizine):  YES- has been prescribed, but not used  Propranolol:  YES- for flying in the past  Benzodiazepine: no    Stopped GLP, tirzepatide, earlier this year due to side effects. Has been able to keep weight stable.         9/23/2022     7:40 AM 1/23/2025    10:23 AM 4/24/2025    11:50 AM   TRAVIS-7 SCORE   Total Score 0 (minimal anxiety) 0 (minimal anxiety) 6 (mild anxiety)   Total Score 0 0  6        Patient-reported           10/24/2023     8:36 PM 7/15/2024     3:21 PM 1/23/2025    10:22 AM   PHQ   PHQ-9 Total Score 2 1 2    Q9: Thoughts of better off dead/self-harm past 2 weeks Not at all Not at all Not at all       Patient-reported           Review of Systems  Constitutional, HEENT, cardiovascular, pulmonary, gi and gu systems are negative, except as otherwise noted.      Objective           Vitals:  No vitals were obtained today due to virtual visit.    Physical Exam   GENERAL: alert and no distress  EYES: Eyes grossly normal to inspection.  No discharge or erythema, or obvious scleral/conjunctival abnormalities.  RESP: No audible wheeze, cough, or  visible cyanosis.    SKIN: Visible skin clear. No significant rash, abnormal pigmentation or lesions.  NEURO: Cranial nerves grossly intact.  Mentation and speech appropriate for age.  PSYCH: Appropriate affect, tone, and pace of words          Video-Visit Details    Type of service:  Video Visit   Originating Location (pt. Location): Home    Distant Location (provider location):  On-site  Platform used for Video Visit: Estelle  Signed Electronically by: HARJINDER Vasquez CNP

## 2025-05-04 ENCOUNTER — MYC MEDICAL ADVICE (OUTPATIENT)
Dept: FAMILY MEDICINE | Facility: CLINIC | Age: 35
End: 2025-05-04
Payer: COMMERCIAL

## 2025-06-05 ENCOUNTER — E-VISIT (OUTPATIENT)
Dept: FAMILY MEDICINE | Facility: CLINIC | Age: 35
End: 2025-06-05
Payer: COMMERCIAL

## 2025-06-05 NOTE — TELEPHONE ENCOUNTER
Provider E-Visit time total (minutes): {AMB PROVIDER EVISIT TOTAL MINUTES LIST:907194}    {Please send feedback regarding eVisits to [email or web address]}

## 2025-06-19 ENCOUNTER — NURSE TRIAGE (OUTPATIENT)
Dept: NURSING | Facility: CLINIC | Age: 35
End: 2025-06-19

## 2025-06-19 ENCOUNTER — VIRTUAL VISIT (OUTPATIENT)
Dept: OBGYN | Facility: CLINIC | Age: 35
End: 2025-06-19
Payer: COMMERCIAL

## 2025-06-19 VITALS — HEIGHT: 65 IN | BODY MASS INDEX: 27.62 KG/M2

## 2025-06-19 DIAGNOSIS — Z34.00 ENCOUNTER FOR SUPERVISION OF NORMAL FIRST PREGNANCY: Primary | ICD-10-CM

## 2025-06-19 DIAGNOSIS — Z23 NEED FOR DIPHTHERIA-TETANUS-PERTUSSIS (TDAP) VACCINE: ICD-10-CM

## 2025-06-19 PROBLEM — Z00.00 ROUTINE GENERAL MEDICAL EXAMINATION AT A HEALTH CARE FACILITY: Status: RESOLVED | Noted: 2024-12-16 | Resolved: 2025-06-19

## 2025-06-19 RX ORDER — AZELAIC ACID 0.15 G/G
GEL TOPICAL
COMMUNITY
Start: 2024-11-23

## 2025-06-19 RX ORDER — KETOCONAZOLE 20 MG/ML
SHAMPOO, SUSPENSION TOPICAL
COMMUNITY
Start: 2025-02-18

## 2025-06-19 RX ORDER — GABAPENTIN 100 MG/1
CAPSULE ORAL
COMMUNITY
Start: 2024-08-22

## 2025-06-19 NOTE — TELEPHONE ENCOUNTER
Nurse Triage SBAR    Is this a 2nd Level Triage? NO    Situation: spotting    Background: Patient states that she is approx 5-6 wks pregnant and noticed spotting this morning. Patient denies clots or heavy bleeding. Patient denies recent cervical exam or intercourse    Assessment: spotting    Protocol Recommended Disposition:   Home Care    Recommendation: Patient verbalized understanding of care advice.       Hollie Berkowitz RN on 6/19/2025 at 6:23 AM      Does the patient meet one of the following criteria for ADS visit consideration? 16+ years old, with an [facility] PCP     TIP  Providers, please consider if this condition is appropriate for management at one of our Acute and Diagnostic Services sites.     If patient is a good candidate, please use dotphrase <dot>triageresponse and select Refer to ADS to document.        Reason for Disposition   SPOTTING (single or brief episode)    Additional Information   Negative: Shock suspected (e.g., cold/pale/clammy skin, too weak to stand, low BP, rapid pulse)   Negative: Difficult to awaken or acting confused (e.g., disoriented, slurred speech)   Negative: Passed out (i.e., lost consciousness, collapsed and was not responding)   Negative: Sounds like a life-threatening emergency to the triager   Negative: SEVERE abdominal pain   Negative: SEVERE vaginal bleeding (e.g., soaking 2 pads per hour or large blood clots and present 2 or more hours)   Negative: SEVERE dizziness (e.g., unable to stand, requires support to walk, feels like passing out)   Negative: [1] MODERATE vaginal bleeding (e.g., soaking 1 pad per hour; clots) AND [2] present > 6 hours   Negative: [1] MODERATE vaginal bleeding (e.g., soaking 1 pad per hour; clots) AND [2] pregnant > 12 weeks   Negative: Passed tissue (e.g., gray-white)   Negative: Shoulder pain   Negative: Pale skin (pallor) of new-onset or worsening   Negative: Patient sounds very sick or weak to the triager   Negative: [1] Constant abdominal pain  "AND [2] present > 2 hours   Negative: Fever 100.4 F (38.0 C) or higher   Negative: [1] Intermittent lower abdominal pain (e.g., cramping) AND [2] present > 24 hours   Negative: Prior history of \"ectopic pregnancy\" or previous tubal surgery (e.g., tubal ligation)   Negative: Pain or burning with passing urine (urination)   Negative: Using heparin (e.g., Lovenox) or other strong blood thinner, or known bleeding disorder (e.g., thrombocytopenia)   Negative: MODERATE vaginal bleeding (e.g., soaking 1 pad per hour; clots)   Negative: Has IUD   Negative: MILD vaginal bleeding (i.e., less than 1 pad / hour; less than patient's usual menstrual bleeding; not just spotting)   Negative: SPOTTING lasts > 48 hours or spotting happens more than once in a week   Negative: Unusual vaginal discharge (e.g., bad smelling, yellow, green, or foamy-white)   Negative: Not feeling pregnant any longer (e.g., breast tenderness or nausea has disappeared)   Negative: SPOTTING after sexual intercourse (single or brief episode)   Negative: SPOTTING after a pelvic examination (single or brief episode)    Protocols used: Pregnancy - Vaginal Bleeding Less Than 20 Weeks EGA-A-AH    "

## 2025-06-19 NOTE — PROGRESS NOTES
Important Information for Provider:     New ob nurse intake by phone, first pregnancy. Patient had some slight spotting, reviewed signs/symptoms of SAB. She will call the clinic if symptoms increase  Handouts reviewed. Discussed genetic screening  Ultrasound and NOB with [facility] 7/03/2025  Family history of Klinefelter syndrome( maternal uncle)    PLEASE review medication list    Caffeine intake/servings daily - 0  Calcium intake/servings daily - 3  Exercise 5 times weekly - describe ; walks, bikes, yoga, hikes, cardio, precautions given  Sunscreen used - Yes  Seatbelts used - Yes  Self Breast Exam - Yes  Pap test up to date -  Yes  Dental exam up to date -  Yes  Immunizations reviewed and up to date - Yes              Prenatal OB Questionnaire  Patient supplied answers from flow sheet for:  Prenatal OB Questionnaire.  Past Medical History  Have you ever recieved care for your mental health? : (!) (Patient-Rptd) Yes  Have you ever been in a major accident or suffered serious trauma?: (Patient-Rptd) No  Within the last year, has anyone hit, slapped, kicked or otherwise hurt you?: (Patient-Rptd) No  In the last year, has anyone forced you to have sex when you didn't want to?: (Patient-Rptd) No    Past Medical History 2   Have you ever received a blood transfusion?: (Patient-Rptd) No  Would you accept a blood transfusion if was medically recommended?: (Patient-Rptd) Yes  Does anyone in your home smoke?: (Patient-Rptd) No   Is your blood type Rh negative?: (!) (Patient-Rptd) Yes  Have you ever ?: (Patient-Rptd) No  Have you been hospitalized for a nonsurgical reason excluding normal delivery?: (Patient-Rptd) No  Have you ever had an abnormal pap smear?: (Patient-Rptd) No    Past Medical History (Continued)  Do you have a history of abnormalities of the uterus?: (Patient-Rptd) No  Did your mother take ELISHA or any other hormones when she was pregnant with you?: (Patient-Rptd) No  Do you have any other problems we have  not asked about which you feel may be important to this pregnancy?: (Patient-Rptd) No              Allergies as of 6/19/2025:    Allergies as of 06/19/2025 - Reviewed 06/19/2025   Allergen Reaction Noted    Doxycycline  08/03/2016    Mushroom Cramps, Diarrhea, and GI Disturbance

## 2025-06-30 ENCOUNTER — VIRTUAL VISIT (OUTPATIENT)
Dept: FAMILY MEDICINE | Facility: CLINIC | Age: 35
End: 2025-06-30
Payer: COMMERCIAL

## 2025-06-30 DIAGNOSIS — F41.8 SITUATIONAL ANXIETY: ICD-10-CM

## 2025-06-30 DIAGNOSIS — F41.1 GENERALIZED ANXIETY DISORDER: Primary | ICD-10-CM

## 2025-06-30 PROCEDURE — 98005 SYNCH AUDIO-VIDEO EST LOW 20: CPT | Performed by: NURSE PRACTITIONER

## 2025-06-30 NOTE — PROGRESS NOTES
Debbie is a 34 year old who is being evaluated via a billable video visit.    How would you like to obtain your AVS? MyChart  If the video visit is dropped, the invitation should be resent by: Text to cell phone: [phone]  Will anyone else be joining your video visit? No      Assessment & Plan     Generalized anxiety disorder  Go up to sertraline 50 mg and stop bupropion  7 weeks pregnant  Can opt for midwifery management for anxiety meds during pregnancy if she wishes     Situational anxiety  As above      The longitudinal plan of care for the diagnosis(es)/condition(s) as documented were addressed during this visit. Due to the added complexity in care, I will continue to support Debbie in the subsequent management and with ongoing continuity of care.Review of prior external note(s) from - new OB RN note  Prescription drug management      Subjective   Debbie is a 34 year old, presenting for the following health issues:  MH Follow Up    HPI      Newly pregnant since last appt. Today 7 weeks. Happened faster than she had thought. Has ultrasound appt on Thursday and already had new OB RN appt. Having breast tenderness, nausea starting last week. Started vitamin B6 and it is helpful, has taken ondansetron a few times. Mild headache, achy, tiredness. Light spotting and spoke to nurse on call two weeks ago. That has resolved.     Anxiety has been very well controlled with starting sertraline in April. We had discussed stopping Wellbutrin at the onset of starting sertaline. Does still have some medical anxiety and hadn't increased the sertraline to 50 mg. Has had difficulty with orgasm with sertraline at 25 mg, but has been tolerable. However, it makes her circumspect about continuing to increase dose above 50 mg. No other side effects. Taking in the evening with prenatal vitamins.         Review of Systems  Constitutional, HEENT, cardiovascular, pulmonary, gi and gu systems are negative, except as otherwise  noted.      Objective           Vitals:  No vitals were obtained today due to virtual visit.    Physical Exam   GENERAL: alert and no distress  EYES: Eyes grossly normal to inspection.  No discharge or erythema, or obvious scleral/conjunctival abnormalities.  RESP: No audible wheeze, cough, or visible cyanosis.    SKIN: Visible skin clear. No significant rash, abnormal pigmentation or lesions.  NEURO: Cranial nerves grossly intact.  Mentation and speech appropriate for age.  PSYCH: Appropriate affect, tone, and pace of words        Video-Visit Details    Type of service:  Video Visit   Originating Location (pt. Location): Home    Distant Location (provider location):  On-site  Platform used for Video Visit: Estelle  Signed Electronically by: HARJINDER Vasquez CNP

## 2025-07-02 LAB
ABO + RH BLD: NORMAL
BLD GP AB SCN SERPL QL: NEGATIVE
SPECIMEN EXP DATE BLD: NORMAL

## 2025-07-03 ENCOUNTER — ANCILLARY PROCEDURE (OUTPATIENT)
Dept: ULTRASOUND IMAGING | Facility: CLINIC | Age: 35
End: 2025-07-03
Attending: ADVANCED PRACTICE MIDWIFE
Payer: COMMERCIAL

## 2025-07-03 ENCOUNTER — PRENATAL OFFICE VISIT (OUTPATIENT)
Dept: MIDWIFE SERVICES | Facility: CLINIC | Age: 35
End: 2025-07-03
Attending: ADVANCED PRACTICE MIDWIFE
Payer: COMMERCIAL

## 2025-07-03 VITALS
BODY MASS INDEX: 31.87 KG/M2 | WEIGHT: 191.5 LBS | SYSTOLIC BLOOD PRESSURE: 119 MMHG | OXYGEN SATURATION: 100 % | HEART RATE: 90 BPM | DIASTOLIC BLOOD PRESSURE: 74 MMHG

## 2025-07-03 DIAGNOSIS — F41.9 ANXIETY: ICD-10-CM

## 2025-07-03 DIAGNOSIS — Z34.00 ENCOUNTER FOR SUPERVISION OF NORMAL FIRST PREGNANCY: ICD-10-CM

## 2025-07-03 DIAGNOSIS — Z34.01 PRENATAL CARE, FIRST PREGNANCY IN FIRST TRIMESTER: Primary | ICD-10-CM

## 2025-07-03 DIAGNOSIS — O21.9 NAUSEA AND VOMITING IN PREGNANCY: ICD-10-CM

## 2025-07-03 LAB
ALBUMIN UR-MCNC: NEGATIVE MG/DL
APPEARANCE UR: CLEAR
BILIRUB UR QL STRIP: NEGATIVE
COLOR UR AUTO: YELLOW
ERYTHROCYTE [DISTWIDTH] IN BLOOD BY AUTOMATED COUNT: 12.1 % (ref 10–15)
EST. AVERAGE GLUCOSE BLD GHB EST-MCNC: 103 MG/DL
GLUCOSE UR STRIP-MCNC: NEGATIVE MG/DL
HBA1C MFR BLD: 5.2 % (ref 0–5.6)
HBV SURFACE AG SERPL QL IA: NONREACTIVE
HCT VFR BLD AUTO: 38 % (ref 35–47)
HCV AB SERPL QL IA: NONREACTIVE
HGB BLD-MCNC: 12.6 G/DL (ref 11.7–15.7)
HGB UR QL STRIP: NEGATIVE
HIV 1+2 AB+HIV1 P24 AG SERPL QL IA: NONREACTIVE
KETONES UR STRIP-MCNC: NEGATIVE MG/DL
LEUKOCYTE ESTERASE UR QL STRIP: NEGATIVE
MCH RBC QN AUTO: 29.6 PG (ref 26.5–33)
MCHC RBC AUTO-ENTMCNC: 33.2 G/DL (ref 31.5–36.5)
MCV RBC AUTO: 89 FL (ref 78–100)
NITRATE UR QL: NEGATIVE
PH UR STRIP: 7 [PH] (ref 5–7)
PLATELET # BLD AUTO: 299 10E3/UL (ref 150–450)
RBC # BLD AUTO: 4.25 10E6/UL (ref 3.8–5.2)
RUBV IGG SERPL QL IA: 3.75 INDEX
RUBV IGG SERPL QL IA: POSITIVE
SP GR UR STRIP: 1.01 (ref 1–1.03)
T PALLIDUM AB SER QL: NONREACTIVE
UROBILINOGEN UR STRIP-ACNC: 0.2 E.U./DL
WBC # BLD AUTO: 9.6 10E3/UL (ref 4–11)

## 2025-07-03 PROCEDURE — 87340 HEPATITIS B SURFACE AG IA: CPT | Performed by: ADVANCED PRACTICE MIDWIFE

## 2025-07-03 PROCEDURE — 86803 HEPATITIS C AB TEST: CPT | Performed by: ADVANCED PRACTICE MIDWIFE

## 2025-07-03 PROCEDURE — 76801 OB US < 14 WKS SINGLE FETUS: CPT | Performed by: OBSTETRICS & GYNECOLOGY

## 2025-07-03 PROCEDURE — 86900 BLOOD TYPING SEROLOGIC ABO: CPT | Performed by: ADVANCED PRACTICE MIDWIFE

## 2025-07-03 PROCEDURE — 85027 COMPLETE CBC AUTOMATED: CPT | Performed by: ADVANCED PRACTICE MIDWIFE

## 2025-07-03 PROCEDURE — 36415 COLL VENOUS BLD VENIPUNCTURE: CPT | Performed by: ADVANCED PRACTICE MIDWIFE

## 2025-07-03 PROCEDURE — 87086 URINE CULTURE/COLONY COUNT: CPT | Performed by: ADVANCED PRACTICE MIDWIFE

## 2025-07-03 PROCEDURE — 76817 TRANSVAGINAL US OBSTETRIC: CPT | Performed by: OBSTETRICS & GYNECOLOGY

## 2025-07-03 PROCEDURE — 86901 BLOOD TYPING SEROLOGIC RH(D): CPT | Performed by: ADVANCED PRACTICE MIDWIFE

## 2025-07-03 PROCEDURE — 87389 HIV-1 AG W/HIV-1&-2 AB AG IA: CPT | Performed by: ADVANCED PRACTICE MIDWIFE

## 2025-07-03 PROCEDURE — 81003 URINALYSIS AUTO W/O SCOPE: CPT | Performed by: ADVANCED PRACTICE MIDWIFE

## 2025-07-03 PROCEDURE — 83036 HEMOGLOBIN GLYCOSYLATED A1C: CPT | Performed by: ADVANCED PRACTICE MIDWIFE

## 2025-07-03 PROCEDURE — 86780 TREPONEMA PALLIDUM: CPT | Performed by: ADVANCED PRACTICE MIDWIFE

## 2025-07-03 PROCEDURE — 86850 RBC ANTIBODY SCREEN: CPT | Performed by: ADVANCED PRACTICE MIDWIFE

## 2025-07-03 PROCEDURE — 86762 RUBELLA ANTIBODY: CPT | Performed by: ADVANCED PRACTICE MIDWIFE

## 2025-07-03 RX ORDER — ONDANSETRON 4 MG/1
4 TABLET, ORALLY DISINTEGRATING ORAL EVERY 12 HOURS PRN
Qty: 20 TABLET | Refills: 3 | Status: SHIPPED | OUTPATIENT
Start: 2025-07-03

## 2025-07-03 ASSESSMENT — EDINBURGH POSTNATAL DEPRESSION SCALE (EPDS)
I HAVE BEEN SO UNHAPPY THAT I HAVE HAD DIFFICULTY SLEEPING: NOT AT ALL
I HAVE BLAMED MYSELF UNNECESSARILY WHEN THINGS WENT WRONG: NO, NEVER
I HAVE FELT SAD OR MISERABLE: NO, NOT AT ALL
I HAVE BEEN ANXIOUS OR WORRIED FOR NO GOOD REASON: YES, SOMETIMES
I HAVE LOOKED FORWARD WITH ENJOYMENT TO THINGS: AS MUCH AS I EVER DID
THE THOUGHT OF HARMING MYSELF HAS OCCURRED TO ME: NEVER
TOTAL SCORE: 4
I HAVE FELT SCARED OR PANICKY FOR NO GOOD REASON: YES, SOMETIMES
I HAVE BEEN SO UNHAPPY THAT I HAVE BEEN CRYING: NO, NEVER
I HAVE BEEN ABLE TO LAUGH AND SEE THE FUNNY SIDE OF THINGS: AS MUCH AS I ALWAYS COULD
THINGS HAVE BEEN GETTING ON TOP OF ME: NO, I HAVE BEEN COPING AS WELL AS EVER

## 2025-07-03 NOTE — PROGRESS NOTES
is a partnered  1 para 0 who presents for a new OB Visit.   She has had bleeding since her LMP. It resolved 9 days ago.  She has had mild nausea.. Weigh loss   has not occurred. She denies fever, chills and viral infections since her last LMP.  There is moderatefatigue.    She is not a previous CNM patient.  She is here with her partner.    =========================================    INFECTION HISTORY  HIV: no  Hepatitis B: no  Hepatitis C: no  Tuberculosis: no    Herpes self: no  Herpes partner:  no   Chlamydia: no, declines testing   Gonorrhea: no, declines testing   Trichomonis: no  Syphilis:  no  ============================================    Low Dose Aspirin for Preeclampsia Prevention  Assessment:    Consider for those with 1 high risk or 2  moderate risk factors    High risk: previous pregnancy with preeclampsia, multifetal gestation, chronic htn, diabetes, chronic kidney disease, autoimmune disorder    Medium risk: nulliparity, BMI >30, family hx preeclampsia, age >/= 35,  race, low SES, personal risk factors (hx low birth weight, hx stillbirth, >10yrs between pregnancies).    Patient does qualify for low dose aspirin therapy    PERSONAL/SOCIAL HISTORY  Lives with partner.  Employment: Full time as an NP  =============================================    REVIEW OF SYSTEMS  CONSTITUTIONAL: Denies fever, chills and night sweats  DIET: Appetite is increase.  Eats a regular diet.    Plans to gain 10-20 pounds with her pregnancy.  SKIN: Denies changes and suspicious moles or lesions.  ENT: Denies blurred vision, hearing loss, tinnitus, frequent colds, epistaxis, hoarseness and tooth pain.    ENDOCRINE: Denies heat and cold intolerance, and polydypsia.    BREASTS:  Tender since LMP.  Denies discharge and lumps. History of breast reduction.    HEART/LUNGS: Denies dyspnea, wheezing, coughing and chest pain.  HEMATOLOGIC: Denies tendency to bruise and history of blood clots.  GASTROINTESTINAL:  Denies heartburn, indigestion and change of color in stools.    GENITOURINARY:  Denies urgency, dysuria and hematuria.  Has frequency of urination since LMP.   NEUROLOGIC:  Denies seizures, weakness and fainting.  PSYCHIATRIC:  anxiety  ===========================================    PHYSICAL EXAM  GENERAL:   pleasant pregnant female, alert, cooperative and well groomed.  SKIN:  Warm and dry, without lesions or tenderness.    HEAD: Symmetrical features.  EYES:  PERRLA, wears no corective lenses.  EARS: Tympanic membranes gray, translucent and intact.  NOSE: No flaring, patent  MOUTH:  Buccal mucosa pink, moist without lesions.  Teeth in good repair.    NECK:  Thyroid without enlargement and nodules.  Lymph nodes not palpable.   LUNGS:  Clear to auscultation.  BREAST:  Symmetrical without lesions or nodes.  Nipples everted.  Areolas symmetric.  No palpable axillary nodes.  HEART:  RRR without murmur.  ABDOMEN: Soft without masses or tenderness.  No CVA tenderness.  Uterus not palpable at size equal to dates.   MUSCULOSKELETAL:  Full range of motion  GENITALIA: Pelvic exam offered and declined   EXTREMITIES:  2+/2+ DTR, No edema. No significant varicosities.  ===================================================    PLAN:  Anxiety - has been seeing Sarahi Santos for medication.    Zofran ordered as requested for Nausea and vomiting in pregnancy.    Preliminary US results reviewed.   Labs today.    Instructed on use of triage nurse line and contacting the on call CNM after hours in an emergency.    Discussed the indications, uses for and false positives for genetic testing and fetal survey ultrasounds at 18-20 weeks gestation. First trimester screening ordered through [facility] per their request.    Will return to the clinic in 4 weeks for her next routine prenatal check.  Will call to be seen sooner if problem arise.  Oriented to CNM Service and added to list.      Sydney Mullen, DNP, APRN, CNM

## 2025-07-04 LAB — BACTERIA UR CULT: NORMAL

## 2025-07-05 ENCOUNTER — RESULTS FOLLOW-UP (OUTPATIENT)
Dept: MIDWIFE SERVICES | Facility: CLINIC | Age: 35
End: 2025-07-05

## 2025-07-07 ENCOUNTER — TRANSCRIBE ORDERS (OUTPATIENT)
Dept: MATERNAL FETAL MEDICINE | Facility: CLINIC | Age: 35
End: 2025-07-07
Payer: COMMERCIAL

## 2025-07-07 DIAGNOSIS — O26.90 PREGNANCY RELATED CONDITION, ANTEPARTUM: ICD-10-CM

## 2025-07-28 ENCOUNTER — PRE VISIT (OUTPATIENT)
Dept: MATERNAL FETAL MEDICINE | Facility: CLINIC | Age: 35
End: 2025-07-28
Payer: COMMERCIAL

## 2025-07-29 ENCOUNTER — OFFICE VISIT (OUTPATIENT)
Dept: OPTOMETRY | Facility: CLINIC | Age: 35
End: 2025-07-29
Payer: COMMERCIAL

## 2025-07-29 DIAGNOSIS — H52.13 MYOPIA, BILATERAL: Primary | ICD-10-CM

## 2025-07-29 DIAGNOSIS — H53.8 ACCOMMODATIVE FATIGUE: ICD-10-CM

## 2025-07-29 ASSESSMENT — VISUAL ACUITY
OD_CC: 20/20
OS_CC: 20/20
OD_CC: J1+
METHOD: SNELLEN - LINEAR
OS_CC: J1+

## 2025-07-29 ASSESSMENT — CONF VISUAL FIELD
OD_SUPERIOR_NASAL_RESTRICTION: 0
OS_INFERIOR_TEMPORAL_RESTRICTION: 0
OS_SUPERIOR_NASAL_RESTRICTION: 0
OS_SUPERIOR_TEMPORAL_RESTRICTION: 0
OD_NORMAL: 1
OD_SUPERIOR_TEMPORAL_RESTRICTION: 0
OS_NORMAL: 1
OD_INFERIOR_TEMPORAL_RESTRICTION: 0
OS_INFERIOR_NASAL_RESTRICTION: 0
OD_INFERIOR_NASAL_RESTRICTION: 0

## 2025-07-29 ASSESSMENT — TONOMETRY
OD_IOP_MMHG: 12
OS_IOP_MMHG: 11
IOP_METHOD: ICARE

## 2025-07-29 ASSESSMENT — SLIT LAMP EXAM - LIDS
COMMENTS: NORMAL
COMMENTS: NORMAL

## 2025-07-29 ASSESSMENT — REFRACTION_WEARINGRX
OD_CYLINDER: SPHERE
OS_SPHERE: -1.00
OS_CYLINDER: SPHERE
OD_SPHERE: -1.00

## 2025-07-29 ASSESSMENT — REFRACTION_CURRENTRX
OS_DIAMETER: 14.0
OS_BRAND: ACUVUE OASYS
OD_SPHERE: -1.25
OD_BRAND: ACUVUE OASYS
OS_SPHERE: -1.25
OD_BASECURVE: 8.4
OD_DIAMETER: 14.0
OS_BASECURVE: 8.4

## 2025-07-29 ASSESSMENT — REFRACTION_MANIFEST
OD_ADD: +0.75
OD_SPHERE: -1.25
OS_ADD: +0.75
OD_CYLINDER: SPHERE
OS_CYLINDER: SPHERE
OS_SPHERE: -1.25

## 2025-07-29 ASSESSMENT — EXTERNAL EXAM - RIGHT EYE: OD_EXAM: NORMAL

## 2025-07-29 ASSESSMENT — CUP TO DISC RATIO
OS_RATIO: 0.25
OD_RATIO: 0.25

## 2025-07-29 ASSESSMENT — EXTERNAL EXAM - LEFT EYE: OS_EXAM: NORMAL

## 2025-07-29 NOTE — PROGRESS NOTES
A/P  1.) Myopia OU with Accommodative Fatigue  -Largely stable spec Rx, verified no overminusing  -Has been having near strain/fatigue ongoing (even prior to pregnancy). High NRA today suggesting accommodative insufficiency  -Option for MF CL's vs OTC readers over prn. May consider PAL in glasses but wearing rarely  -Undilated ocular health unremarkable OU    Monitor 1-2 years comprehensive, sooner prn    I have confirmed the patient's CC, HPI and reviewed Past Medical History, Past Surgical History, Social History, Family History, Problem List, Medication List and agree with Tech note.     Yvonne eRnee, OD FAAO FSLS

## 2025-08-01 ENCOUNTER — LAB (OUTPATIENT)
Dept: LAB | Facility: CLINIC | Age: 35
End: 2025-08-01
Attending: ADVANCED PRACTICE MIDWIFE
Payer: COMMERCIAL

## 2025-08-01 ENCOUNTER — MEDICAL CORRESPONDENCE (OUTPATIENT)
Dept: HEALTH INFORMATION MANAGEMENT | Facility: CLINIC | Age: 35
End: 2025-08-01

## 2025-08-01 ENCOUNTER — HOSPITAL ENCOUNTER (OUTPATIENT)
Dept: ULTRASOUND IMAGING | Facility: CLINIC | Age: 35
Discharge: HOME | End: 2025-08-01
Attending: ADVANCED PRACTICE MIDWIFE
Payer: COMMERCIAL

## 2025-08-01 DIAGNOSIS — Z31.5 ENCOUNTER FOR PROCREATIVE GENETIC COUNSELING AND TESTING: ICD-10-CM

## 2025-08-01 DIAGNOSIS — O26.90 PREGNANCY RELATED CONDITION, ANTEPARTUM: ICD-10-CM

## 2025-08-01 DIAGNOSIS — Z31.430 ENCOUNTER OF FEMALE FOR TESTING FOR GENETIC DISEASE CARRIER STATUS FOR PROCREATIVE MANAGEMENT: ICD-10-CM

## 2025-08-01 DIAGNOSIS — Z36.9 ENCOUNTER FOR ANTENATAL SCREENING OF MOTHER: ICD-10-CM

## 2025-08-01 PROCEDURE — 76813 OB US NUCHAL MEAS 1 GEST: CPT

## 2025-08-01 PROCEDURE — 36415 COLL VENOUS BLD VENIPUNCTURE: CPT

## 2025-08-04 ENCOUNTER — TELEPHONE (OUTPATIENT)
Dept: MIDWIFE SERVICES | Facility: CLINIC | Age: 35
End: 2025-08-04

## 2025-08-11 ENCOUNTER — TELEPHONE (OUTPATIENT)
Dept: MATERNAL FETAL MEDICINE | Facility: CLINIC | Age: 35
End: 2025-08-11
Payer: COMMERCIAL

## 2025-08-11 LAB — SCANNED LAB RESULT: NORMAL

## 2025-08-13 ENCOUNTER — TELEPHONE (OUTPATIENT)
Dept: MATERNAL FETAL MEDICINE | Facility: CLINIC | Age: 35
End: 2025-08-13
Payer: COMMERCIAL

## 2025-08-13 LAB — SCANNED LAB RESULT: ABNORMAL

## (undated) RX ORDER — LIDOCAINE HYDROCHLORIDE 10 MG/ML
INJECTION, SOLUTION EPIDURAL; INFILTRATION; INTRACAUDAL; PERINEURAL
Status: DISPENSED
Start: 2017-12-28

## (undated) RX ORDER — EPINEPHRINE 1 MG/ML
INJECTION, SOLUTION, CONCENTRATE INTRAVENOUS
Status: DISPENSED
Start: 2017-12-28